# Patient Record
Sex: FEMALE | Race: WHITE | NOT HISPANIC OR LATINO | Employment: OTHER | URBAN - METROPOLITAN AREA
[De-identification: names, ages, dates, MRNs, and addresses within clinical notes are randomized per-mention and may not be internally consistent; named-entity substitution may affect disease eponyms.]

---

## 2017-01-23 ENCOUNTER — TRANSCRIBE ORDERS (OUTPATIENT)
Dept: SLEEP CENTER | Facility: CLINIC | Age: 67
End: 2017-01-23

## 2017-01-23 DIAGNOSIS — Z99.89 OSA ON CPAP: Primary | ICD-10-CM

## 2017-01-23 DIAGNOSIS — G47.33 OSA ON CPAP: Primary | ICD-10-CM

## 2017-02-21 ENCOUNTER — TRANSCRIBE ORDERS (OUTPATIENT)
Dept: SLEEP CENTER | Facility: CLINIC | Age: 67
End: 2017-02-21

## 2017-02-21 ENCOUNTER — HOSPITAL ENCOUNTER (OUTPATIENT)
Dept: SLEEP CENTER | Facility: CLINIC | Age: 67
Discharge: HOME | End: 2017-02-21
Payer: MEDICARE

## 2017-02-21 DIAGNOSIS — G47.33 OSA ON CPAP: ICD-10-CM

## 2017-02-21 DIAGNOSIS — Z99.89 OSA ON CPAP: ICD-10-CM

## 2017-02-21 DIAGNOSIS — G47.33 OSA (OBSTRUCTIVE SLEEP APNEA): Primary | ICD-10-CM

## 2017-05-02 ENCOUNTER — ALLSCRIPTS OFFICE VISIT (OUTPATIENT)
Dept: OTHER | Facility: OTHER | Age: 67
End: 2017-05-02

## 2017-09-13 ENCOUNTER — ALLSCRIPTS OFFICE VISIT (OUTPATIENT)
Dept: OTHER | Facility: OTHER | Age: 67
End: 2017-09-13

## 2017-09-20 ENCOUNTER — GENERIC CONVERSION - ENCOUNTER (OUTPATIENT)
Dept: OTHER | Facility: OTHER | Age: 67
End: 2017-09-20

## 2017-09-21 ENCOUNTER — GENERIC CONVERSION - ENCOUNTER (OUTPATIENT)
Dept: OTHER | Facility: OTHER | Age: 67
End: 2017-09-21

## 2017-09-21 DIAGNOSIS — M18.12 PRIMARY OSTEOARTHRITIS OF FIRST CARPOMETACARPAL JOINT OF LEFT HAND: ICD-10-CM

## 2017-10-18 ENCOUNTER — GENERIC CONVERSION - ENCOUNTER (OUTPATIENT)
Dept: OTHER | Facility: OTHER | Age: 67
End: 2017-10-18

## 2017-10-18 ENCOUNTER — TRANSCRIBE ORDERS (OUTPATIENT)
Dept: LAB | Facility: CLINIC | Age: 67
End: 2017-10-18

## 2017-10-18 ENCOUNTER — APPOINTMENT (OUTPATIENT)
Dept: LAB | Facility: CLINIC | Age: 67
End: 2017-10-18
Payer: MEDICARE

## 2017-10-18 ENCOUNTER — OFFICE VISIT (OUTPATIENT)
Dept: LAB | Facility: CLINIC | Age: 67
End: 2017-10-18
Payer: MEDICARE

## 2017-10-18 DIAGNOSIS — M18.12 PRIMARY OSTEOARTHRITIS OF FIRST CARPOMETACARPAL JOINT OF LEFT HAND: Primary | ICD-10-CM

## 2017-10-18 DIAGNOSIS — M18.12 PRIMARY OSTEOARTHRITIS OF FIRST CARPOMETACARPAL JOINT OF LEFT HAND: ICD-10-CM

## 2017-10-18 LAB
ATRIAL RATE: 62 BPM
BASOPHILS # BLD AUTO: 0.04 THOUSANDS/ΜL (ref 0–0.1)
BASOPHILS NFR BLD AUTO: 1 % (ref 0–1)
EOSINOPHIL # BLD AUTO: 0.13 THOUSAND/ΜL (ref 0–0.61)
EOSINOPHIL NFR BLD AUTO: 2 % (ref 0–6)
ERYTHROCYTE [DISTWIDTH] IN BLOOD BY AUTOMATED COUNT: 13.3 % (ref 11.6–15.1)
HCT VFR BLD AUTO: 51.8 % (ref 34.8–46.1)
HGB BLD-MCNC: 17.1 G/DL (ref 11.5–15.4)
LYMPHOCYTES # BLD AUTO: 1.69 THOUSANDS/ΜL (ref 0.6–4.47)
LYMPHOCYTES NFR BLD AUTO: 25 % (ref 14–44)
MCH RBC QN AUTO: 31.3 PG (ref 26.8–34.3)
MCHC RBC AUTO-ENTMCNC: 33 G/DL (ref 31.4–37.4)
MCV RBC AUTO: 95 FL (ref 82–98)
MONOCYTES # BLD AUTO: 0.66 THOUSAND/ΜL (ref 0.17–1.22)
MONOCYTES NFR BLD AUTO: 10 % (ref 4–12)
NEUTROPHILS # BLD AUTO: 4.28 THOUSANDS/ΜL (ref 1.85–7.62)
NEUTS SEG NFR BLD AUTO: 62 % (ref 43–75)
P AXIS: 61 DEGREES
PLATELET # BLD AUTO: 118 THOUSANDS/UL (ref 149–390)
PMV BLD AUTO: 11.8 FL (ref 8.9–12.7)
PR INTERVAL: 174 MS
QRS AXIS: -9 DEGREES
QRSD INTERVAL: 104 MS
QT INTERVAL: 406 MS
QTC INTERVAL: 412 MS
RBC # BLD AUTO: 5.47 MILLION/UL (ref 3.81–5.12)
T WAVE AXIS: 29 DEGREES
VENTRICULAR RATE: 62 BPM
WBC # BLD AUTO: 6.8 THOUSAND/UL (ref 4.31–10.16)

## 2017-10-18 PROCEDURE — 85025 COMPLETE CBC W/AUTO DIFF WBC: CPT

## 2017-10-18 PROCEDURE — 36415 COLL VENOUS BLD VENIPUNCTURE: CPT

## 2017-10-18 PROCEDURE — 93005 ELECTROCARDIOGRAM TRACING: CPT

## 2017-10-23 ENCOUNTER — ALLSCRIPTS OFFICE VISIT (OUTPATIENT)
Dept: OTHER | Facility: OTHER | Age: 67
End: 2017-10-23

## 2017-10-23 DIAGNOSIS — R05.9 COUGH: ICD-10-CM

## 2017-10-23 DIAGNOSIS — Z72.0 TOBACCO USE: ICD-10-CM

## 2017-10-23 DIAGNOSIS — E11.65 TYPE 2 DIABETES MELLITUS WITH HYPERGLYCEMIA (HCC): ICD-10-CM

## 2017-10-24 ENCOUNTER — APPOINTMENT (OUTPATIENT)
Dept: RADIOLOGY | Facility: CLINIC | Age: 67
End: 2017-10-24
Payer: MEDICARE

## 2017-10-24 ENCOUNTER — LAB CONVERSION - ENCOUNTER (OUTPATIENT)
Dept: OTHER | Facility: OTHER | Age: 67
End: 2017-10-24

## 2017-10-24 ENCOUNTER — TRANSCRIBE ORDERS (OUTPATIENT)
Dept: RADIOLOGY | Facility: CLINIC | Age: 67
End: 2017-10-24

## 2017-10-24 DIAGNOSIS — R05.9 COUGH: ICD-10-CM

## 2017-10-24 DIAGNOSIS — Z72.0 TOBACCO USE: ICD-10-CM

## 2017-10-24 LAB
A/G RATIO (HISTORICAL): 1.3 (CALC) (ref 1–2.5)
ALBUMIN SERPL BCP-MCNC: 3.9 G/DL (ref 3.6–5.1)
ALP SERPL-CCNC: 112 U/L (ref 33–130)
ALT SERPL W P-5'-P-CCNC: 23 U/L (ref 6–29)
AST SERPL W P-5'-P-CCNC: 17 U/L (ref 10–35)
BILIRUB SERPL-MCNC: 0.3 MG/DL (ref 0.2–1.2)
BUN SERPL-MCNC: 14 MG/DL (ref 7–25)
BUN/CREA RATIO (HISTORICAL): ABNORMAL (CALC) (ref 6–22)
CALCIUM SERPL-MCNC: 9.7 MG/DL (ref 8.6–10.4)
CHLORIDE SERPL-SCNC: 102 MMOL/L (ref 98–110)
CO2 SERPL-SCNC: 22 MMOL/L (ref 20–31)
CREAT SERPL-MCNC: 0.71 MG/DL (ref 0.5–0.99)
EGFR AFRICAN AMERICAN (HISTORICAL): 101 ML/MIN/1.73M2
EGFR-AMERICAN CALC (HISTORICAL): 87 ML/MIN/1.73M2
GAMMA GLOBULIN (HISTORICAL): 2.9 G/DL (CALC) (ref 1.9–3.7)
GLUCOSE (HISTORICAL): 325 MG/DL (ref 65–99)
LYME IGG/IGM AB (HISTORICAL): <0.9 INDEX
POTASSIUM SERPL-SCNC: 4.7 MMOL/L (ref 3.5–5.3)
SODIUM SERPL-SCNC: 136 MMOL/L (ref 135–146)
TOTAL PROTEIN (HISTORICAL): 6.8 G/DL (ref 6.1–8.1)
TSH SERPL DL<=0.05 MIU/L-ACNC: 1 MIU/L (ref 0.4–4.5)

## 2017-10-24 PROCEDURE — 71020 HB CHEST X-RAY 2VW FRONTAL&LATL: CPT

## 2017-10-24 NOTE — PROGRESS NOTES
Assessment  1  Arthritis of carpometacarpal (CMC) joint of left thumb (716 94) (M18 12)   2  Impaired fasting glucose (790 21) (R73 01)   3  Obesity (BMI 30-39 9) (278 00) (E66 9)   4  Secondary polycythemia (289 0) (D75 1)   5  Tobacco use (305 1) (Z72 0)   6  Cough (786 2) (R05)   7  DDD (degenerative disc disease), lumbar (722 52) (M51 36)    Plan  Cough, SocHx: Tobacco use    · * XR CHEST PA & LATERAL; Status:Active; Requested COM:[id number];   DDD (degenerative disc disease), lumbar    · Meloxicam 7 5 MG Oral Tablet; TAKE 1 TABLET Twice daily PRN PAIN    Discussion/Summary    Comments:  She has a history of impaired fasting glucose and has not followed through with her hemoglobin A1c  We obtain that today  He also got CBC CMP TSH and Lyme  She has a persistent cough asked her to go for chest x-ray  We will going to review these results prior to clearing her for the surgical procedure  She understands  In summary then this is a 41-year-old woman who presents today for medical clearance for a arthroplasty of her left first carpometacarpal joint with Dr Laura Forbes  We reviewed her ECG today which shows normal sinus rhythm with incomplete right bundle branch block  She's asymptomatic from a cardiovascular pulmonary standpoint  We did note that she was noted last year during her preop visit to have hyperglycemia  We asked her to go for hemoglobin A1c among other studies which did not occur  We deniz blood for hemoglobin A1c today and we'll get CMP as well a TSH  We'll also get a Lyme titer as she does have arthralgias and is concerned about potential Lyme exposure  Her preop CBC revealed mild polycythemia which was slightly improved from previously  She continues to smoke and we discussed that this is likely secondary polycythemia from smoking and she is again advised that she needs to discontinue this completely  She requests refill of meloxicam which she takes for low back pain and we provided this for her   She has a persistent cough which she believes is a smoker's cough but we have asked her to go for chest x-ray prior to her surgery as well  She agrees  History of Present Illness  Pre-Op Visit (Brief): The patient is being seen for a preoperative visit  The procedure is a(n) Arthroplasty of L CMC joint  scheduled for 11/14/17 with Reji Hightower  The indication for surgery is Persistent pain and limitation of function  Surgical Risk Assessment:   Prior Anesthesia: She had prior anesthesia,-- a prior adverse reaction to general anesthesia-- and-- Anorexia with inhalation agents  Pertinent Past Medical History: wears dentures,-- FRANK,-- obesity-- and-- Dentures, CPAP 18  Full mask, but-- no angina,-- no arrhythmia,-- no CAD,-- no CHF,-- no chronic liver disease,-- no acute hepatitis,-- no coagulation delay,-- no pulmonary embolism,-- no DVT,-- does not use anticoagulants,-- no diabetes,-- does not use insulin,-- no thyroid disease,-- no seizure disorder,-- no CVA,-- no asthma,-- no COPD,-- no renal disease-- and-- no low serum albumin  Exercise Capacity: able to walk four blocks without symptoms-- and-- able to walk two flights of stairs without symptoms  Lifestyle Factors: denies alcohol use, denies tobacco use, denies illegal drug use and Tobacco 3-4 / day  Symptoms: no easy bleeding,-- no easy bruising,-- no frequent nosebleeds,-- no chest pain,-- no dyspnea,-- no edema,-- no palpitations-- and-- no wheezing--    The patient presents with complaints of cough (Clear sputum  No hemoptysis or purulent sputum  )  Pertinent Family History: no pertinent family history  Living Situation: home is secure and supportive and no post-op concerns with her living situation  Review of Systems    Constitutional: no fever,-- no recent weight gain-- and-- no recent weight loss  Cardiovascular: no chest pain,-- no palpitations-- and-- no lower extremity edema     Respiratory: no shortness of breath-- and-- no wheezing--    The patient presents with complaints of cough (As noted in history of present illness  Clear TMs sputum  No hemoptysis and no purulent sputum  No pleuritic chest pain)  Gastrointestinal: No complaints of abdominal pain, no constipation, no nausea or vomiting, no diarrhea, no bloody stools,-- no abdominal pain,-- no constipation-- and-- no diarrhea  Genitourinary: No complaints of dysuria, no incontinence, no pelvic pain, no dysmenorrhea, no vaginal discharge or bleeding-- and-- no incontinence  Musculoskeletal: arthralgias,-- joint swelling-- and-- joint stiffness, but-- as noted in HPI  Integumentary: no rashes  Neurological: no headache-- and-- no dizziness  Active Problems  1  Analgesic use (V58 69) (Z79 899)   2  Arthritis of carpometacarpal (CMC) joint of left thumb (716 94) (M18 12)   3  Cataract of both eyes (366 9) (H26 9)   4  DDD (degenerative disc disease), lumbar (722 52) (M51 36)   5  Facet arthropathy, lumbar (721 3) (M12 88)   6  Impaired fasting glucose (790 21) (R73 01)   7  Lumbosacral radiculopathy (724 4) (M54 17)   8  Obesity (BMI 30-39 9) (278 00) (E66 9)   9  Obstructive sleep apnea of adult (327 23) (G47 33)   10  Sacroiliac pain (724 6) (M53 3)   11  Secondary polycythemia (289 0) (D75 1)   12   Tobacco use (305 1) (Z72 0)    Past Medical History   · History of Acute Bronchitis With Bronchospasm (466 0)   · History of Community acquired pneumonia (5) (J18 9)   · History of acute sinusitis (V12 69) (Z87 09)   · History of asthma (V12 69) (Z87 09)   · History of chronic obstructive lung disease (V12 69) (Z87 09)   · History of viral warts (V12 09) (Z86 19)    Surgical History   · History of Abdominoplasty   · History of Hysterectomy   · History of Liver Surgery    Family History  Mother    · Family history of myocardial infarction (V17 3) (Z80 55)  Father    · Family history of myocardial infarction (V17 3) (Z80 55)  Sister    · Family history of Ovarian cancer  Unknown    · Family history of cardiac disorder (V17 49) (Z82 49)   · Family history of diabetes mellitus (V18 0) (Z83 3)   · Family history of liver cancer (V16 0) (Z80 0)   · Family history of malignant neoplasm of breast (V16 3) (Z80 3)   · Family history of malignant neoplasm of stomach (V16 0) (Z80 0)    Social History   · Former smoker (X80 86) (G47 341)   · Quit smoking (V15 82) (V66 113)   · Social alcohol use (Z78 9)   · Tobacco use (305 1) (Z72 0)    Current Meds   1  Benadryl CAPS Recorded   2  Meloxicam 7 5 MG Oral Tablet; TAKE 1 TABLET Twice daily PRN PAIN;   Therapy: 40AEX2340 to (Evaluate:37Erl6628)  Requested for: 60IBX2061; Last   Rx:63Shq2401 Ordered   3  OxyCODONE HCl - 5 MG Oral Capsule; TAKE 1 CAPSULE EVERY 4  TO 6 HOURS AS   NEEDED FOR BREAKTHROUGH PAIN;   Therapy: 07WJN7636 to (Evaluate:10Qld5892); Last Rx:63Ljf1850 Ordered    Allergies  1  Penicillins    Vitals   Recorded: 49RRC6394 66:98LE   Systolic 758   Diastolic 78   Height 5 ft 0 5 in   Weight 184 lb    BMI Calculated 35 34   BSA Calculated 1 81     Physical Exam    Constitutional   General appearance: Abnormal  -- Overweight and in no apparent distress  Eyes   Conjunctiva and lids: No swelling, erythema or discharge  Ears, Nose, Mouth, and Throat   Otoscopic examination: Tympanic membranes translucent with normal light reflex  Canals patent without erythema  Lips, teeth, and gums: Abnormal  -- Edentulous oral cavity  Oropharynx: Normal with no erythema, edema, exudate or lesions  -- Mucosa is moist and I see no evidence of ulcer or exudate or lesion presently  Neck   Neck: Supple, symmetric, trachea midline, no masses  Thyroid: Normal, no thyromegaly  -- No thyroid enlargement or adenopathy  Pulmonary   Respiratory effort: No increased work of breathing or signs of respiratory distress  Auscultation of lungs: Clear to auscultation  -- Clear to auscultation     Cardiovascular   Auscultation of heart: Normal rate and rhythm, normal S1 and S2, no murmurs  -- Heart rate is regular with a rate in the 60s  There is no murmur or gallop noted  Carotid pulses: 2+ bilaterally  -- Carotid upstroke equal bilaterally and without  Examination of extremities for edema and/or varicosities: Normal  -- No edema  Abdomen   Abdomen: Abnormal  -- Obese abdomen with no tenderness or mass appreciated  Liver and spleen: No hepatomegaly or splenomegaly  -- No organomegaly appreciated  Lymphatic   Palpation of lymph nodes in neck: No lymphadenopathy  Musculoskeletal   Gait and station: Normal     Digits and nails: Normal without clubbing or cyanosis  -- Normal capillary refill without cyanosis  Psychiatric   Orientation to person, place, and time: Normal     Mood and affect: Normal        End of Encounter Meds  1  OxyCODONE HCl - 5 MG Oral Capsule; TAKE 1 CAPSULE EVERY 4  TO 6 HOURS AS   NEEDED FOR BREAKTHROUGH PAIN;   Therapy: 30MYB9798 to (Evaluate:21Kun3997); Last Rx:20Sep2017 Ordered  2  Meloxicam 7 5 MG Oral Tablet; TAKE 1 TABLET Twice daily PRN PAIN;   Therapy: 68YNB5424 to (Evaluate:21Apr2018)  Requested for: 23Oct2017; Last   Rx:23Oct2017 Ordered  3  Benadryl CAPS (DiphenhydrAMINE HCl) Recorded    Future Appointments    Date/Time Provider Specialty Site   11/24/2017 10:10 AM Mehdi Bunch, [facility] Cardenas   11/14/2017 12:15 PM BRIDGETTE Jeff   [facility]     Signatures   Electronically signed by : BRIDGETTE Caban ; Oct 23 2017  2:17PM EST                       (Author)

## 2017-10-25 ENCOUNTER — LAB CONVERSION - ENCOUNTER (OUTPATIENT)
Dept: OTHER | Facility: OTHER | Age: 67
End: 2017-10-25

## 2017-10-25 LAB
CONTACT: (HISTORICAL): NORMAL
HBA1C MFR BLD HPLC: 9.9 % OF TOTAL HGB
TEST INFORMATION (HISTORICAL): NORMAL
TEST NAME (HISTORICAL): NORMAL

## 2017-11-10 ENCOUNTER — ANESTHESIA EVENT (OUTPATIENT)
Dept: PERIOP | Facility: HOSPITAL | Age: 67
End: 2017-11-10

## 2017-11-10 ENCOUNTER — GENERIC CONVERSION - ENCOUNTER (OUTPATIENT)
Dept: OTHER | Facility: OTHER | Age: 67
End: 2017-11-10

## 2017-11-11 ENCOUNTER — APPOINTMENT (OUTPATIENT)
Dept: LAB | Facility: CLINIC | Age: 67
End: 2017-11-11
Payer: MEDICARE

## 2017-11-11 DIAGNOSIS — E11.65 TYPE 2 DIABETES MELLITUS WITH HYPERGLYCEMIA (HCC): ICD-10-CM

## 2017-11-11 LAB
ANION GAP SERPL CALCULATED.3IONS-SCNC: 8 MMOL/L (ref 4–13)
BUN SERPL-MCNC: 12 MG/DL (ref 5–25)
CALCIUM SERPL-MCNC: 9.2 MG/DL (ref 8.3–10.1)
CHLORIDE SERPL-SCNC: 101 MMOL/L (ref 100–108)
CO2 SERPL-SCNC: 29 MMOL/L (ref 21–32)
CREAT SERPL-MCNC: 0.59 MG/DL (ref 0.6–1.3)
GFR SERPL CREATININE-BSD FRML MDRD: 94 ML/MIN/1.73SQ M
GLUCOSE P FAST SERPL-MCNC: 256 MG/DL (ref 65–99)
POTASSIUM SERPL-SCNC: 4.2 MMOL/L (ref 3.5–5.3)
SODIUM SERPL-SCNC: 138 MMOL/L (ref 136–145)

## 2017-11-11 PROCEDURE — 36415 COLL VENOUS BLD VENIPUNCTURE: CPT

## 2017-11-11 PROCEDURE — 80048 BASIC METABOLIC PNL TOTAL CA: CPT

## 2017-11-13 ENCOUNTER — GENERIC CONVERSION - ENCOUNTER (OUTPATIENT)
Dept: OTHER | Facility: OTHER | Age: 67
End: 2017-11-13

## 2017-11-14 ENCOUNTER — ANESTHESIA (OUTPATIENT)
Dept: PERIOP | Facility: HOSPITAL | Age: 67
End: 2017-11-14

## 2017-11-30 ENCOUNTER — ALLSCRIPTS OFFICE VISIT (OUTPATIENT)
Dept: OTHER | Facility: OTHER | Age: 67
End: 2017-11-30

## 2017-12-05 NOTE — PROGRESS NOTES
Assessment    1  Keratosis (701 1) (L57 0)   2  Uncontrolled type 2 diabetes mellitus without complication, without long-term current   use of insulin (250 02) (E11 65)    Plan  Uncontrolled type 2 diabetes mellitus without complication, without long-term current  use of insulin    · GlipiZIDE ER 5 MG Oral Tablet Extended Release 24 Hour; take 1 tablet by mouth  daily 30 MINUTES BEFORE BREAKFAST    Discussion/Summary    In summary then this is a 22-year-old female who presents today for treatment of to keratotic lesions of her right posterior arm  They are both treated with cryotherapy of 45 seconds duration followed by 2nd cycle of 30 seconds duration to each lesion  I believe that they are seborrheic keratoses though cannot rule out actinic versus early squamous cell though I believe this is unlikely  We discussed the expected evolution of these lesions over time  She is asked to return in 2 weeks if they have not completely resolved  She agrees  We also spent significant time examining her recent blood sugar log and discussing her diabetes  She is going to continue on metformin 1000 b i d  She is also asked to begin glipizide ER 5 mg daily  We discussed possibly adding Januvia though she is concerned about the cost is going to investigate this with her insurance plan  She will send a blood sugar log in the next week and we will follow up with her at that time  She agrees  Chief Complaint  I am frustrated  I stay on my diet but my BS stay elevated  Metformin 1000 BID  No GI c/o  FBS recently 120s - 160s though 1 in the 250s  History of Present Illness  Patient is a 22-year-old female who states that she has irritated lesions of her right upper posterior arm which have been present for months which are bothering her more recently  She has had no bleeding  She has had no fevers chills, weight loss, anorexia or constitutional symptoms   She has also been monitoring her blood sugars since beginning metformin a presents her log today which we examined  Typically her blood sugars are significantly improved though they continue to remain in the 140-160 range on a regular basis and occasionally over 200  She states that she is attempting to watch her diet lose weight but is not having much success  She has no cardiovascular pulmonary complaint  Active Problems    1  Analgesic use (V58 69) (Z79 899)   2  Arthritis of carpometacarpal (CMC) joint of left thumb (716 94) (M18 12)   3  Cataract of both eyes (366 9) (H26 9)   4  Cough (786 2) (R05)   5  DDD (degenerative disc disease), lumbar (722 52) (M51 36)   6  Facet arthropathy, lumbar (721 3) (M12 88)   7  Fatigue, unspecified type (780 79) (R53 83)   8  Impaired fasting glucose (790 21) (R73 01)   9  Lumbosacral radiculopathy (724 4) (M54 17)   10  Obesity (BMI 30-39 9) (278 00) (E66 9)   11  Obstructive sleep apnea of adult (327 23) (G47 33)   12  Sacroiliac pain (724 6) (M53 3)   13  Secondary polycythemia (289 0) (D75 1)   14  Tobacco use (305 1) (Z72 0)   15  Uncontrolled type 2 diabetes mellitus without complication, without long-term current    use of insulin (250 02) (E11 65)    Past Medical History    1  History of Acute Bronchitis With Bronchospasm (466 0)   2  History of Community acquired pneumonia (5) (J18 9)   3  History of acute sinusitis (V12 69) (Z87 09)   4  History of asthma (V12 69) (Z87 09)   5  History of chronic obstructive lung disease (V12 69) (Z87 09)   6  History of viral warts (V12 09) (Z86 19)    Surgical History    1  History of Abdominoplasty   2  History of Hysterectomy   3  History of Liver Surgery    Family History  Mother    1  Family history of myocardial infarction (V17 3) (Z82 49)  Father    2  Family history of myocardial infarction (V17 3) (Z82 49)  Sister    3  Family history of Ovarian cancer  Unknown    4  Family history of cardiac disorder (V17 49) (Z82 49)   5   Family history of diabetes mellitus (V18 0) (Z83 3) 6  Family history of liver cancer (V16 0) (Z80 0)   7  Family history of malignant neoplasm of breast (V16 3) (Z80 3)   8  Family history of malignant neoplasm of stomach (V16 0) (Z80 0)    Social History    · Former smoker (N23 96) (L45 011)   · Quit smoking (V15 82) (Z67 177)   · Social alcohol use (Z78 9)   · Tobacco use (305 1) (Z72 0)    Current Meds   1  Accu-Chek Supriya Device; USE AS DIRECTED; Therapy: 01ZQC0961 to (Evaluate:11Nov2017)  Requested for: 35JNH3211; Last   Rx:10Nov2017 Ordered   2  Accu-Chek Supriya Plus In Vitro Strip; TEST TWICE DAILY; Therapy: 83MIU6619 to ([place] Reason)  Requested for: 15ZXR8088; Last   Rx:10Nov2017 Ordered   3  Accu-Chek Multiclix Lancets Miscellaneous; TEST BLOOD SUGAR TWICE DAILY; Therapy: 96AWB2018 to ([place] [facility])  Requested for: 09HMW0777; Last   Rx:10Nov2017 Ordered   4  Benadryl CAPS Recorded   5  Meloxicam 7 5 MG Oral Tablet; TAKE 1 TABLET Twice daily PRN PAIN;   Therapy: 09UJM0165 to (Evaluate:21Apr2018)  Requested for: 23Oct2017; Last   Rx:23Oct2017 Ordered   6  MetFORMIN HCl - 1000 MG Oral Tablet; TAKE 1 TABLET TWICE DAILY; Therapy: 66LZT3712 to (Evaluate:09Wts8979)  Requested for: 16VHO5456; Last   Rx:16Nov2017 Ordered   7  OxyCODONE HCl - 5 MG Oral Capsule; TAKE 1 CAPSULE EVERY 4  TO 6 HOURS AS   NEEDED FOR BREAKTHROUGH PAIN;   Therapy: 95OMH0121 to (Evaluate:10Ngw4487); Last Rx:04Uxb3257 Ordered    Allergies    1  Penicillins    Vitals  Vital Signs    Recorded: 96HLY0201 54:41SF   Systolic 148   Diastolic 70   Height 5 ft 0 5 in   Weight 186 lb    BMI Calculated 35 73   BSA Calculated 1 82     Physical Exam    Constitutional   General appearance: Abnormal   Obese and in no apparent distress  Skin   Examination of the skin for lesions: Abnormal   There are to raised keratotic lesions of the right posterior upper arm  One is approximately 6 mm in diameter in the other is approximately 3 mm in diameter   They are gray, somewhat scaly without erythema or irregularity  The are consistent with keratosis sees though cannot completely rule out squamous cell CA though I doubt this based on appearance  Each lesion was treated with 45 seconds of cryotherapy with 2nd cycle of 30 seconds to each lesion     Psychiatric   Orientation to person, place, and time: Normal     Mood and affect: Normal          Signatures   Electronically signed by : BRIDGETTE Norris ; Nov 30 2017 11:59AM EST                       (Author)

## 2018-01-12 VITALS
WEIGHT: 187 LBS | HEIGHT: 61 IN | HEART RATE: 60 BPM | SYSTOLIC BLOOD PRESSURE: 147 MMHG | BODY MASS INDEX: 35.3 KG/M2 | DIASTOLIC BLOOD PRESSURE: 77 MMHG

## 2018-01-12 VITALS
HEIGHT: 61 IN | SYSTOLIC BLOOD PRESSURE: 128 MMHG | DIASTOLIC BLOOD PRESSURE: 70 MMHG | BODY MASS INDEX: 35.12 KG/M2 | WEIGHT: 186 LBS

## 2018-01-12 NOTE — RESULT NOTES
Verified Results  (1) BASIC METABOLIC PROFILE 19NIG7071 07:22AM Micheal Kumari Order Number: [id number]     Test Name Result Flag Reference   SODIUM 138 mmol/L  136-145   POTASSIUM 4 2 mmol/L  3 5-5 3   Slightly Hemolyzed; Results May be Affected   CHLORIDE 101 mmol/L  100-108   CARBON DIOXIDE 29 mmol/L  21-32   ANION GAP (CALC) 8 mmol/L  4-13   BLOOD UREA NITROGEN 12 mg/dL  5-25   CREATININE 0 59 mg/dL L 0 60-1 30   Standardized to IDMS reference method   CALCIUM 9 2 mg/dL  8 3-10 1   eGFR 94 ml/min/1 73sq m     National Kidney Disease Education Program recommendations are as follows:  GFR calculation is accurate only with a steady state creatinine  Chronic Kidney disease less than 60 ml/min/1 73 sq  meters  Kidney failure less than 15 ml/min/1 73 sq  meters  GLUCOSE FASTING 256 mg/dL H 65-99   Specimen collection should occur prior to Sulfasalazine administration due to the potential for falsely depressed results  Specimen collection should occur prior to Sulfapyridine administration due to the potential for falsely elevated results

## 2018-01-12 NOTE — RESULT NOTES
Verified Results  * XR CHEST PA & LATERAL 32YEZ8113 12:29PM Micheal Kumari Order Number: [id number]     Test Name Result Flag Reference   XR CHEST PA & LATERAL (Report)     CHEST - DUAL ENERGY     INDICATION: Preoperative assessment  Scheduled for orthopedic surgery  History of tobacco use and cough     COMPARISON: 11/12/2008     VIEWS: PA (including soft tissue/bone algorithms) and lateral projections     IMAGES: 4     FINDINGS:        Cardiomediastinal silhouette appears unremarkable  The lungs are clear  No pneumothorax or pleural effusion  Chronic pleural thickening on the right is unchanged     Visualized osseous structures appear within normal limits for the patient's age  IMPRESSION:     No active pulmonary disease  Workstation performed: [id number]     Signed by:   Elif Roche MD   10/24/17     (1) COMPREHENSIVE METABOLIC PANEL 96BGF6228 42:67UF Nke[facility]itanner Faulkner     Test Name Result Flag Reference   GLUCOSE 325 mg/dL H 65-99   Fasting reference interval     For someone without known diabetes, a glucose  value >125 mg/dL indicates that they may have  diabetes and this should be confirmed with a  follow-up test    UREA NITROGEN (BUN) 14 mg/dL  7-25   CREATININE 0 71 mg/dL  0 50-0 99   For patients >52years of age, the reference limit  for Creatinine is approximately 13% higher for people  identified as -American  eGFR NON-AFR   AMERICAN 87 mL/min/1 73m2  > OR = 60   eGFR AFRICAN AMERICAN 101 mL/min/1 73m2  > OR = 60   BUN/CREATININE RATIO   0-79   NOT APPLICABLE (calc)   SODIUM 136 mmol/L  135-146   POTASSIUM 4 7 mmol/L  3 5-5 3   CHLORIDE 102 mmol/L     CARBON DIOXIDE 22 mmol/L  20-31   CALCIUM 9 7 mg/dL  8 6-10 4   PROTEIN, TOTAL 6 8 g/dL  6 1-8 1   ALBUMIN 3 9 g/dL  3 6-5 1   GLOBULIN 2 9 g/dL (calc)  1 9-3 7   ALBUMIN/GLOBULIN RATIO 1 3 (calc)  1 0-2 5   BILIRUBIN, TOTAL 0 3 mg/dL  0 2-1 2   ALKALINE PHOSPHATASE 112 U/L     AST 17 U/L  10-35   ALT 23 U/L  6-29 (Q) LYME DISEASE AB, TOTAL W/REFL WB (IGG, IGM) 23Oct2017 12:00AM Tc Bonilla     Test Name Result Flag Reference   LYME AB SCREEN <0 90 index     Index                Interpretation                     -----                --------------                     < 0 90               Negative                     0  90-1 09            Equivocal                     > 1 09               Positive      As recommended by the Food and Drug Administration   (FDA), all samples with positive or equivocal   results in a Borrelia burgdorferi antibody screen  will be tested using a blot method  Positive or   equivocal screening test results should not be   interpreted as truly positive until verified as such   using a supplemental assay (e g , B  burgdorferi blot)  The screening test and/or blot for B  burgdorferi   antibodies may be falsely negative in early stages  of Lyme disease, including the period when erythema   migrans is apparent  (Q) CBC (H/H, RBC, INDICES, WBC, PLT) 23Oct2017 12:00AM Tc Bonilla     Test Name Result Flag Reference   WHITE BLOOD CELL COUNT 7 7 Thousand/uL  3 8-10 8   RED BLOOD CELL COUNT 5 35 Million/uL H 3 80-5 10   HEMOGLOBIN 17 1 g/dL H 11 7-15 5   HEMATOCRIT 49 4 % H 35 0-45 0   MCV 92 3 fL  80 0-100 0   MCH 32 0 pg  27 0-33 0   MCHC 34 6 g/dL  32 0-36 0   RDW 12 0 %  11 0-15 0   PLATELET COUNT 175 Thousand/uL L 140-400   WE RECEIVED YOUR HANDWRITTEN TEST ORDER AND  PERFORMED A HEMOGRAM WITH A PLATELET WITHOUT  A DIFFERENTIAL  IF THIS IS NOT WHAT YOU INTENDED  TO ORDER, PLEASE CONTACT YOUR LOCAL CLIENT SERVICE  REPRESENTATIVE IMMEDIATELY SO THAT WE CAN   ADJUST OUR BILLING APPROPRIATELY  YOU MAY ALSO   INQUIRE ABOUT ALTERNATIVE OR ADDITIONAL TESTING     MPV 12 5 fL  7 5-12 5           (Q) TSH, 3RD GENERATION 23Oct2017 12:00AM Cesar Hilario     Test Name Result Flag Reference   TSH 1 00 mIU/L  0 40-4 50     (Q) TEST AUTHORIZATION 23Oct2017 12:00AM Cesar Hilario     Test Name Result Flag Reference   TEST(S) ORDERED ON$REQUISITION HEMOGLOBIN A1c     TEST CODE: 496QHO     CLIENT CONTACT: EVERT TREVINO     REPORT ALWAYS MESSAGE$SIGNATURE See Below     The laboratory testing on this patient was verbally requested  or confirmed by the ordering physician or his or her authorized  representative after contact with an employee of [employer]  Federal regulations require that we maintain on file written  authorization for all laboratory testing  Accordingly we are asking  that the ordering physician or his or her authorized representative  sign a copy of this report and promptly return it to the client    Signature:____________________________________________________     (Q) HEMOGLOBIN A1c 23Oct2017 12:00AM Trever Hinkle     Test Name Result Flag Reference   HEMOGLOBIN A1c 9 9 % of total Hgb H <5 7   For someone without known diabetes, a hemoglobin A1c  value of 6 5% or greater indicates that they may have   diabetes and this should be confirmed with a follow-up   test      For someone with known diabetes, a value <7% indicates   that their diabetes is well controlled and a value   greater than or equal to 7% indicates suboptimal   control  A1c targets should be individualized based on   duration of diabetes, age, comorbid conditions, and   other considerations  Currently, no consensus exists regarding use of  hemoglobin A1c for diagnosis of diabetes for children

## 2018-01-12 NOTE — MISCELLANEOUS
Message   Date: 24 Jun 2016 2:49 PM EST, Recorded By: Whitney Ragsdale, Care Coordinator   Phone: [phone]   Reason: Eladia Peguero from [address] [facility] [address] on [place] triage line today at 6808, wanting to verify if Dr Che Krause treated her for work related injury  Requesting CB      I spoke with Marilu Addison, advised I don't see in her chart that it was WC  She just wanted to verify they were billing correctly  ************        Active Problems    1  Acute Bronchitis With Bronchospasm (466 0)   2  Analgesic use (V58 69) (Z79 899)   3  Cataract of both eyes (366 9) (H26 9)   4  Community acquired pneumonia (5) (J18 9)   5  DDD (degenerative disc disease), lumbar (722 52) (M51 36)   6  Facet arthropathy, lumbar (721 3) (M46 96)   7  Lower back pain (724 2) (M54 5)   8  Lumbosacral radiculopathy (724 4) (M54 17)   9  Sacroiliac pain (724 6) (M53 3)   10  Tobacco use (305 1) (Z72 0)   11  Viral warts (078 10) (B07 9)    Current Meds   1  Gabapentin 100 MG Oral Capsule; TAKE TWO CAPSULES BY MOUTH IN THE   MORNING AND THREE  CAPSULES AT BEDTIME; Therapy: 39ABZ9079 to (Evaluate:78Med2780)  Requested for: 26Aph9162; Last   Rx:21Dec2015 Ordered   2  Meloxicam 7 5 MG Oral Tablet; TAKE 1 TABLET Twice daily PRN PAIN;   Therapy: 39XLF7581 to (Evaluate:39Ecb2514)  Requested for: 26STW4454; Last   Rx:23Nov2015 Ordered   3  Oxycodone-Acetaminophen 5-325 MG Oral Tablet; TAKE 1 TABLET EVERY day AS   NEEDED; Therapy: 88WPX1668 to (Evaluate:08Yoo1298); Last Rx:23Nov2015 Ordered   4  ProAir  (90 Base) MCG/ACT Inhalation Aerosol Solution; INHALE 2 PUFFS   EVERY 4-6 HOURS AS NEEDED; Therapy: 07QPP9069 to (Evaluate:01Jun2014)  Requested for: 02KVB9310; Last   Rx:03Mar2014 Ordered    Allergies    1  Penicillins    Signatures   Electronically signed by :  Jil Anne, ; Jun 24 2016  2:51PM EST                       (Author)

## 2018-01-13 VITALS
HEIGHT: 61 IN | DIASTOLIC BLOOD PRESSURE: 78 MMHG | SYSTOLIC BLOOD PRESSURE: 161 MMHG | BODY MASS INDEX: 35.3 KG/M2 | WEIGHT: 187 LBS | HEART RATE: 87 BPM

## 2018-01-13 VITALS
HEIGHT: 61 IN | SYSTOLIC BLOOD PRESSURE: 144 MMHG | BODY MASS INDEX: 34.74 KG/M2 | DIASTOLIC BLOOD PRESSURE: 78 MMHG | WEIGHT: 184 LBS

## 2018-01-13 NOTE — PROGRESS NOTES
Preliminary Nursing Report                Patient Information    Initial Encounter Entry Date:   2017 11:48 AM EST (Automated Transmission Automated Transmission)       Last Modified:   {Thalia Allen}              Legal Name: [id number] Number:        YOB: 1950        Age (years): 69        Gender: F        Body Mass Index (BMI): 37 kg/m2        Height: 60 in  Weight: 189 lbs (86 kgs)           Address:   [address] [place] [address]              Phone: [phone]   (consent to leave messages)        Email:        Ethnicity: Decline to State        Sabianism:        Marital Status:        Preferred Language: English        Race: Other Race                    Patient Insurance Information        Primary Insurance Information Carrier Name: {Primary  CarrierName}           Carrier Address:   {Primary  CarrierAddress}              Carrier Phone: {Primary  CarrierPhone}          Group Number: {Primary  GroupNumber}          Policy Number: {Primary  PolicyNumber}          Insured Name: {Primary  InsuredName}          Insured : {Primary  InsuredDOB}          Relationship to Insured: {Primary  RelationshiptoInsured}           Secondary Insurance Information Carrier Name: {Secondary  CarrierName}           Carrier Address:   {Secondary  CarrierAddress}              Carrier Phone: {Secondary  CarrierPhone}          Group Number: {Secondary  GroupNumber}          Policy Number: {Secondary  PolicyNumber}          Insured Name: {Secondary  InsuredName}          Insured : {Secondary  InsuredDOB}          Relationship to Insured: {Secondary  RelationshiptoInsured}                       Health Profile   Booking #:   Stan Standard #: [id number]               DOS: 2017    Surgery : REPAIR WRIST JOINT(S)    Add'l Procedures/Notes:     Surgery Risk: Intermediate          Precautions     BMI       Obstructive sleep apnea of adult                   Allergies Penicillins             Medications    Benadryl CAPS       Gabapentin 100 MG Oral Capsule       Meloxicam 7 5 MG Oral Tablet       OxyCODONE HCl - 5 MG Oral Capsule               Conditions    Analgesic use       Arthritis of carpometacarpal (CMC) joint of left thumb       Cataract of both eyes       DDD (degenerative disc disease), lumbar       Facet arthropathy, lumbar       Impaired fasting glucose       Limited Exercise Tolerance       Lumbosacral radiculopathy       Obesity (BMI 30-39  9)       Obstructive sleep apnea of adult       Pain of left hand       Sacroiliac pain       Secondary polycythemia       Tobacco use               Family History    None             Surgical History    None             Social History    Former smoker       Quit smoking       Social alcohol use       Tobacco use                               Patient Instructions       Medical Procedure Risk  NPO Instructions   The day before surgery it is recommended to have a light dinner at your usual time and you are allowed a light snack early in the evening  Do not eat anything heavy or eat a big meal after 7pm  Do not eat or drink anything after midnight prior to your surgery  If you are supposed to take any of your medications, do so with a sip of water  Failure to follow these instructions can lead to an increased risk of lung complications and may result in a delay or cancellation of your procedure  If you have any questions, contact your institution for further instructions  No candy, no gum, no mints, no chewing tobacco          Gabapentin 100 MG Oral Capsule  Medication Instruction (Neurological Medication) 8  Please continue to take this medication on your normal schedule  If this is an oral medication and you take in the morning, you may do so with a sip of water           Meloxicam 7 5 MG Oral Tablet  Medication Instruction (NSAID - Pain Medication) 61  Please stop ibuprofen, naproxen and other non-steroidal anti-inflammatory drugs (NSAIDS) for 24 hrs before surgery  OxyCODONE HCl - 5 MG Oral Capsule  Medication Instruction (Opioids - Pain Medication) 62  Please continue the following medications, if needed, up to and including the day of surgery (with a sip of water)  Obstructive sleep apnea of adult  Sleep Apnea   Please notify surgeon and anesthesiologist if you have sleep apnea, severe snoring, or daytime somnolence  For those sleep apnea patients with continuous positive airway pressure (CPAP or BiPAP) machines, please bring your machine to the hospital on the day of surgery  Tobacco use  Smoking Cessation   Smoking before and after surgery can lead to complications  Patients who quit smoking at least eight weeks before surgery have complication rates almost as low as non-smokers  Smokers who can stop smoking 24 or 48 hours before surgery may also benefit from decreased amounts of nicotine and carbon monoxide in the body  For help quitting smoking, speak with your physician or contact the [facility] Ritter or [employer]  Please visit the following web address for assistance with quitting  [employer] be  [email or web address]  [email or web address]  Testing Considerations       ? Complete Blood Count (CBC) t  If test was completed and normal within last six months, repeat test is not necessary  Triggered by: Secondary polycythemia         ? Electrocardiogram (ECG) t  Patient does not need new test if normal ECG is present within the last six months and no change in clinical condition  Triggered by: Obstructive sleep apnea of adult, Age or Facility Rec               Consultations       ? Limited Exercise Tolerance   If a cardiopulmonary issue is suspected then Beta Blocker Therapy, possible initiation of statins, and further cardiovascular testing are recommended if it will    If patient has limited exercise tolerance, please consider likelihood of this being secondary to other causes (e g  hip or knee pain)  Perioperative initiation of statins may be considered in patients with clinical indications who are undergoing elevated-risk procedures  Triggered by: Limited Exercise Tolerance         ? Primary Care Physician Evaluation   Primary care physician may need to evaluate patient prior to surgery  This is likely NOT necessary if the listed conditions are chronic and stable  Triggered by: Secondary polycythemia               Miscellaneous Questions         Question: Are you able to walk up a flight of stairs, walk up a hill or do heavy housework WITHOUT having chest pain or shortness of breath? Answer: NO                   Allergies/Conditions/Medications Not Found        The following were not recognized by our system when generating the recommendations  Please consider if this would impact any preoperative protocols  ? Social alcohol use       ? Benadryl CAPS                  Appointment Information         Date:    11/14/2017        Location:    [facility]        Address:           Directions:                      Footnotes revision 14      ?? Denotes a free-text entry  Legal Disclaimer: Any and all recommendations and services provided herein are designed to assist in the preoperative care of the patient  Nothing contained herein is designed to replace, eliminate or alleviate the responsibility of the attending physician to supervise and determine the patient?s preoperative care and course of treatment  Failure to provide complete, accurate information may negatively impact the system?s ability to recommend the proper preoperative protocol  THE ATTENDING PHYSICIAN IS RESPONSIBLE TO REVIEW THE SUGGESTED PREOPERATIVE PROTOCOLS/COURSE OF TREATMENT AND PRESCRIBE THE FINAL COURSE OF PREOPERATIVE TREATMENT IN CONSULTATION WITH THE PATIENT  THE [employer] SYSTEM AND ITS MATERIALS ARE PROVIDED ? AS IS? WITHOUT WARRANTY OF ANY KIND, EXPRESS OR IMPLIED, INCLUDING, BUT NOT LIMITED TO, WARRANTIES OF PERFORMANCE OR MERCHANTABILITY OR FITNESS FOR A PARTICULAR PURPOSE  PATIENT AND PHYSICIANS HEREBY AGREE THAT THEIR USE OF THE MATERIALS AND RESOURCES ACT AS A CONSENT TO RELEASE AND WAIVE ePREOP FROM ANY AND ALL CLAIMS OF WARRANTY, TORT OR CONTRACT LAW OF ANY KIND  Electronically signed Zane RICE    Sep 25 2017 12:55PM EST

## 2018-01-16 NOTE — CONSULTS
History of Present Illness  Pre-Op Visit (Brief): The patient is being seen for a preoperative visit  The procedure is a(n) Arthroplasty of L CMC joint  scheduled for 11/14/17 with [facility]  The indication for surgery is Persistent pain and limitation of function  Surgical Risk Assessment:   Prior Anesthesia: She had prior anesthesia, a prior adverse reaction to general anesthesia and Anorexia with inhalation agents  Pertinent Past Medical History: wears dentures, FRANK, obesity and Dentures, CPAP 18  Full mask, but no angina, no arrhythmia, no CAD, no CHF, no chronic liver disease, no acute hepatitis, no coagulation delay, no pulmonary embolism, no DVT, does not use anticoagulants, no diabetes, does not use insulin, no thyroid disease, no seizure disorder, no CVA, no asthma, no COPD, no renal disease and no low serum albumin  Exercise Capacity: able to walk four blocks without symptoms and able to walk two flights of stairs without symptoms  Lifestyle Factors: denies alcohol use, denies tobacco use, denies illegal drug use and Tobacco 3-4 / day  Symptoms: no easy bleeding, no easy bruising, no frequent nosebleeds, no chest pain, no dyspnea, no edema, no palpitations and no wheezing    The patient presents with complaints of cough (Clear sputum  No hemoptysis or purulent sputum  )  Pertinent Family History: no pertinent family history  Living Situation: home is secure and supportive and no post-op concerns with her living situation  Review of Systems    Constitutional: no fever, no recent weight gain and no recent weight loss  Cardiovascular: no chest pain, no palpitations and no lower extremity edema  Respiratory: no shortness of breath and no wheezing    The patient presents with complaints of cough (As noted in history of present illness  Clear TMs sputum  No hemoptysis and no purulent sputum  No pleuritic chest pain)     Gastrointestinal: No complaints of abdominal pain, no constipation, no nausea or vomiting, no diarrhea, no bloody stools, no abdominal pain, no constipation and no diarrhea  Genitourinary: No complaints of dysuria, no incontinence, no pelvic pain, no dysmenorrhea, no vaginal discharge or bleeding and no incontinence  Musculoskeletal: arthralgias, joint swelling and joint stiffness, but as noted in HPI  Integumentary: no rashes  Neurological: no headache and no dizziness  Active Problems    1  Analgesic use (V58 69) (Z79 899)   2  Arthritis of carpometacarpal (CMC) joint of left thumb (716 94) (M18 12)   3  Cataract of both eyes (366 9) (H26 9)   4  DDD (degenerative disc disease), lumbar (722 52) (M51 36)   5  Facet arthropathy, lumbar (721 3) (M12 88)   6  Impaired fasting glucose (790 21) (R73 01)   7  Lumbosacral radiculopathy (724 4) (M54 17)   8  Obesity (BMI 30-39 9) (278 00) (E66 9)   9  Obstructive sleep apnea of adult (327 23) (G47 33)   10  Sacroiliac pain (724 6) (M53 3)   11  Secondary polycythemia (289 0) (D75 1)   12   Tobacco use (305 1) (Z72 0)    Past Medical History    · History of Acute Bronchitis With Bronchospasm (466 0)   · History of Community acquired pneumonia (5) (J18 9)   · History of acute sinusitis (V12 69) (Z87 09)   · History of asthma (V12 69) (Z87 09)   · History of chronic obstructive lung disease (V12 69) (Z87 09)   · History of viral warts (V12 09) (Z86 19)    Surgical History    · History of Abdominoplasty   · History of Hysterectomy   · History of Liver Surgery    Family History    · Family history of myocardial infarction (V17 3) (Z82 49)    · Family history of myocardial infarction (V17 3) (Z82 49)    · Family history of Ovarian cancer    · Family history of cardiac disorder (V17 49) (Z82 49)   · Family history of diabetes mellitus (V18 0) (Z83 3)   · Family history of liver cancer (V16 0) (Z80 0)   · Family history of malignant neoplasm of breast (V16 3) (Z80 3)   · Family history of malignant neoplasm of stomach (V16 0) (Z80 0)    Social History    · Former smoker (J02 51) (L71 612)   · Quit smoking (V15 82) (X38 119)   · Social alcohol use (Z78 9)   · Tobacco use (305 1) (Z72 0)    Current Meds   1  Benadryl CAPS Recorded   2  Meloxicam 7 5 MG Oral Tablet; TAKE 1 TABLET Twice daily PRN PAIN;   Therapy: 04RMO0060 to (Evaluate:18Mpa5644)  Requested for: 98MVO6329; Last   Rx:[id number] Ordered   3  OxyCODONE HCl - 5 MG Oral Capsule; TAKE 1 CAPSULE EVERY 4  TO 6 HOURS AS   NEEDED FOR BREAKTHROUGH PAIN;   Therapy: 14OGQ8536 to (Evaluate:59Nks0912); Last Rx:[id number] Ordered    Allergies    1  Penicillins    Vitals  Signs    Systolic: 902  Diastolic: 78  Height: 5 ft 0 5 in  Weight: 184 lb   BMI Calculated: 35 34  BSA Calculated: 1 81    Physical Exam    Constitutional   General appearance: Abnormal   Overweight and in no apparent distress  Eyes   Conjunctiva and lids: No swelling, erythema or discharge  Ears, Nose, Mouth, and Throat   Otoscopic examination: Tympanic membranes translucent with normal light reflex  Canals patent without erythema  Lips, teeth, and gums: Abnormal   Edentulous oral cavity  Oropharynx: Normal with no erythema, edema, exudate or lesions  Mucosa is moist and I see no evidence of ulcer or exudate or lesion presently  Neck   Neck: Supple, symmetric, trachea midline, no masses  Thyroid: Normal, no thyromegaly  No thyroid enlargement or adenopathy  Pulmonary   Respiratory effort: No increased work of breathing or signs of respiratory distress  Auscultation of lungs: Clear to auscultation  Clear to auscultation  Cardiovascular   Auscultation of heart: Normal rate and rhythm, normal S1 and S2, no murmurs  Heart rate is regular with a rate in the 60s  There is no murmur or gallop noted  Carotid pulses: 2+ bilaterally  Carotid upstroke equal bilaterally and without  Examination of extremities for edema and/or varicosities: Normal   No edema     Abdomen   Abdomen: Abnormal   Obese abdomen with no tenderness or mass appreciated  Liver and spleen: No hepatomegaly or splenomegaly  No organomegaly appreciated  Lymphatic   Palpation of lymph nodes in neck: No lymphadenopathy  Musculoskeletal   Gait and station: Normal     Digits and nails: Normal without clubbing or cyanosis  Normal capillary refill without cyanosis  Psychiatric   Orientation to person, place, and time: Normal     Mood and affect: Normal        Assessment    1  Arthritis of carpometacarpal (CMC) joint of left thumb (716 94) (M18 12)   2  Impaired fasting glucose (790 21) (R73 01)   3  Obesity (BMI 30-39 9) (278 00) (E66 9)   4  Secondary polycythemia (289 0) (D75 1)   5  Tobacco use (305 1) (Z72 0)   6  Cough (786 2) (R05)   7  DDD (degenerative disc disease), lumbar (722 52) (M51 36)    Plan  Cough, SocHx: Tobacco use    · * XR CHEST PA & LATERAL; Status:Active; Requested RBY:[id number];   DDD (degenerative disc disease), lumbar    · Meloxicam 7 5 MG Oral Tablet; TAKE 1 TABLET Twice daily PRN PAIN    Discussion/Summary    Comments:  She has a history of impaired fasting glucose and has not followed through with her hemoglobin A1c  We obtain that today  He also got CBC CMP TSH and Lyme  She has a persistent cough asked her to go for chest x-ray  We will going to review these results prior to clearing her for the surgical procedure  She understands  In summary then this is a 25-year-old woman who presents today for medical clearance for a arthroplasty of her left first carpometacarpal joint with Dr Ivelisse Henao  We reviewed her ECG today which shows normal sinus rhythm with incomplete right bundle branch block  She's asymptomatic from a cardiovascular pulmonary standpoint  We did note that she was noted last year during her preop visit to have hyperglycemia  We asked her to go for hemoglobin A1c among other studies which did not occur  We deniz blood for hemoglobin A1c today and we'll get CMP as well a TSH   We'll also get a Lyme titer as she does have arthralgias and is concerned about potential Lyme exposure  Her preop CBC revealed mild polycythemia which was slightly improved from previously  She continues to smoke and we discussed that this is likely secondary polycythemia from smoking and she is again advised that she needs to discontinue this completely  She requests refill of meloxicam which she takes for low back pain and we provided this for her  She has a persistent cough which she believes is a smoker's cough but we have asked her to go for chest x-ray prior to her surgery as well  She agrees  End of Encounter Meds    1  OxyCODONE HCl - 5 MG Oral Capsule; TAKE 1 CAPSULE EVERY 4  TO 6 HOURS AS   NEEDED FOR BREAKTHROUGH PAIN;   Therapy: 80BHV9050 to (Evaluate:59Vbx8946); Last Rx:[id number] Ordered    2  Meloxicam 7 5 MG Oral Tablet; TAKE 1 TABLET Twice daily PRN PAIN;   Therapy: [id number] to (Evaluate:80Kkf6411)  Requested for: 23Oct2017; Last   Rx:[id number] Ordered    3   Benadryl CAPS (DiphenhydrAMINE HCl) Recorded    Signatures   Electronically signed by : BRIDGETTE Diallo ; Oct 23 2017  2:17PM EST                       (Author)

## 2018-01-22 ENCOUNTER — GENERIC CONVERSION - ENCOUNTER (OUTPATIENT)
Dept: OTHER | Facility: OTHER | Age: 68
End: 2018-01-22

## 2018-01-22 ENCOUNTER — ALLSCRIPTS OFFICE VISIT (OUTPATIENT)
Dept: OTHER | Facility: OTHER | Age: 68
End: 2018-01-22

## 2018-01-22 VITALS
DIASTOLIC BLOOD PRESSURE: 73 MMHG | WEIGHT: 189 LBS | HEIGHT: 61 IN | SYSTOLIC BLOOD PRESSURE: 149 MMHG | HEART RATE: 62 BPM | BODY MASS INDEX: 35.68 KG/M2

## 2018-01-22 DIAGNOSIS — Z12.11 ENCOUNTER FOR SCREENING FOR MALIGNANT NEOPLASM OF COLON: ICD-10-CM

## 2018-01-22 DIAGNOSIS — Z12.31 ENCOUNTER FOR SCREENING MAMMOGRAM FOR MALIGNANT NEOPLASM OF BREAST: ICD-10-CM

## 2018-01-22 DIAGNOSIS — E11.65 TYPE 2 DIABETES MELLITUS WITH HYPERGLYCEMIA (HCC): ICD-10-CM

## 2018-01-23 NOTE — PROGRESS NOTES
Assessment   1  Uncontrolled type 2 diabetes mellitus without complication, without long-term current use     of insulin (250 02) (E11 65)  2  Sacroiliac pain (724 6) (M53 3)    Plan   Breast cancer screening, high risk patient, Screening for colon cancer    · * MAMMO SCREENING BILATERAL W CAD; Status:Hold For - Scheduling; Requested    for:22Jan2018;   DDD (degenerative disc disease), lumbar    · Renew: Gabapentin 100 MG Oral Capsule; TAKE TWO CAPSULES BY MOUTH IN THE    MORNING AND THREE  CAPSULES AT BEDTIME  Lumbosacral radiculopathy    · Renew: OxyCODONE HCl - 5 MG Oral Capsule; TAKE 1 CAPSULE EVERY 4  TO 6    HOURS AS NEEDED FOR BREAKTHROUGH PAIN  Screening for colon cancer    · COLONOSCOPY; Status:Active; Requested for:22Jan2018; Uncontrolled type 2 diabetes mellitus without complication, without long-term current use    of insulin    · (1) CBC/ PLT (NO DIFF); Status:Active; Requested for:22Jan2018;    · (1) COMPREHENSIVE METABOLIC PANEL; Status:Active; Requested for:22Jan2018;    · (1) HEMOGLOBIN A1C; Status:Active; Requested for:22Jan2018;    · (1) LIPID PANEL, FASTING; Status:Active; Requested for:22Jan2018; Discussion/Summary      In summary this is a 69 year female who presents with acute radicular right lower back pain  She has a history of lumbosacral DDD with epidural steroid injections through pain management  Previously she received relief with gabapentin and oxycodone will provide her with these prescriptions today  She is going to use ice and rest  She is asked to call in 2 days with report of her condition  We also reviewed her recent blood sugars which shows much improved control  Going to have her go for hemoglobin A1c as well as CBC CMP and lipids next week  Will follow up with her when these results are available  Chief Complaint   I woke up at 120 with such sharp pain in my R lower side  No history of same on R but has on L  Dr Phill Almaraz started me on neurontin which seemed to help 2015  Yesterday no lifting, bending, etc  No incontinence  No weakness  No fever, chills, etc  No urinary sx  I get shaky if my BS is below 90  History of Present Illness   HPI: The patient is a 24-year-old obese female recently diagnosed diabetic presents with development of acute right lower back pain with radicular symptoms since the early morning hours  She has had no weakness and no incontinence  She does have a history of lumbosacral DDD with treatment by pain management 2015 with opioid analgesia as well as Neurontin and epidural steroid injections  She states that she recalls no precipitating injury yesterday but awoke with acutely this morning  She also presents her blood sugar log for review  She states that she has been feeling improved and feels that her sugars are under much improved control  Review of Systems        Constitutional: no fever,-- no recent weight gain-- and-- no recent weight loss  Gastrointestinal: No incontinence of bowel, but-- no constipation-- and-- no diarrhea  Genitourinary: No urinary frequency, but-- no dysuria-- and-- no incontinence  Musculoskeletal: Right-sided radicular lower back pain, but-- as noted in HPI  Integumentary: no rashes  Neurological: no headache  Active Problems   1  Analgesic use (V58 69) (Z79 899)  2  Arthritis of carpometacarpal (CMC) joint of left thumb (716 94) (M18 12)  3  Breast cancer screening, high risk patient (V76 11) (Z12 31)  4  Cataract of both eyes (366 9) (H26 9)  5  Cough (786 2) (R05)  6  DDD (degenerative disc disease), lumbar (722 52) (M51 36)  7  Facet arthropathy, lumbar (721 3) (M46 96)  8  Fatigue, unspecified type (780 79) (R53 83)  9  Impaired fasting glucose (790 21) (R73 01)  10  Keratosis (701 1) (L57 0)  11  Lumbosacral radiculopathy (724 4) (M54 17)  12  Obesity (BMI 30-39 9) (278 00) (E66 9)  13  Obstructive sleep apnea of adult (327 23) (G47 33)  14  Sacroiliac pain (724 6) (M53 3)  15  Screening for colon cancer (V76 51) (Z12 11)  16  Secondary polycythemia (289 0) (D75 1)  17  Tobacco use (305 1) (Z72 0)  18  Uncontrolled type 2 diabetes mellitus without complication, without long-term current use      of insulin (250 02) (E11 65)    Past Medical History   1  History of Acute Bronchitis With Bronchospasm (466 0)  2  History of Community acquired pneumonia (5) (J18 9)  3  History of acute sinusitis (V12 69) (Z87 09)  4  History of asthma (V12 69) (Z87 09)  5  History of chronic obstructive lung disease (V12 69) (Z87 09)  6  History of viral warts (V12 09) (Z86 19)    Family History   Mother   1  Family history of myocardial infarction (V17 3) (Z82 49)  Father   2  Family history of myocardial infarction (V17 3) (Z82 49)  Sister   3  Family history of Ovarian cancer  Unknown   4  Family history of cardiac disorder (V17 49) (Z82 49)  5  Family history of diabetes mellitus (V18 0) (Z83 3)  6  Family history of liver cancer (V16 0) (Z80 0)  7  Family history of malignant neoplasm of breast (V16 3) (Z80 3)  8  Family history of malignant neoplasm of stomach (V16 0) (Z80 0)    Social History    · Former smoker (B58 68) (Q36 853)   · Quit smoking (V15 82) (J63 631)   · Social alcohol use (Z78 9)   · Tobacco use (305 1) (Z72 0)    Surgical History   1  History of Abdominoplasty  2  History of Hysterectomy  3  History of Liver Surgery    Current Meds   1  Accu-Chek Supriya Device; USE AS DIRECTED; Therapy: 81QVD6936 to (Evaluate:11Nov2017)  Requested for: 16JXA5886; Last     Rx:10Nov2017 Ordered  2  Accu-Chek Supriya Plus In Vitro Strip; TEST WITH ONE STRIP  FOUR TIMES DAILY; Therapy: 50ZKS6073 to (Last Rx:81Tud2489)  Requested for: 27HJR8329 Ordered  3  Accu-Chek Softclix Lancets Miscellaneous; Check BS 4 times daily; Therapy: 05ZWV0463 to (Last Rx:18Ery5440)  Requested for: 58HEL8486 Ordered  4  Benadryl CAPS Recorded  5   GlipiZIDE ER 5 MG Oral Tablet Extended Release 24 Hour; take 1 tablet by mouth daily     30 MINUTES BEFORE BREAKFAST; Therapy: 44XTX9603 to (Evaluate:18Jun2018)  Requested for: 75Qpk0652; Last     Rx:94Htw5717 Ordered  6  Meloxicam 7 5 MG Oral Tablet; TAKE 1 TABLET Twice daily PRN PAIN;     Therapy: 57YLT4380 to (Evaluate:18Jun2018)  Requested for: 39Tmb8420; Last     Rx:59Hle8323 Ordered  7  MetFORMIN HCl - 1000 MG Oral Tablet; TAKE 1 TABLET TWICE DAILY; Therapy: 04ERX9569 to (Evaluate:18Jun2018)  Requested for: 20Hkq6969; Last     Rx:28Vkl4134 Ordered  8  OxyCODONE HCl - 5 MG Oral Capsule; TAKE 1 CAPSULE EVERY 4  TO 6 HOURS AS     NEEDED FOR BREAKTHROUGH PAIN;     Therapy: 78KQM1341 to (Evaluate:24Sep2017); Last Rx:75Lui9329 Ordered    Allergies   Denied   1  Penicillins    Vitals    Recorded: 61PLB9610 09:48AM   Temperature 01 6 F   Systolic 992   Diastolic 74   Weight 604 lb    BMI Calculated 36 5   BSA Calculated 1 84     Physical Exam        Constitutional      General appearance: Abnormal  -- Obese in moderate distress from lower back pain  Musculoskeletal      Gait and station: Abnormal  -- Antalgic gait favoring right  Digits and nails: Normal without clubbing or cyanosis  -- No clubbing or cyanosis  Neurologic      Reflexes: 2+ and symmetric  -- Normal DTRs at knees and ankles  Psychiatric      Orientation to person, place, and time: Normal        Mood and affect: Abnormal  -- Mildly anxious appearance and in pain  Results/Data   PHQ-2 Adult Depression Screening 22Jan2018 09:56AM User, s      Test Name Result Flag Reference   PHQ-2 Adult Depression Score 2     Over the last two weeks, how often have you been bothered by any of the following problems?      Little interest or pleasure in doing things: Several days - 1     Feeling down, depressed, or hopeless: Several days - 1   PHQ-2 Adult Depression Screening Negative          Signatures    Electronically signed by : BRIDGETTE Kelly ; Jan 22 2018 10:41AM EST                       (Author)

## 2018-01-31 DIAGNOSIS — M54.16 LUMBAR RADICULITIS: Primary | ICD-10-CM

## 2018-01-31 RX ORDER — OXYCODONE HYDROCHLORIDE 5 MG/1
1 CAPSULE ORAL
COMMUNITY
Start: 2017-09-20 | End: 2018-01-31 | Stop reason: SDUPTHER

## 2018-01-31 RX ORDER — MELOXICAM 7.5 MG/1
1 TABLET ORAL 2 TIMES DAILY PRN
COMMUNITY
Start: 2014-09-05 | End: 2018-01-31

## 2018-01-31 RX ORDER — OXYCODONE HYDROCHLORIDE 5 MG/1
5 CAPSULE ORAL EVERY 6 HOURS PRN
Qty: 30 CAPSULE | Refills: 0 | Status: SHIPPED | OUTPATIENT
Start: 2018-01-31 | End: 2018-02-16 | Stop reason: ALTCHOICE

## 2018-01-31 RX ORDER — GLIPIZIDE 5 MG/1
TABLET, FILM COATED, EXTENDED RELEASE ORAL
COMMUNITY
Start: 2017-11-30 | End: 2018-05-08 | Stop reason: SDUPTHER

## 2018-01-31 RX ORDER — LANCETS
EACH MISCELLANEOUS
COMMUNITY
Start: 2017-11-10 | End: 2018-04-02 | Stop reason: SDUPTHER

## 2018-01-31 RX ORDER — GABAPENTIN 100 MG/1
CAPSULE ORAL 3 TIMES DAILY
COMMUNITY
Start: 2015-02-05 | End: 2018-02-16 | Stop reason: ALTCHOICE

## 2018-01-31 NOTE — PROGRESS NOTES
I spoke with the patient today  She continues to have severe right-sided sciatica radiating down her leg to her foot  She requests a refill of oxycodone as this seems to be the only thing that is affective for her  The increased dose of gabapentin to 300 b i d  nor does her meloxicam   I told her that I would agree to refill her oxycodone for now but she must contact her pain management specialist, Dr Dixon Pepper for re-evaluation and she agrees to do this  She has no incontinence of bowel or bladder weakness weight loss night sweats [employer] presently  She does note that she is losing her appetite because the pain is severe and she is not sleeping well either

## 2018-02-13 ENCOUNTER — HOSPITAL ENCOUNTER (EMERGENCY)
Facility: HOSPITAL | Age: 68
Discharge: HOME | End: 2018-02-13
Attending: EMERGENCY MEDICINE | Admitting: EMERGENCY MEDICINE
Payer: MEDICARE

## 2018-02-13 ENCOUNTER — APPOINTMENT (EMERGENCY)
Dept: CT IMAGING | Facility: HOSPITAL | Age: 68
End: 2018-02-13
Payer: MEDICARE

## 2018-02-13 VITALS
WEIGHT: 183 LBS | RESPIRATION RATE: 18 BRPM | BODY MASS INDEX: 35.15 KG/M2 | SYSTOLIC BLOOD PRESSURE: 176 MMHG | HEART RATE: 58 BPM | DIASTOLIC BLOOD PRESSURE: 76 MMHG | OXYGEN SATURATION: 97 % | TEMPERATURE: 97.7 F

## 2018-02-13 DIAGNOSIS — M19.90 DJD (DEGENERATIVE JOINT DISEASE): Primary | ICD-10-CM

## 2018-02-13 DIAGNOSIS — M54.30 SCIATICA: ICD-10-CM

## 2018-02-13 PROCEDURE — 72131 CT LUMBAR SPINE W/O DYE: CPT

## 2018-02-13 PROCEDURE — 99284 EMERGENCY DEPT VISIT MOD MDM: CPT

## 2018-02-13 PROCEDURE — 96372 THER/PROPH/DIAG INJ SC/IM: CPT

## 2018-02-13 RX ORDER — KETOROLAC TROMETHAMINE 30 MG/ML
30 INJECTION, SOLUTION INTRAMUSCULAR; INTRAVENOUS ONCE
Status: COMPLETED | OUTPATIENT
Start: 2018-02-13 | End: 2018-02-13

## 2018-02-13 RX ORDER — PREDNISONE 20 MG/1
40 TABLET ORAL DAILY
Qty: 10 TABLET | Refills: 0 | Status: SHIPPED | OUTPATIENT
Start: 2018-02-13 | End: 2018-02-18

## 2018-02-13 RX ORDER — TRAMADOL HYDROCHLORIDE 50 MG/1
50 TABLET ORAL EVERY 6 HOURS PRN
Qty: 20 TABLET | Refills: 0 | Status: SHIPPED | OUTPATIENT
Start: 2018-02-13 | End: 2018-02-16

## 2018-02-13 RX ADMIN — KETOROLAC TROMETHAMINE 30 MG: 30 INJECTION, SOLUTION INTRAMUSCULAR at 11:12

## 2018-02-13 NOTE — ED NOTES
D/C instructions discussed  Educated on f/u, medication, and s/s of when to return to the ED  All questions answered  Ambulatory off unit with steady gait and no s/s of acute distress        Italo Patricia RN  02/13/18 9737

## 2018-02-13 NOTE — ED PROVIDER NOTES
History  Chief Complaint   Patient presents with    Back Pain     Pt complains of low back/sciatic pain that has been going on for the past 3 weeks  Pt states that she has seen her family doctor and the percocet pills he prescribed are not working for her  Pt states that the pain goes from her back down leg and sometiems goes from groin to leg  History provided by:  Patient  Back Pain   Location:  Lumbar spine  Quality:  Aching  Radiates to:  R posterior upper leg  Pain severity:  Moderate  Onset quality:  Gradual  Timing:  Constant  Progression:  Worsening  Chronicity:  New  Relieved by:  Being still  Worsened by:  Ambulation, bending, palpation and sitting  Ineffective treatments:  None tried  Associated symptoms: leg pain    Associated symptoms: no abdominal pain, no abdominal swelling, no bladder incontinence, no bowel incontinence, no chest pain, no dysuria, no fever, no headaches, no numbness, no paresthesias, no perianal numbness, no tingling and no weakness        Prior to Admission Medications   Prescriptions Last Dose Informant Patient Reported? Taking?    ACCU-CHEK SOFTCLIX LANCETS lancets   Yes No   Sig: by Does not apply route   Blood Glucose Monitoring Suppl (ACCU-CHEK JESS CONNECT) w/Device KIT   Yes No   Sig: by Does not apply route   Glucose Blood (ACCU-CHEK JESS PLUS VI)   Yes No   Sig: by In Vitro route   diphenhydrAMINE (BENADRYL) 25 mg capsule   Yes Yes   Sig: Take 25 mg by mouth every 6 (six) hours as needed for itching   gabapentin (NEURONTIN) 100 mg capsule   Yes Yes   Sig: Take by mouth 3 (three) times a day     glipiZIDE (GLUCOTROL XL) 5 mg 24 hr tablet   Yes Yes   Sig: Take by mouth   meloxicam (MOBIC) 7 5 mg tablet   Yes Yes   Sig: Take 7 5 mg by mouth 2 (two) times a day   metFORMIN (GLUCOPHAGE) 1000 MG tablet   Yes Yes   Sig: Take 1 tablet by mouth 2 (two) times a day   oxyCODONE (OXY-IR) 5 MG capsule   No Yes   Sig: Take 1 capsule (5 mg total) by mouth every 6 (six) hours as needed for moderate pain Max Daily Amount: 20 mg      Facility-Administered Medications: None       Past Medical History:   Diagnosis Date    Diabetes mellitus ([address] )     Liver disease     Sleep apnea        Past Surgical History:   Procedure Laterality Date    ABDOMINAL SURGERY      CHOLECYSTECTOMY      COSMETIC SURGERY      FINGER SURGERY      HAND SURGERY      HYSTERECTOMY      LIVER SURGERY      HI REPAIR INTERCARP/CARP-METACARP JT Right 9/9/2016    Procedure: THUMB TRAPEZIECTOMY; BASAL JOINT ARTHROPLASTY WITH APL AUTOGRAFT;  Surgeon: Kushal Vincent MD;  Location: [facility];  Service: Orthopedics       History reviewed  No pertinent family history  I have reviewed and agree with the history as documented  Social History   Substance Use Topics    Smoking status: Current Every Day Smoker     Packs/day: 0 20    Smokeless tobacco: Never Used    Alcohol use No        Review of Systems   Constitutional: Negative for chills and fever  HENT: Negative for rhinorrhea, sore throat and trouble swallowing  Eyes: Negative for pain  Respiratory: Negative for cough, shortness of breath, wheezing and stridor  Cardiovascular: Negative for chest pain and leg swelling  Gastrointestinal: Negative for abdominal pain, bowel incontinence, diarrhea and nausea  Endocrine: Negative for polyuria  Genitourinary: Negative for bladder incontinence, dysuria, flank pain and urgency  Musculoskeletal: Positive for back pain  Negative for joint swelling, myalgias and neck stiffness  Skin: Negative for rash  Allergic/Immunologic: Negative for immunocompromised state  Neurological: Negative for dizziness, tingling, syncope, weakness, numbness, headaches and paresthesias  Psychiatric/Behavioral: Negative for confusion and suicidal ideas  All other systems reviewed and are negative        Physical Exam  ED Triage Vitals [02/13/18 1047]   Temperature Pulse Respirations Blood Pressure SpO2   97 7 °F (36 5 °C) 75 18 (!) 182/82 92 %      Temp Source Heart Rate Source Patient Position - Orthostatic VS BP Location FiO2 (%)   Oral Monitor Lying Right arm --      Pain Score       Worst Possible Pain           Orthostatic Vital Signs  Vitals:    02/13/18 1047   BP: (!) 182/82   Pulse: 75   Patient Position - Orthostatic VS: Lying       Physical Exam   Constitutional: She is oriented to person, place, and time  She appears well-developed and well-nourished  HENT:   Head: Normocephalic and atraumatic  Eyes: EOM are normal  Pupils are equal, round, and reactive to light  Neck: Normal range of motion  Neck supple  Cardiovascular: Normal rate and regular rhythm  Exam reveals no friction rub  No murmur heard  Pulmonary/Chest: Breath sounds normal  No respiratory distress  She has no wheezes  She has no rales  Abdominal: Soft  Bowel sounds are normal  She exhibits no distension  There is no tenderness  Musculoskeletal: Normal range of motion  She exhibits tenderness  She exhibits no edema  Lumbar back: She exhibits tenderness, bony tenderness, pain and spasm  Back:    Neurological: She is alert and oriented to person, place, and time  Skin: Skin is warm  No rash noted  Psychiatric: She has a normal mood and affect  Nursing note and vitals reviewed  ED Medications  Medications   ketorolac (TORADOL) injection 30 mg (30 mg Intramuscular Given 2/13/18 1112)       Diagnostic Studies  Results Reviewed     None                 CT lumbar spine without contrast   Final Result by Marivel Giordano MD (02/13 1227)      Multilevel degenerative disc and facet disease with progression of disease since August 13, 2014  L3-4: Potential for exiting right L3 radiculopathy  New mild spinal canal stenosis  L4-5: Potential for exiting left L4 radiculopathy  L5-S1: Potential for bilateral exiting L5 radiculopathy left greater than right                    Workstation performed: [id number] Procedures  Procedures       Phone Contacts  ED Phone Contact    ED Course  ED Course                                MDM  Number of Diagnoses or Management Options  DJD (degenerative joint disease): new and requires workup  Sciatica: new and requires workup  Diagnosis management comments: No spinous process tenderness, hypertonicity paraspinal musculature consistent with acute muscle spasm , +2 patella and Achilles reflex bilaterally  Normal sensation normal muscle strength bilateral lower extremities    Denies history of severe or worsening lower extremity weakness/numbness  Denies history of saddle anesthesia/perineal anesthesia  Denies bowel or bladder incontinence/retention  History does not suggest diagnosis of cauda equina syndrome  Patient denies history of IVDA, denies history of fevers, no recent surgeries or any procedures to suggest a transient bacteremia leading to a diagnosis of subdural abscess  Denies history of blood thinner use with recent history of lumbar puncture or any violation of the epidural space to suggest history of epidural hematoma  Therefore these above diagnoses (cauda equina syndrome, epidural abscess, epidural hematoma) were not pursued with diagnostic imaging  Worsening degenerative disc disease joint disease on the CT scan  Will need outpatient management has appointment with pain management the next several days steroids and pain medicine to be given as an outpatient  Pt re-examined and evaluated after testing and treatment  Spoke with the patient and feeling improved and sxs have resolved  Will discharge home with close f/u with pcp and instructed to return to the ED if sxs worsen or continue  Pt agrees with the plan for discharge and feels comfortable to go home with proper f/u  Advised to return for worsening or additional problems  Diagnostic tests were reviewed and questions answered  Diagnosis, care plan and treatment options were discussed   The patient understand instructions and will follow up as directed  Amount and/or Complexity of Data Reviewed  Tests in the radiology section of CPT®: ordered and reviewed  Review and summarize past medical records: yes      CritCare Time    Disposition  Final diagnoses:   DJD (degenerative joint disease)   Sciatica     Time reflects when diagnosis was documented in both MDM as applicable and the Disposition within this note     Time User Action Codes Description Comment    2/13/2018 12:36 PM Saman Coleman Add [M19 90] DJD (degenerative joint disease)     2/13/2018 12:36 PM Saman Coleman Add [M54 30] Sciatica       ED Disposition     ED Disposition Condition Comment    Discharge  [address] [facility] [address] discharge to home/self care  Condition at discharge: Stable        Follow-up Information    None       Patient's Medications   Discharge Prescriptions    PREDNISONE 20 MG TABLET    Take 2 tablets (40 mg total) by mouth daily for 5 days       Start Date: 2/13/2018 End Date: 2/18/2018       Order Dose: 40 mg       Quantity: 10 tablet    Refills: 0    TRAMADOL (ULTRAM) 50 MG TABLET    Take 1 tablet (50 mg total) by mouth every 6 (six) hours as needed for moderate pain for up to 10 days       Start Date: 2/13/2018 End Date: 2/23/2018       Order Dose: 50 mg       Quantity: 20 tablet    Refills: 0     No discharge procedures on file      ED Provider  Electronically Signed by           Thuy Morel DO  02/13/18 1318

## 2018-02-13 NOTE — DISCHARGE INSTRUCTIONS
Osteoarthritis, Ambulatory Care   GENERAL INFORMATION:   Osteoarthritis  occurs when cartilage (tissue that cushions a joint) wears away slowly and causes the bones to rub together  Osteoarthritis (OA) is a long-term condition that often affects the hands, neck, lower back, knees, and hips  OA is also called arthrosis or degenerative joint disease  Common symptoms include the following:   · Joint pain that gets worse when you move the joint     · Joint stiffness that decreases after you move the joint     · Decreased range of movement     · Hard, bony enlargement on your fingers or toes    · A grinding or cracking sound when you move your joint  Seek immediate care for the following symptoms:   · Severe pain    · Not able to move your joint  Treatment for osteoarthritis  may include any of the following:  · Acetaminophen  is used to decrease pain  It is available without a doctor's order  Ask how much to take and how often to take it  Follow directions  Acetaminophen can cause liver damage if not taken correctly  · NSAIDs  help decrease swelling and pain or fever  This medicine is available with or without a doctor's order  NSAIDs can cause stomach bleeding or kidney problems in certain people  If you take blood thinner medicine, always ask your healthcare provider if NSAIDs are safe for you  Always read the medicine label and follow directions  · Capsaicin cream  may help decrease pain in your joint  · Prescription pain medicine  may be given to decrease severe pain if other medicines do not work  Take the medicine as directed  Do not wait until the pain is severe before you take your medicine  · A steroid injection  may be given if your symptoms get worse  · Physical therapy  may be ordered by your healthcare provider  A physical therapist teaches you exercises to help improve movement and strength, and to decrease pain in your joints       · Surgery  may be needed if other treatments do not work   Manage osteoarthritis   · Stay active  Physical activity may reduce your pain and improve your ability to do daily activities  Avoid activities that cause pain  Ask your healthcare provider what type of exercise would be best for you  · Maintain a healthy weight  This helps decrease the strain on the joints in your back, hips, knees, ankles, and feet  Ask your healthcare provider how much you should weigh  Ask him to help you create a weight loss plan if you are overweight  · Use heat or ice  on your joints as directed  Heat and ice help decrease pain, swelling, and muscle spasms  Use a heating pad on a low setting or take a warm bath  Use an ice pack, or put crushed ice in a plastic bag  Cover it with a towel  · Massage  the muscles around the joint to relieve pain and stiffness  · Use a cane, crutches, or a walker  to protect and relieve pressure on your ankle, knee, and hip joints  You may also be prescribed shoe inserts to decrease pressure in your joints  · Wear flat or low-heeled shoes  This will help decrease pain and reduce pressure on your ankle, knee, and hip joints  Follow up with your healthcare provider as directed:  Write down your questions so you remember to ask them during your visits  CARE AGREEMENT:   You have the right to help plan your care  Learn about your health condition and how it may be treated  Discuss treatment options with your caregivers to decide what care you want to receive  You always have the right to refuse treatment  The above information is an  only  It is not intended as medical advice for individual conditions or treatments  Talk to your doctor, nurse or pharmacist before following any medical regimen to see if it is safe and effective for you  © 2014 [employer] [employer] is for End User's use only and may not be sold, redistributed or otherwise used for commercial purposes   All illustrations and images included in [facility] are the copyrighted property of A D A M , Inc  or Cedric Soto  Osteoarthritis   WHAT YOU NEED TO KNOW:   Osteoarthritis (OA) occurs when cartilage (tissue that cushions a joint) wears away slowly and causes the bones to rub together  OA is a long-term condition that often affects the hands, neck, lower back, knees, and hips  OA is also called arthrosis or degenerative joint disease  DISCHARGE INSTRUCTIONS:   Return to the emergency department if:   · You have severe pain  · You cannot move your joint  Contact your healthcare provider if:   · You have a fever  · Your joint is red and tender  · You have questions or concerns about your condition or care  Medicines:   · Acetaminophen  is used to decrease pain  It is available without a doctor's order  Ask how much to take and how often to take it  Follow directions  Acetaminophen can cause liver damage if not taken correctly  · NSAIDs , such as ibuprofen, help decrease swelling, pain, and fever  This medicine is available with or without a doctor's order  NSAIDs can cause stomach bleeding or kidney problems in certain people  If you take blood thinner medicine, always ask your healthcare provider if NSAIDs are safe for you  Always read the medicine label and follow directions  · Prescription pain medicine  may be given to decrease severe pain if other medicines do not work  Take the medicine as directed  Do not wait until the pain is severe before you take your medicine  · Take your medicine as directed  Contact your healthcare provider if you think your medicine is not helping or if you have side effects  Tell him or her if you are allergic to any medicine  Keep a list of the medicines, vitamins, and herbs you take  Include the amounts, and when and why you take them  Bring the list or the pill bottles to follow-up visits  Carry your medicine list with you in case of an emergency    Follow up with your healthcare provider as directed:  Write down your questions so you remember to ask them during your visits  Go to physical therapy as directed:  A physical therapist teaches you exercises to help improve movement and strength, and to decrease pain in your joints  The exercises also help lower your risk for loss of function  Manage your OA:   · Stay active  Physical activity may reduce your pain and improve your ability to do daily activities  Avoid activities that cause pain  Ask your healthcare provider what type of exercise would be best for you  · Maintain a healthy weight  This helps decrease the strain on the joints in your back, hips, knees, ankles, and feet  Ask your healthcare provider how much you should weigh  Ask him to help you create a weight loss plan if you are overweight  · Use heat or ice  on your joints as directed  Heat and ice help decrease pain, swelling, and muscle spasms  Use a heating pad on a low setting or take a warm bath  Use an ice pack, or put crushed ice in a plastic bag  Cover it with a towel  · Massage  the muscles around the joint to relieve pain and stiffness  · Use a cane, crutches, or a walker  to protect and relieve pressure on your ankle, knee, and hip joints  You may also be prescribed shoe inserts to decrease pressure in your joints  · Wear flat or low-heeled shoes  This will help decrease pain and reduce pressure on your ankle, knee, and hip joints  © 2017 [address] Information is for End User's use only and may not be sold, redistributed or otherwise used for commercial purposes  All illustrations and images included in CareNotes® are the copyrighted property of A D A M , Inc  or Cedric Soto  The above information is an  only  It is not intended as medical advice for individual conditions or treatments   Talk to your doctor, nurse or pharmacist before following any medical regimen to see if it is safe and effective for you

## 2018-02-16 ENCOUNTER — CLINICAL SUPPORT (OUTPATIENT)
Dept: PAIN MEDICINE | Facility: CLINIC | Age: 68
End: 2018-02-16
Payer: MEDICARE

## 2018-02-16 VITALS
WEIGHT: 186 LBS | TEMPERATURE: 99.5 F | HEIGHT: 61 IN | HEART RATE: 74 BPM | BODY MASS INDEX: 35.12 KG/M2 | DIASTOLIC BLOOD PRESSURE: 75 MMHG | SYSTOLIC BLOOD PRESSURE: 158 MMHG

## 2018-02-16 DIAGNOSIS — M47.816 LUMBAR SPONDYLOSIS: ICD-10-CM

## 2018-02-16 DIAGNOSIS — M48.062 SPINAL STENOSIS OF LUMBAR REGION WITH NEUROGENIC CLAUDICATION: ICD-10-CM

## 2018-02-16 DIAGNOSIS — M54.16 LUMBAR RADICULOPATHY: Primary | ICD-10-CM

## 2018-02-16 DIAGNOSIS — M25.551 CHRONIC RIGHT HIP PAIN: ICD-10-CM

## 2018-02-16 DIAGNOSIS — G89.29 CHRONIC RIGHT HIP PAIN: ICD-10-CM

## 2018-02-16 PROCEDURE — 99215 OFFICE O/P EST HI 40 MIN: CPT | Performed by: ANESTHESIOLOGY

## 2018-02-16 RX ORDER — TRAMADOL HYDROCHLORIDE 50 MG/1
50 TABLET ORAL EVERY 8 HOURS PRN
Qty: 90 TABLET | Refills: 1 | Status: SHIPPED | OUTPATIENT
Start: 2018-02-16 | End: 2018-04-19 | Stop reason: SDUPTHER

## 2018-02-16 NOTE — PROGRESS NOTES
Assessment:  1  Lumbar radiculopathy    2  Spinal stenosis of lumbar region with neurogenic claudication    3  Lumbar spondylosis    4  Chronic right hip pain        Plan:  55-year-old female last seen in December of 2015 returning for follow-up of lumbosacral back pain and radiculopathy which was previously in the L4-5 distribution of the left lower extremity now into the L4-5 distribution of the right lower extremity  The patient does have degenerative disc disease and spondylosis as well as varying degrees of central and foraminal stenosis most pronounced at L5-S1 and L4-5 which has progressed since her previous MRI in 2014  The patient has had very favorable response to left L4 and L5 TFESI with essentially complete resolution of her left lower extremity symptoms  She has been taking tramadol 50 mg q 8 hours p r n  which she finds more effective than Norco   Her last prescription was from her primary care physician and she is almost out of this medication  She is finishing up a course of oral steroids which she was somewhat effective and was taking meloxicam 7 5 mg b i d  p r n  prior to starting her steroids without much relief  1   I will schedule the patient for a right L4 and L5 TFESI to reduce the inflammatory component of her pain  2  I will prescribe tramadol 50 mg q 8 hours p r n  and the patient was given a 1 month prescription with 1 refill  3  I will prescribe [facility] physical therapy for her radicular symptoms  4  The patient may benefit from a trial of gabapentin in the future, however she would like to hold off on medications that she would have to take on a consistent basis rather than p r n   5   I will follow up the patient in 2 months after injection  6  Patient may continue with meloxicam 7 5 mg b i d  p r n  when she is finished her steroid pack  The patient is not to take NSAIDs along with the steroids    The patient did verbalize understanding    There are risks associated with opioid medications, including dependence, addiction and tolerance  The patient understands and agrees to use these medications only as prescribed  Potential side effects of the medications include, but are not limited to, constipation, drowsiness, addiction, impaired judgment and risk of fatal overdose if not taken as prescribed  The patient was warned against driving while taking sedation medications  Sharing medications is a felony  At this point in time, the patient is showing no signs of addiction, abuse, diversion or suicidal ideation  A urine drug screen was collected at today's office visit as part of our medication management protocol  The point of care testing results were appropriate for what was being prescribed  The specimen will be sent for confirmatory testing  The drug screen is medically necessary because the patient is either dependent on opioid medication or is being considered for opioid medication therapy and the results could impact ongoing or future treatment  The drug screen is to evaluate for the presences or absence of prescribed, non-prescribed, and/or illicit drugs/substances  [place] Prescription Drug Monitoring Program report was reviewed and was appropriate     Complete risks and benefits including bleeding, infection, tissue reaction, nerve injury and allergic reaction were discussed  The approach was demonstrated using models and literature was provided  Verbal and written consent was obtained  My impressions and treatment recommendations were discussed in detail with the patient who verbalized understanding and had no further questions  Discharge instructions were provided  I personally saw and examined the patient and I agree with the above discussed plan of care  No orders of the defined types were placed in this encounter  No orders of the defined types were placed in this encounter        History of Present Illness:    Sergei Tong is a 69 y o  female returning for follow-up of lumbosacral back pain and radiculopathy secondary to degenerative disc disease, spondylosis, and stenosis  Initially, the patient's symptoms were in the L4-5 distribution of the left lower extremity  She had complete resolution of her symptoms with left L4 and L5 TFESI back in 2015  However, the patient is now experiencing identical symptoms in the right lower extremity in the L4-5 distribution  She denies any left lower extremity symptoms, bladder bowel incontinence, or saddle anesthesia  She is currently taking tramadol 50 mg q 8 hours p r n  which is provided by her primary care physician which the patient finds more fact of the [address] [facility] the  She has been on a steroid pack and is on her last day of it  She has found some relief with this  She was previously taking meloxicam 7 5 mg b i d  p r n  with minimal relief  The patient states that the pain was so severe she had to go to the emergency room where and updated image in the form of his CT of her lumbar spine was done revealing progression of her degenerative disc disease, spondylosis, and stenosis  The patient rates her pain a 10/10 on the pain is constant  The pain is worse in the afternoon, evening, and at night  The pain is described as shooting, sharp, and throbbing  The pain is decreased with lying down  The pain is increased with standing, bending at the waist, sitting, and exercise  I have personally reviewed and/or updated the patient's past medical history, past surgical history, family history, social history, current medications, allergies, and vital signs today  Other than as stated above, the patient denies any interval changes in medications, medical condition, mental condition, symptoms, or allergies since the last office visit  Review of Systems:    Review of Systems   Constitutional: Negative for fever and unexpected weight change  HENT: Negative for trouble swallowing      Eyes: Negative for visual disturbance  Respiratory: Negative for shortness of breath and wheezing  Cardiovascular: Negative for chest pain and palpitations  Gastrointestinal: Negative for constipation, diarrhea, nausea and vomiting  Endocrine: Negative for cold intolerance, heat intolerance and polydipsia  Genitourinary: Negative for difficulty urinating and frequency  Musculoskeletal: Negative for arthralgias, joint swelling and myalgias  Difficulty walking, Decreased ROM   Skin: Negative for rash  Neurological: Negative for dizziness, seizures, syncope, weakness and headaches  Hematological: Does not bruise/bleed easily  Psychiatric/Behavioral: Negative for dysphoric mood  All other systems reviewed and are negative  There is no problem list on file for this patient  Past Medical History:   Diagnosis Date    Diabetes mellitus ([facility] [address] )     Liver disease     Sleep apnea        Past Surgical History:   Procedure Laterality Date    ABDOMINAL SURGERY      CHOLECYSTECTOMY      COSMETIC SURGERY      FINGER SURGERY      HAND SURGERY      HYSTERECTOMY      LIVER SURGERY      MI REPAIR INTERCARP/CARP-METACARP JT Right 9/9/2016    Procedure: THUMB TRAPEZIECTOMY; BASAL JOINT ARTHROPLASTY WITH APL AUTOGRAFT;  Surgeon: Carin Jaffe MD;  Location: [facility];  Service: Orthopedics       Family History   Problem Relation Age of Onset    Heart attack Father     Heart disease Family     Diabetes Family     Thyroid disease Family     Ovarian cancer Family        Social History     Occupational History    Not on file       Social History Main Topics    Smoking status: Current Every Day Smoker     Packs/day: 0 20    Smokeless tobacco: Never Used    Alcohol use No    Drug use: No    Sexual activity: Not on file       Current Outpatient Prescriptions on File Prior to Visit   Medication Sig    ACCU-CHEK SOFTCLIX LANCETS lancets by Does not apply route    Blood Glucose Monitoring Suppl ([address]) w/Device KIT by Does not apply route    diphenhydrAMINE (BENADRYL) 25 mg capsule Take 25 mg by mouth every 6 (six) hours as needed for itching    glipiZIDE (GLUCOTROL XL) 5 mg 24 hr tablet Take by mouth    Glucose Blood (ACCU-CHEK JESS PLUS VI) by In Vitro route    meloxicam (MOBIC) 7 5 mg tablet Take 7 5 mg by mouth 2 (two) times a day    metFORMIN (GLUCOPHAGE) 1000 MG tablet Take 1 tablet by mouth 2 (two) times a day    predniSONE 20 mg tablet Take 2 tablets (40 mg total) by mouth daily for 5 days    traMADol (ULTRAM) 50 mg tablet Take 1 tablet (50 mg total) by mouth every 6 (six) hours as needed for moderate pain for up to 10 days    [DISCONTINUED] gabapentin (NEURONTIN) 100 mg capsule Take by mouth 3 (three) times a day      [DISCONTINUED] oxyCODONE (OXY-IR) 5 MG capsule Take 1 capsule (5 mg total) by mouth every 6 (six) hours as needed for moderate pain Max Daily Amount: 20 mg     No current facility-administered medications on file prior to visit  No Known Allergies    Physical Exam:    /75   Pulse 74   Temp 99 5 °F (37 5 °C)   Ht 5' 1" (1 549 m)   Wt 84 4 kg (186 lb)   BMI 35 14 kg/m²     Constitutional: obese  Eyes: anicteric  HEENT: grossly intact  Neck: supple, symmetric, trachea midline and no masses   Pulmonary:even and unlabored  Cardiovascular:No edema or pitting edema present  Skin:Normal without rashes or lesions and well hydrated  Psychiatric:Mood and affect appropriate  Neurologic:Cranial Nerves II-XII grossly intact  Musculoskeletal:antalgic gait  Right-sided lumbar paraspinals tender to palpation from L3-L5  Bilateral lower extremity strength 5/5 in all muscle groups  Positive seated straight leg raise on the right and negative on the left  Imaging:    CT Lumbar spine dated 2/13/2018    FINDINGS:     ALIGNMENT:  Normal alignment of the lumbar spine  No spondylolisthesis or spondylolysis      VERTEBRAL BODIES:  No fracture    No lytic or blastic lesion      DEGENERATIVE CHANGES:     Lower Thoracic spine:  Minimal T12-L1 disc bulge and facet degenerative disease  No spinal canal or neuroforaminal narrowing   ]     L1-2:  New minimal diffuse disc bulge slightly eccentric to the left posterolateral zone  Mild facet degenerative disease  No spinal canal or neuroforaminal narrowing      L2-3:  New minimal diffuse disc bulge  Mild facet degenerative disease  Minimal posterior epidural fat  No spinal canal or neuroforaminal narrowing      L3-4:  New mild to moderate diffuse  Mild to moderate facet degenerative disease  Minimal posterior epidural fat  New mild spinal canal stenosis  Mild bilateral neuroforaminal narrowing  There is encroachment if not contact of the right exiting L3   nerve root just lateral to the neural foramen      L4-5:  Severe disc height loss  Chronic mild diffuse disc bulge  Mild to moderate facet degenerative disease  Minimal posterior epidural fat  No spinal canal stenosis  Moderate bilateral neuroforaminal narrowing with contact of the exiting left L4   nerve root just lateral to the neural foramen      L5-S1:  Mild disc height loss  Mild chronic posterior disc bulge  Moderate to severe facet degenerative disease right greater than left  No spinal canal stenosis  Moderate to severe bilateral neuroforaminal narrowing left greater than right  There   is encroachment on bilateral exiting L5 nerve roots      PARASPINAL SOFT TISSUES:  Aortic calcification without abdominal aortic aneurysm  Surgical clips along the expected location of the left adrenal gland      IMPRESSION:     Multilevel degenerative disc and facet disease with progression of disease since August 13, 2014      L3-4: Potential for exiting right L3 radiculopathy  New mild spinal canal stenosis      L4-5: Potential for exiting left L4 radiculopathy      L5-S1: Potential for bilateral exiting L5 radiculopathy left greater than right

## 2018-02-20 ENCOUNTER — OFFICE VISIT (OUTPATIENT)
Dept: SLEEP CENTER | Facility: CLINIC | Age: 68
End: 2018-02-20
Payer: MEDICARE

## 2018-02-20 ENCOUNTER — VBI (OUTPATIENT)
Dept: ADMINISTRATIVE | Facility: OTHER | Age: 68
End: 2018-02-20

## 2018-02-20 VITALS
DIASTOLIC BLOOD PRESSURE: 70 MMHG | BODY MASS INDEX: 34.85 KG/M2 | HEIGHT: 61 IN | SYSTOLIC BLOOD PRESSURE: 140 MMHG | WEIGHT: 184.6 LBS | HEART RATE: 76 BPM

## 2018-02-20 DIAGNOSIS — G47.33 OSA (OBSTRUCTIVE SLEEP APNEA): ICD-10-CM

## 2018-02-20 PROCEDURE — 99213 OFFICE O/P EST LOW 20 MIN: CPT | Performed by: INTERNAL MEDICINE

## 2018-02-20 NOTE — TELEPHONE ENCOUNTER
Tonia Ritter    ED Visit Information     Ed visit date: 02/13/2018  Diagnosis Description: DJD (degenerative joint disease); Sciatica  In Network? Yes [facility]  Discharge status: Home  Number of ED visits to date: 1  ED Severity:3    Outreach Information    Outreach successful: Yes 1  Date letter mailed:N/a  Date Finalized:02/20/2018    Care Coordination    [facility] ED Outreach    Emergent necessity warranted by diagnosis:  No  Transportation:  Friend/Family Transport  Ambulance - Was Pt given choice of of ED:  NA  If able to choose an ED  Would you have chosen [facility]:  NA  Called PCP first?:  No  Told to go to ED by PCP?:  NA  Same-Day or Next Day Appointment Offered?:  NA  Would have used same-day or next-day if offered?:  NA  Feels able to call PCP for urgent problems ?:  Yes  Understands what emergencies can be handled by PCP ?:  Yes  Ever any problems getting appointment with PCP for minor emergency/urgency problems?:  No  Practice Contacted Patient ?:  No  Pt had ED FU Call w PCP/Staff and NO FU visit was scheduled ?:  No  Seen for follow-up out of network ?:  No    02/20/2018 11:12 AM Phone  [facility] [phone] (H)   Call Complete - Personal communication with patient:    Patient stated that her pain persists but she has a had a f/u appointment with her pain management physician on 02/16/2018  She is scheduled for epidural shots with him on March 7th  Patient went to the ed due to increased pain and chose [facility] due to  proximity  Patient does not feel that it is necessary at this time to follow up with her PCP  Patient stated that she feels comfortable reaching out to office and has not problem getting appointments when needed  She stated on occasion she has issues with  Messages being relayed to her physician

## 2018-02-20 NOTE — PROGRESS NOTES
Progress Note - Sleep Center   José Elena RWA:[birth year] MRN: [id number]      Reason for Visit:  69 y  o female here for annual follow-up    Assessment:  Doing well on current therapy, CPAP 18 cm for very severe obstructive sleep apnea (AHI = 117)  Plan:  Continue same    Follow up: One year    History of Present Illness:  History of FRANK on PAP therapy  Fully compliant and deriving benefit        Historical Information    Past Medical History:   Past Medical History:   Diagnosis Date    [facility] [address] [facility]     [facility]     Liver disease     Sleep apnea          Past Surgical History:   Past Surgical History:   Procedure Laterality Date    ABDOMINAL SURGERY      CHOLECYSTECTOMY      COSMETIC SURGERY      FINGER SURGERY      HAND SURGERY      HYSTERECTOMY      LIVER SURGERY      [place] REPAIR INTERCARP/CARP-METACARP JT Right 9/9/2016    Procedure: THUMB TRAPEZIECTOMY; BASAL JOINT ARTHROPLASTY WITH APL AUTOGRAFT;  Surgeon: Mana Perez MD;  [facility];  Service: Orthopedics         Social History - see chart  History   Alcohol use: Not on file     History   Drug Use Not on file     History   Smoking Status    Not on file   Smokeless Tobacco    Not on file     Family History:   Family History   Problem Relation Age of Onset    Heart attack Father     Heart disease Family     Diabetes Family     Thyroid disease Family     Ovarian cancer Family        Medications/Allergies:      Current Outpatient Prescriptions:     ACCU-CHEK SOFTCLIX LANCETS lancets, by Does not apply route, Disp: , Rfl:     Blood Glucose Monitoring Suppl (ACCU-CHEK JESS CONNECT) w/Device KIT, by Does not apply route, Disp: , Rfl:     diphenhydrAMINE (BENADRYL) 25 mg capsule, Take 25 mg by mouth every 6 (six) hours as needed for itching, Disp: , Rfl:     glipiZIDE (GLUCOTROL XL) 5 mg 24 hr tablet, Take by mouth, Disp: , Rfl:     Glucose Blood (ACCU-CHEK JESS PLUS VI), by In Vitro route, Disp: , Rfl:     meloxicam (MOBIC) 7 5 mg tablet, Take 7 5 mg by mouth 2 (two) times a day, Disp: , Rfl:     metFORMIN (GLUCOPHAGE) 1000 MG tablet, Take 1 tablet by mouth 2 (two) times a day, Disp: , Rfl:     traMADol (ULTRAM) 50 mg tablet, Take 1 tablet (50 mg total) by mouth every 8 (eight) hours as needed for moderate pain, Disp: 90 tablet, Rfl: 1      Review of Systems      Genitourinary hot flashes and post menopausal   Cardiology none   Gastrointestinal none   Neurology none   Constitutional weight change of 10 or more pounds in the past year loss of 10 pounds   Integumentary none   Psychiatry none   Musculoskeletal joint pain, back pain and sciatica   Pulmonary none   ENT post nasal drip and throat clearing   Endocrine intolerance of heat   Hematological none       Type 2 diabetic as of November 2017  Objective      Vital Signs:   Vitals:    02/20/18 1500   BP: 140/70   Pulse: 76       [place]: @ZDYO([id number])      Physical Exam:    General: Alert, appropriate, cooperative, overweight    Head: NC/AT    Skin: Warm, dry    Neuro: No motor abnormalities, cranial nerves appear intact    Extremity: No clubbing, cyanosis        Counseling / Coordination of Care  Total clinic time spent today 15 minutes  Greater than 50% of total time was spent with the patient and / or family counseling and / or coordination of care  A description of the counseling / coordination of care: Discussed equipment and response to treatment  BRIDGETTE Jenkins    Board Certified Sleep Specialist

## 2018-02-26 ENCOUNTER — APPOINTMENT (OUTPATIENT)
Dept: LAB | Facility: CLINIC | Age: 68
End: 2018-02-26
Payer: MEDICARE

## 2018-02-26 ENCOUNTER — TRANSCRIBE ORDERS (OUTPATIENT)
Dept: LAB | Facility: CLINIC | Age: 68
End: 2018-02-26

## 2018-02-26 ENCOUNTER — APPOINTMENT (OUTPATIENT)
Dept: RADIOLOGY | Facility: CLINIC | Age: 68
End: 2018-02-26
Payer: MEDICARE

## 2018-02-26 DIAGNOSIS — M25.551 CHRONIC RIGHT HIP PAIN: ICD-10-CM

## 2018-02-26 DIAGNOSIS — E11.65 TYPE 2 DIABETES MELLITUS WITH HYPERGLYCEMIA (HCC): ICD-10-CM

## 2018-02-26 DIAGNOSIS — G89.29 CHRONIC RIGHT HIP PAIN: ICD-10-CM

## 2018-02-26 LAB
ALBUMIN SERPL BCP-MCNC: 3.6 G/DL (ref 3.5–5)
ALP SERPL-CCNC: 79 U/L (ref 46–116)
ALT SERPL W P-5'-P-CCNC: 26 U/L (ref 12–78)
ANION GAP SERPL CALCULATED.3IONS-SCNC: 7 MMOL/L (ref 4–13)
AST SERPL W P-5'-P-CCNC: 15 U/L (ref 5–45)
BILIRUB SERPL-MCNC: 0.4 MG/DL (ref 0.2–1)
BUN SERPL-MCNC: 20 MG/DL (ref 5–25)
CALCIUM SERPL-MCNC: 9.3 MG/DL (ref 8.3–10.1)
CHLORIDE SERPL-SCNC: 103 MMOL/L (ref 100–108)
CHOLEST SERPL-MCNC: 212 MG/DL (ref 50–200)
CO2 SERPL-SCNC: 30 MMOL/L (ref 21–32)
CREAT SERPL-MCNC: 0.62 MG/DL (ref 0.6–1.3)
ERYTHROCYTE [DISTWIDTH] IN BLOOD BY AUTOMATED COUNT: 13.5 % (ref 11.6–15.1)
EST. AVERAGE GLUCOSE BLD GHB EST-MCNC: 126 MG/DL
GFR SERPL CREATININE-BSD FRML MDRD: 92 ML/MIN/1.73SQ M
GLUCOSE P FAST SERPL-MCNC: 135 MG/DL (ref 65–99)
HBA1C MFR BLD: 6 % (ref 4.2–6.3)
HCT VFR BLD AUTO: 50.6 % (ref 34.8–46.1)
HDLC SERPL-MCNC: 46 MG/DL (ref 40–60)
HGB BLD-MCNC: 16 G/DL (ref 11.5–15.4)
LDLC SERPL CALC-MCNC: 127 MG/DL (ref 0–100)
MCH RBC QN AUTO: 30 PG (ref 26.8–34.3)
MCHC RBC AUTO-ENTMCNC: 31.6 G/DL (ref 31.4–37.4)
MCV RBC AUTO: 95 FL (ref 82–98)
PLATELET # BLD AUTO: 150 THOUSANDS/UL (ref 149–390)
PMV BLD AUTO: 10.7 FL (ref 8.9–12.7)
POTASSIUM SERPL-SCNC: 4.3 MMOL/L (ref 3.5–5.3)
PROT SERPL-MCNC: 7.5 G/DL (ref 6.4–8.2)
RBC # BLD AUTO: 5.33 MILLION/UL (ref 3.81–5.12)
SODIUM SERPL-SCNC: 140 MMOL/L (ref 136–145)
TRIGL SERPL-MCNC: 197 MG/DL
WBC # BLD AUTO: 7.24 THOUSAND/UL (ref 4.31–10.16)

## 2018-02-26 PROCEDURE — 80061 LIPID PANEL: CPT

## 2018-02-26 PROCEDURE — 83036 HEMOGLOBIN GLYCOSYLATED A1C: CPT

## 2018-02-26 PROCEDURE — 36415 COLL VENOUS BLD VENIPUNCTURE: CPT

## 2018-02-26 PROCEDURE — 80053 COMPREHEN METABOLIC PANEL: CPT

## 2018-02-26 PROCEDURE — 85027 COMPLETE CBC AUTOMATED: CPT

## 2018-02-26 PROCEDURE — 73502 X-RAY EXAM HIP UNI 2-3 VIEWS: CPT

## 2018-03-01 ENCOUNTER — TELEPHONE (OUTPATIENT)
Dept: PAIN MEDICINE | Facility: MEDICAL CENTER | Age: 68
End: 2018-03-01

## 2018-03-01 NOTE — TELEPHONE ENCOUNTER
----- Message from Nelly Archer DO sent at 3/1/2018  7:05 AM EST -----  Please notify the patient the x-ray of her right hip did not reveal any significant arthritic processes, fracture, or dislocation

## 2018-03-09 ENCOUNTER — HOSPITAL ENCOUNTER (OUTPATIENT)
Dept: RADIOLOGY | Facility: CLINIC | Age: 68
Discharge: HOME | End: 2018-03-09
Attending: ANESTHESIOLOGY | Admitting: ANESTHESIOLOGY
Payer: MEDICARE

## 2018-03-09 VITALS
TEMPERATURE: 97.1 F | DIASTOLIC BLOOD PRESSURE: 62 MMHG | OXYGEN SATURATION: 97 % | SYSTOLIC BLOOD PRESSURE: 142 MMHG | RESPIRATION RATE: 18 BRPM | HEART RATE: 63 BPM

## 2018-03-09 DIAGNOSIS — M54.16 LUMBAR RADICULOPATHY: ICD-10-CM

## 2018-03-09 PROCEDURE — 64483 NJX AA&/STRD TFRM EPI L/S 1: CPT | Performed by: ANESTHESIOLOGY

## 2018-03-09 PROCEDURE — 64484 NJX AA&/STRD TFRM EPI L/S EA: CPT | Performed by: ANESTHESIOLOGY

## 2018-03-09 RX ORDER — PAPAVERINE HCL 150 MG
20 CAPSULE, EXTENDED RELEASE ORAL ONCE
Status: COMPLETED | OUTPATIENT
Start: 2018-03-09 | End: 2018-03-09

## 2018-03-09 RX ORDER — LIDOCAINE HYDROCHLORIDE 10 MG/ML
5 INJECTION, SOLUTION EPIDURAL; INFILTRATION; INTRACAUDAL; PERINEURAL ONCE
Status: COMPLETED | OUTPATIENT
Start: 2018-03-09 | End: 2018-03-09

## 2018-03-09 RX ADMIN — LIDOCAINE HYDROCHLORIDE 4 ML: 10 INJECTION, SOLUTION EPIDURAL; INFILTRATION; INTRACAUDAL; PERINEURAL at 07:23

## 2018-03-09 RX ADMIN — IOHEXOL 2 ML: 300 INJECTION, SOLUTION INTRAVENOUS at 07:25

## 2018-03-09 RX ADMIN — DEXAMETHASONE SODIUM PHOSPHATE 15 MG: 10 INJECTION, SOLUTION INTRAMUSCULAR; INTRAVENOUS at 07:22

## 2018-03-09 RX ADMIN — LIDOCAINE HYDROCHLORIDE 2 ML: 20 INJECTION, SOLUTION EPIDURAL; INFILTRATION; INTRACAUDAL; PERINEURAL at 07:23

## 2018-03-09 NOTE — H&P
History of Present Illness: The patient is a 77 y o  female who presents with complaints of low back and right leg pain      Patient Active Problem List   Diagnosis    Lumbar radiculopathy    Spinal stenosis of lumbar region with neurogenic claudication    Lumbar spondylosis    Chronic right hip pain       Past Medical History:   Diagnosis Date    [facility] [address] [facility]     Diabetes mellitus [facility] [address] [facility]     Liver disease     Sleep apnea        Past Surgical History:   Procedure Laterality Date    ABDOMINAL SURGERY      CHOLECYSTECTOMY      COSMETIC SURGERY      FINGER SURGERY      HAND SURGERY      HYSTERECTOMY      LIVER SURGERY      [place] REPAIR INTERCARP/CARP-METACARP JT Right 9/9/2016    Procedure: THUMB TRAPEZIECTOMY; BASAL JOINT ARTHROPLASTY WITH APL AUTOGRAFT;  Surgeon: Kymberly Tenorio MD;  [facility];  Service: Orthopedics         Current Outpatient Prescriptions:     ACCU-CHEK SOFTCLIX LANCETS lancets, by Does not apply route, Disp: , Rfl:     Blood Glucose Monitoring Suppl (ACCU-CHEK JESS CONNECT) w/Device KIT, by Does not apply route, Disp: , Rfl:     diphenhydrAMINE (BENADRYL) 25 mg capsule, Take 25 mg by mouth every 6 (six) hours as needed for itching, Disp: , Rfl:     glipiZIDE (GLUCOTROL XL) 5 mg 24 hr tablet, Take by mouth, Disp: , Rfl:     Glucose Blood (ACCU-CHEK JESS PLUS VI), by In Vitro route, Disp: , Rfl:     meloxicam (MOBIC) 7 5 mg tablet, Take 7 5 mg by mouth 2 (two) times a day, Disp: , Rfl:     metFORMIN (GLUCOPHAGE) 1000 MG tablet, Take 1 tablet by mouth 2 (two) times a day, Disp: , Rfl:     traMADol (ULTRAM) 50 mg tablet, Take 1 tablet (50 mg total) by mouth every 8 (eight) hours as needed for moderate pain, Disp: 90 tablet, Rfl: 1    Current Facility-Administered Medications:     dexamethasone (PF) (DECADRON) injection 20 mg, 20 mg, Intra-articular, Once, Juanita Henry, DO    iohexol (OMNIPAQUE) 300 mg/mL injection 50 mL, 50 mL, Injection, Once, Severiano Roberts, DO    lidocaine (PF) (XYLOCAINE-MPF) 1 % injection 5 mL, 5 mL, Intra-articular, Once, Nahun Henry, DO    lidocaine (PF) (XYLOCAINE-MPF) 2 % injection 5 mL, 5 mL, Intra-articular, Once, Severiano Armando, DO    No Known Allergies    Physical Exam:   Vitals:    03/09/18 0658   BP: 123/71   Pulse: 62   Resp: 18   Temp: (!) 97 1 °F (36 2 °C)   SpO2: 95%     General: Awake, Alert, Oriented x 3, Mood and affect appropriate  Respiratory: Respirations even and unlabored  Cardiovascular: Peripheral pulses intact; no edema  Musculoskeletal Exam: Bilateral lumbar paraspinals tender to palpation ropy in texture more so on the right than the left  ASA Score: 2    Assessment:   1  Lumbar radiculopathy        Plan: Lumbar radiculopathy -right L4 and L5 TFESI       Assessment:  1  Lumbar radiculopathy    2  Spinal stenosis of lumbar region with neurogenic claudication    3  Lumbar spondylosis    4  Chronic right hip pain          Plan:  20-year-old female last seen in December of 2015 returning for follow-up of lumbosacral back pain and radiculopathy which was previously in the L4-5 distribution of the left lower extremity now into the L4-5 distribution of the right lower extremity  The patient does have degenerative disc disease and spondylosis as well as varying degrees of central and foraminal stenosis most pronounced at L5-S1 and L4-5 which has progressed since her previous MRI in 2014  The patient has had very favorable response to left L4 and L5 TFESI with essentially complete resolution of her left lower extremity symptoms  She has been taking tramadol 50 mg q 8 hours p r n  which she finds more effective than Norco   Her last prescription was from her primary care physician and she is almost out of this medication    She is finishing up a course of oral steroids which she was somewhat effective and was taking meloxicam 7 5 mg b i d  p r n  prior to starting her steroids without much relief  1   I will schedule the patient for a right L4 and L5 TFESI to reduce the inflammatory component of her pain  2  I will prescribe tramadol 50 mg q 8 hours p r n  and the patient was given a 1 month prescription with 1 refill  3  I will prescribe [place] based physical therapy for her radicular symptoms  4  The patient may benefit from a trial of gabapentin in the future, however she would like to hold off on medications that she would have to take on a consistent basis rather than p r n   5   I will follow up the patient in 2 months after injection  6  Patient may continue with meloxicam 7 5 mg b i d  p r n  when she is finished her steroid pack  The patient is not to take NSAIDs along with the steroids  The patient did verbalize understanding     There are risks associated with opioid medications, including dependence, addiction and tolerance  The patient understands and agrees to use these medications only as prescribed  Potential side effects of the medications include, but are not limited to, constipation, drowsiness, addiction, impaired judgment and risk of fatal overdose if not taken as prescribed  The patient was warned against driving while taking sedation medications  Sharing medications is a felony  At this point in time, the patient is showing no signs of addiction, abuse, diversion or suicidal ideation      A urine drug screen was collected at today's office visit as part of our medication management protocol  The point of care testing results were appropriate for what was being prescribed  The specimen will be sent for confirmatory testing  The drug screen is medically necessary because the patient is either dependent on opioid medication or is being considered for opioid medication therapy and the results could impact ongoing or future treatment   The drug screen is to evaluate for the presences or absence of prescribed, non-prescribed, and/or illicit drugs/substances      [place] Prescription Drug Monitoring Program report was reviewed and was appropriate      Complete risks and benefits including bleeding, infection, tissue reaction, nerve injury and allergic reaction were discussed  The approach was demonstrated using models and literature was provided  Verbal and written consent was obtained      My impressions and treatment recommendations were discussed in detail with the patient who verbalized understanding and had no further questions  Discharge instructions were provided  I personally saw and examined the patient and I agree with the above discussed plan of care      No orders of the defined types were placed in this encounter      No orders of the defined types were placed in this encounter         History of Present Illness:    Kristen Tracey is a 69 y o  female returning for follow-up of lumbosacral back pain and radiculopathy secondary to degenerative disc disease, spondylosis, and stenosis  Initially, the patient's symptoms were in the L4-5 distribution of the left lower extremity  She had complete resolution of her symptoms with left L4 and L5 TFESI back in 2015  However, the patient is now experiencing identical symptoms in the right lower extremity in the L4-5 distribution  She denies any left lower extremity symptoms, bladder bowel incontinence, or saddle anesthesia  She is currently taking tramadol 50 mg q 8 hours p r n  which is provided by her primary care physician which the patient finds more fact of the [address] [facility] the  She has been on a steroid pack and is on her last day of it  She has found some relief with this  She was previously taking meloxicam 7 5 mg b i d  p r n  with minimal relief  The patient states that the pain was so severe she had to go to the emergency room where and updated image in the form of his CT of her lumbar spine was done revealing progression of her degenerative disc disease, spondylosis, and stenosis    The patient rates her pain a 10/10 on the pain is constant  The pain is worse in the afternoon, evening, and at night  The pain is described as shooting, sharp, and throbbing  The pain is decreased with lying down  The pain is increased with standing, bending at the waist, sitting, and exercise      I have personally reviewed and/or updated the patient's past medical history, past surgical history, family history, social history, current medications, allergies, and vital signs today       Other than as stated above, the patient denies any interval changes in medications, medical condition, mental condition, symptoms, or allergies since the last office visit  Review of Systems:     Review of Systems   Constitutional: Negative for fever and unexpected weight change  HENT: Negative for trouble swallowing  Eyes: Negative for visual disturbance  Respiratory: Negative for shortness of breath and wheezing  Cardiovascular: Negative for chest pain and palpitations  Gastrointestinal: Negative for constipation, diarrhea, nausea and vomiting  Endocrine: Negative for cold intolerance, heat intolerance and polydipsia  Genitourinary: Negative for difficulty urinating and frequency  Musculoskeletal: Negative for arthralgias, joint swelling and myalgias  Difficulty walking, Decreased ROM   Skin: Negative for rash  Neurological: Negative for dizziness, seizures, syncope, weakness and headaches  Hematological: Does not bruise/bleed easily  Psychiatric/Behavioral: Negative for dysphoric mood     All other systems reviewed and are negative         There is no problem list on file for this patient         Medical History        Past Medical History:   Diagnosis Date    [facility] [address] [facility]      Liver disease      Sleep apnea              Surgical History         Past Surgical History:   Procedure Laterality Date    ABDOMINAL SURGERY        CHOLECYSTECTOMY        COSMETIC SURGERY        FINGER SURGERY        HAND SURGERY        HYSTERECTOMY        LIVER SURGERY        [place] REPAIR INTERCARP/CARP-METACARP JT Right 9/9/2016     Procedure: THUMB TRAPEZIECTOMY; BASAL JOINT ARTHROPLASTY WITH APL AUTOGRAFT;  Surgeon: Bhavana Casas MD;  [facility];  Service: Orthopedics                  Family History   Problem Relation Age of Onset    Heart attack Father      Heart disease Family      Diabetes Family      Thyroid disease Family      Ovarian cancer Family           Social History          Occupational History    Not on file             Social History Main Topics    Smoking status: Current Every Day Smoker       Packs/day: 0 20    Smokeless tobacco: Never Used    Alcohol use No    Drug use: No    Sexual activity: Not on file              Current Outpatient Prescriptions on File Prior to Visit   Medication Sig    ACCU-CHEK SOFTCLIX LANCETS lancets by Does not apply route    Blood Glucose Monitoring Suppl (ACCU-CHEK JESS CONNECT) w/Device KIT by Does not apply route    diphenhydrAMINE (BENADRYL) 25 mg capsule Take 25 mg by mouth every 6 (six) hours as needed for itching    glipiZIDE (GLUCOTROL XL) 5 mg 24 hr tablet Take by mouth    Glucose Blood (ACCU-CHEK JESS PLUS VI) by In Vitro route    meloxicam (MOBIC) 7 5 mg tablet Take 7 5 mg by mouth 2 (two) times a day    metFORMIN (GLUCOPHAGE) 1000 MG tablet Take 1 tablet by mouth 2 (two) times a day    predniSONE 20 mg tablet Take 2 tablets (40 mg total) by mouth daily for 5 days    traMADol (ULTRAM) 50 mg tablet Take 1 tablet (50 mg total) by mouth every 6 (six) hours as needed for moderate pain for up to 10 days    [DISCONTINUED] gabapentin (NEURONTIN) 100 mg capsule Take by mouth 3 (three) times a day      [DISCONTINUED] oxyCODONE (OXY-IR) 5 MG capsule Take 1 capsule (5 mg total) by mouth every 6 (six) hours as needed for moderate pain Max Daily Amount: 20 mg      No current facility-administered medications on file prior to visit           No Known Allergies     Physical Exam:     /75   Pulse 74   Temp 99 5 °F (37 5 °C)   Ht 5' 1" (1 549 m)   Wt 84 4 kg (186 lb)   BMI 35 14 kg/m²      Constitutional: obese  Eyes: anicteric  HEENT: grossly intact  Neck: supple, symmetric, trachea midline and no masses   Pulmonary:even and unlabored  Cardiovascular:No edema or pitting edema present  Skin:Normal without rashes or lesions and well hydrated  Psychiatric:Mood and affect appropriate  Neurologic:Cranial Nerves II-XII grossly intact  Musculoskeletal:antalgic gait  Right-sided lumbar paraspinals tender to palpation from L3-L5  Bilateral lower extremity strength 5/5 in all muscle groups    Positive seated straight leg raise on the right and negative on the left

## 2018-03-09 NOTE — DISCHARGE INSTRUCTIONS
Epidural Steroid Injection   WHAT YOU NEED TO KNOW:   An epidural steroid injection (ELLEN) is a procedure to inject steroid medicine into the epidural space  The epidural space is between your spinal cord and vertebrae  Steroids reduce inflammation and fluid buildup in your spine that may be causing pain  You may be given pain medicine along with the steroids  ACTIVITY  · Do not drive or operate machinery today  · No strenuous activity today - bending, lifting, etc   · You may resume normal activites starting tomorrow - start slowly and as tolerated  · You may shower today, but no tub baths or hot tubs  · You may have numbness for several hours from the local anesthetic  Please use caution and common sense, especially with weight-bearing activities  CARE OF THE INJECTION SITE  · If you have soreness or pain, apply ice to the area today (20 minutes on/20 minutes off)  · Starting tomorrow, you may use warm, moist heat or ice if needed  · You may have an increase or change in your discomfort for 36-48 hours after your treatment  · Apply ice and continue with any pain medication you have been prescribed  · Notify [facility] if you have any of the following: redness, drainage, swelling, headache, stiff neck or fever above 100°F     SPECIAL INSTRUCTIONS  · Our office will contact you in approximately 7 days for a progress report  MEDICATIONS  · Continue to take all routine medications  · Our office may have instructed you to hold some medications  If you have a problem specifically related to your procedure, please call our office at [phone]  Problems not related to your procedure should be directed to your primary care physician

## 2018-03-16 ENCOUNTER — TELEPHONE (OUTPATIENT)
Dept: PAIN MEDICINE | Facility: CLINIC | Age: 68
End: 2018-03-16

## 2018-04-02 DIAGNOSIS — E11.9 TYPE 2 DIABETES MELLITUS WITHOUT COMPLICATION, WITHOUT LONG-TERM CURRENT USE OF INSULIN (HCC): Primary | ICD-10-CM

## 2018-04-02 PROBLEM — IMO0001 UNCONTROLLED TYPE 2 DIABETES MELLITUS WITHOUT COMPLICATION, WITHOUT LONG-TERM CURRENT USE OF INSULIN: Status: ACTIVE | Noted: 2017-11-09

## 2018-04-02 RX ORDER — LANCETS
EACH MISCELLANEOUS 4 TIMES DAILY
Qty: 100 EACH | Refills: 1 | Status: SHIPPED | OUTPATIENT
Start: 2018-04-02 | End: 2018-05-24

## 2018-04-13 ENCOUNTER — OFFICE VISIT (OUTPATIENT)
Dept: OBGYN CLINIC | Facility: HOSPITAL | Age: 68
End: 2018-04-13
Payer: MEDICARE

## 2018-04-13 VITALS
SYSTOLIC BLOOD PRESSURE: 172 MMHG | HEIGHT: 61 IN | WEIGHT: 185.4 LBS | DIASTOLIC BLOOD PRESSURE: 77 MMHG | HEART RATE: 82 BPM | BODY MASS INDEX: 35.01 KG/M2

## 2018-04-13 DIAGNOSIS — M18.12 PRIMARY OSTEOARTHRITIS OF FIRST CARPOMETACARPAL JOINT OF LEFT HAND: Primary | ICD-10-CM

## 2018-04-13 PROCEDURE — 99214 OFFICE O/P EST MOD 30 MIN: CPT | Performed by: ORTHOPAEDIC SURGERY

## 2018-04-13 NOTE — H&P
ASSESSMENT/PLAN:    Diagnoses and all orders for this visit:    Primary osteoarthritis of first carpometacarpal joint of left hand        Assessment:   Thumb CMC Arthritis  left    Plan:   Thumb Interpositional Arthroplasty Cata Ramirez) with FCR transfer  Left  Therapy will start 2 weeks after surgery  Follow Up: After Surgery    To Do Next Visit:    and Sutures out    General Discussions:       Operative Discussions:     [place]: The anatomy and physiology of carpometacarpal joint arthritis was discussed with the patient today in the office  Deterioration of the articular cartilage eventually leads to hypermobility at the thumb [facility] joint, resulting in joint subluxation, osteophyte formation, cystic changes within the trapezium and base of the first metacarpal, as well as subchondral sclerosis  Eventually, pain, limited mobility, and compensatory hyperextension at the metacarpophalangeal joint may develop  While normal activity and usage of the thumb joint may provide a painful experience to the patient, this typically does not result in damage to the thumb or hand  Treatment options include resting thumb spica splints to decreased joint edema, pain, and inflammation  Therapy exercises to strengthen the thenar musculature may relieve pain, but do not alter the overall continued development of osteoarthritis  Oral medications, topical medications, corticosteroid injections may decrease pain and increase overall function  Eventually, approximately 5% of patients may require surgical intervention  The risks and benefit of surgery were discussed with the patient  The patient is aware of the incision, the harvesting of the flexor carpi radialis tendon, as well as the weave portion of the procedure  After the procedure, the pain will be decreased, the function improved, and the strength improved  The potential for numbness around the incision site was also discussed    The patient is aware that in the postoperative period, a bulky dressing will be worn for roughly 10 days, followed by a removable splint for 4  This removable splint may be removed for showering, bathing, and daily therapy exercises  At 6 weeks postoperative, the splint will be discontinued in strengthening will continue for the next 6-8 weeks  The patient is aware that the average total recovery time is approximately 3-4 months  Success rate is approximately 90-95%  The risks and benefits of the procedure were explained to the patient, which include, but are not limited to: Bleeding, infection, recurrence, pain, scar, damage to tendons, damage to nerves, and damage to blood vessels, failure to give desired results and complications related to anesthesia  These risks, along with alternative conservative treatment options, and postoperative protocols were voiced back and understood by the patient  All questions were answered to the patient's satisfaction  The patient agrees to comply with a standard postoperative protocol, and is willing to proceed  Education was provided via written and auditory forms  There were no barriers to learning  Written handouts regarding wound care, incision and scar care, and general preoperative information was provided to the patient  Prior to surgery, the patient may be requested to stop all anti-inflammatory medications  Prophylactic aspirin, Plavix, and Coumadin may be allowed to be continued  Medications including vitamin E , ginkgo, and fish oil are requested to be stopped approximately one week prior to surgery  Hypertensive medications and beta blockers, if taken, should be continued  _____________________________________________________  CHIEF COMPLAINT:  Chief Complaint   Patient presents with    Left Thumb - Follow-up         SUBJECTIVE:  Alfonzo Tovar is a 77y o  RHD year old female who presents for follow up regarding Thumb CMC Arthritis  left    Since last visit, Alfonzo Tovar has tried bracing without relief  Today there is Pain  Moderate  Constant  Sharp and Aching to the left thumb  Pt states that is affecting her daily lift at this point in time  Pt states that she previously has had her R thumb done and states that she is pain free in her right hand  Pts surgery was previously cancelled due to finding out that she is a diabetic     Radiation: Yes to the  thumb  Associated symptoms: No Complaints    PAST MEDICAL HISTORY:  Past Medical History:   Diagnosis Date    [facility] [address]     Diabetes mellitus [facility] [address] [facility]     Liver disease     Sleep apnea        PAST SURGICAL HISTORY:  Past Surgical History:   Procedure Laterality Date    ABDOMINAL SURGERY      CHOLECYSTECTOMY      COSMETIC SURGERY      FINGER SURGERY      HAND SURGERY      HYSTERECTOMY      LIVER SURGERY      [place] REPAIR INTERCARP/CARP-METACARP JT Right 9/9/2016    Procedure: THUMB TRAPEZIECTOMY; BASAL JOINT ARTHROPLASTY WITH APL AUTOGRAFT;  Surgeon: Josephine Ramos MD;  [facility];  Service: Orthopedics       FAMILY HISTORY:  Family History   Problem Relation Age of Onset    Heart attack Father     Heart disease Family     Diabetes Family     Thyroid disease Family     Ovarian cancer Family        SOCIAL HISTORY:  Social History   Substance Use Topics    Smoking status: Current Some Day Smoker     Packs/day: 0 20    Smokeless tobacco: Never Used    Alcohol use No       MEDICATIONS:    Current Outpatient Prescriptions:     ACCU-CHEK SOFTCLIX LANCETS lancets, by Other route 4 (four) times a day, Disp: 100 each, Rfl: 1    Blood Glucose Monitoring Suppl (ACCU-CHEK JESS CONNECT) w/Device KIT, by Does not apply route, Disp: , Rfl:     diphenhydrAMINE (BENADRYL) 25 mg capsule, Take 25 mg by mouth every 6 (six) hours as needed for itching, Disp: , Rfl:     glipiZIDE (GLUCOTROL XL) 5 mg 24 hr tablet, Take by mouth, Disp: , Rfl:     Glucose Blood (ACCU-CHEK JESS PLUS VI), by In Vitro route, Disp: , Rfl:     meloxicam (MOBIC) 7 5 mg tablet, Take 7 5 mg by mouth 2 (two) times a day, Disp: , Rfl:     metFORMIN (GLUCOPHAGE) 1000 MG tablet, Take 1 tablet by mouth 2 (two) times a day, Disp: , Rfl:     traMADol (ULTRAM) 50 mg tablet, Take 1 tablet (50 mg total) by mouth every 8 (eight) hours as needed for moderate pain, Disp: 90 tablet, Rfl: 1    ALLERGIES:  No Known Allergies    REVIEW OF SYSTEMS:  Pertinent items are noted in HPI      LABS:  HgA1c:   Lab Results   Component Value Date    HGBA1C 6 0 02/26/2018     BMP:   Lab Results   Component Value Date    GLUCOSE 193 (H) 08/18/2016    CALCIUM 9 3 02/26/2018     02/26/2018    K 4 3 02/26/2018    CO2 30 02/26/2018     02/26/2018    BUN 20 02/26/2018    CREATININE 0 62 02/26/2018           _____________________________________________________  PHYSICAL EXAMINATION:  General: well developed and well nourished, alert, oriented times 3 and appears comfortable  Psychiatric: Normal  HEENT: Trachea Midline, No torticollis  Cardiovascular: No discernable arrhythmia  Pulmonary: No wheezing or stridor  Skin: No masses, erthema, lacerations, fluctation, ulcerations  Neurovascular: Sensation Intact to the Median, Ulnar, Radial Nerve, Motor Intact to the Median, Ulnar, Radial Nerve and Pulses Intact    MUSCULOSKELETAL EXAMINATION:  LEFT SIDE:  CMC: Positive shuck, Positive grind, Positive tendnerness CMC and Positive Shoulder Sign    _____________________________________________________  STUDIES REVIEWED:  No Studies to review      PROCEDURES PERFORMED:  Procedures  No Procedures performed today

## 2018-04-13 NOTE — PROGRESS NOTES
ASSESSMENT/PLAN:    Diagnoses and all orders for this visit:    Primary osteoarthritis of first carpometacarpal joint of left hand  -     Case request operating room: ARTHROPLASTY THUMB WEILBY, TRANSFER TENDON HAND/WRIST FCR from forearm to wrist; Standing  -     Case request operating room: ARTHROPLASTY THUMB WEILRICKY, TRANSFER TENDON HAND/WRIST FCR from forearm to wrist    Other orders  -     Diet NPO; Sips with meds; Standing  -     Height and weight upon arrival; Standing  -     Void on call to OR; Standing  -     Insert peripheral IV; Standing  -     ceFAZolin (ANCEF) 2,000 mg in sodium chloride 0 9 % 50 mL IVPB; Infuse 2,000 mg into a venous catheter once         Assessment:   Thumb CMC Arthritis  left    Plan:   Thumb Interpositional Arthroplasty Bree Holes) with FCR transfer  Left  It was discussed with the patient that we will start her Therapy 2 weeks after surgery  It was also discussed with the patient the acceptable A1C and daily sugar levels to proceed with surgery  Follow Up: After Surgery    To Do Next Visit:    and Sutures out    General Discussions:       Operative Discussions:     [place]: The anatomy and physiology of carpometacarpal joint arthritis was discussed with the patient today in the office  Deterioration of the articular cartilage eventually leads to hypermobility at the thumb [facility] joint, resulting in joint subluxation, osteophyte formation, cystic changes within the trapezium and base of the first metacarpal, as well as subchondral sclerosis  Eventually, pain, limited mobility, and compensatory hyperextension at the metacarpophalangeal joint may develop  While normal activity and usage of the thumb joint may provide a painful experience to the patient, this typically does not result in damage to the thumb or hand  Treatment options include resting thumb spica splints to decreased joint edema, pain, and inflammation    Therapy exercises to strengthen the thenar musculature may relieve pain, but do not alter the overall continued development of osteoarthritis  Oral medications, topical medications, corticosteroid injections may decrease pain and increase overall function  Eventually, approximately 5% of patients may require surgical intervention  The risks and benefit of surgery were discussed with the patient  The patient is aware of the incision, the harvesting of the flexor carpi radialis tendon, as well as the weave portion of the procedure  After the procedure, the pain will be decreased, the function improved, and the strength improved  The potential for numbness around the incision site was also discussed  The patient is aware that in the postoperative period, a bulky dressing will be worn for roughly 10 days, followed by a removable splint for 4  This removable splint may be removed for showering, bathing, and daily therapy exercises  At 6 weeks postoperative, the splint will be discontinued in strengthening will continue for the next 6-8 weeks  The patient is aware that the average total recovery time is approximately 3-4 months  Success rate is approximately 90-95%  The risks and benefits of the procedure were explained to the patient, which include, but are not limited to: Bleeding, infection, recurrence, pain, scar, damage to tendons, damage to nerves, and damage to blood vessels, failure to give desired results and complications related to anesthesia  These risks, along with alternative conservative treatment options, and postoperative protocols were voiced back and understood by the patient  All questions were answered to the patient's satisfaction  The patient agrees to comply with a standard postoperative protocol, and is willing to proceed  Education was provided via written and auditory forms  There were no barriers to learning  Written handouts regarding wound care, incision and scar care, and general preoperative information was provided to the patient  Prior to surgery, the patient may be requested to stop all anti-inflammatory medications  Prophylactic aspirin, Plavix, and Coumadin may be allowed to be continued  Medications including vitamin E , ginkgo, and fish oil are requested to be stopped approximately one week prior to surgery  Hypertensive medications and beta blockers, if taken, should be continued  _____________________________________________________  CHIEF COMPLAINT:  Chief Complaint   Patient presents with    Left Thumb - Follow-up         SUBJECTIVE:  Brooke Jacques is a 77y o  RHD year old female who presents for follow up regarding Thumb CMC Arthritis  left  Since last visit, Brooke Jacques has tried bracing without relief  Today there is Pain  Moderate  Constant  Sharp and Aching to the left thumb  Pt states that is affecting her daily lift at this point in time  Pt states that she previously has had her R thumb done and states that she is pain free in her right hand  Pts surgery was previously cancelled due to finding out that she is a diabetic     Radiation: Yes to the  thumb  Associated symptoms: No Complaints    PAST MEDICAL HISTORY:  Past Medical History:   Diagnosis Date    [facility]chronic obstructive [facility] disease) [address] [address]     Diabetes mellitus [facility] [address]     Liver disease     Sleep apnea        PAST SURGICAL HISTORY:  Past Surgical History:   Procedure Laterality Date    ABDOMINAL SURGERY      CHOLECYSTECTOMY      COSMETIC SURGERY      FINGER SURGERY      HAND SURGERY      HYSTERECTOMY      LIVER SURGERY      [place] REPAIR INTERCARP/CARP-METACARP JT Right 9/9/2016    Procedure: THUMB TRAPEZIECTOMY; BASAL JOINT ARTHROPLASTY WITH APL AUTOGRAFT;  Surgeon: Kunal Yusuf MD;  [facility];  Service: Orthopedics       FAMILY HISTORY:  Family History   Problem Relation Age of Onset    Heart attack Father     Heart disease Family     Diabetes Family     Thyroid disease Family     Ovarian cancer Family        SOCIAL HISTORY:  Social History   Substance Use Topics    Smoking status: Current Some Day Smoker     Packs/day: 0 20    Smokeless tobacco: Never Used    Alcohol use No       MEDICATIONS:    Current Outpatient Prescriptions:     ACCU-CHEK SOFTCLIX LANCETS lancets, by Other route 4 (four) times a day, Disp: 100 each, Rfl: 1    Blood Glucose Monitoring Suppl (ACCU-CHEK JESS CONNECT) w/Device KIT, by Does not apply route, Disp: , Rfl:     diphenhydrAMINE (BENADRYL) 25 mg capsule, Take 25 mg by mouth every 6 (six) hours as needed for itching, Disp: , Rfl:     glipiZIDE (GLUCOTROL XL) 5 mg 24 hr tablet, Take by mouth, Disp: , Rfl:     Glucose Blood (ACCU-CHEK JESS PLUS VI), by In Vitro route, Disp: , Rfl:     meloxicam (MOBIC) 7 5 mg tablet, Take 7 5 mg by mouth 2 (two) times a day, Disp: , Rfl:     metFORMIN (GLUCOPHAGE) 1000 MG tablet, Take 1 tablet by mouth 2 (two) times a day, Disp: , Rfl:     traMADol (ULTRAM) 50 mg tablet, Take 1 tablet (50 mg total) by mouth every 8 (eight) hours as needed for moderate pain, Disp: 90 tablet, Rfl: 1    ALLERGIES:  No Known Allergies    REVIEW OF SYSTEMS:  Pertinent items are noted in HPI      LABS:  HgA1c:   Lab Results   Component Value Date    HGBA1C 6 0 02/26/2018     BMP:   Lab Results   Component Value Date    GLUCOSE 193 (H) 08/18/2016    CALCIUM 9 3 02/26/2018     02/26/2018    K 4 3 02/26/2018    CO2 30 02/26/2018     02/26/2018    BUN 20 02/26/2018    CREATININE 0 62 02/26/2018           _____________________________________________________  PHYSICAL EXAMINATION:  General: well developed and well nourished, alert, oriented times 3 and appears comfortable  Psychiatric: Normal  HEENT: Trachea Midline, No torticollis  Cardiovascular: No discernable arrhythmia  Pulmonary: No wheezing or stridor  Skin: No masses, erthema, lacerations, fluctation, ulcerations  Neurovascular: Sensation Intact to the Median, Ulnar, Radial Nerve, Motor Intact to the Median, Ulnar, Radial Nerve and Pulses Intact    MUSCULOSKELETAL EXAMINATION:  LEFT SIDE:  CMC: Positive shuck, Positive grind, Positive tendnerness CMC and Positive Shoulder Sign    _____________________________________________________  STUDIES REVIEWED:  No Studies to review      PROCEDURES PERFORMED:  Procedures  No Procedures performed today

## 2018-04-13 NOTE — H&P
ASSESSMENT/PLAN:    Diagnoses and all orders for this visit:    Primary osteoarthritis of first carpometacarpal joint of left hand  -     Case request operating room: ARTHROPLASTY THUMB WEILBY, TRANSFER TENDON HAND/WRIST FCR from forearm to wrist; Standing  -     Case request operating room: ARTHROPLASTY THUMB WEILRICKY, TRANSFER TENDON HAND/WRIST FCR from forearm to wrist    Other orders  -     Diet NPO; Sips with meds; Standing  -     Height and weight upon arrival; Standing  -     Void on call to OR; Standing  -     Insert peripheral IV; Standing  -     ceFAZolin (ANCEF) 2,000 mg in sodium chloride 0 9 % 50 mL IVPB; Infuse 2,000 mg into a venous catheter once         Assessment:   Thumb CMC Arthritis  left    Plan:   Thumb Interpositional Arthroplasty Luis A Simper) with FCR transfer  Left  It was discussed with the patient that we will start her Therapy 2 weeks after surgery  It was also discussed with the patient the acceptable A1C and daily sugar levels to proceed with surgery  Follow Up: After Surgery    To Do Next Visit:    and Sutures out    General Discussions:       Operative Discussions:     [place]: The anatomy and physiology of carpometacarpal joint arthritis was discussed with the patient today in the office  Deterioration of the articular cartilage eventually leads to hypermobility at the thumb [facility] joint, resulting in joint subluxation, osteophyte formation, cystic changes within the trapezium and base of the first metacarpal, as well as subchondral sclerosis  Eventually, pain, limited mobility, and compensatory hyperextension at the metacarpophalangeal joint may develop  While normal activity and usage of the thumb joint may provide a painful experience to the patient, this typically does not result in damage to the thumb or hand  Treatment options include resting thumb spica splints to decreased joint edema, pain, and inflammation    Therapy exercises to strengthen the thenar musculature may relieve pain, but do not alter the overall continued development of osteoarthritis  Oral medications, topical medications, corticosteroid injections may decrease pain and increase overall function  Eventually, approximately 5% of patients may require surgical intervention  The risks and benefit of surgery were discussed with the patient  The patient is aware of the incision, the harvesting of the flexor carpi radialis tendon, as well as the weave portion of the procedure  After the procedure, the pain will be decreased, the function improved, and the strength improved  The potential for numbness around the incision site was also discussed  The patient is aware that in the postoperative period, a bulky dressing will be worn for roughly 10 days, followed by a removable splint for 4  This removable splint may be removed for showering, bathing, and daily therapy exercises  At 6 weeks postoperative, the splint will be discontinued in strengthening will continue for the next 6-8 weeks  The patient is aware that the average total recovery time is approximately 3-4 months  Success rate is approximately 90-95%  The risks and benefits of the procedure were explained to the patient, which include, but are not limited to: Bleeding, infection, recurrence, pain, scar, damage to tendons, damage to nerves, and damage to blood vessels, failure to give desired results and complications related to anesthesia  These risks, along with alternative conservative treatment options, and postoperative protocols were voiced back and understood by the patient  All questions were answered to the patient's satisfaction  The patient agrees to comply with a standard postoperative protocol, and is willing to proceed  Education was provided via written and auditory forms  There were no barriers to learning  Written handouts regarding wound care, incision and scar care, and general preoperative information was provided to the patient  Prior to surgery, the patient may be requested to stop all anti-inflammatory medications  Prophylactic aspirin, Plavix, and Coumadin may be allowed to be continued  Medications including vitamin E , ginkgo, and fish oil are requested to be stopped approximately one week prior to surgery  Hypertensive medications and beta blockers, if taken, should be continued  _____________________________________________________  CHIEF COMPLAINT:  Chief Complaint   Patient presents with    Left Thumb - Follow-up         SUBJECTIVE:  Lelo Carr is a 77y o  RHD year old female who presents for follow up regarding Thumb CMC Arthritis  left  Since last visit, Lelo Carr has tried bracing without relief  Today there is Pain  Moderate  Constant  Sharp and Aching to the left thumb  Pt states that is affecting her daily lift at this point in time  Pt states that she previously has had her R thumb done and states that she is pain free in her right hand  Pts surgery was previously cancelled due to finding out that she is a diabetic     Radiation: Yes to the  thumb  Associated symptoms: No Complaints    PAST MEDICAL HISTORY:  Past Medical History:   Diagnosis Date    [facility]ch[facility] obstructive [facility]) [facility] [address]     Diabetes mellitus [facility] [address]     Liver disease     Sleep apnea        PAST SURGICAL HISTORY:  Past Surgical History:   Procedure Laterality Date    ABDOMINAL SURGERY      CHOLECYSTECTOMY      COSMETIC SURGERY      FINGER SURGERY      HAND SURGERY      HYSTERECTOMY      LIVER SURGERY      [place] REPAIR INTERCARP/CARP-METACARP JT Right 9/9/2016    Procedure: THUMB TRAPEZIECTOMY; BASAL JOINT ARTHROPLASTY WITH APL AUTOGRAFT;  Surgeon: Huy Maxwell MD;  [facility];  Service: Orthopedics       FAMILY HISTORY:  Family History   Problem Relation Age of Onset    Heart attack Father     Heart disease Family     Diabetes Family     Thyroid disease Family     Ovarian cancer Family        SOCIAL HISTORY:  Social History   Substance Use Topics    Smoking status: Current Some Day Smoker     Packs/day: 0 20    Smokeless tobacco: Never Used    Alcohol use No       MEDICATIONS:    Current Outpatient Prescriptions:     ACCU-CHEK SOFTCLIX LANCETS lancets, by Other route 4 (four) times a day, Disp: 100 each, Rfl: 1    Blood Glucose Monitoring Suppl (ACCU-CHEK JESS CONNECT) w/Device KIT, by Does not apply route, Disp: , Rfl:     diphenhydrAMINE (BENADRYL) 25 mg capsule, Take 25 mg by mouth every 6 (six) hours as needed for itching, Disp: , Rfl:     glipiZIDE (GLUCOTROL XL) 5 mg 24 hr tablet, Take by mouth, Disp: , Rfl:     Glucose Blood (ACCU-CHEK JESS PLUS VI), by In Vitro route, Disp: , Rfl:     meloxicam (MOBIC) 7 5 mg tablet, Take 7 5 mg by mouth 2 (two) times a day, Disp: , Rfl:     metFORMIN (GLUCOPHAGE) 1000 MG tablet, Take 1 tablet by mouth 2 (two) times a day, Disp: , Rfl:     traMADol (ULTRAM) 50 mg tablet, Take 1 tablet (50 mg total) by mouth every 8 (eight) hours as needed for moderate pain, Disp: 90 tablet, Rfl: 1    ALLERGIES:  No Known Allergies    REVIEW OF SYSTEMS:  Pertinent items are noted in HPI      LABS:  HgA1c:   Lab Results   Component Value Date    HGBA1C 6 0 02/26/2018     BMP:   Lab Results   Component Value Date    GLUCOSE 193 (H) 08/18/2016    CALCIUM 9 3 02/26/2018     02/26/2018    K 4 3 02/26/2018    CO2 30 02/26/2018     02/26/2018    BUN 20 02/26/2018    CREATININE 0 62 02/26/2018           _____________________________________________________  PHYSICAL EXAMINATION:  General: well developed and well nourished, alert, oriented times 3 and appears comfortable  Psychiatric: Normal  HEENT: Trachea Midline, No torticollis  Cardiovascular: No discernable arrhythmia  Pulmonary: No wheezing or stridor  Skin: No masses, erthema, lacerations, fluctation, ulcerations  Neurovascular: Sensation Intact to the Median, Ulnar, Radial Nerve, Motor Intact to the Median, Ulnar, Radial Nerve and Pulses Intact    MUSCULOSKELETAL EXAMINATION:  LEFT SIDE:  CMC: Positive shuck, Positive grind, Positive tendnerness CMC and Positive Shoulder Sign    _____________________________________________________  STUDIES REVIEWED:  No Studies to review      PROCEDURES PERFORMED:  Procedures  No Procedures performed today

## 2018-04-19 ENCOUNTER — CLINICAL SUPPORT (OUTPATIENT)
Dept: PAIN MEDICINE | Facility: CLINIC | Age: 68
End: 2018-04-19
Payer: MEDICARE

## 2018-04-19 VITALS
HEIGHT: 61 IN | WEIGHT: 181.4 LBS | BODY MASS INDEX: 34.25 KG/M2 | DIASTOLIC BLOOD PRESSURE: 72 MMHG | TEMPERATURE: 98.2 F | SYSTOLIC BLOOD PRESSURE: 126 MMHG

## 2018-04-19 DIAGNOSIS — M48.062 SPINAL STENOSIS OF LUMBAR REGION WITH NEUROGENIC CLAUDICATION: ICD-10-CM

## 2018-04-19 DIAGNOSIS — G89.29 CHRONIC RIGHT HIP PAIN: Primary | ICD-10-CM

## 2018-04-19 DIAGNOSIS — M47.816 LUMBAR SPONDYLOSIS: ICD-10-CM

## 2018-04-19 DIAGNOSIS — M54.16 LUMBAR RADICULOPATHY: ICD-10-CM

## 2018-04-19 DIAGNOSIS — M25.551 CHRONIC RIGHT HIP PAIN: Primary | ICD-10-CM

## 2018-04-19 PROCEDURE — 99214 OFFICE O/P EST MOD 30 MIN: CPT | Performed by: ANESTHESIOLOGY

## 2018-04-19 RX ORDER — MELOXICAM 7.5 MG/1
7.5 TABLET ORAL 2 TIMES DAILY PRN
Qty: 60 TABLET | Refills: 2 | Status: SHIPPED | OUTPATIENT
Start: 2018-04-19 | End: 2018-05-08 | Stop reason: SDUPTHER

## 2018-04-19 RX ORDER — TRAMADOL HYDROCHLORIDE 50 MG/1
50 TABLET ORAL EVERY 8 HOURS PRN
Qty: 90 TABLET | Refills: 2 | Status: SHIPPED | OUTPATIENT
Start: 2018-05-02 | End: 2018-05-29 | Stop reason: HOSPADM

## 2018-04-19 NOTE — PROGRESS NOTES
Assessment:  1  Chronic right hip pain    2  Spinal stenosis of lumbar region with neurogenic claudication    3  Lumbar radiculopathy    4  Lumbar spondylosis        Plan:  42-year-old female returning for follow-up of lumbosacral back pain with radiculopathy in the L4-5 distribution of the right lower extremity  The patient is status post right L4 and L5 TFESI with essentially 95% relief of her right lower extremity radicular symptoms  The patient does have some chronic low back pain which is manageable at this time  She does have degenerative disc disease, spondylosis, and varying degrees of central and foraminal stenosis most pronounced at L5-S1 and L4-5  She also has occasional right hip pain that radiates into the groin  X-ray of her hips did not show any significant arthritic changes  However, the patient's right hip and groin pain does seem to be intra-articular in nature as she does have pain which is reproducible with internal and external rotation of the right hip as well as a positive Stinchfield test on the right  She has been taking tramadol 50 mg q 8 hours p r n  and meloxicam 7 5 mg b i d  p r n  with about 80-90% relief of her current symptoms  She denies any side effects to the medications  The patient is going to have surgery on her left thumb with orthopedics in the middle of next month  1   I will continue tramadol 50 mg q 8 hours p r n  and the patient was given a prescription dated for do not fill before May 2, 2018 with 2 refills  2   We will continue meloxicam 7 5 mg b i d  p r n   3   The patient will continue with her home exercise program  4  We will repeat right L4 and L5 TFESI p r n   5   We may consider a right intra-articular hip injection if the patient's right groin and hip pain worsens  6  I will follow up the patient in 3 months    There are risks associated with opioid medications, including dependence, addiction and tolerance   The patient understands and agrees to use these medications only as prescribed  Potential side effects of the medications include, but are not limited to, constipation, drowsiness, addiction, impaired judgment and risk of fatal overdose if not taken as prescribed  The patient was warned against driving while taking sedation medications  Sharing medications is a felony  At this point in time, the patient is showing no signs of addiction, abuse, diversion or suicidal ideation  [place] Prescription Drug Monitoring Program report was reviewed and was appropriate       My impressions and treatment recommendations were discussed in detail with the patient who verbalized understanding and had no further questions  Discharge instructions were provided  I personally saw and examined the patient and I agree with the above discussed plan of care  No orders of the defined types were placed in this encounter  No orders of the defined types were placed in this encounter  History of Present Illness:    Eric Pascual is a 77 y o  female  returning for follow-up of lumbosacral back pain with radiculopathy in the L4-5 distribution of the right lower extremity  The patient is status post right L4 and L5 TFESI with essentially 95% relief of her right lower extremity radicular symptoms  The patient does have some chronic low back pain which is manageable at this time  She does have degenerative disc disease, spondylosis, and varying degrees of central and foraminal stenosis most pronounced at L5-S1 and L4-5  She also has occasional right hip pain that radiates into the groin  X-ray of her hips did not show any significant arthritic changes  She denies any bladder or bowel incontinence, saddle anesthesia, or left lower extremity symptoms  She has been taking tramadol 50 mg q 8 hours p r n  and meloxicam 7 5 mg b i d  p r n  with about 80-90% relief of her current symptoms  She denies any side effects to the medications    Of note, the patient is going to have surgery on her left thumb with orthopedics in the middle of next month  The patient rates her pain a 1/10 on the pain is worse in the evening  The pain is occasional and described as dull and aching  The pain is worse with lying supine  The pain is alleviated with sitting and relaxation  I have personally reviewed and/or updated the patient's past medical history, past surgical history, family history, social history, current medications, allergies, and vital signs today  Other than as stated above, the patient denies any interval changes in medications, medical condition, mental condition, symptoms, or allergies since the last office visit  Review of Systems:    Review of Systems   Respiratory: Negative for shortness of breath  Cardiovascular: Negative for chest pain  Gastrointestinal: Negative for constipation, diarrhea, nausea and vomiting  Musculoskeletal: Positive for gait problem  Negative for arthralgias, joint swelling and myalgias  Difficulty walking   Skin: Negative for rash  Neurological: Negative for dizziness, seizures and weakness  All other systems reviewed and are negative        Patient Active Problem List   Diagnosis    Lumbar radiculopathy    Spinal stenosis of lumbar region with neurogenic claudication    Lumbar spondylosis    Chronic right hip pain    Uncontrolled type 2 diabetes mellitus without complication, without long-term current use of insulin (HCC)    Primary osteoarthritis of first carpometacarpal joint of left hand       Past Medical History:   Diagnosis Date    [facility] disease) [facility]     Diabetes mellitus ([facility]     Liver disease     Sleep apnea        Past Surgical History:   Procedure Laterality Date    ABDOMINAL SURGERY      CHOLECYSTECTOMY      COSMETIC SURGERY      FINGER SURGERY      HAND SURGERY      HYSTERECTOMY      LIVER SURGERY      [place] REPAIR INTERCARP/CARP-METACARP JT Right 9/9/2016 Procedure: THUMB TRAPEZIECTOMY; BASAL JOINT ARTHROPLASTY WITH APL AUTOGRAFT;  Surgeon: Diana Dawkins MD;  [facility];  Service: Orthopedics       Family History   Problem Relation Age of Onset    Heart attack Father     Heart disease Family     Diabetes Family     Thyroid disease Family     Ovarian cancer Family        Social History     Occupational History    Not on file  Social History Main Topics    Smoking status: Current Some Day Smoker     Packs/day: 0 20    Smokeless tobacco: Never Used    Alcohol use No    Drug use: No    Sexual activity: Not on file       Current Outpatient Prescriptions on File Prior to Visit   Medication Sig    ACCU-CHEK SOFTCLIX LANCETS lancets by Other route 4 (four) times a day    Blood Glucose Monitoring Suppl (ACCU-CHEK JESS CONNECT) w/Device KIT by Does not apply route    diphenhydrAMINE (BENADRYL) 25 mg capsule Take 25 mg by mouth every 6 (six) hours as needed for itching    glipiZIDE (GLUCOTROL XL) 5 mg 24 hr tablet Take by mouth    Glucose Blood (ACCU-CHEK JESS PLUS VI) by In Vitro route    meloxicam (MOBIC) 7 5 mg tablet Take 7 5 mg by mouth 2 (two) times a day    metFORMIN (GLUCOPHAGE) 1000 MG tablet Take 1 tablet by mouth 2 (two) times a day    traMADol (ULTRAM) 50 mg tablet Take 1 tablet (50 mg total) by mouth every 8 (eight) hours as needed for moderate pain     No current facility-administered medications on file prior to visit  No Known Allergies    Physical Exam:    /72   Temp 98 2 °F (36 8 °C)   Ht 5' 0 6" (1 539 m)   Wt 82 3 kg (181 lb 6 4 oz)   BMI 34 73 kg/m²     Constitutional: normal, well developed, well nourished, alert, in no distress and non-toxic and no overt pain behavior    Eyes: anicteric  HEENT: grossly intact  Neck: supple, symmetric, trachea midline and no masses   Pulmonary:even and unlabored  Cardiovascular:No edema or pitting edema present  Skin:Normal without rashes or lesions and well hydrated  Psychiatric:Mood and affect appropriate  Neurologic:Cranial Nerves II-XII grossly intact  Musculoskeletal:normal gait  Bilateral lumbar paraspinals mildly tender to palpation and ropy in texture  Negative seated straight leg raise bilaterally  Bilateral lower extremity strength 5/5 in all muscle groups  Right hip and groin pain reproducible with internal and external rotation of the right hip  Positive Stinchfield test on the right      Imaging  Imaging reviewed

## 2018-04-24 ENCOUNTER — APPOINTMENT (OUTPATIENT)
Dept: LAB | Facility: CLINIC | Age: 68
End: 2018-04-24
Payer: MEDICARE

## 2018-04-24 ENCOUNTER — OFFICE VISIT (OUTPATIENT)
Dept: LAB | Facility: CLINIC | Age: 68
End: 2018-04-24
Payer: MEDICARE

## 2018-04-24 DIAGNOSIS — M18.12 PRIMARY OSTEOARTHRITIS OF FIRST CARPOMETACARPAL JOINT OF LEFT HAND: ICD-10-CM

## 2018-04-24 LAB
ANION GAP SERPL CALCULATED.3IONS-SCNC: 9 MMOL/L (ref 4–13)
ATRIAL RATE: 73 BPM
BASOPHILS # BLD AUTO: 0.03 THOUSANDS/ΜL (ref 0–0.1)
BASOPHILS NFR BLD AUTO: 0 % (ref 0–1)
BUN SERPL-MCNC: 20 MG/DL (ref 5–25)
CALCIUM SERPL-MCNC: 9.8 MG/DL (ref 8.3–10.1)
CHLORIDE SERPL-SCNC: 102 MMOL/L (ref 100–108)
CO2 SERPL-SCNC: 28 MMOL/L (ref 21–32)
CREAT SERPL-MCNC: 0.64 MG/DL (ref 0.6–1.3)
EOSINOPHIL # BLD AUTO: 0.16 THOUSAND/ΜL (ref 0–0.61)
EOSINOPHIL NFR BLD AUTO: 2 % (ref 0–6)
ERYTHROCYTE [DISTWIDTH] IN BLOOD BY AUTOMATED COUNT: 14.3 % (ref 11.6–15.1)
EST. AVERAGE GLUCOSE BLD GHB EST-MCNC: 131 MG/DL
GFR SERPL CREATININE-BSD FRML MDRD: 91 ML/MIN/1.73SQ M
GLUCOSE SERPL-MCNC: 121 MG/DL (ref 65–140)
HBA1C MFR BLD: 6.2 % (ref 4.2–6.3)
HCT VFR BLD AUTO: 49.3 % (ref 34.8–46.1)
HGB BLD-MCNC: 16 G/DL (ref 11.5–15.4)
LYMPHOCYTES # BLD AUTO: 1.63 THOUSANDS/ΜL (ref 0.6–4.47)
LYMPHOCYTES NFR BLD AUTO: 24 % (ref 14–44)
MCH RBC QN AUTO: 30.5 PG (ref 26.8–34.3)
MCHC RBC AUTO-ENTMCNC: 32.5 G/DL (ref 31.4–37.4)
MCV RBC AUTO: 94 FL (ref 82–98)
MONOCYTES # BLD AUTO: 0.51 THOUSAND/ΜL (ref 0.17–1.22)
MONOCYTES NFR BLD AUTO: 8 % (ref 4–12)
NEUTROPHILS # BLD AUTO: 4.41 THOUSANDS/ΜL (ref 1.85–7.62)
NEUTS SEG NFR BLD AUTO: 66 % (ref 43–75)
P AXIS: 55 DEGREES
PLATELET # BLD AUTO: 146 THOUSANDS/UL (ref 149–390)
PMV BLD AUTO: 11.6 FL (ref 8.9–12.7)
POTASSIUM SERPL-SCNC: 4 MMOL/L (ref 3.5–5.3)
PR INTERVAL: 186 MS
QRS AXIS: -17 DEGREES
QRSD INTERVAL: 120 MS
QT INTERVAL: 414 MS
QTC INTERVAL: 456 MS
RBC # BLD AUTO: 5.24 MILLION/UL (ref 3.81–5.12)
SODIUM SERPL-SCNC: 139 MMOL/L (ref 136–145)
T WAVE AXIS: 26 DEGREES
VENTRICULAR RATE: 73 BPM
WBC # BLD AUTO: 6.74 THOUSAND/UL (ref 4.31–10.16)

## 2018-04-24 PROCEDURE — 36415 COLL VENOUS BLD VENIPUNCTURE: CPT

## 2018-04-24 PROCEDURE — 93005 ELECTROCARDIOGRAM TRACING: CPT

## 2018-04-24 PROCEDURE — 80048 BASIC METABOLIC PNL TOTAL CA: CPT

## 2018-04-24 PROCEDURE — 83036 HEMOGLOBIN GLYCOSYLATED A1C: CPT

## 2018-04-24 PROCEDURE — 93010 ELECTROCARDIOGRAM REPORT: CPT | Performed by: INTERNAL MEDICINE

## 2018-04-24 PROCEDURE — 85025 COMPLETE CBC W/AUTO DIFF WBC: CPT

## 2018-05-02 ENCOUNTER — OFFICE VISIT (OUTPATIENT)
Dept: FAMILY MEDICINE CLINIC | Facility: CLINIC | Age: 68
End: 2018-05-02
Payer: MEDICARE

## 2018-05-02 VITALS
DIASTOLIC BLOOD PRESSURE: 70 MMHG | SYSTOLIC BLOOD PRESSURE: 140 MMHG | WEIGHT: 184 LBS | BODY MASS INDEX: 33.86 KG/M2 | HEIGHT: 62 IN

## 2018-05-02 DIAGNOSIS — Z01.818 PREOPERATIVE CLEARANCE: Primary | ICD-10-CM

## 2018-05-02 DIAGNOSIS — E11.9 TYPE 2 DIABETES MELLITUS WITHOUT COMPLICATION, WITHOUT LONG-TERM CURRENT USE OF INSULIN (HCC): Primary | ICD-10-CM

## 2018-05-02 PROBLEM — M18.12 ARTHRITIS OF CARPOMETACARPAL (CMC) JOINT OF LEFT THUMB: Status: ACTIVE | Noted: 2017-09-20

## 2018-05-02 PROBLEM — L57.0 KERATOSIS: Status: ACTIVE | Noted: 2017-11-30

## 2018-05-02 PROCEDURE — 99214 OFFICE O/P EST MOD 30 MIN: CPT | Performed by: FAMILY MEDICINE

## 2018-05-02 NOTE — ASSESSMENT & PLAN NOTE
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

## 2018-05-02 NOTE — PROGRESS NOTES
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  Plan is for arthroplasty and tendon transfer performed by Dr Jackson  Cardiac functional status is acceptable  We reviewed her preoperative studies which include blood work which was acceptable with a hemoglobin A1c of 6 2, normal BMP and essentially normal CBC  She has mild polycythemia from cigarette smoking most likely  ECG revealed a right bundle deena block which is similar to her previous ECG  No evidence of myocardial ischemia  Based on her examination today as well as review of her preoperative studies she is cleared for the proposed procedure  Diagnoses and all orders for this visit:    Preoperative clearance          Subjective:   Chief Complaint   Patient presents with    Surgical Clearance        Patient ID: Lelo Carr is a 77 y o  female  HPI     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  She has 1st CMC arthritis on the left  Dr Jackson plans arthroplasty as well as tendon transfer from formed to wrist   She had right [facility] arthroplasty performed by Dr Suzette Clancy in 2016 and tolerated the procedure well  She has been awaiting surgery on her left thumb though she was discovered to be diabetic last fall and has been working on getting her diabetes under control  She has been watching her diet and exercising  Her hemoglobin A1c in the fall was over 9 but her most recent A1c last week was 6 2 which she was congratulated for  She states that she can walk 4 blocks without exertional chest pain or significant shortness of breath  She can climb 2 flights of stairs without chest pain or exertional shortness of breath  She continues to smoke though she is down to 0-3 cigarettes per day  She has no bleeding bruising and no exertional chest pain no cough no edema palpitations or wheezing    She does have some mild shortness of breath with exertion  She notes this is increased during times of humidity  She has had anesthesia on multiple occasions without any significant adverse reaction  She does note that she is nauseated for 2 days after typically but no vomiting or other cardio vascular symptom  Her past medical history is significant for obstructive sleep apnea and she is compliant with CPAP at 18 mm of water pressure  She wears a nasal mask  She also suffers from obesity and COPD  Again she has no angina no arrhythmia no CAD no mi no CHF no chronic liver disease no appetite is no history of PE or DVT and she uses no anticoagulants  She has no thyroid disease no seizure disorder no CVA no asthma no renal disease or low serum albumin  She does have a history of diabetes as documented above but is currently well controlled  Managing DM well  Recent A1c was 6 2  Was 9+ in the fall  Can walk 2 flights of stairs without CP as well as 4 blocks on a flat surface  No exertional CP  Anesthesia without issue  No history DVT or PE  No hemorrhagic diathesis  CPAP 18 cm H2O  Saw Dr Kacy Coffey and told no retinopathy and retinal tear healed  0-3 cigs a day  No ETOH since [place]  The following portions of the patient's history were reviewed and updated as appropriate: allergies, current medications, past family history, past medical history, past social history, past surgical history and problem list     Review of Systems   Constitution: Negative for decreased appetite, fever, weakness, weight gain and weight loss  HENT: Positive for congestion  Cardiovascular: Negative for chest pain, leg swelling, orthopnea and paroxysmal nocturnal dyspnea  Respiratory: Positive for cough  Negative for shortness of breath and wheezing  Endocrine: Negative for polydipsia, polyphagia and polyuria  Hematologic/Lymphatic: Negative for adenopathy and bleeding problem  Does not bruise/bleed easily     Musculoskeletal: Positive for arthritis, back pain, joint pain and joint swelling  Gastrointestinal: Positive for diarrhea  Negative for abdominal pain  Dumping syndrome from cholecystectomy  Genitourinary: Negative  Psychiatric/Behavioral: Negative for depression and suicidal ideas  The patient has insomnia  The patient is not nervous/anxious  FRANK and c/w CPAP         Objective:    Physical Exam   Constitutional: She is oriented to person, place, and time  She appears well-developed and well-nourished  Obese and in no apparent distress   HENT:   Mouth/Throat: Oropharynx is clear and moist  No oropharyngeal exudate  Oral cavity and oropharynx is without ulcer exudate or lesion  She is edentulous and has dentures in place  Eyes: Conjunctivae are normal  No scleral icterus  Neck: No JVD present  No thyromegaly present  Cardiovascular: Normal rate, regular rhythm and normal heart sounds  Exam reveals no gallop  No murmur heard  Pulmonary/Chest: Effort normal and breath sounds normal  No respiratory distress  She has no wheezes  She has no rales  Lungs are clear with good air exchange and no crackle rhonchi or wheeze   Musculoskeletal: She exhibits no edema  Lymphadenopathy:     She has no cervical adenopathy  Neurological: She is alert and oriented to person, place, and time  No cranial nerve deficit  Skin: No rash noted  No erythema  Psychiatric: She has a normal mood and affect

## 2018-05-02 NOTE — PATIENT INSTRUCTIONS
Your cleared for proposed procedure  Please try to discontinue smoking completely  Continue to walk for exercise and watch your diet for diabetes

## 2018-05-08 DIAGNOSIS — E11.9 TYPE 2 DIABETES MELLITUS WITHOUT COMPLICATION, WITHOUT LONG-TERM CURRENT USE OF INSULIN (HCC): Primary | ICD-10-CM

## 2018-05-08 DIAGNOSIS — M25.551 CHRONIC RIGHT HIP PAIN: ICD-10-CM

## 2018-05-08 DIAGNOSIS — G89.29 CHRONIC RIGHT HIP PAIN: ICD-10-CM

## 2018-05-08 RX ORDER — GLIPIZIDE 5 MG/1
TABLET, FILM COATED, EXTENDED RELEASE ORAL
Qty: 90 TABLET | Refills: 1 | Status: SHIPPED | OUTPATIENT
Start: 2018-05-08 | End: 2018-08-05 | Stop reason: SDUPTHER

## 2018-05-08 RX ORDER — MELOXICAM 7.5 MG/1
TABLET ORAL
Qty: 180 TABLET | Refills: 1 | Status: SHIPPED | OUTPATIENT
Start: 2018-05-08 | End: 2018-08-05 | Stop reason: SDUPTHER

## 2018-05-24 RX ORDER — ALBUTEROL SULFATE 90 UG/1
2 AEROSOL, METERED RESPIRATORY (INHALATION) EVERY 6 HOURS PRN
COMMUNITY
End: 2019-08-02 | Stop reason: ALTCHOICE

## 2018-05-24 NOTE — PRE-PROCEDURE INSTRUCTIONS
Pre-Surgery Instructions:   Medication Instructions    diphenhydrAMINE (BENADRYL) 25 mg capsule Instructed patient per Anesthesia Guidelines   glipiZIDE (GLUCOTROL XL) 5 mg 24 hr tablet Instructed patient per Anesthesia Guidelines   meloxicam (MOBIC) 7 5 mg tablet Instructed patient per Anesthesia Guidelines   metFORMIN (GLUCOPHAGE) 1000 MG tablet Instructed patient per Anesthesia Guidelines   traMADol (ULTRAM) 50 mg tablet Instructed patient per Anesthesia Guidelines  Spoke to pt  Medication list reviewed & instructed  As of 5 24 18 pt to stop meloxicam  Instructed on tramadol or tylenol  Am DOS no meds  Tramadol ok if needed  Pt given instructions on post-op restart of metformin/glipizide by PCP  Pt reports COPD stable, no inhaler use >1 year  Ventolin PRN rare use  Smoking cessation education given  Instructed no smoking 24 hours prior to sx including am DOS  Pt understands  All instructions verbally understood by patient  No further questions  Med Class     Pre-Surgery/Procedure Instructions for Adult Patients who Take Medicine for Diabetes or to Control their Blood Sugar     Day Before Surgery/Procedure  Use the directions based on the type of medicine you take for your diabetes  1  If you are having a procedure that does not require a bowel prep:  ? Pre-Mixed Insulin (Intermediate Acting: Humalog 75/25, Humulin 70/30  Novolog 70/30, Regular Insulin)  § Take ½ your regular dose the evening before your procedure  ? Rapid/Fast Acting Insulin/Long Acting Insulin (Humalog U200, NovoLog, Apidra, Lantus, Levemir, Ekaterina Claude, [place])  § Take your FULL regular dose the day before procedure  ? Oral Diabetic Medicines including Glipizide/Glimepiride/Glucotrol (sulfonylurea)  § Take your regular dose with dinner the evening before your procedure      Day of Surgery/Procedure  · Long Acting Insulin (Lantus, Levemir, Ekaterina Claude)  ?  If you usually take your Long-Acting Insulin in the morning, take the full dose as scheduled  · With the exception of the morning Long-Acting Insulin noted above, DO NOT take ANY diabetic medicine on the day of your procedure unless you were instructed by the doctor who manages your diabetic medicines  · Continue to check your blood sugars  · If you have an insulin pump then consult with your Endocrinologist for instructions  · If you cannot see your Endocrinologist, on the day of the procedure set your insulin pump to your basal rate only  Please bring your insulin pump supplies to the hospital      This Educational material has been approved by the Patient Education Advisory Committee  Date prepared: 1/17/2018          Expiration date: 1/17/2019        Approval Number:                     NSAID Med Class     Stop taking this medication at least 3 days prior to surgery/procedure  Opioid Med Class     Continue to take this medication on your normal schedule  If this is an oral medication and you take it in the morning, then you may take this medicine with a sip of water

## 2018-05-29 ENCOUNTER — HOSPITAL ENCOUNTER (OUTPATIENT)
Facility: HOSPITAL | Age: 68
Setting detail: OUTPATIENT SURGERY
Discharge: HOME | End: 2018-05-29
Attending: ORTHOPAEDIC SURGERY | Admitting: ORTHOPAEDIC SURGERY
Payer: MEDICARE

## 2018-05-29 ENCOUNTER — ANESTHESIA EVENT (OUTPATIENT)
Dept: PERIOP | Facility: HOSPITAL | Age: 68
End: 2018-05-29
Payer: MEDICARE

## 2018-05-29 ENCOUNTER — ANESTHESIA (OUTPATIENT)
Dept: PERIOP | Facility: HOSPITAL | Age: 68
End: 2018-05-29
Payer: MEDICARE

## 2018-05-29 ENCOUNTER — APPOINTMENT (OUTPATIENT)
Dept: RADIOLOGY | Facility: HOSPITAL | Age: 68
End: 2018-05-29
Payer: MEDICARE

## 2018-05-29 VITALS
RESPIRATION RATE: 20 BRPM | OXYGEN SATURATION: 93 % | TEMPERATURE: 99 F | DIASTOLIC BLOOD PRESSURE: 67 MMHG | HEART RATE: 91 BPM | SYSTOLIC BLOOD PRESSURE: 167 MMHG | HEIGHT: 62 IN | BODY MASS INDEX: 33.31 KG/M2 | WEIGHT: 181 LBS

## 2018-05-29 DIAGNOSIS — M18.12 PRIMARY OSTEOARTHRITIS OF FIRST CARPOMETACARPAL JOINT OF LEFT HAND: Primary | ICD-10-CM

## 2018-05-29 LAB
GLUCOSE SERPL-MCNC: 149 MG/DL (ref 65–140)
GLUCOSE SERPL-MCNC: 150 MG/DL (ref 65–140)

## 2018-05-29 PROCEDURE — 25310 TRANSPLANT FOREARM TENDON: CPT | Performed by: ORTHOPAEDIC SURGERY

## 2018-05-29 PROCEDURE — 82948 REAGENT STRIP/BLOOD GLUCOSE: CPT

## 2018-05-29 PROCEDURE — 25447 ARTHRP NTRCRP/CRP/MTCR NTRPS: CPT | Performed by: ORTHOPAEDIC SURGERY

## 2018-05-29 RX ORDER — METOCLOPRAMIDE HYDROCHLORIDE 5 MG/ML
5 INJECTION INTRAMUSCULAR; INTRAVENOUS ONCE AS NEEDED
Status: DISCONTINUED | OUTPATIENT
Start: 2018-05-29 | End: 2018-05-29 | Stop reason: HOSPADM

## 2018-05-29 RX ORDER — ONDANSETRON 2 MG/ML
4 INJECTION INTRAMUSCULAR; INTRAVENOUS ONCE AS NEEDED
Status: DISCONTINUED | OUTPATIENT
Start: 2018-05-29 | End: 2018-05-29 | Stop reason: HOSPADM

## 2018-05-29 RX ORDER — DEXAMETHASONE SODIUM PHOSPHATE 4 MG/ML
INJECTION, SOLUTION INTRA-ARTICULAR; INTRALESIONAL; INTRAMUSCULAR; INTRAVENOUS; SOFT TISSUE AS NEEDED
Status: DISCONTINUED | OUTPATIENT
Start: 2018-05-29 | End: 2018-05-29 | Stop reason: SURG

## 2018-05-29 RX ORDER — HYDROCODONE BITARTRATE AND ACETAMINOPHEN 5; 325 MG/1; MG/1
2 TABLET ORAL EVERY 6 HOURS PRN
Status: DISCONTINUED | OUTPATIENT
Start: 2018-05-29 | End: 2018-05-29 | Stop reason: HOSPADM

## 2018-05-29 RX ORDER — SODIUM CHLORIDE, SODIUM LACTATE, POTASSIUM CHLORIDE, CALCIUM CHLORIDE 600; 310; 30; 20 MG/100ML; MG/100ML; MG/100ML; MG/100ML
50 INJECTION, SOLUTION INTRAVENOUS CONTINUOUS
Status: DISCONTINUED | OUTPATIENT
Start: 2018-05-29 | End: 2018-05-29 | Stop reason: HOSPADM

## 2018-05-29 RX ORDER — SODIUM CHLORIDE, SODIUM LACTATE, POTASSIUM CHLORIDE, CALCIUM CHLORIDE 600; 310; 30; 20 MG/100ML; MG/100ML; MG/100ML; MG/100ML
20 INJECTION, SOLUTION INTRAVENOUS CONTINUOUS
Status: DISCONTINUED | OUTPATIENT
Start: 2018-05-29 | End: 2018-05-29 | Stop reason: HOSPADM

## 2018-05-29 RX ORDER — MAGNESIUM HYDROXIDE 1200 MG/15ML
LIQUID ORAL AS NEEDED
Status: DISCONTINUED | OUTPATIENT
Start: 2018-05-29 | End: 2018-05-29 | Stop reason: HOSPADM

## 2018-05-29 RX ORDER — IPRATROPIUM BROMIDE AND ALBUTEROL SULFATE 2.5; .5 MG/3ML; MG/3ML
3 SOLUTION RESPIRATORY (INHALATION)
Status: DISCONTINUED | OUTPATIENT
Start: 2018-05-29 | End: 2018-05-29 | Stop reason: HOSPADM

## 2018-05-29 RX ORDER — ONDANSETRON 2 MG/ML
INJECTION INTRAMUSCULAR; INTRAVENOUS AS NEEDED
Status: DISCONTINUED | OUTPATIENT
Start: 2018-05-29 | End: 2018-05-29 | Stop reason: SURG

## 2018-05-29 RX ORDER — HYDROCODONE BITARTRATE AND ACETAMINOPHEN 5; 325 MG/1; MG/1
1 TABLET ORAL EVERY 4 HOURS PRN
Qty: 20 TABLET | Refills: 0 | Status: SHIPPED | OUTPATIENT
Start: 2018-05-29 | End: 2018-06-06 | Stop reason: SDUPTHER

## 2018-05-29 RX ORDER — FENTANYL CITRATE/PF 50 MCG/ML
25 SYRINGE (ML) INJECTION
Status: DISCONTINUED | OUTPATIENT
Start: 2018-05-29 | End: 2018-05-29 | Stop reason: HOSPADM

## 2018-05-29 RX ORDER — ALBUTEROL SULFATE 90 UG/1
AEROSOL, METERED RESPIRATORY (INHALATION) AS NEEDED
Status: DISCONTINUED | OUTPATIENT
Start: 2018-05-29 | End: 2018-05-29 | Stop reason: SURG

## 2018-05-29 RX ADMIN — ONDANSETRON 4 MG: 2 INJECTION INTRAMUSCULAR; INTRAVENOUS at 09:35

## 2018-05-29 RX ADMIN — SODIUM CHLORIDE, SODIUM LACTATE, POTASSIUM CHLORIDE, AND CALCIUM CHLORIDE: .6; .31; .03; .02 INJECTION, SOLUTION INTRAVENOUS at 09:10

## 2018-05-29 RX ADMIN — ALBUTEROL SULFATE 4 PUFF: 90 AEROSOL, METERED RESPIRATORY (INHALATION) at 10:04

## 2018-05-29 RX ADMIN — ALBUTEROL SULFATE 4 PUFF: 90 AEROSOL, METERED RESPIRATORY (INHALATION) at 09:25

## 2018-05-29 RX ADMIN — DEXAMETHASONE SODIUM PHOSPHATE 4 MG: 4 INJECTION, SOLUTION INTRAMUSCULAR; INTRAVENOUS at 09:35

## 2018-05-29 RX ADMIN — IPRATROPIUM BROMIDE AND ALBUTEROL SULFATE 3 ML: .5; 3 SOLUTION RESPIRATORY (INHALATION) at 10:26

## 2018-05-29 RX ADMIN — ALBUTEROL SULFATE 3 PUFF: 90 AEROSOL, METERED RESPIRATORY (INHALATION) at 09:17

## 2018-05-29 RX ADMIN — SODIUM CHLORIDE, SODIUM LACTATE, POTASSIUM CHLORIDE, AND CALCIUM CHLORIDE 20 ML/HR: .6; .31; .03; .02 INJECTION, SOLUTION INTRAVENOUS at 08:19

## 2018-05-29 NOTE — ADDENDUM NOTE
Addendum  created 05/29/18 1248 by Jai Elder MD    Anesthesia Intra Blocks edited, Sign clinical note

## 2018-05-29 NOTE — ANESTHESIA POSTPROCEDURE EVALUATION
Post-Op Assessment Note      CV Status:  Stable    Mental Status:  Alert and awake    Hydration Status:  Euvolemic    PONV Controlled:  Controlled    Airway Patency:  Patent    Post Op Vitals Reviewed: Yes          Staff: Anesthesiologist, CRNA           BP   101/70   Temp   97 0   Pulse  75   Resp   16   SpO2   95

## 2018-05-29 NOTE — ANESTHESIA POSTPROCEDURE EVALUATION
Post-Op Assessment Note      CV Status:  Stable    Mental Status:  Alert and awake    Hydration Status:  Euvolemic    PONV Controlled:  Controlled    Airway Patency:  Patent    Post Op Vitals Reviewed: Yes          Staff: Anesthesiologist, CRNA           /76 (05/29/18 1022)    Temp 97 7 °F (36 5 °C) (05/29/18 1022)    Pulse 70 (05/29/18 1022)   Resp 18 (05/29/18 1022)    SpO2 93 % (05/29/18 1022)

## 2018-05-29 NOTE — H&P
H&P Exam - Orthopedics   Gill Salcedo 77 y o  female MRN: [id number]  Unit/Bed#: [facility] 03    Assessment/Plan   Assessment:  Left thumb CMC joint osteoarthritis that failed conservative management  Plan:  Left thumb [place] with FCR tendon transfer    History of Present Illness   HPI:  Gill Salcedo is a 77 y o  y o  female who presents with left hand thumb CMC joint arthritis  Historical Information  Review Of Systems:   · Skin: Normal  · Neuro: See HPI  · Musculoskeletal: See HPI  · 14 point review of systems negative except as stated above     Past Medical History:   Past Medical History:   Diagnosis Date    Asthma     [facility] disease [facility]     Community acquired pneumonia     LAST ASSESSED: [id number]    COPD [facility]chronic obstructive pulmonary disease) ([facility]     Diabetes mellitus ([facility] )     History of MRSA infection     2008 liver sx    Liver disease     Sleep apnea     Viral warts     LAST ASSESSED: [id number]       Past Surgical History:   Past Surgical History:   Procedure Laterality Date    ABDOMINAL SURGERY      ABDOMINOPLASTY      CHOLECYSTECTOMY      COSMETIC SURGERY      EYE SURGERY      Bilateral cataracts 2015 Dr Nas Lama       FINGER SURGERY      HAND SURGERY      HYSTERECTOMY      LIVER SURGERY      [place] REPAIR INTERCARP/CARP-METACARP JT Right 9/9/2016    Procedure: THUMB TRAPEZIECTOMY; BASAL JOINT ARTHROPLASTY WITH APL AUTOGRAFT;  Surgeon: Delbert Cano MD;  [facility];  Service: Orthopedics       Family History:  Family history reviewed and non-contributory  Family History   Problem Relation Age of Onset    Heart attack Father     Heart disease Family     Diabetes Family     Thyroid disease Family     Ovarian cancer Family     Heart attack Mother     Ovarian cancer Sister     Heart disease Other      CARDIAC DISORDER    Diabetes Other     Liver cancer Other     Breast cancer Other     Stomach cancer Other        Social History:  Social History Social History    Marital status: /Civil Union     Spouse name: N/A    Number of children: N/A    Years of education: N/A     Social History Main Topics    Smoking status: Current Every Day Smoker     Packs/day: 0 20    Smokeless tobacco: Never Used    Alcohol use No    Drug use: No    Sexual activity: Not Asked     Other Topics Concern    None     Social History Narrative    None       Allergies:   No Known Allergies        Labs:    0  Lab Value Date/Time   HCT 49 3 (H) 04/24/2018 0906   HCT 50 6 (H) 02/26/2018 0819   HCT 51 8 (H) 10/18/2017 0957   HGB 16 0 (H) 04/24/2018 0906   HGB 16 0 (H) 02/26/2018 0819   HGB 17 1 (H) 10/18/2017 0957   WBC 6 74 04/24/2018 0906   WBC 7 24 02/26/2018 0819   WBC 6 80 10/18/2017 0957       Meds:    Current Facility-Administered Medications:     ceFAZolin (ANCEF) 2 G in sodium chloride 0 9% 50 ml IVPB (CMPD ORDER), 2,000 mg, Intravenous, Once, Audie Lofton MD    lactated ringers infusion, 20 mL/hr, Intravenous, Continuous, Audie Lofton MD, Last Rate: 20 mL/hr at 05/29/18 0819, 20 mL/hr at 05/29/18 0819    Blood Culture:   No results found for: BLOODCX    Wound Culture:   No results found for: WOUNDCULT    Ins and Outs:  No intake/output data recorded  Physical Exam  /61   Pulse 58   Temp 98 3 °F (36 8 °C) (Tympanic Core)   Resp 18   Ht 5' 1 5" (1 562 m)   Wt 82 1 kg (181 lb)   SpO2 94%   BMI 33 65 kg/m²   Gen: Alert and oriented to person, place, time  HEENT: EOMI, eyes clear, moist mucus membranes, hearing intact  Respiratory: Bilateral chest rise  No audible wheezing found  Cardiovascular: Regular Rate and Rhythm  Abdomen: soft nontender/nondistended  Ortho Exam:  Tenderness to palpation left thumb CMC joint with positive shock and grind test  Neuro Exam:  The patient is neurovascularly intact in the median, ulnar, and radial nerve distribution  There is normal sensation and good capillary refill within the digits    2+ pulses      Lab Results: Reviewed  Imaging: Reviewed

## 2018-05-29 NOTE — ANESTHESIA PROCEDURE NOTES
Peripheral Block    Patient location during procedure: pre-op  Start time: 5/29/2018 9:00 AM  Reason for block: procedure for pain, at surgeon's request and post-op pain management  Staffing  Anesthesiologist: Jose Smith  Performed: anesthesiologist   Preanesthetic Checklist  Completed: patient identified, surgical consent and IV checked  Peripheral Block  Patient position: supine  Prep: ChloraPrep  Patient monitoring: heart rate, cardiac monitor, continuous pulse ox and frequent blood pressure checks  Block type: supraclavicular  Laterality: left  Injection technique: single-shot  Procedures: ultrasound guided  Local infiltration: ropivacaine  Infiltration strength: 0 5 %  Dose: 20 mL  Needle  Needle gauge: 22 G  Needle length: 10 cm  Needle localization: anatomical landmarks and ultrasound guidance  Assessment  Injection assessment: incremental injection, local visualized surrounding nerve on ultrasound and negative aspiration for heme  Heart rate change: no  Slow fractionated injection: yes  Post-procedure:  site cleaned  patient tolerated the procedure well with no immediate complications

## 2018-05-29 NOTE — OP NOTE
OPERATIVE REPORT  PATIENT NAME: Beck Romo  :  1950  MRN: [id number]  Pt Location: [facility]    SURGERY DATE: 18    Surgeon(s) and Role:     * Herbert Johnson MD - Primary    Pre-Op Diagnosis:  Primary osteoarthritis of first carpometacarpal joint of left hand [M18 12]    Post-Op Diagnosis Codes:     * Primary osteoarthritis of first carpometacarpal joint of left hand [M18 12]    Procedure(s):  ARTHROPLASTY THUMB WEILBY (Left)  TRANSFER TENDON HAND/WRIST FCR from forearm to wrist (Left)    Specimen(s):  * No orders in the log *    Estimated Blood Loss:   Minimal      Anesthesia Type:   [place] with Sedation    Operative Indications: The patient has a history of Thumb CMC Arthritis  left that was recalcitrant to conservative management  The decision was made to bring the patient to the operating room for Thumb Interpositional Arthroplasty [place] with FCR transfer  left  Risks of the procedure were explained which include, but are not limited to bleeding; infection; damage to nerves, arteries,veins, tendons; scar; pain; need for reoperation; failure to give desired result; and risks of anaesthesia  All questions were answered to satisfaction and they were willing to proceed  Operative Findings:  Left thumb arthritis    Complications:   None    Procedure and Technique:  After the patient, site, and procedure were identified, the patient was brought into the operating room in a supine position  Regional and general anaesthesia were provided  A well padded tourniquet was applied to the extremity, set at 250 mmHg  The  left upper extremity was then prepped and drapped in a normal, sterile, orthopedic fashion  After the patient, site, and procedure were once again identified, attention was turned to the left thumb  A curvilinear incision was made at the junction of the glaborous and nonglaborous skin extending toward the flexor carpi radialis tendon  Care was taken to protect the superficial sensory branch of the radial nerve, the radial artery, the median nerve, the palmar cutaneous branch of the median nerve, the flexor carpi radialis tendon, the abductor pollicis longus tendon and the extensor pollicis brevis tendon  The thenar muscles were elevated off of the thumb metacarpal and an arthrotomy was done at the trapeziometacarpal and scaphotrapezial joint  The trapezium was removed in its entirety  Inspection revealed stage 4 (full thickiness loss of the articular cartilage with exposed subchondral bone) arthritis at the level of the metacarpal base, stage 4 (full thickiness loss of the articular cartilage with exposed subchondral bone) arthritis at the distal pole of the scaphoid, and inspection of the scaphotrapezoid joint revealed stage 1 (minimal fraying of the articular surface) arthritis  Through a second incision, using a Birdpost tendon retriever, the ulnar half of the FCR tendon was harvested toward its insertion  The thumb was held in a reduced position and the tendon was woven through the APL at its insertion into the thumb metacarpal and secured with Ethibond suture  A figure of 8 wrap was then performed around the remainder of the FCR and APL tendons and secured with Ethibond suture  Gelfoam was placed in the remainder of the hole  At the completion of the procedure, hemostasis was obtained with cautery and direct pressure  The wounds were copiously irrigated with sterile solution  The wounds were closed with Vicryl and Prolene  Sterile dressings were applied, including Xeroform, gauze, tweeners, webril, ACE  Please note, all sponge, needle, and instrument counts were correct prior to closure  Loupe magnification was utilized  The patient tolerated the procedure well       I was present for the entire procedure    Patient Disposition:  PACU  and hemodynamically stable    SIGNATURE: Nereyda Tinsley MD  DATE: 05/29/18  TIME: 10:06 AM

## 2018-05-29 NOTE — ANESTHESIA PREPROCEDURE EVALUATION
Review of Systems/Medical History  Patient summary reviewed  Chart reviewed  No history of anesthetic complications     Cardiovascular  EKG reviewed, Dysrhythmias ,   Comment: RBBB,  Pulmonary  Pneumonia, COPD mild- PRN medicaiton , Sleep apnea CPAP and Sleep Study completed,        GI/Hepatic    Liver disease ,   Comment: Hx of hepatic abscess  S/p partial hepatectomy ~10 years ago          Endo/Other  Diabetes type 2 Oral agent,   Comment: Adrenalectomy    GYN  Negative gynecology ROS          Hematology   Musculoskeletal    Arthritis     Neurology   Psychology   Negative psychology ROS              Physical Exam    Airway    Mallampati score: II  TM Distance: >3 FB       Dental   upper dentures and lower dentures,     Cardiovascular  Rhythm: regular, Rate: normal,     Pulmonary  Pulmonary exam normal Breath sounds clear to auscultation,     Other Findings        Anesthesia Plan  ASA Score- 3     Anesthesia Type- IV sedation with anesthesia with ASA Monitors  Additional Monitors:   Airway Plan:         Plan Factors-    Induction- intravenous  Postoperative Plan-     Informed Consent- Anesthetic plan and risks discussed with patient and spouse  I personally reviewed this patient with the CRNA  Discussed and agreed on the Anesthesia Plan with the CRNA  Elaine Chun

## 2018-05-29 NOTE — DISCHARGE INSTRUCTIONS
Post Operative Instructions    You have had surgery on your arm today, please read and follow the information below:  · Elevate your hand above your elbow during the next 24-48 hours to help with swelling  · Place your hand and arm over your head with motion at your shoulder three times a day  · Do not apply any cream/ointment/oil to your incisions including antibiotics  · Do not soak your hands in standing water (dishwater, tubs, Jacuzzi's, pools, etc ) until given permission (typically 2-3 weeks after injury)    Call the office if you notice any:  · Increased numbness or tingling of your hand or fingers that is not relieved with elevation  · Increasing pain that is not controlled with medication  · Difficulty chewing, breathing, swallowing  · Chest pains or shortness of breath  · Fever over 101 4 degrees  Bandage: Do NOT remove bandage until follow-up appointment  Motion: Move fingers into a fist 5 times a day, DO NOT move any splinted fingers  Weight bearing status: The operated extremity should be non-weight bearing until further notice  Ice: Ice for 10 minutes every hour as needed for swelling x 24 hours  Sling: Sling for comfort for 2-3 days  Pain medication: Norco/Hydrocodone 1 tab every 6 hours as needed for pain  Follow-up Appointment: 7-10 days  Please call the office if you have any questions or concerns regarding your post-operative care

## 2018-06-04 ENCOUNTER — TELEPHONE (OUTPATIENT)
Dept: OBGYN CLINIC | Facility: HOSPITAL | Age: 68
End: 2018-06-04

## 2018-06-04 NOTE — TELEPHONE ENCOUNTER
Spoke to pt  Symptoms are getting better with loosening of bandages  And will see the Dr  On Wednesday for her Follow up appt

## 2018-06-04 NOTE — TELEPHONE ENCOUNTER
If pt's symptoms don't improve with loosening of the bandage, we can try to get her in  Let me know  I'm hoping loosening the bandage is all she needs  She may also put tiny tears in the web roll in addition to just loosening the ace  She may experience bruising in her fingers post-operatively regardless  But if she has any warning signs of significant pain, n/t, or worsening swelling, we can get her in today  Let me know how she's doing   Thanks

## 2018-06-04 NOTE — TELEPHONE ENCOUNTER
Pt  Called stating that  her fingers on left hand is swollen and fingers are changing color "it looks bruised "since last night   pt  Also stated that her dressing feels tight  Pt  Fingers are blanchable, pt  Deny any coldness to fingers, but stats that fingers are tender to touch  I advised pt  To have her  loosen dressing, elevate arm and start icing for swelling  Pt  Is willing to come in to have it checked  Pt  Is aware Dr  Is in the [address] [place] [address] location today  Please advise

## 2018-06-06 ENCOUNTER — OFFICE VISIT (OUTPATIENT)
Dept: OBGYN CLINIC | Facility: HOSPITAL | Age: 68
End: 2018-06-06

## 2018-06-06 VITALS
HEART RATE: 56 BPM | WEIGHT: 184 LBS | DIASTOLIC BLOOD PRESSURE: 80 MMHG | HEIGHT: 60 IN | BODY MASS INDEX: 36.12 KG/M2 | SYSTOLIC BLOOD PRESSURE: 165 MMHG

## 2018-06-06 DIAGNOSIS — Z47.89 AFTERCARE FOLLOWING SURGERY OF THE MUSCULOSKELETAL SYSTEM: Primary | ICD-10-CM

## 2018-06-06 DIAGNOSIS — M18.12 PRIMARY OSTEOARTHRITIS OF FIRST CARPOMETACARPAL JOINT OF LEFT HAND: ICD-10-CM

## 2018-06-06 PROCEDURE — 99024 POSTOP FOLLOW-UP VISIT: CPT | Performed by: ORTHOPAEDIC SURGERY

## 2018-06-06 RX ORDER — HYDROCODONE BITARTRATE AND ACETAMINOPHEN 5; 325 MG/1; MG/1
1 TABLET ORAL EVERY 4 HOURS PRN
Qty: 20 TABLET | Refills: 0 | Status: SHIPPED | OUTPATIENT
Start: 2018-06-06 | End: 2018-06-16

## 2018-06-06 NOTE — PROGRESS NOTES
77 y o female presents today at her request due to increased pain and swelling 8 days s/p left Weilby from 5/29  She loosened her compression wrap with improvement but still had and has pain  She has been taking her Vicodin and tramadol and also resumed her mobic  Sherre Soulier Denies any fevers or chills  Review of Systems  Review of systems negative unless otherwise specified in HPI    Past Medical History  Past Medical History:   Diagnosis Date    Asthma     Chronic obstructive lung disease ([facility] )     Community acquired pneumonia     LAST ASSESSED: [id number]    COPD (chronic obstructive pulmonary disease) ([facility] )     Diabetes mellitus ([facility] )     History of MRSA infection     2008 liver sx    Liver disease     Sleep apnea     Viral warts     LAST ASSESSED: [id number]       Past Surgical History  Past Surgical History:   Procedure Laterality Date    ABDOMINAL SURGERY      ABDOMINOPLASTY      CHOLECYSTECTOMY      COSMETIC SURGERY      EYE SURGERY      Bilateral cataracts 2015 Dr Wen Ceballos       FINGER SURGERY      HAND SURGERY      HYSTERECTOMY      LIVER SURGERY      SC REPAIR INTERCARP/CARP-METACARP JT Right 9/9/2016    Procedure: THUMB TRAPEZIECTOMY; BASAL JOINT ARTHROPLASTY WITH APL AUTOGRAFT;  Surgeon: Elyse Hoskins MD;  Location[facility];  Service: Orthopedics    SC REPAIR INTERCARP/CARP-METACARP JT Left 5/29/2018    Procedure: [place];  Surgeon: Audie Lofton MD;  [facility];  Service: Orthopedics    SC TRANSPLANT HAND TENDON Left 5/29/2018    Procedure: TRANSFER TENDON HAND/WRIST FCR from forearm to wrist;  Surgeon: Audie Lofton MD;  [facility];  Service: Orthopedics       Current Medications  Current Outpatient Prescriptions on File Prior to Visit   Medication Sig Dispense Refill    diphenhydrAMINE (BENADRYL) 25 mg capsule Take 25 mg by mouth every 6 (six) hours as needed for itching      glipiZIDE (GLUCOTROL XL) 5 mg 24 hr tablet TAKE 1 TABLET EVERY DAY  30  MINUTES BEFORE BREAKFAST 90 tablet 1    HYDROcodone-acetaminophen (NORCO) 5-325 mg per tablet Take 1 tablet by mouth every 4 (four) hours as needed for pain for up to 10 days Max Daily Amount: 6 tablets 20 tablet 0    meloxicam (MOBIC) 7 5 mg tablet TAKE 1 TABLET TWICE DAILY AS NEEDED FOR PAIN 180 tablet 1    metFORMIN (GLUCOPHAGE) 1000 MG tablet TAKE 1 TABLET TWICE DAILY 180 tablet 1    albuterol (PROVENTIL HFA,VENTOLIN HFA) 90 mcg/act inhaler Inhale 2 puffs every 6 (six) hours as needed for wheezing       No current facility-administered medications on file prior to visit  Recent Labs [facility])    0  Lab Value Date/Time   HCT 49 3 (H) 04/24/2018 0906   HGB 16 0 (H) 04/24/2018 0906   WBC 6 74 04/24/2018 0906   GLUCOSE 121 04/24/2018 0906   HGBA1C 6 2 04/24/2018 0906   HGBA1C 9 9 (H) 10/23/2017 0000         Physical exam  · General: Awake, Alert, Oriented  · Eyes: Pupils equal, round and reactive to light  · Heart: regular rate and rhythm  · Lungs: No audible wheezing  · Abdomen: soft    Left hand: thumb    Healed incisions with mild diffuse ecchymosis  Painless passive circumduction  Mild diffuse swelling throughout  Good STLT  NVID      Imaging  None indicated    Procedure  None indicated    Assessment/Plan:   77 y  o female 8 days s/p left Weilby  She is doing well, can get her hand wet and wash with soap and water, pat dry, don't rub  Lifting restrictions as discussed (fork/spoon)  Refer to OT will see Mariaa today  Comfort cool brace given today  Refill of her vicodin     Re-check in 5 weeks   Scribe Attestation    I,:   Niurka Sandoval am acting as a scribe while in the presence of the attending physician :        I,:   Delfino Phan MD personally performed the services described in this documentation    as scribed in my presence :

## 2018-06-13 ENCOUNTER — EVALUATION (OUTPATIENT)
Dept: OCCUPATIONAL THERAPY | Facility: CLINIC | Age: 68
End: 2018-06-13
Payer: MEDICARE

## 2018-06-13 DIAGNOSIS — Z47.89 AFTERCARE FOLLOWING SURGERY OF THE MUSCULOSKELETAL SYSTEM: ICD-10-CM

## 2018-06-13 DIAGNOSIS — M18.12 ARTHRITIS OF CARPOMETACARPAL (CMC) JOINT OF LEFT THUMB: ICD-10-CM

## 2018-06-13 DIAGNOSIS — M18.12 PRIMARY OSTEOARTHRITIS OF FIRST CARPOMETACARPAL JOINT OF LEFT HAND: Primary | ICD-10-CM

## 2018-06-13 PROCEDURE — G8991 OTHER PT/OT GOAL STATUS: HCPCS | Performed by: OCCUPATIONAL THERAPIST

## 2018-06-13 PROCEDURE — 97140 MANUAL THERAPY 1/> REGIONS: CPT | Performed by: OCCUPATIONAL THERAPIST

## 2018-06-13 PROCEDURE — 97165 OT EVAL LOW COMPLEX 30 MIN: CPT | Performed by: OCCUPATIONAL THERAPIST

## 2018-06-13 PROCEDURE — G8990 OTHER PT/OT CURRENT STATUS: HCPCS | Performed by: OCCUPATIONAL THERAPIST

## 2018-06-13 NOTE — PROGRESS NOTES
OT Evaluation     Today's date: 2018  Patient name: Mark Tompkins  : 1950  MRN: [id number]  Referring provider: Billy Pacheco MD  Dx:   Encounter Diagnosis     ICD-10-CM    1  Primary osteoarthritis of first carpometacarpal joint of left hand M18 12    2  Aftercare following surgery of the musculoskeletal system [id number] [id number] Ambulatory referral to PT/OT hand therapy    Left [place] 18                   Assessment  Impairments: abnormal or restricted ROM, impaired physical strength and pain with function  Patient presents with symptom irritability no  Assessment details: Glenny Delacruz is s/p L Weilby procedure on 18  She presents with minimal pain and edema  Her motion is excellent at this time and minimally restricted  Understanding of Dx/Px/POC: excellent  Goals  STG: Patient will be compliant with post operative precautions (if applicable) and home exercise program in 2 weeks  STG:  Pain will be reduced by two subjective levels in 2 weeks  STG: Inflammation/edema/joint effusion will be reduced by 25% and patient will be independent with self management techniques in 4 weeks  STG: Range of motion of thumb will be improved by 25% in 4 weeks  STG: Strength will be improved by 25% in 4 weeks  LTG: Pain will be reduced by 4 subjective levels in 6-8 weeks  LTG: Performance in ADLs and IADLS will be improved to prior level of function with the affected extremity within 8 weeks  LTG: FOTO score increase by 20 points within 8 weeks         Plan  Patient would benefit from: skilled OT and OT eval  Planned modality interventions: thermotherapy: hydrocollator packs and thermotherapy: paraffin bath  Planned therapy interventions: functional ROM exercises, home exercise program, joint mobilization, manual therapy, therapeutic exercise and patient education  Frequency: 2x week  Duration in weeks: 6  Treatment plan discussed with: patient  Plan details: Treatment to include modalities, manual therapy, PRE's, HEP, and orthotics as appropriate and following protocol  Subjective Evaluation    History of Present Illness  Date of surgery: 2018  Mechanism of injury: surgery  Mechanism of injury: Lee Machado is s/p L Weilby procedure on 18  She was previously seen in 2016 for the procedure on the R hand and had a very good outcome after surgery  She has been wearing a comfort-cool brace on the L and has been started with AROM exercises last week  Pain  Current pain ratin  At worst pain ratin  Quality: dull ache  Progression: improved    Social Support  Lives with: spouse    Hand dominance: right    Patient Goals  Patient goals for therapy: decreased pain, increased strength, independence with ADLs/IADLs, increased motion and decreased edema          Objective     Observations     Left Wrist/Hand   Positive for incision  Neurological Testing     Additional Neurological Details  No numbness or tingling into the digits  Slight numbness over incision  Active Range of Motion     Left Wrist   Wrist flexion: 65 degrees   Wrist extension: 65 degrees     Left Thumb   Flexion     MP: 48 degrees    DIP: 60 degrees  Extension     MP: 20 degrees  Palmar Abduction     CMC: 75 degrees  Radial abduction    CMC: 80 degrees    Right Thumb   Flexion     MP: 50    DIP: 61  Palmar Abduction    CMC: 70  Radial Abduction    CMC: 85  Opposition: Opposition to all digits       Swelling     Left Wrist/Hand   Thumb     Proximal: 6 cm    Distal: 5 4 cm  Circumference wrist: 16 4 cm    Right Wrist/Hand   Thumb     Proximal: 5 7 cm    Distal: 5 5 cm  Circumference wrist: 15 8 cm

## 2018-06-13 NOTE — PROGRESS NOTES
Daily Note     Today's date: 2018  Patient name: Jillian Molina  : 1950  MRN: [id number]  Referring provider: Dez Redd MD  Dx:   Encounter Diagnosis     ICD-10-CM    1  Primary osteoarthritis of first carpometacarpal joint of left hand M18 12    2  Aftercare following surgery of the musculoskeletal system Z47 89 Ambulatory referral to PT/OT hand therapy    [facility]                   Subjective: See eval       Objective: See treatment diary below      Assessment: Tolerated treatment well  Patient would benefit from continued OT      Plan: Progress treament per protocol       Precautions: Weilby 18; no strength     Specialty Daily Treatment Diary     Manual         IASTM 10       IP PROM        AAROM thumb         Wrist ROM                    Exercise Diary         HEP: AROM, TG Completed                                                                                                                                                                   Modalities        P 5'

## 2018-06-15 ENCOUNTER — OFFICE VISIT (OUTPATIENT)
Dept: OCCUPATIONAL THERAPY | Facility: CLINIC | Age: 68
End: 2018-06-15
Payer: MEDICARE

## 2018-06-15 DIAGNOSIS — M18.12 PRIMARY OSTEOARTHRITIS OF FIRST CARPOMETACARPAL JOINT OF LEFT HAND: Primary | ICD-10-CM

## 2018-06-15 PROCEDURE — 97140 MANUAL THERAPY 1/> REGIONS: CPT | Performed by: OCCUPATIONAL THERAPIST

## 2018-06-15 PROCEDURE — 97110 THERAPEUTIC EXERCISES: CPT | Performed by: OCCUPATIONAL THERAPIST

## 2018-06-15 NOTE — PROGRESS NOTES
Daily Note     Today's date: 6/15/2018  Patient name: Laly Harvey  : 1950  MRN: [id number]  Referring provider: Ernesto Rubio MD  Dx:   Encounter Diagnosis     ICD-10-CM    1  Primary osteoarthritis of first carpometacarpal joint of left hand M18 12               Subjective: "I am so pleased with this surgery"      Objective: See treatment diary below  Assessment: Tolerated treatment well  Patient would benefit from continued OT  Excellent motion  Minimal scar tissue adherence  Minimal swelling  Plan: Progress treament per protocol       Precautions: Weilby 18; no strength     Specialty Daily Treatment Diary     Manual  6/13 6/15      IASTM 10 10'      IP PROM  1'      AAROM thumb         Wrist ROM  2'                  Exercise Diary  6/13 6/15      HEP: AROM, TG Completed       [place] roll   20x      Keypegs  1x      Wrist maze  10x      Coordination balls  2 min                                                                                                                                  Modalities 6/13 6/15      MHP 5' 10'

## 2018-06-19 ENCOUNTER — OFFICE VISIT (OUTPATIENT)
Dept: OCCUPATIONAL THERAPY | Facility: CLINIC | Age: 68
End: 2018-06-19
Payer: MEDICARE

## 2018-06-19 DIAGNOSIS — M18.12 PRIMARY OSTEOARTHRITIS OF FIRST CARPOMETACARPAL JOINT OF LEFT HAND: Primary | ICD-10-CM

## 2018-06-19 PROCEDURE — 97010 HOT OR COLD PACKS THERAPY: CPT | Performed by: OCCUPATIONAL THERAPIST

## 2018-06-19 PROCEDURE — 97110 THERAPEUTIC EXERCISES: CPT | Performed by: OCCUPATIONAL THERAPIST

## 2018-06-19 PROCEDURE — 97140 MANUAL THERAPY 1/> REGIONS: CPT | Performed by: OCCUPATIONAL THERAPIST

## 2018-06-19 NOTE — PROGRESS NOTES
Daily Note     Today's date: 2018  Patient name: Tc Estrella  : 1950  MRN: [id number]  Referring provider: Zachary Guzmán MD  Dx:   Encounter Diagnosis     ICD-10-CM    1  Primary osteoarthritis of first carpometacarpal joint of left hand M18 12               Subjective: "It still feels good!"      Objective: See treatment diary below  Assessment: Tolerated treatment well  Patient would benefit from continued OT  Minimal discomfort, completed PREs without incidence  Plan: Progress treament per protocol       Precautions: Weilby 18; no strength     Specialty Daily Treatment Diary     Manual  6/13 6/15 6/19     IASTM 10 10' 10     IP PROM  1'      AAROM thumb    5     Wrist ROM  2'                  Exercise Diary  6/13 6/15 6/19     HEP: AROM, TG Completed       [place] roll   20x 25     Keypegs  1x 2     Wrist maze  10x 10     Coordination balls  2 min 3     Grasp + translation   Gems/beads                                                                                                                         Modalities 6/13 6/15 6/19     MHP 5' 10' 10

## 2018-06-22 ENCOUNTER — OFFICE VISIT (OUTPATIENT)
Dept: OCCUPATIONAL THERAPY | Facility: CLINIC | Age: 68
End: 2018-06-22
Payer: MEDICARE

## 2018-06-22 DIAGNOSIS — M18.12 PRIMARY OSTEOARTHRITIS OF FIRST CARPOMETACARPAL JOINT OF LEFT HAND: Primary | ICD-10-CM

## 2018-06-22 PROCEDURE — 97110 THERAPEUTIC EXERCISES: CPT | Performed by: OCCUPATIONAL THERAPIST

## 2018-06-22 NOTE — PROGRESS NOTES
Daily Note     Today's date: 2018  Patient name: Biju Haas  : 1950  MRN: [id number]  Referring provider: Gerrianne Crigler, MD  Dx:   Encounter Diagnosis     ICD-10-CM    1  Primary osteoarthritis of first carpometacarpal joint of left hand M18 12                   Subjective:   " I don't have any pain  My next appointment is on 18 "  " I can fold clothes, wash silverware with no problem "    ,   Objective: See treatment diary below    Manual  6/13 6/15 6/19 6/22    IASTM 10 10' 10 10    IP PROM  1'      AAROM thumb    5 5    Wrist ROM  2'                  Exercise Diary  6/13 6/15 6/19 6/22    HEP: AROM, TG Completed       [place] roll   20x 25 25    Keypegs  1x 2 2    Wrist maze  10x 10 10    Coordination balls  2 min 3 3    Grasp + translation   Gems/beads Gems/bead                                                                                                                        Modalities 6/13 6/15 6/19 6/22    MHP 5' 10' 10 10                              Assessment: Tolerated treatment well  Patient exhibited good technique with therapeutic exercises and would benefit from continued OT  Thumb ROM is excellent  Progressing well  Plan: Continue per plan of care

## 2018-06-26 ENCOUNTER — OFFICE VISIT (OUTPATIENT)
Dept: OCCUPATIONAL THERAPY | Facility: CLINIC | Age: 68
End: 2018-06-26
Payer: MEDICARE

## 2018-06-26 DIAGNOSIS — M18.12 PRIMARY OSTEOARTHRITIS OF FIRST CARPOMETACARPAL JOINT OF LEFT HAND: Primary | ICD-10-CM

## 2018-06-26 PROCEDURE — 97110 THERAPEUTIC EXERCISES: CPT | Performed by: OCCUPATIONAL THERAPIST

## 2018-06-26 PROCEDURE — 97140 MANUAL THERAPY 1/> REGIONS: CPT | Performed by: OCCUPATIONAL THERAPIST

## 2018-06-26 PROCEDURE — 97010 HOT OR COLD PACKS THERAPY: CPT | Performed by: OCCUPATIONAL THERAPIST

## 2018-06-26 NOTE — PROGRESS NOTES
Daily Note     Today's date: 2018  Patient name: Farrah Rosario  : 1950  MRN: [id number]  Referring provider: Amanuel Ramos MD  Dx:   Encounter Diagnosis     ICD-10-CM    1  Primary osteoarthritis of first carpometacarpal joint of left hand M18 12                   Subjective:   " It feels great!"    ,   Objective: See treatment diary below    Manual  6/13 6/15 6/19 6/22    IASTM 10 10' 10 10    IP PROM  1'      AAROM thumb    5 5    Wrist ROM  2'                  Exercise Diary  6/13 6/15 6/19 6/22 6/26   HEP: AROM, TG Completed       [place] roll   20x 25 25 30   Keypegs  1x 2 2 2   Wrist maze  10x 10 10 10   Coordination balls  2 min 3 3 3 min   Grasp + translation   Gems/beads Gems/bead Gems beads x 50                                                                                                                       Modalities 6/13 6/15 6/19 6/22 6/26   MHP 5' 10' 10 10 10                             Assessment: Tolerated treatment well  Patient exhibited good technique with therapeutic exercises and would benefit from continued OT  Doing very well, no pain  Progressing well  Plan: Continue per plan of care

## 2018-06-29 ENCOUNTER — OFFICE VISIT (OUTPATIENT)
Dept: OCCUPATIONAL THERAPY | Facility: CLINIC | Age: 68
End: 2018-06-29
Payer: MEDICARE

## 2018-06-29 DIAGNOSIS — M18.12 PRIMARY OSTEOARTHRITIS OF FIRST CARPOMETACARPAL JOINT OF LEFT HAND: Primary | ICD-10-CM

## 2018-06-29 PROCEDURE — 97140 MANUAL THERAPY 1/> REGIONS: CPT | Performed by: OCCUPATIONAL THERAPIST

## 2018-06-29 PROCEDURE — 97110 THERAPEUTIC EXERCISES: CPT | Performed by: OCCUPATIONAL THERAPIST

## 2018-06-29 NOTE — PROGRESS NOTES
Daily Note     Today's date: 2018  Patient name: Gill Salcedo  : 1950  MRN: [id number]  Referring provider: Jackson Hollis MD  Dx:   Encounter Diagnosis     ICD-10-CM    1  Primary osteoarthritis of first carpometacarpal joint of left hand M18 12                   Subjective:   "im massaging this bump"      Objective: See treatment diary below    Manual  6/13 6/15 6/19 6/22 6/29   IASTM  10' 10 10 5'   IP PROM  1'      AAROM thumb  5'  5 5 5'   Wrist ROM  2'                  Exercise Diary  6/29 6/15 6/19 6/22 6/26   HEP: AROM, TG        [place] roll  20x 20x 25 25 30   Keypegs 1x 1x 2 2 2   Wrist maze  10x 10 10 10   Coordination balls 2 min 2 min 3 3 3 min   Grasp + translation   Gems/beads Gems/bead Gems beads x 50   Tennis ball on wall  10x                                                                                                                   Modalities 6/29 6/15 6/19 6/22 6/26   MHP 15' 10' 10 10 10                       Assessment: Completed program as outlined  No pain  Tolerated treatment well  Patient exhibited good technique with therapeutic exercises and would benefit from continued OT  Plan: Continue per plan of care

## 2018-07-03 ENCOUNTER — OFFICE VISIT (OUTPATIENT)
Dept: OCCUPATIONAL THERAPY | Facility: CLINIC | Age: 68
End: 2018-07-03
Payer: MEDICARE

## 2018-07-03 DIAGNOSIS — M18.12 PRIMARY OSTEOARTHRITIS OF FIRST CARPOMETACARPAL JOINT OF LEFT HAND: Primary | ICD-10-CM

## 2018-07-03 PROCEDURE — 97010 HOT OR COLD PACKS THERAPY: CPT | Performed by: OCCUPATIONAL THERAPIST

## 2018-07-03 PROCEDURE — 97140 MANUAL THERAPY 1/> REGIONS: CPT | Performed by: OCCUPATIONAL THERAPIST

## 2018-07-03 PROCEDURE — 97110 THERAPEUTIC EXERCISES: CPT | Performed by: OCCUPATIONAL THERAPIST

## 2018-07-03 NOTE — PROGRESS NOTES
Daily Note     Today's date: 7/3/2018  Patient name: Moises Giordano  : 1950  MRN: [id number]  Referring provider: Nai Foster MD  Dx:   Encounter Diagnosis     ICD-10-CM    1  Primary osteoarthritis of first carpometacarpal joint of left hand M18 12                   Subjective:   "It feels great"      Objective: See treatment diary below    Manual  7/3 6/15 6/19 6/22 6/29   IASTM 5 10' 10 10 5'   IP PROM  1'      AAROM thumb  5'  5 5 5'   Wrist ROM  2'                  Exercise Diary  7/3 6/15 6/19 6/22 6/26   HEP: AROM, TG        [place] roll  30 20x 25 25 30   Keypegs 1x 1x 2 2 2   Wrist maze  10x 10 10 10   Coordination balls 3 min 2 min 3 3 3 min   Grasp + translation Beads x 45  Gems/beads Gems/bead Gems beads x 50   Ball rotate 4 planes 10x       Pinch Stebbins Y/R x1       EDC Sm rubber band x 20                                                                                                   Modalities  67/3    MHP 15' 10' 10 10 10                       Assessment: Completed program as outlined  No pain  Tolerated treatment well  Patient exhibited good technique with therapeutic exercises and would benefit from continued OT  Tolerated upgrades/light strengthening well with mild discomfort only  Plan: Continue per plan of care

## 2018-07-05 ENCOUNTER — OFFICE VISIT (OUTPATIENT)
Dept: OCCUPATIONAL THERAPY | Facility: CLINIC | Age: 68
End: 2018-07-05
Payer: MEDICARE

## 2018-07-05 DIAGNOSIS — M18.12 PRIMARY OSTEOARTHRITIS OF FIRST CARPOMETACARPAL JOINT OF LEFT HAND: Primary | ICD-10-CM

## 2018-07-05 PROCEDURE — 97140 MANUAL THERAPY 1/> REGIONS: CPT | Performed by: OCCUPATIONAL THERAPIST

## 2018-07-05 PROCEDURE — 97110 THERAPEUTIC EXERCISES: CPT | Performed by: OCCUPATIONAL THERAPIST

## 2018-07-10 ENCOUNTER — EVALUATION (OUTPATIENT)
Dept: OCCUPATIONAL THERAPY | Facility: CLINIC | Age: 68
End: 2018-07-10
Payer: MEDICARE

## 2018-07-10 DIAGNOSIS — Z47.89 AFTERCARE FOLLOWING SURGERY OF THE MUSCULOSKELETAL SYSTEM: ICD-10-CM

## 2018-07-10 DIAGNOSIS — M18.12 PRIMARY OSTEOARTHRITIS OF FIRST CARPOMETACARPAL JOINT OF LEFT HAND: Primary | ICD-10-CM

## 2018-07-10 DIAGNOSIS — G47.33 OSA (OBSTRUCTIVE SLEEP APNEA): Primary | ICD-10-CM

## 2018-07-10 PROCEDURE — 97110 THERAPEUTIC EXERCISES: CPT | Performed by: OCCUPATIONAL THERAPIST

## 2018-07-10 PROCEDURE — G8985 CARRY GOAL STATUS: HCPCS | Performed by: OCCUPATIONAL THERAPIST

## 2018-07-10 PROCEDURE — G8984 CARRY CURRENT STATUS: HCPCS | Performed by: OCCUPATIONAL THERAPIST

## 2018-07-10 PROCEDURE — 97168 OT RE-EVAL EST PLAN CARE: CPT | Performed by: OCCUPATIONAL THERAPIST

## 2018-07-10 NOTE — PROGRESS NOTES
OT Re-Evaluation     Today's date: 7/10/2018  Patient name: Aida Anguiano  : 1950  MRN: [id number]  Referring provider: Andreia Gutierrez MD  Dx:   Encounter Diagnosis     ICD-10-CM    1  Primary osteoarthritis of first carpometacarpal joint of left hand M18 12    2  Aftercare following surgery of the musculoskeletal system Z47 89        Start Time: 5437  Stop Time: 0940  Total time in clinic (min): 50 minutes    Assessment  Impairments: abnormal or restricted ROM, impaired physical strength and pain with function  Patient presents with symptom irritability no  Assessment details: Cole Pardo is s/p L Weilby procedure on 18  She is 6 weeks post surgery and light strengthening was initiated with patient tolerating graded activities  She continues to present with slight edema  ROM is excellent   strength and pinch are slightly decreased in comparison with the right hand but this is to be expected  Functionally, she is Independent with light IADL's  She is now able to perform ADL's at an adequate rate of time  ADL's are I  Patient would continue to benefit from skilled OT to  Initiate graded strength tasks in order to resume independent lifestyle  At present, patient is doing excellent in therapy with no anticpated problems  Understanding of Dx/Px/POC: excellent  Goals  STG: Patient will be compliant with post operative precautions (if applicable) and home exercise program in 2 weeks  MET  STG:  Pain will be reduced by two subjective levels in 2 weeks  MET  STG: Inflammation/edema/joint effusion will be reduced by 25% and patient will be independent with self management techniques in 4 weeks  MET  STG: Range of motion of thumb will be improved by 25% in 4 weeks  MET  STG: Strength will be improved by 25% in 4 weeks  PARTIALLY MET    LTG: Pain will be reduced by 4 subjective levels in 6-8 weeks     MET  LTG: Performance in ADLs and IADLS will be improved to prior level of function with the affected extremity within 8 weeks  PARTIALLY MET  LTG: FOTO score increase by 20 points within 8 weeks  PARTIALLY MET      Plan  Patient would benefit from: skilled OT and OT eval  Planned modality interventions: thermotherapy: hydrocollator packs and thermotherapy: paraffin bath  Planned therapy interventions: functional ROM exercises, home exercise program, joint mobilization, manual therapy, therapeutic exercise and patient education  Frequency: 2x week  Duration in weeks: 6  Treatment plan discussed with: patient  Plan details: Treatment to include modalities, manual therapy, PRE's, HEP, and orthotics as appropriate and following protocol  Subjective Evaluation    History of Present Illness  Date of surgery: 2018  Mechanism of injury: surgery  Mechanism of injury: Ila Garnica is s/p L Weineeta procedure on 18  She was previously seen in 2016 for the procedure on the R hand and had a very good outcome after surgery  She has been wearing a comfort-cool brace on the L and has been started with AROM exercises last week  Re-eval this date  She is 6 weeks post surgery and feels that she is progressing accordingly  She continues  Wear her comfort cool splint  MD f/u on 18  Not a recurrent problem   Quality of life: good    Pain  Current pain ratin  At worst pain ratin  Location: base of CMC and wrist  Quality: dull ache  Relieving factors: rest and heat  Progression: improved    Social Support  Lives with: spouse    Hand dominance: right    Patient Goals  Patient goals for therapy: decreased pain, increased strength, independence with ADLs/IADLs, increased motion and decreased edema          Objective     Observations     Left Wrist/Hand   Positive for incision  Neurological Testing     Additional Neurological Details   Slight numbness over incision at time of IE and it is now very slight numbness over surgical scar        Active Range of Motion     Left Wrist   Wrist flexion: 65 degrees   Wrist extension: 65 degrees     Left Thumb   Flexion     MP: 49 degrees    DIP: 70 degrees  Extension     MP: 4016 degrees  Palmar Abduction     CMC: 75 degrees  Radial abduction    CMC: 80 degrees    Right Thumb   Flexion     MP: 50    DIP: 61  Palmar Abduction    CMC: 70  Radial Abduction    CMC: 85  Opposition: Opposition to all digits       Strength/Myotome Testing     Left Wrist/Hand      (2nd hand position)     Trial 1: 40    Trial 2: 38    Trial 3: 35    Thumb Strength  Key/Lateral Pinch     [place] 1: 9    [place] 2: 8    Trial 3: 7    Average: 8  Tip/Two-Point Pinch     Trial 1: 6    Trial 2: 5    Trial 3: 5    Average: 5 33  Palmar/Three-Point Pinch     Trial 1: 1 5    Trial 2: 3    Trial 3: 3    Average: 2 5     Right Wrist/Hand      (2nd hand position)     Trial 1: 20    Trial 2: 22    Trial 3: 25    Thumb Strength   Key/Lateral Pinch     Trial 1: 3    Trial 2: 3    Trial 3: 3    Average: 3  Tip/Two-Point Pinch     Trial 1: 2    Trial 2: 1 5    Trial 3: 2    Average: 1 83  Palmar/Three-Point Pinch     Trial 1: 2    Trial 2: 2    Trial 3: 1    Average: 1 67    Swelling     Left Wrist/Hand   Thumb     Proximal: 6 cm    Distal: 5 4 cm  Circumference wrist: 16 4 cm    Right Wrist/Hand   Thumb     Proximal: 5 7 cm    Distal: 5 5 cm  Circumference wrist: 15 8 cm      Flowsheet Rows      Most Recent Value   PT/OT G-Codes   Current Score  44   Projected Score  59   Assessment Type  Re-evaluation   G code set  Carrying, Moving & Handling Objects   Carrying, Moving and Handling Objects Current Status ()  CK   Carrying, Moving and Handling Objects Goal Status ()  CJ         Manual  7/3 7/5 7/10 6/22 6/29   IASTM 5 5' 5' 10 5'   IP PROM        AAROM thumb  5' 3'  5 5'   Wrist ROM  2'                  Exercise Diary  7/3 7/5 7/10 6/22 6/26   HEP: AROM, TG        [place] roll  30   25 30   [place] 1x x1  2 2   Wrist maze    10 10   Coordination balls 3 min 2 min  3 3 min   Grasp + translation Beads x 45   Gems/bead Gems beads x 50   Ball rotate 4 planes 10x       Pinch [place] Y/R x1       EDC Sm rubber band x 20 x20      Pegs  Yellow in, 1st out      Wrist strengthening  1# 3x10      Velcro pinch board        RPW  20 times      Yellow tp   Finger dig  Roll & pinch tip to tip and three jaw perfecto        Peg board  Alt y & r   3rd                                                  Modalities 6/29 7/3 7/5 6/22 6/26   MHP 15' 8' 10 10 10

## 2018-07-11 ENCOUNTER — OFFICE VISIT (OUTPATIENT)
Dept: OBGYN CLINIC | Facility: HOSPITAL | Age: 68
End: 2018-07-11

## 2018-07-11 VITALS
BODY MASS INDEX: 35.69 KG/M2 | HEIGHT: 60 IN | HEART RATE: 59 BPM | SYSTOLIC BLOOD PRESSURE: 166 MMHG | DIASTOLIC BLOOD PRESSURE: 76 MMHG | WEIGHT: 181.8 LBS

## 2018-07-11 DIAGNOSIS — Z47.89 AFTERCARE FOLLOWING SURGERY OF THE MUSCULOSKELETAL SYSTEM: Primary | ICD-10-CM

## 2018-07-11 DIAGNOSIS — G47.33 OSA (OBSTRUCTIVE SLEEP APNEA): Primary | ICD-10-CM

## 2018-07-11 PROCEDURE — 99024 POSTOP FOLLOW-UP VISIT: CPT | Performed by: ORTHOPAEDIC SURGERY

## 2018-07-11 NOTE — PROGRESS NOTES
Assessment:   S/P L Weilby 5/29/18    Plan:   Continued therapy, may now progress to strengthening  Comfort cool only as needed  Scar tissue massage    Follow Up:  6  week(s)          CHIEF COMPLAINT:  Chief Complaint   Patient presents with    Left Wrist - Post-op         SUBJECTIVE:  Beck Romo is a 77y o  year old female who presents for follow up S/P L Weilby 5/29/18  Patient states she is doing great  No pain  Therapy is going very well   Overall she is very pleased with the results      PHYSICAL EXAMINATION:  General: well developed and well nourished, alert, oriented times 3 and appears comfortable  Psychiatric: Normal    MUSCULOSKELETAL EXAMINATION:  Incision: Clean, dry, intact  Range of Motion: As expected  Neurovascular status: Neuro intact, good cap refill  Activity Restrictions: No restrictions         STUDIES REVIEWED:  No Studies to review      PROCEDURES PERFORMED:  Procedures  No Procedures performed today   Scribe Attestation    I,:   Javed Soriano PA-C am acting as a scribe while in the presence of the attending physician :        I,:   Netta Walsh MD personally performed the services described in this documentation    as scribed in my presence :

## 2018-07-12 ENCOUNTER — CLINICAL SUPPORT (OUTPATIENT)
Dept: PAIN MEDICINE | Facility: CLINIC | Age: 68
End: 2018-07-12
Payer: MEDICARE

## 2018-07-12 VITALS
HEART RATE: 57 BPM | TEMPERATURE: 99 F | WEIGHT: 180 LBS | DIASTOLIC BLOOD PRESSURE: 71 MMHG | BODY MASS INDEX: 35.15 KG/M2 | SYSTOLIC BLOOD PRESSURE: 151 MMHG

## 2018-07-12 DIAGNOSIS — M47.816 LUMBAR SPONDYLOSIS: ICD-10-CM

## 2018-07-12 DIAGNOSIS — M51.36 DDD (DEGENERATIVE DISC DISEASE), LUMBAR: ICD-10-CM

## 2018-07-12 DIAGNOSIS — E11.9 TYPE 2 DIABETES MELLITUS WITHOUT COMPLICATION, WITHOUT LONG-TERM CURRENT USE OF INSULIN (HCC): ICD-10-CM

## 2018-07-12 DIAGNOSIS — M54.16 LUMBAR RADICULOPATHY: Primary | ICD-10-CM

## 2018-07-12 DIAGNOSIS — M48.062 SPINAL STENOSIS OF LUMBAR REGION WITH NEUROGENIC CLAUDICATION: ICD-10-CM

## 2018-07-12 PROCEDURE — 99214 OFFICE O/P EST MOD 30 MIN: CPT | Performed by: ANESTHESIOLOGY

## 2018-07-12 NOTE — PROGRESS NOTES
Pt is c/o lower back pain   Assessment:  1  Lumbar radiculopathy    2  Lumbar spondylosis    3  DDD (degenerative disc disease), lumbar    4  Spinal stenosis of lumbar region with neurogenic claudication        Plan:  25-year-old female returning for follow-up of lumbosacral back pain with radiculopathy in the L4-5 distribution of the right lower extremity  The patient is status post right L4 and L5 TFESI and she is still experiencing about 95% relief of her lower extremity pain  The patient is also status post surgery on the left thumb and she is quite satisfied with the results of the surgery as well from orthopedics  Her rehabilitation is going exceptionally well and she has regained quite a bit of function of her left hand which she is very happy about  The patient is currently taking tramadol 50 mg q 8 hours p r n  and meloxicam 7 5 mg b i d  p r n  with about 95% relief of her pain  She denies any side effects to the medication  1   The patient may continue with tramadol 50 mg q 8 hours p r n     The patient states that she does not need refills at this time  2   The patient may continue with meloxicam 7 5 mg b i d  p r n   3   The patient will continue with her home exercise program  4  I will repeat right L4 and L5 TFESI p r n   5   I will follow up the patient in 3 months    [facility] report was reviewed and was appropriate       History of Present Illness: The patient is a 77 y o  female  returning for follow-up of lumbosacral back pain with radiculopathy in the L4-5 distribution of the right lower extremity  The patient is status post right L4 and L5 TFESI and she is still experiencing about 95% relief of her lower extremity pain  She denies any bladder or bowel incontinence or saddle anesthesia  She denies any left lower extremity radicular symptoms      The patient is also status post surgery on the left thumb and she is quite satisfied with the results of the surgery as well from orthopedics  Her rehabilitation is going exceptionally well and she has regained quite a bit of function of her left hand which she is very happy about  The patient is currently taking tramadol 50 mg q 8 hours p r n  and meloxicam 7 5 mg b i d  p r n  with about 95% relief of her pain  She denies any side effects to the medication  The patient rates her pain as 0/10 on the pain is worse in the evening  The pain is intermittent and described as dull and aching  The pain is worse with standing, walking, and exercise  The pain is alleviated with sitting, relaxation, and lying down  I have personally reviewed and/or updated the patient's past medical history, past surgical history, family history, social history, current medications, allergies, and vital signs today  Other than as stated above, the patient denies any interval changes in medications, medical condition, mental condition, symptoms, or allergies since the last office visit  Review of Systems  Review of Systems   Respiratory: Negative for shortness of breath  Cardiovascular: Negative for chest pain  Gastrointestinal: Negative for constipation, diarrhea, nausea and vomiting  Musculoskeletal: Negative for arthralgias, gait problem, joint swelling and myalgias  Difficulty walking    Skin: Negative for rash  Neurological: Negative for dizziness, seizures and weakness  All other systems reviewed and are negative          Past Medical History:   Diagnosis Date    Asthma     Chronic obstructive lung disease ([place] [address] )     Community acquired pneumonia     LAST ASSESSED: [id number]    COPD (chronic obstructive pulmonary disease) ([place] [address] )     Diabetes mellitus ([place] [address] )     History of MRSA infection     2008 liver sx    Liver disease     Sleep apnea     Viral warts     LAST ASSESSED: [id number]       Past Surgical History:   Procedure Laterality Date    ABDOMINAL SURGERY      ABDOMINOPLASTY      CHOLECYSTECTOMY      COSMETIC SURGERY      EYE SURGERY      Bilateral cataracts 2015 Dr Chuck Shelotn   FINGER SURGERY      HAND SURGERY      HYSTERECTOMY      LIVER SURGERY      CA REPAIR INTERCARP/CARP-METACARP JT Right 9/9/2016    Procedure: THUMB TRAPEZIECTOMY; BASAL JOINT ARTHROPLASTY WITH APL AUTOGRAFT;  Surgeon: Aundrea Lucas MD;  [facility];  Service: Orthopedics    CA REPAIR INTERCARP/CARP-METACARP JT Left 5/29/2018    Procedure: Patricia Randall;  Surgeon: Marilu Perez MD;  [facility];  Service: Orthopedics    CA TRANSPLANT HAND TENDON Left 5/29/2018    Procedure: TRANSFER TENDON HAND/WRIST FCR from forearm to wrist;  Surgeon: Marilu Perez MD;  [facility];  Service: Orthopedics       Family History   Problem Relation Age of Onset    Heart attack Father     Heart disease Family     Diabetes Family     Thyroid disease Family     Ovarian cancer Family     Heart attack Mother     Ovarian cancer Sister     Heart disease Other         CARDIAC DISORDER    Diabetes Other     Liver cancer Other     Breast cancer Other     Stomach cancer Other        Social History     Occupational History    Not on file       Social History Main Topics    Smoking status: Current Every Day Smoker     Packs/day: 0 20    Smokeless tobacco: Never Used    Alcohol use No    Drug use: No    Sexual activity: Not on file         Current Outpatient Prescriptions:     albuterol (PROVENTIL HFA,VENTOLIN HFA) 90 mcg/act inhaler, Inhale 2 puffs every 6 (six) hours as needed for wheezing, Disp: , Rfl:     diphenhydrAMINE (BENADRYL) 25 mg capsule, Take 25 mg by mouth every 6 (six) hours as needed for itching, Disp: , Rfl:     glipiZIDE (GLUCOTROL XL) 5 mg 24 hr tablet, TAKE 1 TABLET EVERY DAY  30  MINUTES BEFORE BREAKFAST, Disp: 90 tablet, Rfl: 1    meloxicam (MOBIC) 7 5 mg tablet, TAKE 1 TABLET TWICE DAILY AS NEEDED FOR PAIN, Disp: 180 tablet, Rfl: 1    metFORMIN (GLUCOPHAGE) 1000 MG tablet, TAKE 1 TABLET TWICE DAILY, Disp: 180 tablet, Rfl: 1    No Known Allergies    Physical Exam:    There were no vitals taken for this visit  Constitutional:overweight   Eyes:anicteric  HEENT:grossly intact  Neck:supple, symmetric, trachea midline and no masses   Pulmonary:even and unlabored  Cardiovascular:No edema or pitting edema present  Skin:Normal without rashes or lesions and well hydrated  Psychiatric:Mood and affect appropriate  Neurologic:Cranial Nerves II-XII grossly intact  Musculoskeletal:normal gait  Bilateral lumbar paraspinals minimally tender to palpation from L3-L5  Bilateral SI joints nontender to palpation  Bilateral lower extremity strength 5/5 in all muscle groups  Negative seated straight leg raise bilaterally  Patient wearing wrist and thumb splint on the left  Imaging  No orders to display     MRI lumbar spine wo contrast   Status: Final result   PACS Images     Show images for MRI lumbar spine wo contrast   Order Report      Order Details   Study Result     MRI LUMBAR SPINE WITHOUT CONTRAST   INDICATION-  Chronic low back pain, bilateral leg pain   COMPARISON-  X-ray 7/30/2014   TECHNIQUE-  Sagittal T1, sagittal T2, sagittal inversion recovery,   axial T1 and axial T2, coronal T2      IMAGE QUALITY-  Diagnostic   FINDINGS-   ALIGNMENT-  Minimal retrolisthesis of L4  MARROW SIGNAL-  Normal marrow signal is identified within the   visualized bony structures  No discrete marrow lesion  Chronic   reactive marrow changes   DISTAL CORD AND CONUS-  Normal size and signal within the distal cord   and conus  The conus ends at the L1 level  PARASPINAL SOFT TISSUES-  Paraspinal soft tissues are unremarkable  SACRUM-  Normal signal within the sacrum  No evidence of insufficiency   or stress fracture  LOWER THORACIC DISC SPACES-  Minor bulge at the T10-11 and T11-T12   levels     LUMBAR DISC SPACES-        L1-L2-  Small chronic Schmorl's nodes   L2-L3- Minimal facet arthrosis   L3-L4-  Minor facet arthrosis   L4-L5-  Reduction disc height, chronic endplate changes,   circumferential bulging of the disc extending beyond the foramen   bilaterally, no definite L4 root compression  L5-S1-  Moderate bilateral facet arthrosis, right greater than left  Small marginal osteophytes and minor circumferential bulge  IMPRESSION-   1   Mild to moderate multilevel degenerative changes, most severe at   the L4-L5 level  No compressive cord or root disease  Transcribed on- BRITTNY Aviles MD        Reading Radiologist- BRITTNY Shah MD        Electronically BRITTNY Hardin MD        Released Date Time- 08/13/14 1322           No orders of the defined types were placed in this encounter

## 2018-07-13 ENCOUNTER — OFFICE VISIT (OUTPATIENT)
Dept: OCCUPATIONAL THERAPY | Facility: CLINIC | Age: 68
End: 2018-07-13
Payer: MEDICARE

## 2018-07-13 DIAGNOSIS — M18.12 ARTHRITIS OF CARPOMETACARPAL (CMC) JOINT OF LEFT THUMB: Primary | ICD-10-CM

## 2018-07-13 PROCEDURE — 97150 GROUP THERAPEUTIC PROCEDURES: CPT

## 2018-07-13 PROCEDURE — 97140 MANUAL THERAPY 1/> REGIONS: CPT

## 2018-07-13 PROCEDURE — 97110 THERAPEUTIC EXERCISES: CPT

## 2018-07-13 RX ORDER — BLOOD SUGAR DIAGNOSTIC
STRIP MISCELLANEOUS
Qty: 100 EACH | Refills: 1 | Status: SHIPPED | OUTPATIENT
Start: 2018-07-13 | End: 2018-11-30 | Stop reason: SDUPTHER

## 2018-07-13 NOTE — PROGRESS NOTES
Daily Note     Today's date: 2018  Patient name: Eric Pascual  : 1950  MRN: [id number]  Referring provider: Demetrice Fraire MD  Dx:   Encounter Diagnosis   Name Primary?  Arthritis of carpometacarpal [place]) joint of left thumb Yes                  Subjective: "I'm doing really well!"      Objective: See treatment diary below  Manual  7/3 7/5 7/13 6/22 6/29   IASTM 5 5' 10 10 5'   IP PROM             AAROM thumb  5' 3' 5' 5 5'   Wrist ROM   2'                             Exercise Diary  7/3 7/5 7/13 6/22 6/26   HEP: AROM, TG             [place] roll  30   25 25 30   [place] 1x x1 1x 2 2   Wrist maze      10 10   Coordination balls 3 min 2 min 3 min 3 3 min   Grasp + translation Beads x 45    Gems/bead Gems beads x 50   Ball rotate 4 planes 10x           Pinch [place] Y/R x1    Y/R 1x       EDC Sm rubber band x 20 x20  Rubber bands x1       Pegs   Yellow in, 1st out         Wrist strengthening   1# 3x10         Velcro pinch board   x1                                                                                                                               Modalities 6/29 7/3 7/5 7/13 6/26   MHP 15' 10' 10 10 10                                      Assessment: Tolerated treatment well        Plan: Continued skilled OT per POC     INTERVENTION COMMENTS:  Diagnosis: Arthritis of carpometacarpal (CMC) joint of left thumb [M18 12]  Precautions: Universal  FOTO:

## 2018-07-16 ENCOUNTER — OFFICE VISIT (OUTPATIENT)
Dept: OCCUPATIONAL THERAPY | Facility: CLINIC | Age: 68
End: 2018-07-16
Payer: MEDICARE

## 2018-07-16 DIAGNOSIS — M18.12 ARTHRITIS OF CARPOMETACARPAL (CMC) JOINT OF LEFT THUMB: Primary | ICD-10-CM

## 2018-07-16 PROCEDURE — 97110 THERAPEUTIC EXERCISES: CPT | Performed by: OCCUPATIONAL THERAPIST

## 2018-07-16 NOTE — PROGRESS NOTES
Daily Note     Today's date: 2018  Patient name: Marlen Pretty  : 1950  MRN: [id number]  Referring provider: Bonifacio Forrester MD  Dx:   Encounter Diagnosis   Name Primary?  Arthritis of carpometacarpal (CMC) joint of left thumb Yes                  Subjective: "I'm doing really well!"      Objective: See treatment diary below  Manual  7/3 7/5 7/13 7/16 6/29   IASTM 5 5' 10  5'   IP PROM             AAROM thumb  5' 3' 5'  5'   Wrist ROM   2'                             Exercise Diary  7/3 7/5 7/13 7/16 6/26   HEP: AROM, TG             [place] roll  30   25  30   [place] 1x x1 1x  2   Wrist maze       10   Coordination balls 3 min 2 min 3 min  3 min   Grasp + translation Beads x 45     Gems beads x 50   Ball rotate 4 planes 10x           Pinch [place] Y/R x1    Y/R 1x       EDC Sm rubber band x 20 x20  Rubber bands x1  Yellow ext web 2x10     Pegs   Yellow in, 1st out   Red, 3rd     Wrist strengthening   1# 3x10    2# 3X10     Velcro pinch board   x1         Putty jar/key turn       [place] 10/10                                                                                                             Modalities 6/29 7/3 7/5 7/13 7/16   MHP 15' 10' 10 10 10'                                    Assessment: Tolerated treatment well  MNG added to HEP due to c/o of thumb--> LF numbness  Progressing with strengthening  Plan: Continued skilled OT per POC

## 2018-07-19 ENCOUNTER — OFFICE VISIT (OUTPATIENT)
Dept: OCCUPATIONAL THERAPY | Facility: CLINIC | Age: 68
End: 2018-07-19
Payer: MEDICARE

## 2018-07-19 DIAGNOSIS — M18.12 ARTHRITIS OF CARPOMETACARPAL (CMC) JOINT OF LEFT THUMB: Primary | ICD-10-CM

## 2018-07-19 PROCEDURE — 97140 MANUAL THERAPY 1/> REGIONS: CPT | Performed by: OCCUPATIONAL THERAPIST

## 2018-07-19 PROCEDURE — 97110 THERAPEUTIC EXERCISES: CPT | Performed by: OCCUPATIONAL THERAPIST

## 2018-07-19 NOTE — PROGRESS NOTES
Daily Note     Today's date: 2018  Patient name: Gill Salcedo  : 1950  MRN: [id number]  Referring provider: Jackson Hollis MD  Dx:   Encounter Diagnosis   Name Primary?  Arthritis of carpometacarpal (CMC) joint of left thumb Yes                  Subjective: "They have been tingling a lot"      Objective: See treatment diary below  Manual  7/3 7/5 7/13 7/16 7/19   IASTM 5 5' 10  5'   IP PROM             AAROM thumb  5' 3' 5'     Wrist ROM   2'      5'    Thumb PROM          5'         Exercise Diary  7/3 7/5 7/13 7/16 7/19   HEP: AROM, TG             [place] roll  30   25     [place] 1x x1 1x  1x   Wrist maze          Coordination balls 3 min 2 min 3 min     Grasp + translation Beads x 45        Ball rotate 4 planes 10x           Pinch [place] Y/R x1    Y/R 1x       EDC Sm rubber band x 20 x20  Rubber bands x1  Yellow ext web 2x10 Yellow- unable to complete   Pegs   Yellow in, 1st out   Red, 3rd  Red, 3rd   Wrist strengthening   1# 3x10    2# 3X10  3# 3x10   Velcro pinch board   x1      1x   Putty jar/key turn       [place] 10/10  [place] 10/10                                                                                                           Modalities 7/19 7/3 7/5 7/13 7/16   MHP 15' 10' 10 10 10'                                    Assessment: Tolerated treatment well  Reports consistent numbness along MN innervated digits  Will continue to monitor  Had some thumb extensor pain during resistive PRE and was unable to complete today  Plan: Continued skilled OT per POC

## 2018-07-23 ENCOUNTER — OFFICE VISIT (OUTPATIENT)
Dept: OCCUPATIONAL THERAPY | Facility: CLINIC | Age: 68
End: 2018-07-23
Payer: MEDICARE

## 2018-07-23 DIAGNOSIS — M18.12 ARTHRITIS OF CARPOMETACARPAL (CMC) JOINT OF LEFT THUMB: Primary | ICD-10-CM

## 2018-07-23 PROCEDURE — 97010 HOT OR COLD PACKS THERAPY: CPT | Performed by: OCCUPATIONAL THERAPIST

## 2018-07-23 PROCEDURE — 97140 MANUAL THERAPY 1/> REGIONS: CPT | Performed by: OCCUPATIONAL THERAPIST

## 2018-07-23 PROCEDURE — 97110 THERAPEUTIC EXERCISES: CPT | Performed by: OCCUPATIONAL THERAPIST

## 2018-07-23 NOTE — PROGRESS NOTES
Daily Note     Today's date: 2018  Patient name: Nishi Nielsen  : 1950  MRN: [id number]  Referring provider: Myrlene Bumpers, MD  Dx:   Encounter Diagnosis   Name Primary?  Arthritis of carpometacarpal (CMC) joint of left thumb Yes                  Subjective: "It still keeps getting numb"      Objective: See treatment diary below  Manual  7/3 7/5 7/13 7/16 7/19   IASTM 5 5' 10  5'   IP PROM             AAROM thumb  5' 3' 5'     Wrist ROM   2'      5'    Thumb PROM          5'         Exercise Diary     HEP: AROM, TG             [place] roll     25     [place] 1x x1 1x  1x   Wrist maze          Coordination balls 3 min 2 min 3 min     Grasp + translation Beads x 45        Ball rotate 4 planes 10x           Pinch [place] Y/R x1    Y/R 1x       EDC Med rB  x 20 x20  Rubber bands x1  Yellow ext web 2x10 Yellow- unable to complete   Pegs Red, 3rd Yellow in, 1st out   Red, 3rd  Red, 3rd   Wrist strengthening  3# 3x10 1# 3x10    2# 3X10  3# 3x10   Velcro pinch board  x1 x1      1x   Putty jar/key turn  [place] 10/10     [place] 10/10  [place] 10/10                                                                                                           Modalities    MHP 15' 15 10 10 10'                                    Assessment: Tolerated treatment well  Says her N/T worse with sleep, provided OTC brace recommendation  Tolerated PREs without pain however + N/T 1x  Plan: Continued skilled OT per POC

## 2018-07-25 ENCOUNTER — OFFICE VISIT (OUTPATIENT)
Dept: OCCUPATIONAL THERAPY | Facility: CLINIC | Age: 68
End: 2018-07-25
Payer: MEDICARE

## 2018-07-25 DIAGNOSIS — M18.12 ARTHRITIS OF CARPOMETACARPAL (CMC) JOINT OF LEFT THUMB: Primary | ICD-10-CM

## 2018-07-25 PROCEDURE — 97110 THERAPEUTIC EXERCISES: CPT | Performed by: OCCUPATIONAL THERAPIST

## 2018-07-25 NOTE — PROGRESS NOTES
Daily Note     Today's date: 2018  Patient name: Beck Romo  : 1950  MRN: [id number]  Referring provider: Sharmaine Berman MD  Dx:   Encounter Diagnosis   Name Primary?  Arthritis of carpometacarpal (CMC) joint of left thumb Yes                  Subjective: reports getting new nighttime brace and it has significantly helped her nighttime numbness  Objective: See treatment diary below  Manual     IASTM 5' 5' 10  5'   IP PROM             AAROM thumb   3' 5'     Wrist ROM   2'      5'    Thumb PROM          5'         Exercise Diary     HEP: AROM, TG             [place] roll     25     [place] 1x  1x  1x   Wrist maze          Coordination balls 3 min  3 min     Grasp + translation Beads x 45        Ball rotate 4 planes 10x           Pinch [place] Y/R x1    Y/R 1x       EDC Med rB  x 20   Rubber bands x1  Yellow ext web 2x10 Yellow- unable to complete   Pegs Red, 3rd [place] 3rd    Red, 3rd  Red, 3rd   Wrist strengthening  3# 3x10 5# 3x10    2# 3X10  3# 3x10   Velcro pinch board  x1        1x   Putty jar/key turn  orange 10/10 [place] 10/10   [place] 10/10  [place] 10/10   Object find in orange putty   1x                                                                                                   Modalities    MHP 15' 15 10' 10 10'                                    Assessment: Tolerated treatment well  Assessed pinch and  strength today and measures were not significantly different from the last re-evaluation  Pinch strength is more limited than   She was issued orange theraputty for home and instructed to perform lateral and 3 point pinch exercises  Plan: Continued skilled OT per POC

## 2018-07-31 ENCOUNTER — OFFICE VISIT (OUTPATIENT)
Dept: OCCUPATIONAL THERAPY | Facility: CLINIC | Age: 68
End: 2018-07-31
Payer: MEDICARE

## 2018-07-31 DIAGNOSIS — M18.12 ARTHRITIS OF CARPOMETACARPAL (CMC) JOINT OF LEFT THUMB: Primary | ICD-10-CM

## 2018-07-31 PROCEDURE — 97112 NEUROMUSCULAR REEDUCATION: CPT | Performed by: OCCUPATIONAL THERAPIST

## 2018-07-31 PROCEDURE — 97110 THERAPEUTIC EXERCISES: CPT | Performed by: OCCUPATIONAL THERAPIST

## 2018-07-31 PROCEDURE — 97140 MANUAL THERAPY 1/> REGIONS: CPT | Performed by: OCCUPATIONAL THERAPIST

## 2018-07-31 NOTE — PROGRESS NOTES
Daily Note     Today's date: 2018  Patient name: Tiara Marvin  : 1950  MRN: [id number]  Referring provider: Francine Lynch MD  Dx:   Encounter Diagnosis   Name Primary?  Arthritis of carpometacarpal (CMC) joint of left thumb Yes                  Subjective: reports getting new nighttime brace and it has significantly helped her nighttime numbness  Objective: See treatment diary below  Manual     IASTM 5' 13 10  5'   IP PROM             AAROM thumb    5'     Wrist ROM         5'    Thumb PROM          5'         Exercise Diary     HEP: AROM, TG             [place] roll          [place] 1x  x1  1x   Wrist maze          Coordination balls 3 min       Grasp + translation Beads x 45        Ball rotate 4 planes 10x           Pinch [place] Y/R x1          EDC Med rB  x 20   Med RB x 5  Yellow ext web 2x10 Yellow- unable to complete   Pegs Red, 3rd Green 3rd   Green 3rd Red, 3rd  Red, 3rd   Wrist strengthening  3# 3x10 5# 3x10    2# 3X10  3# 3x10   Velcro pinch board  x1    x2    1x   Putty jar/key turn  orange 10/10 Orange 10/10   Orange 10/10  [place] 10/10   Object find in orange putty   1x          FPCizer   x25  x25        Pinch [place]   R/R x 2  R/R x2                                                                     Modalities    MHP 15' 15 10' 10 10'                                    Assessment: Tolerated treatment well  Doing well with upgrades, no pain reported today  Plan: Continued skilled OT per POC

## 2018-08-02 ENCOUNTER — OFFICE VISIT (OUTPATIENT)
Dept: OCCUPATIONAL THERAPY | Facility: CLINIC | Age: 68
End: 2018-08-02
Payer: MEDICARE

## 2018-08-02 DIAGNOSIS — M18.12 ARTHRITIS OF CARPOMETACARPAL (CMC) JOINT OF LEFT THUMB: Primary | ICD-10-CM

## 2018-08-02 PROCEDURE — 97110 THERAPEUTIC EXERCISES: CPT | Performed by: OCCUPATIONAL THERAPIST

## 2018-08-02 PROCEDURE — 97140 MANUAL THERAPY 1/> REGIONS: CPT | Performed by: OCCUPATIONAL THERAPIST

## 2018-08-02 NOTE — PROGRESS NOTES
Daily Note     Today's date: 2018  Patient name: Katherin Joyce  : 1950  MRN: [id number]  Referring provider: Baltazar Peralta MD  Dx:   Encounter Diagnosis   Name Primary?  Arthritis of carpometacarpal (CMC) joint of left thumb Yes                  Subjective:  "The tape really helps"      Objective: See treatment diary below  Manual     IASTM 5' 13 10  5'   IP PROM             AAROM thumb         Wrist ROM         5'    Thumb PROM          5'         Exercise Diary     HEP: AROM, TG             [place] roll          [place] 1x  x1  1x   Wrist maze          Coordination balls 3 min       Grasp + translation Beads x 45        Ball rotate 4 planes 10x           Pinch [place] Y/R x1          EDC Med rB  x 20   Med RB x 5  Yellow ext web- unable to complete- sub with rubber bands Yellow- unable to complete   Pegs Red, 3rd Green 3rd   Green 3rd Green , 3rd  Red, 3rd   Wrist strengthening  3# 3x10 5# 3x10    5# 3X10  3# 3x10   Velcro pinch board  x1    x2  x1  1x   Putty jar/key turn  orange 10/10 Orange 10/10   [place] 10/10  [place] 10/10   Object find in orange putty   1x          FPCizer   x25  x25        Pinch [place]   R/R x 2  R/R x2        Power web thumb flexion       Blue 3x10                                                     Modalities    MHP 15' 15 10' 10 10'                                    Assessment: Tolerated treatment well  Minimal scar tissue adherence  Good motion  Pain with extension web  Plan: Continued skilled OT per POC

## 2018-08-05 DIAGNOSIS — M25.551 CHRONIC RIGHT HIP PAIN: ICD-10-CM

## 2018-08-05 DIAGNOSIS — G89.29 CHRONIC RIGHT HIP PAIN: ICD-10-CM

## 2018-08-05 DIAGNOSIS — E11.9 TYPE 2 DIABETES MELLITUS WITHOUT COMPLICATION, WITHOUT LONG-TERM CURRENT USE OF INSULIN (HCC): ICD-10-CM

## 2018-08-06 ENCOUNTER — OFFICE VISIT (OUTPATIENT)
Dept: OCCUPATIONAL THERAPY | Facility: CLINIC | Age: 68
End: 2018-08-06
Payer: MEDICARE

## 2018-08-06 DIAGNOSIS — M18.12 ARTHRITIS OF CARPOMETACARPAL (CMC) JOINT OF LEFT THUMB: Primary | ICD-10-CM

## 2018-08-06 PROCEDURE — 97112 NEUROMUSCULAR REEDUCATION: CPT | Performed by: OCCUPATIONAL THERAPIST

## 2018-08-06 PROCEDURE — 97110 THERAPEUTIC EXERCISES: CPT | Performed by: OCCUPATIONAL THERAPIST

## 2018-08-06 PROCEDURE — 97140 MANUAL THERAPY 1/> REGIONS: CPT | Performed by: OCCUPATIONAL THERAPIST

## 2018-08-06 RX ORDER — MELOXICAM 7.5 MG/1
TABLET ORAL
Qty: 180 TABLET | Refills: 1 | Status: SHIPPED | OUTPATIENT
Start: 2018-08-06 | End: 2019-04-09 | Stop reason: SDUPTHER

## 2018-08-06 RX ORDER — GLIPIZIDE 5 MG/1
TABLET, FILM COATED, EXTENDED RELEASE ORAL
Qty: 90 TABLET | Refills: 1 | Status: SHIPPED | OUTPATIENT
Start: 2018-08-06 | End: 2019-01-29 | Stop reason: SDUPTHER

## 2018-08-06 NOTE — PROGRESS NOTES
Daily Note     Today's date: 2018  Patient name: Jillian Molina  : 1950  MRN: [id number]  Referring provider: Dez Redd MD  Dx:   No diagnosis found  Subjective:  "It feels stronger"      Objective: See treatment diary below  Manual     IASTM 5' 13 10 10 5'   IP PROM             AAROM thumb         Wrist ROM         5'    Thumb PROM          5'         Exercise Diary     HEP: AROM, TG             [place] roll          [place] 1x  x1     Wrist maze          Coordination balls 3 min       Grasp + translation Beads x 45        Ball rotate 4 planes 10x           Pinch [place] Y/R x1          EDC Med rB  x 20   Med RB x 5  Yellow ext web- unable to complete- sub with rubber bands LRG RB   Pegs Red, 3rd Green 3rd   Green 3rd Green , 3rd  Green, 4th   Wrist strengthening  3# 3x10 5# 3x10    5# 3X10  5# 3x10   Velcro pinch board  x1    x2  x1  1x   Putty jar/key turn  orange 10/10 Orange 10/10   [place] 10/10  [place] 10/10   Object find in orange putty   1x          FPCizer   x25  x25        Pinch [place]   R/R x 2  R/R x2        Power web thumb flexion       Blue 3x10  Blue 3x10    Pinch [place]         R/G x 2                                     Modalities    MHP 15' 15 10' 10 15                                    Assessment: Tolerated treatment well  Slowly building extension endurance; no pain today and upgrades going well  Scar bed greatly decreased  Plan: Continued skilled OT per POC

## 2018-08-09 ENCOUNTER — OFFICE VISIT (OUTPATIENT)
Dept: OCCUPATIONAL THERAPY | Facility: CLINIC | Age: 68
End: 2018-08-09
Payer: MEDICARE

## 2018-08-09 DIAGNOSIS — M18.12 ARTHRITIS OF CARPOMETACARPAL (CMC) JOINT OF LEFT THUMB: Primary | ICD-10-CM

## 2018-08-09 PROCEDURE — 97010 HOT OR COLD PACKS THERAPY: CPT | Performed by: OCCUPATIONAL THERAPIST

## 2018-08-09 PROCEDURE — 97110 THERAPEUTIC EXERCISES: CPT | Performed by: OCCUPATIONAL THERAPIST

## 2018-08-09 PROCEDURE — 97112 NEUROMUSCULAR REEDUCATION: CPT | Performed by: OCCUPATIONAL THERAPIST

## 2018-08-09 NOTE — PROGRESS NOTES
Daily Note     Today's date: 2018  Patient name: Jo Herrera  : 1950  MRN: [id number]  Referring provider: Ellyn Teresa MD  Dx:   Encounter Diagnosis   Name Primary?  Arthritis of carpometacarpal (CMC) joint of left thumb Yes                  Subjective:  "It feels stronger"      Objective: See treatment diary below  Manual     IASTM 5' 13 10 10 5'   IP PROM             AAROM thumb         Wrist ROM         5'    Thumb PROM          5'         Exercise Diary     HEP: AROM, TG             [place] roll          [place]   x1     Wrist maze          Coordination balls        Grasp + translation         Ball rotate 4 planes            Pinch [place]           EDC LRG RB   Med RB x 5  Yellow ext web- unable to complete- sub with rubber bands LRG RB   Pegs R,  4th Green 3rd   Green 3rd Green , 3rd  Green, 4th   Wrist strengthening 5# 3x10 5# 3x10    5# 3X10  5# 3x10   Velcro pinch board 1x    x2  x1  1x   Putty jar/key turn [place] 15/15 Orange 10/10   [place] 10/10  [place] 10/10   Object find in orange putty   1x          FPCizer   x25  x25        Pinch [place]   R/R x 2  R/R x2        Power web thumb flexion       Blue 3x10  Blue 3x10    Pinch [place]  R/G x2       R/G x 2                                     Modalities    MHP 15' 15 10' 10 15                                    Assessment: Tolerated treatment well  Good tolerance, no pain with activitis when pinch downgraded  Plan: Continued skilled OT per POC

## 2018-08-13 ENCOUNTER — EVALUATION (OUTPATIENT)
Dept: OCCUPATIONAL THERAPY | Facility: CLINIC | Age: 68
End: 2018-08-13
Payer: MEDICARE

## 2018-08-13 DIAGNOSIS — M18.12 ARTHRITIS OF CARPOMETACARPAL (CMC) JOINT OF LEFT THUMB: Primary | ICD-10-CM

## 2018-08-13 PROCEDURE — G8991 OTHER PT/OT GOAL STATUS: HCPCS | Performed by: OCCUPATIONAL THERAPIST

## 2018-08-13 PROCEDURE — G8990 OTHER PT/OT CURRENT STATUS: HCPCS | Performed by: OCCUPATIONAL THERAPIST

## 2018-08-13 PROCEDURE — 97110 THERAPEUTIC EXERCISES: CPT | Performed by: OCCUPATIONAL THERAPIST

## 2018-08-13 PROCEDURE — 97140 MANUAL THERAPY 1/> REGIONS: CPT | Performed by: OCCUPATIONAL THERAPIST

## 2018-08-13 NOTE — PROGRESS NOTES
Daily Note     Today's date: 2018  Patient name: Danica Yeung  : 1950  MRN: [id number]  Referring provider: Jeronimo Arana MD  Dx:   Encounter Diagnosis   Name Primary?  Arthritis of carpometacarpal (CMC) joint of left thumb Yes                  Subjective:  "It's great!"      Objective: See treatment diary below  Manual     IASTM 5' 13 10 10 5'   IP PROM             AAROM thumb         Wrist ROM         5'    Thumb PROM          5'         Exercise Diary     HEP: AROM, TG             [place] roll          [place]        Wrist maze          Coordination balls        Grasp + translation         Ball rotate 4 planes            Pinch [place]           EDC LRG RB LRG RB x 20   Yellow ext web- unable to complete- sub with rubber bands LRG RB   Pegs R,  4th   Green , 3rd  Green, 4th   Wrist strengthening 5# 3x10 5#3x10    5# 3X10  5# 3x10   Velcro pinch board 1x     x1  1x   Putty jar/key turn [place] 15/15    [place] 10/10  [place] 10/10   Object find in orange putty             FPCizer            Pinch [place]   R/G x2         Power web thumb flexion    B x 35   Blue 3x10  Blue 3x10    Pinch [place]  R/G x2      R/G x 2    +Pinch    R/G/4th                            Modalities    MHP 15' 15 10' 10 15                                    Assessment: See RE  Plan: Continued skilled OT per POC

## 2018-08-13 NOTE — PROGRESS NOTES
OT Re-Evaluation     Today's date: 2018  Patient name: Marlen Pretty  : 1950  MRN: [id number]  Referring provider: Bonifacio Forrester MD  Dx:   Encounter Diagnosis     ICD-10-CM    1  Arthritis of carpometacarpal [place]) joint of left thumb M18 12                   Assessment  Impairments: abnormal or restricted ROM, impaired physical strength and pain with function  Patient presents with symptom irritability no  Assessment details: Nick Rueda is s/p L Weilby procedure on 18: Nick Rueda continues to show great improvement with her strength, pain and reported ADL/IADL function  At this point recommend HEP only  Understanding of Dx/Px/POC: excellent  Goals  STG: Patient will be compliant with post operative precautions (if applicable) and home exercise program in 2 weeks  MET  STG:  Pain will be reduced by two subjective levels in 2 weeks  MET  STG: Inflammation/edema/joint effusion will be reduced by 25% and patient will be independent with self management techniques in 4 weeks  MET  STG: Range of motion of thumb will be improved by 25% in 4 weeks  MET  STG: Strength will be improved by 25% in 4 weeks  PARTIALLY MET    LTG: Pain will be reduced by 4 subjective levels in 6-8 weeks  MET  LTG: Performance in ADLs and IADLS will be improved to prior level of function with the affected extremity within 8 weeks  MET  LTG: FOTO score increase by 20 points within 8 weeks     MET      Plan  Patient would benefit from: skilled OT and OT eval  Planned modality interventions: thermotherapy: hydrocollator packs and thermotherapy: paraffin bath  Planned therapy interventions: functional ROM exercises, home exercise program, joint mobilization, manual therapy, therapeutic exercise and patient education  Frequency: 2x week  Duration in weeks: 6  Treatment plan discussed with: patient  Plan details: Treatment to include modalities, manual therapy, PRE's, HEP, and orthotics as appropriate and following protocol  Subjective Evaluation    History of Present Illness  Date of surgery: 2018  Mechanism of injury: surgery  Mechanism of injury: Glenny Delacruz is s/p L Weilby procedure on 18  She was previously seen in 2016 for the procedure on the R hand and had a very good outcome after surgery  She has been wearing a comfort-cool brace on the L and has been started with AROM exercises last week  Re-eval this date  She is 6 weeks post surgery and feels that she is progressing accordingly  She continues  Wear her comfort cool splint  MD f/u on 18      Not a recurrent problem   Quality of life: good    Pain  Current pain ratin  At worst pain ratin  Location: base of CMC and wrist  Quality: dull ache  Relieving factors: rest and heat  Progression: improved    Social Support  Lives with: spouse    Hand dominance: right    Patient Goals  Patient goals for therapy: decreased pain, increased strength, independence with ADLs/IADLs, increased motion and decreased edema          Objective     Neurological Testing     Additional Neurological Details       Active Range of Motion     Left Wrist   Wrist flexion: 70 degrees   Wrist extension: 70 degrees     Left Thumb   Flexion     MP: 50 degrees    DIP: 60 degrees  Extension     MP: 15 degrees    DIP: 0 degrees  Palmar Abduction     CMC: 60 degrees  Radial abduction    CMC: 63 degrees    Strength/Myotome Testing     Left Wrist/Hand      (2nd hand position)     Trial 1: 30    Thumb Strength  Key/Lateral Pinch     Trail 1: 6  Tip/Two-Point Pinch     Trial 1: 6    Right Wrist/Hand   Normal wrist strength     (2nd hand position)     Trial 1: 31    Thumb Strength   Key/Lateral Pinch     Trial 1: 3  Tip/Two-Point Pinch     Trial 1: 1         Manual  7/3 7/5 7/10 6/22 6/29   IASTM 5 5' 5' 10 5'   IP PROM        AAROM thumb  5' 3'  5 5'   Wrist ROM  2'                  Exercise Diary  7/3 7/5 7/10 6/22 6/26   HEP: AROM, TG        [place] roll  30    30 [place] 1x x1  2 2   Wrist maze    10 10   Coordination balls 3 min 2 min  3 3 min   Grasp + translation Beads x 45   Gems/bead [place] beads x 50   Ball rotate 4 planes 10x       Pinch [place] Y/R x1       EDC Sm rubber band x 20 x20      Pegs  Yellow in, 1st out      Wrist strengthening  1# 3x10      Velcro pinch board        RPW  20 times      Yellow tp   Finger dig  Roll & pinch tip to tip and three jaw perfecto        Peg board  [place] y & r   3rd                                                  Modalities 6/29 7/3 7/5 6/22 6/26   [facility] 15' 8' 10 10 10

## 2018-08-15 ENCOUNTER — OFFICE VISIT (OUTPATIENT)
Dept: OBGYN CLINIC | Facility: HOSPITAL | Age: 68
End: 2018-08-15

## 2018-08-15 ENCOUNTER — HOSPITAL ENCOUNTER (OUTPATIENT)
Dept: RADIOLOGY | Facility: HOSPITAL | Age: 68
Discharge: HOME | End: 2018-08-15
Attending: ORTHOPAEDIC SURGERY
Payer: MEDICARE

## 2018-08-15 VITALS
SYSTOLIC BLOOD PRESSURE: 166 MMHG | HEART RATE: 91 BPM | HEIGHT: 60 IN | DIASTOLIC BLOOD PRESSURE: 73 MMHG | WEIGHT: 182 LBS | BODY MASS INDEX: 35.73 KG/M2

## 2018-08-15 DIAGNOSIS — Z48.89 AFTERCARE FOLLOWING SURGERY: ICD-10-CM

## 2018-08-15 DIAGNOSIS — M18.12 PRIMARY OSTEOARTHRITIS OF FIRST CARPOMETACARPAL JOINT OF LEFT HAND: Primary | ICD-10-CM

## 2018-08-15 PROCEDURE — 99024 POSTOP FOLLOW-UP VISIT: CPT | Performed by: ORTHOPAEDIC SURGERY

## 2018-08-15 PROCEDURE — 73130 X-RAY EXAM OF HAND: CPT

## 2018-08-15 NOTE — PROGRESS NOTES
Assessment:   S/p Left Weilby on 5/29/18    Plan:   Resume activities as tolerated  WBAT  No formal restrictions at this point in time  If the patients numbness and tingling does not improve she was advised to call the office to see us  Follow Up:  PRN    To Do Next Visit:         CHIEF COMPLAINT:  Chief Complaint   Patient presents with    Left Hand - Post-op         SUBJECTIVE:  Tiara Marvin is a 77y o  year old female who presents for follow up after S/p Left Weilby on 5/29/18   Today patient has No Complaints  Pt states that she is back to fully activities at this point in time and is so happy with her results  Pt does experience occasional numbness and tingling into her left hand thumb, index and long finger however she has since started wearing a night splint which she states help  PHYSICAL EXAMINATION:  General: well developed and well nourished, alert, oriented times 3 and appears comfortable  Psychiatric: Normal    MUSCULOSKELETAL EXAMINATION:  Incision: healed  Range of Motion: full thumb motion, full strength no hyperextension, no tenderness cmc  Neurovascular status: Neuro intact, good cap refill  Activity Restrictions: No restrictions         STUDIES REVIEWED:  Images were reviewd in PACS: s/p L thumb Weibly procedure to left thumb with proper alignment        PROCEDURES PERFORMED:  Procedures  No Procedures performed today   Scribe Attestation    I,:   Reggie Velasco am acting as a scribe while in the presence of the attending physician :        I,:   Amanuel Echavarria MD personally performed the services described in this documentation    as scribed in my presence :

## 2018-09-11 DIAGNOSIS — G47.33 OSA (OBSTRUCTIVE SLEEP APNEA): Primary | ICD-10-CM

## 2018-10-04 ENCOUNTER — OFFICE VISIT (OUTPATIENT)
Dept: PAIN MEDICINE | Facility: CLINIC | Age: 68
End: 2018-10-04
Payer: MEDICARE

## 2018-10-04 VITALS
DIASTOLIC BLOOD PRESSURE: 74 MMHG | TEMPERATURE: 99.3 F | SYSTOLIC BLOOD PRESSURE: 124 MMHG | HEIGHT: 60 IN | BODY MASS INDEX: 36.32 KG/M2 | WEIGHT: 185 LBS

## 2018-10-04 DIAGNOSIS — M48.062 SPINAL STENOSIS OF LUMBAR REGION WITH NEUROGENIC CLAUDICATION: ICD-10-CM

## 2018-10-04 DIAGNOSIS — G89.4 CHRONIC PAIN SYNDROME: ICD-10-CM

## 2018-10-04 DIAGNOSIS — M47.816 LUMBAR SPONDYLOSIS: Primary | ICD-10-CM

## 2018-10-04 DIAGNOSIS — M54.16 LUMBAR RADICULOPATHY: ICD-10-CM

## 2018-10-04 DIAGNOSIS — F11.20 UNCOMPLICATED OPIOID DEPENDENCE (HCC): ICD-10-CM

## 2018-10-04 DIAGNOSIS — M51.36 DDD (DEGENERATIVE DISC DISEASE), LUMBAR: ICD-10-CM

## 2018-10-04 DIAGNOSIS — M47.816 FACET ARTHROPATHY, LUMBAR: ICD-10-CM

## 2018-10-04 PROCEDURE — 80305 DRUG TEST PRSMV DIR OPT OBS: CPT | Performed by: NURSE PRACTITIONER

## 2018-10-04 PROCEDURE — 99214 OFFICE O/P EST MOD 30 MIN: CPT | Performed by: NURSE PRACTITIONER

## 2018-10-04 RX ORDER — TRAMADOL HYDROCHLORIDE 50 MG/1
50 TABLET ORAL EVERY 8 HOURS PRN
Qty: 90 TABLET | Refills: 0 | Status: SHIPPED | OUTPATIENT
Start: 2018-10-04 | End: 2019-01-10 | Stop reason: SDUPTHER

## 2018-10-04 RX ORDER — TRAMADOL HYDROCHLORIDE 50 MG/1
50 TABLET ORAL EVERY 6 HOURS PRN
COMMUNITY
End: 2018-10-04 | Stop reason: SDUPTHER

## 2018-10-04 NOTE — PROGRESS NOTES
Assessment:  1  Lumbar spondylosis    2  Lumbar radiculopathy    3  DDD (degenerative disc disease), lumbar    4  Facet arthropathy, lumbar    5  Spinal stenosis of lumbar region with neurogenic claudication        Plan:  1  The patient's bilateral axial lumbosacral back pain is most likely secondary to her spondylosis and facet arthropathy as evidenced on exam and MRI of the lumbar spine  I did discuss bilateral L3-5 medial branch blocks with the intention of moving forward toward a radiofrequency ablation if she has an appropriate diagnostic response, however she would like to read literature on this procedure first and will call the office if she wishes to schedule the procedure  Complete risks and benefits including bleeding, infection, tissue reaction, nerve injury and allergic reaction were discussed  The approach was demonstrated using models and literature was provided  2   The patient is currently not experiencing any radicular symptoms in her right lower extremity, however if this pain were to return, we can repeat right L4 and L5 TFESI PRN  3   The patient can continue on tramadol 50 mg 1 tablet every 8 hours as needed for pain #90 tablets  Please note that the patient takes this medication sparingly, therefore 90 tablets lasts the patients 3 months, therefore I will only provide with with 90 tablets without any refills at this time  The patient did not have their opioid medications available for confirmation or counting while in the office today  I reviewed with the patient that as per the opioid contract, they are to bring in the last filled prescription for all of their opioid medications, with what they have left to every office visit  I advised the patient that if they would continue to not bring in their prescriptions as discussed, we may not be able to continue prescribing opioid medications in the future  The patient was agreeable and verbalized an understanding      [place] Prescription Drug Monitoring Program report was reviewed and was appropriate     There are risks associated with opioid medications, including dependence, addiction and tolerance  The patient understands and agrees to use these medications only as prescribed  Potential side effects of the medications include, but are not limited to, constipation, drowsiness, addiction, impaired judgment and risk of fatal overdose if not taken as prescribed  The patient was warned against driving while taking sedation medications  Sharing medications is a felony  At this point in time, the patient is showing no signs of addiction, abuse, diversion or suicidal ideation  An oral drug screen swab was collected at today's office visit  The swab will be sent for confirmatory testing  The drug screen is medically necessary because the patient is either dependent on opioid medication or is being considered for opioid medication therapy and the results could impact ongoing or future treatment  The drug screen is to evaluate for the presences or absence of prescribed, non-prescribed, and/or illicit drugs/substances  While the patient was in the office today an opioid contract was thoroughly reviewed and signed by the patient  The patient was given adequate time to ask questions in regards to the contract and a signed copy was sent home for their records  4   The patient can continue on meloxicam 7 5 mg BID PRN  5  The patient will continue to follow up with Orthopedics as needed   6  Patient will continue with her home exercise program  7  The patient will follow-up in 12 weeks for medication prescription refill and reevaluation  The patient was advised to contact the office should their symptoms worsen in the interim  The patient was agreeable and verbalized an understanding        I attest that I have spent at least 25 minutes face to face with the patient and that at least 50% of the time was spent educating and/or discussing the patient's symptoms and treatment plan options  History of Present Illness: The patient is a 77 y o  female last seen on 7/12/18 who presents for a follow up office visit in regards to chronic pain secondary to lumbar stenosis and spondylosis with a history of radicular symptoms in the right lower extremity in an L4-5 dermatomal distribution  The patient had a previous right L4 and L5 TFESI with Dr Dennis Collier on March 3, 2018 and continues to experience ongoing resolution of her right lower extremity symptoms at this time  The patient reports that she is also status post surgery on the left thumb with Dr Bandar Bangura and continues to heal and rehab well from this procedure  She is now following with Dr Bandar Bangura PRN  At today's office visit, the patient complains of axial lumbosacral back pain that does not radiate into the bilateral lower extremities  She denies any bowel or bladder incontinence or saddle anesthesia  She currently rates her pain a 5/10 on the numeric pain rating scale  She states her pain is intermittent in nature most bothersome in the evening  She characterizes the pain as dull aching  The pain is made worse when she is walking or standing for long periods of time, especially when she walks up the stairs  The pain is alleviated with relaxation and lying down  Current pain medications includes:   Tramadol 50 mg 1 tablet every 8 hours as needed for pain and meloxicam 7 5 mg b i d  p r n     The patient reports that this regimen is providing 60% pain relief  The patient is reporting no side effects from this pain medication regimen  Pain Contract Signed: 10/4/18  Last Urine Drug Screen: 10/4/18    I have personally reviewed and/or updated the patient's past medical history, past surgical history, family history, social history, current medications, allergies, and vital signs today  Review of Systems:    Review of Systems   Respiratory: Positive for shortness of breath  Cardiovascular: Negative for chest pain  Gastrointestinal: Negative for constipation, diarrhea, nausea and vomiting  Musculoskeletal: Positive for gait problem (Difficulty walking, Decreased range of motion)  Negative for arthralgias, joint swelling and myalgias  Skin: Negative for rash  Neurological: Negative for dizziness, seizures and weakness  All other systems reviewed and are negative  Past Medical History:   Diagnosis Date    Asthma     Chronic obstructive lung disease ([place] [address] )     Community acquired pneumonia     LAST ASSESSED: [id number]    COPD (chronic obstructive pulmonary disease) ([place] [address] )     Diabetes mellitus ([place] [address] )     History of MRSA infection     2008 liver sx    Liver disease     Sleep apnea     Viral warts     LAST ASSESSED: [id number]       Past Surgical History:   Procedure Laterality Date    ABDOMINAL SURGERY      ABDOMINOPLASTY      CHOLECYSTECTOMY      COSMETIC SURGERY      EYE SURGERY      Bilateral cataracts 2015 Dr Tyesha Mercado       FINGER SURGERY      HAND SURGERY      HYSTERECTOMY      LIVER SURGERY      PA REPAIR INTERCARP/CARP-METACARP JT Right 9/9/2016    Procedure: THUMB TRAPEZIECTOMY; BASAL JOINT ARTHROPLASTY WITH APL AUTOGRAFT;  Surgeon: Inez Jasso MD;  [facility];  Service: Orthopedics    PA REPAIR INTERCARP/CARP-METACARP JT Left 5/29/2018    Procedure: Julita Riley;  Surgeon: Daysi Harden MD;  [facility];  Service: Orthopedics    PA TRANSPLANT HAND TENDON Left 5/29/2018    Procedure: TRANSFER TENDON HAND/WRIST FCR from forearm to wrist;  Surgeon: Daysi Harden MD;  [facility];  Service: Orthopedics       Family History   Problem Relation Age of Onset    Heart attack Father     Heart disease Family     Diabetes Family     Thyroid disease Family     Ovarian cancer Family     Heart attack Mother     Ovarian cancer Sister     Heart disease Other         CARDIAC DISORDER    Diabetes Other     Liver cancer Other     Breast cancer Other     Stomach cancer Other        Social History     Occupational History    Not on file  Social History Main Topics    Smoking status: Current Every Day Smoker     Packs/day: 0 20    Smokeless tobacco: Never Used    Alcohol use No    Drug use: No    Sexual activity: Not on file         Current Outpatient Prescriptions:     ACCU-CHEK JESS PLUS test strip, USE 1 STRIP TO CHECK GLUCOSE 4 TIMES DAILY, Disp: 100 each, Rfl: 1    albuterol (PROVENTIL HFA,VENTOLIN HFA) 90 mcg/act inhaler, Inhale 2 puffs every 6 (six) hours as needed for wheezing, Disp: , Rfl:     diphenhydrAMINE (BENADRYL) 25 mg capsule, Take 25 mg by mouth every 6 (six) hours as needed for itching, Disp: , Rfl:     glipiZIDE (GLUCOTROL XL) 5 mg 24 hr tablet, TAKE 1 TABLET EVERY DAY  30  MINUTES BEFORE BREAKFAST, Disp: 90 tablet, Rfl: 1    meloxicam (MOBIC) 7 5 mg tablet, TAKE 1 TABLET TWICE DAILY AS NEEDED FOR PAIN, Disp: 180 tablet, Rfl: 1    metFORMIN (GLUCOPHAGE) 1000 MG tablet, TAKE 1 TABLET TWICE DAILY, Disp: 180 tablet, Rfl: 1    Multiple Vitamins-Minerals (OCUVITE PO), Take by mouth, Disp: , Rfl:     traMADol (ULTRAM) 50 mg tablet, Take 50 mg by mouth every 6 (six) hours as needed for moderate pain, Disp: , Rfl:     No Known Allergies    Physical Exam:    /74   Temp 99 3 °F (37 4 °C)   Ht 5' (1 524 m)   Wt 83 9 kg (185 lb)   BMI 36 13 kg/m²     Constitutional:Endomorphic body habitus  Eyes:anicteric  HEENT:grossly intact  Neck:supple, symmetric, trachea midline and no masses   Pulmonary:even and unlabored  Cardiovascular:No edema or pitting edema present  Skin:Normal without rashes or lesions and well hydrated  Psychiatric:Mood and affect appropriate  Neurologic:Cranial Nerves II-XII grossly intact  Musculoskeletal:Slightly antalgic but steady without the use of assistive devices  Bilateral lumbar paraspinal musculature mildly tender to palpation    Lumbar facet loading with bilateral rotation does reproduce the patient's low back pain complaints  Bilateral lower extremity strength 5/5 in all muscle groups  Negative straight leg raise bilaterally      Imaging  CT LUMBAR SPINE     INDICATION: back pain  History taken directly from the electronic ordering system      COMPARISON: Lumbar spine MRI 8/13/2014     TECHNIQUE:  Contiguous axial images through the lumbar spine were obtained  Sagittal and coronal reconstructions were performed        Radiation dose length product (DLP) for this visit:  3025 mGy-cm   This examination, like all CT scans performed in the [facility], was performed utilizing techniques to minimize radiation dose exposure, including the use of iterative   reconstruction and automated exposure control        IMAGE QUALITY:  Diagnostic      FINDINGS:     ALIGNMENT:  Normal alignment of the lumbar spine  No spondylolisthesis or spondylolysis      VERTEBRAL BODIES:  No fracture  No lytic or blastic lesion      DEGENERATIVE CHANGES:     Lower Thoracic spine:  Minimal T12-L1 disc bulge and facet degenerative disease  No spinal canal or neuroforaminal narrowing   ]     L1-2:  New minimal diffuse disc bulge slightly eccentric to the left posterolateral zone  Mild facet degenerative disease  No spinal canal or neuroforaminal narrowing      L2-3:  New minimal diffuse disc bulge  Mild facet degenerative disease  Minimal posterior epidural fat  No spinal canal or neuroforaminal narrowing      L3-4:  New mild to moderate diffuse  Mild to moderate facet degenerative disease  Minimal posterior epidural fat  New mild spinal canal stenosis  Mild bilateral neuroforaminal narrowing  There is encroachment if not contact of the right exiting L3   nerve root just lateral to the neural foramen      L4-5:  Severe disc height loss  Chronic mild diffuse disc bulge  Mild to moderate facet degenerative disease  Minimal posterior epidural fat    No spinal canal stenosis  Moderate bilateral neuroforaminal narrowing with contact of the exiting left L4   nerve root just lateral to the neural foramen      L5-S1:  Mild disc height loss  Mild chronic posterior disc bulge  Moderate to severe facet degenerative disease right greater than left  No spinal canal stenosis  Moderate to severe bilateral neuroforaminal narrowing left greater than right  There   is encroachment on bilateral exiting L5 nerve roots      PARASPINAL SOFT TISSUES:  Aortic calcification without abdominal aortic aneurysm  Surgical clips along the expected location of the left adrenal gland      IMPRESSION:     Multilevel degenerative disc and facet disease with progression of disease since August 13, 2014      L3-4: Potential for exiting right L3 radiculopathy  New mild spinal canal stenosis      L4-5: Potential for exiting left L4 radiculopathy      L5-S1: Potential for bilateral exiting L5 radiculopathy left greater than right        No orders of the defined types were placed in this encounter

## 2018-10-10 ENCOUNTER — TELEPHONE (OUTPATIENT)
Dept: PAIN MEDICINE | Facility: CLINIC | Age: 68
End: 2018-10-10

## 2018-10-10 NOTE — TELEPHONE ENCOUNTER
Patient's UDS came back negative for Tramadol  Can the patient explain this? Had she not taken the tramadol recently?

## 2018-10-10 NOTE — TELEPHONE ENCOUNTER
S/w the patient and she stated she only takes the tramadol when she needs it  The last time she took it was 2 nights before her office visit  Clarified that she takes it PRN  Please advise   Thanks

## 2018-11-30 DIAGNOSIS — E11.9 TYPE 2 DIABETES MELLITUS WITHOUT COMPLICATION, WITHOUT LONG-TERM CURRENT USE OF INSULIN (HCC): ICD-10-CM

## 2019-01-10 ENCOUNTER — CLINICAL SUPPORT (OUTPATIENT)
Dept: PAIN MEDICINE | Facility: CLINIC | Age: 69
End: 2019-01-10
Payer: MEDICARE

## 2019-01-10 VITALS
DIASTOLIC BLOOD PRESSURE: 78 MMHG | WEIGHT: 185 LBS | HEIGHT: 60 IN | BODY MASS INDEX: 36.32 KG/M2 | SYSTOLIC BLOOD PRESSURE: 158 MMHG | HEART RATE: 88 BPM

## 2019-01-10 DIAGNOSIS — M47.816 FACET ARTHROPATHY, LUMBAR: ICD-10-CM

## 2019-01-10 DIAGNOSIS — M51.36 DDD (DEGENERATIVE DISC DISEASE), LUMBAR: ICD-10-CM

## 2019-01-10 DIAGNOSIS — M48.062 SPINAL STENOSIS OF LUMBAR REGION WITH NEUROGENIC CLAUDICATION: ICD-10-CM

## 2019-01-10 DIAGNOSIS — G89.4 CHRONIC PAIN SYNDROME: ICD-10-CM

## 2019-01-10 DIAGNOSIS — M54.16 LUMBAR RADICULOPATHY: ICD-10-CM

## 2019-01-10 DIAGNOSIS — M47.816 LUMBAR SPONDYLOSIS: Primary | ICD-10-CM

## 2019-01-10 PROCEDURE — 99214 OFFICE O/P EST MOD 30 MIN: CPT | Performed by: ANESTHESIOLOGY

## 2019-01-10 RX ORDER — TRAMADOL HYDROCHLORIDE 50 MG/1
50 TABLET ORAL EVERY 8 HOURS PRN
Qty: 90 TABLET | Refills: 0 | Status: SHIPPED | OUTPATIENT
Start: 2019-01-10 | End: 2019-04-04 | Stop reason: SDUPTHER

## 2019-01-10 NOTE — PROGRESS NOTES
Assessment:  1  Lumbar spondylosis    2  Spinal stenosis of lumbar region with neurogenic claudication    3  Lumbar radiculopathy    4  DDD (degenerative disc disease), lumbar    5  Facet arthropathy, lumbar    6  Chronic pain syndrome        Plan:  23-year-old female returning for follow-up of lumbosacral back pain  The patient does have a history of lumbar degenerative disc disease, spondylosis, radiculopathy, and stenosis most pronounced at L4-5 and L5-S1  The patient denies any radicular symptoms today and her major complaint is her low back pain  The patient did have a left L4 and L5 TFESI with about 90% relief of her left lower extremity symptoms which she is still experiencing today  She is currently taking tramadol 50 mg p r n     This does give her moderate relief  She also takes meloxicam 7 5 mg b i d  P r n  Which gives her mild relief  She denies any side effects of the medications  The patient's low back pain seems to be mostly myofascial and facet mediated  She does not have any evidence of radiculopathy or myelopathy on physical exam today  1  I discussed bilateral L2, L3, L4, and L5 medial branch blocks with the patient to address the facet mediated component of her pain  I discussed the diagnostic nature of these blocks and if she has a favorable result x2 we could proceed with RFA for longer lasting relief  The patient wished to proceed with the procedure  2  The patient may continue with tramadol 50 mg q 8 hours p r n     A refill was sent to the pharmacy today  Of note the patient last filled her medication October 4, 2018 and she tries to take this is sparingly as possible  3  The patient may continue with meloxicam 7 5 mg b i d  P r n     The patient did not need a refill of this today    4  The patient may continue with her home exercise program   5  I will follow up the patient in 3 months      There are risks associated with opioid medications, including dependence, addiction and tolerance  The patient understands and agrees to use these medications only as prescribed  Potential side effects of the medications include, but are not limited to, constipation, drowsiness, addiction, impaired judgment and risk of fatal overdose if not taken as prescribed  The patient was warned against driving while taking sedation medications  Sharing medications is a felony  At this point in time, the patient is showing no signs of addiction, abuse, diversion or suicidal ideation  [place] Prescription Drug Monitoring Program report was reviewed and was appropriate     Complete risks and benefits including bleeding, infection, tissue reaction, nerve injury and allergic reaction were discussed  The approach was demonstrated using models and literature was provided  Verbal and written consent was obtained  My impressions and treatment recommendations were discussed in detail with the patient who verbalized understanding and had no further questions  Discharge instructions were provided  I personally saw and examined the patient and I agree with the above discussed plan of care  No orders of the defined types were placed in this encounter  No orders of the defined types were placed in this encounter  History of Present Illness:    Parveen Jones is a 77 y o  female with a history of lumbar degenerative disc disease, spondylosis, stenosis, and lumbar radiculopathy returning for follow-up with complaints of low back pain  She denies any radiation of the pain into her legs  She denies any numbness, paresthesias, or subjective weakness in her legs  She denies any bladder or bowel incontinence or saddle anesthesia  The patient did have a left L4 and L5 TFESI with about 90% relief of her left lower extremity symptoms which she is still experiencing today  She is currently taking tramadol 50 mg p r n     This does give her moderate relief  She also takes meloxicam 7 5 mg b i d  P r n   Which gives her mild relief  She denies any side effects of the medications  The patient rates her pain 8/10 on the pain does not follow any particular pattern throughout the day  The pain is constant and described as throbbing and pressure-like  The pain is worse with standing, walking, bending, and lifting  The pain is alleviated with sitting and lying down  I have personally reviewed and/or updated the patient's past medical history, past surgical history, family history, social history, current medications, allergies, and vital signs today  Other than as stated above, the patient denies any interval changes in medications, medical condition, mental condition, symptoms, or allergies since the last office visit  Review of Systems:    Review of Systems   Constitutional: Negative for fever and unexpected weight change  HENT: Negative for trouble swallowing  Eyes: Negative for visual disturbance  Respiratory: Negative for shortness of breath and wheezing  Cardiovascular: Negative for chest pain and palpitations  Gastrointestinal: Negative for constipation, diarrhea, nausea and vomiting  Endocrine: Negative for cold intolerance, heat intolerance and polydipsia  Genitourinary: Negative for difficulty urinating and frequency  Musculoskeletal: Positive for gait problem  Negative for arthralgias, joint swelling and myalgias  Pain in extremity   Skin: Negative for rash  Neurological: Negative for dizziness, seizures, syncope, weakness and headaches  Hematological: Does not bruise/bleed easily  Psychiatric/Behavioral: Negative for dysphoric mood  All other systems reviewed and are negative        Patient Active Problem List   Diagnosis    Lumbar radiculopathy    Spinal stenosis of lumbar region with neurogenic claudication    Lumbar spondylosis    Chronic right hip pain    Type 2 diabetes mellitus without complication, without long-term current use of insulin ([facility] [address] )    Primary osteoarthritis of first carpometacarpal joint of left hand    Arthritis of carpometacarpal (CMC) joint of left thumb    Cataract of both eyes    DDD (degenerative disc disease), lumbar    Facet arthropathy, lumbar    Keratosis    Obesity (BMI 30-39  9)    Obstructive sleep apnea of adult    Secondary polycythemia    Preoperative clearance    Aftercare following surgery of the musculoskeletal system       Past Medical History:   Diagnosis Date    Asthma     Chronic obstructive [facility] disease [facility] [address] [facility]     Community acquired pneumonia     LAST ASSESSED: [id number]    COPD (chronic obstructive pulmonary disease) ([facility] [address] [facility]     [facility]     History of MRSA infection     2008 liver sx    Liver disease     Sleep apnea     Viral warts     LAST ASSESSED: [id number]       Past Surgical History:   Procedure Laterality Date    ABDOMINAL SURGERY      ABDOMINOPLASTY      CHOLECYSTECTOMY      COSMETIC SURGERY      EYE SURGERY      Bilateral cataracts 2015 Dr Alee Al       FINGER SURGERY      HAND SURGERY      HYSTERECTOMY      LIVER SURGERY      [place] REPAIR INTERCARP/CARP-METACARP JT Right 9/9/2016    Procedure: THUMB TRAPEZIECTOMY; BASAL JOINT ARTHROPLASTY WITH APL AUTOGRAFT;  Surgeon: Vladimir Patel MD;  [facility];  Service: Orthopedics    [place] REPAIR INTERCARP/CARP-METACARP JT Left 5/29/2018    Procedure: Nakia Holt;  Surgeon: Devin Cohn MD;  [facility];  Service: Orthopedics    OH TRANSPLANT HAND TENDON Left 5/29/2018    Procedure: TRANSFER TENDON HAND/WRIST FCR from forearm to wrist;  Surgeon: Devin Cohn MD;  [facility];  Service: Orthopedics       Family History   Problem Relation Age of Onset    Heart attack Father     Heart disease Family     Diabetes Family     Thyroid disease Family     Ovarian cancer Family     Heart attack Mother     Ovarian cancer Sister     Heart disease Other         CARDIAC DISORDER    Diabetes Other     Liver cancer Other     Breast cancer Other     Stomach cancer Other        Social History     Occupational History    Not on file  Social History Main Topics    Smoking status: Current Every Day Smoker     Packs/day: 0 20    Smokeless tobacco: Never Used    Alcohol use No    Drug use: No    Sexual activity: Not on file       Current Outpatient Prescriptions on File Prior to Visit   Medication Sig    diphenhydrAMINE (BENADRYL) 25 mg capsule Take 25 mg by mouth every 6 (six) hours as needed for itching    glipiZIDE (GLUCOTROL XL) 5 mg 24 hr tablet TAKE 1 TABLET EVERY DAY  30  MINUTES BEFORE BREAKFAST    glucose blood (ACCU-CHEK JESS PLUS) test strip Test BS 3 times daily  DX [id number] [id number]    meloxicam (MOBIC) 7 5 mg tablet TAKE 1 TABLET TWICE DAILY AS NEEDED FOR PAIN    metFORMIN (GLUCOPHAGE) 1000 MG tablet TAKE 1 TABLET TWICE DAILY    traMADol (ULTRAM) 50 mg tablet Take 1 tablet (50 mg total) by mouth every 8 (eight) hours as needed for moderate pain    [DISCONTINUED] Multiple Vitamins-Minerals (OCUVITE PO) Take by mouth    albuterol (PROVENTIL HFA,VENTOLIN HFA) 90 mcg/act inhaler Inhale 2 puffs every 6 (six) hours as needed for wheezing     No current facility-administered medications on file prior to visit  No Known Allergies    Physical Exam:    Ht 5' (1 524 m)   Wt 83 9 kg (185 lb)   BMI 36 13 kg/m²     Constitutional: normal, well developed, well nourished, alert, in no distress and non-toxic and no overt pain behavior  Eyes: anicteric  HEENT: grossly intact  Neck: supple, symmetric, trachea midline and no masses   Pulmonary:even and unlabored  Cardiovascular:No edema or pitting edema present  Skin:Normal without rashes or lesions and well hydrated  Psychiatric:Mood and affect appropriate  Neurologic:Cranial Nerves II-XII grossly intact  Musculoskeletal:normal gait  Bilateral lumbar paraspinals tender to palpation from L2-L5    Bilateral SI joints minimally tender to palpation  Bilateral greater trochanters nontender to palpation  Bilateral lower extremity strength 5/5 in all muscle groups  Sensation intact to light touch in the L3 thru S1 dermatomes bilaterally  Negative seated straight leg raise bilaterally      Imaging  Imaging reviewed

## 2019-01-23 ENCOUNTER — HOSPITAL ENCOUNTER (OUTPATIENT)
Dept: RADIOLOGY | Facility: CLINIC | Age: 69
Discharge: HOME | End: 2019-01-23
Attending: ANESTHESIOLOGY | Admitting: ANESTHESIOLOGY
Payer: MEDICARE

## 2019-01-23 VITALS
TEMPERATURE: 98.2 F | HEART RATE: 88 BPM | SYSTOLIC BLOOD PRESSURE: 142 MMHG | OXYGEN SATURATION: 97 % | RESPIRATION RATE: 18 BRPM | DIASTOLIC BLOOD PRESSURE: 72 MMHG

## 2019-01-23 DIAGNOSIS — M47.816 LUMBAR SPONDYLOSIS: ICD-10-CM

## 2019-01-23 PROCEDURE — 64493 INJ PARAVERT F JNT L/S 1 LEV: CPT | Performed by: ANESTHESIOLOGY

## 2019-01-23 PROCEDURE — 64494 INJ PARAVERT F JNT L/S 2 LEV: CPT | Performed by: ANESTHESIOLOGY

## 2019-01-23 PROCEDURE — 64495 INJ PARAVERT F JNT L/S 3 LEV: CPT | Performed by: ANESTHESIOLOGY

## 2019-01-23 RX ORDER — LIDOCAINE HYDROCHLORIDE 10 MG/ML
10 INJECTION, SOLUTION EPIDURAL; INFILTRATION; INTRACAUDAL; PERINEURAL ONCE
Status: COMPLETED | OUTPATIENT
Start: 2019-01-23 | End: 2019-01-23

## 2019-01-23 RX ADMIN — LIDOCAINE HYDROCHLORIDE 10 ML: 10 INJECTION, SOLUTION EPIDURAL; INFILTRATION; INTRACAUDAL; PERINEURAL at 08:39

## 2019-01-23 RX ADMIN — LIDOCAINE HYDROCHLORIDE 4 ML: 20 INJECTION, SOLUTION EPIDURAL; INFILTRATION; INTRACAUDAL; PERINEURAL at 08:41

## 2019-01-23 NOTE — H&P
History of Present Illness: The patient is a 77 y o  female who presents with complaints of low back pain  Patient Active Problem List   Diagnosis    Lumbar radiculopathy    Spinal stenosis of lumbar region with neurogenic claudication    Lumbar spondylosis    Chronic right hip pain    Type 2 diabetes mellitus without complication, without long-term current use of insulin (HCC)    Primary osteoarthritis of first carpometacarpal joint of left hand    Arthritis of carpometacarpal (CMC) joint of left thumb    Cataract of both eyes    DDD (degenerative disc disease), lumbar    Facet arthropathy, lumbar    Keratosis    Obesity (BMI 30-39  9)    Obstructive sleep apnea of adult    Secondary polycythemia    Preoperative clearance    Aftercare following surgery of the musculoskeletal system    Chronic pain syndrome       Past Medical History:   Diagnosis Date    Asthma     Chronic obstructive lung disease ([facility] )     Community acquired pneumonia     LAST ASSESSED: [id number]    [facility]     Diabetes mellitus ([facility] )     History of MRSA infection     2008 liver sx    Liver disease     Sleep apnea     Viral warts     LAST ASSESSED: [id number]       Past Surgical History:   Procedure Laterality Date    ABDOMINAL SURGERY      ABDOMINOPLASTY      CHOLECYSTECTOMY      COSMETIC SURGERY      EYE SURGERY      Bilateral cataracts 2015 Dr Jess Scruggs       FINGER SURGERY      HAND SURGERY      HYSTERECTOMY      LIVER SURGERY      ID REPAIR INTERCARP/CARP-METACARP JT Right 9/9/2016    Procedure: THUMB TRAPEZIECTOMY; BASAL JOINT ARTHROPLASTY WITH APL AUTOGRAFT;  Surgeon: Dario Bernal MD;  [facility];  Service: Orthopedics    ID REPAIR INTERCARP/CARP-METACARP JT Left 5/29/2018    Procedure: Jeananne Barthel;  Surgeon: Suki Gannon MD;  [facility];  Service: Orthopedics    ID TRANSPLANT HAND TENDON Left 5/29/2018    Procedure: TRANSFER TENDON HAND/WRIST FCR from forearm to wrist;  Surgeon: China Kebede MD;  [facility];  Service: Orthopedics         Current Outpatient Prescriptions:     albuterol (PROVENTIL HFA,VENTOLIN HFA) 90 mcg/act inhaler, Inhale 2 puffs every 6 (six) hours as needed for wheezing, Disp: , Rfl:     diphenhydrAMINE (BENADRYL) 25 mg capsule, Take 25 mg by mouth every 6 (six) hours as needed for itching, Disp: , Rfl:     glipiZIDE (GLUCOTROL XL) 5 mg 24 hr tablet, TAKE 1 TABLET EVERY DAY  30  MINUTES BEFORE BREAKFAST, Disp: 90 tablet, Rfl: 1    glucose blood (ACCU-CHEK JESS PLUS) test strip, Test BS 3 times daily  DX E11 9, Disp: 100 each, Rfl: 2    meloxicam (MOBIC) 7 5 mg tablet, TAKE 1 TABLET TWICE DAILY AS NEEDED FOR PAIN, Disp: 180 tablet, Rfl: 1    metFORMIN (GLUCOPHAGE) 1000 MG tablet, TAKE 1 TABLET TWICE DAILY, Disp: 180 tablet, Rfl: 1    traMADol (ULTRAM) 50 mg tablet, Take 1 tablet (50 mg total) by mouth every 8 (eight) hours as needed for moderate pain, Disp: 90 tablet, Rfl: 0    No Known Allergies    Physical Exam:   Vitals:    01/23/19 0808   BP: (!) 176/66   Pulse: 88   Resp: 20   Temp: 98 2 °F (36 8 °C)   SpO2: 97%     General: Awake, Alert, Oriented x 3, Mood and affect appropriate  Respiratory: Respirations even and unlabored  Cardiovascular: Peripheral pulses intact; no edema  Musculoskeletal Exam:  Bilateral lumbar paraspinals tender to palpation  ASA Score: 2    Patient/Chart Verification  Patient ID Verified: Verbal  ID Band Applied: No  Consents Confirmed: Procedural  H&P( within 30 days) Verified: To be obtained in the Pre-Procedure area  Interval H&P(within 24 hr) Complete (required for Outpatients and Surgery Admit only): To be obtained in the Pre-Procedure area  Allergies Reviewed: Yes  Anticoag/NSAID held?: NA  Currently on antibiotics?: No  Pre-op Lab/Test Results Available: N/A  Pregnancy Lab Collected: N/A comment    Assessment:   1   Lumbar spondylosis        Plan: lumbar spondylosis - Bilateral L2, L3, L4, L5 MBB#1

## 2019-01-23 NOTE — DISCHARGE INSTRUCTIONS

## 2019-01-28 ENCOUNTER — TELEPHONE (OUTPATIENT)
Dept: PAIN MEDICINE | Facility: CLINIC | Age: 69
End: 2019-01-28

## 2019-01-29 ENCOUNTER — OFFICE VISIT (OUTPATIENT)
Dept: FAMILY MEDICINE CLINIC | Facility: CLINIC | Age: 69
End: 2019-01-29
Payer: MEDICARE

## 2019-01-29 VITALS
HEIGHT: 60 IN | WEIGHT: 184 LBS | DIASTOLIC BLOOD PRESSURE: 90 MMHG | SYSTOLIC BLOOD PRESSURE: 170 MMHG | BODY MASS INDEX: 36.12 KG/M2 | TEMPERATURE: 97.4 F | HEART RATE: 72 BPM | OXYGEN SATURATION: 97 %

## 2019-01-29 DIAGNOSIS — Z11.59 NEED FOR HEPATITIS C SCREENING TEST: ICD-10-CM

## 2019-01-29 DIAGNOSIS — E11.9 TYPE 2 DIABETES MELLITUS WITHOUT COMPLICATION, WITHOUT LONG-TERM CURRENT USE OF INSULIN (HCC): ICD-10-CM

## 2019-01-29 DIAGNOSIS — M48.062 SPINAL STENOSIS OF LUMBAR REGION WITH NEUROGENIC CLAUDICATION: ICD-10-CM

## 2019-01-29 DIAGNOSIS — I10 ESSENTIAL HYPERTENSION: ICD-10-CM

## 2019-01-29 DIAGNOSIS — G89.29 CHRONIC RIGHT HIP PAIN: ICD-10-CM

## 2019-01-29 DIAGNOSIS — M25.551 CHRONIC RIGHT HIP PAIN: ICD-10-CM

## 2019-01-29 DIAGNOSIS — M54.16 LUMBAR RADICULOPATHY: Primary | ICD-10-CM

## 2019-01-29 DIAGNOSIS — F17.200 CURRENT SMOKER ON SOME DAYS: ICD-10-CM

## 2019-01-29 DIAGNOSIS — Z13.0 SCREENING FOR IRON DEFICIENCY ANEMIA: ICD-10-CM

## 2019-01-29 DIAGNOSIS — G47.33 OBSTRUCTIVE SLEEP APNEA OF ADULT: ICD-10-CM

## 2019-01-29 PROBLEM — Z01.818 PREOPERATIVE CLEARANCE: Status: RESOLVED | Noted: 2018-05-02 | Resolved: 2019-01-29

## 2019-01-29 PROBLEM — Z47.89 AFTERCARE FOLLOWING SURGERY OF THE MUSCULOSKELETAL SYSTEM: Status: RESOLVED | Noted: 2018-07-11 | Resolved: 2019-01-29

## 2019-01-29 LAB — SL AMB POCT HEMOGLOBIN AIC: 5.9 (ref ?–6.5)

## 2019-01-29 PROCEDURE — 99214 OFFICE O/P EST MOD 30 MIN: CPT | Performed by: FAMILY MEDICINE

## 2019-01-29 PROCEDURE — 83036 HEMOGLOBIN GLYCOSYLATED A1C: CPT | Performed by: FAMILY MEDICINE

## 2019-01-29 RX ORDER — LISINOPRIL 10 MG/1
10 TABLET ORAL DAILY
Qty: 90 TABLET | Refills: 0 | Status: SHIPPED | OUTPATIENT
Start: 2019-01-29 | End: 2019-03-25 | Stop reason: SDUPTHER

## 2019-01-29 RX ORDER — GLIPIZIDE 5 MG/1
5 TABLET, FILM COATED, EXTENDED RELEASE ORAL
Qty: 90 TABLET | Refills: 1 | Status: SHIPPED | OUTPATIENT
Start: 2019-01-29 | End: 2019-06-24 | Stop reason: SDUPTHER

## 2019-01-29 RX ORDER — MELOXICAM 7.5 MG/1
7.5 TABLET ORAL 2 TIMES DAILY PRN
Qty: 180 TABLET | Refills: 1 | Status: CANCELLED | OUTPATIENT
Start: 2019-01-29

## 2019-01-29 NOTE — ASSESSMENT & PLAN NOTE
Lab Results   Component Value Date    HGBA1C 5 9 01/29/2019     Hemoglobin A1c is 5 9 revealing excellent control  She is going to continue with Glucotrol as well as metformin  She is due for fasting blood work and we are going to get CBC CMP lipids as well as hepatitis-C  Also get urine microalbumin  She is asked to continue compliance with her medication and diet try to improve her exercise regimen  No results for input(s): POCGLU in the last 72 hours      Blood Sugar Average: Last 72 hrs:

## 2019-01-29 NOTE — PROGRESS NOTES
Assessment/Plan:  Type 2 diabetes mellitus without complication, without long-term current use of insulin (HCC)  Lab Results   Component Value Date    HGBA1C 5 9 01/29/2019     Hemoglobin A1c is 5 9 revealing excellent control  She is going to continue with Glucotrol as well as metformin  She is due for fasting blood work and we are going to get CBC CMP lipids as well as hepatitis-C  Also get urine microalbumin  She is asked to continue compliance with her medication and diet try to improve her exercise regimen  No results for input(s): POCGLU in the last 72 hours  Blood Sugar Average: Last 72 hrs:      Obstructive sleep apnea of adult  Continue with CPAP  Essential hypertension  Blood pressure is uncontrolled today  She is in pain from her back but due to her blood pressure today as well as her diabetes I believe the beginning her on lisinopril as in her best interest   She agrees to this  She does decline get locally and says that she will wait until she can get from her mail order pharmacy  We sent that prescription now  She is asked to return for blood pressure check after she had been on lisinopril for a few days    Lumbar radiculopathy  She continues to follow up with Dr Corey Lambert  We did give her prescription for a wheeled walker that she can use  Obesity (BMI 30-39  9)  Continue watching diet and try to improve activity level for weight loss  Current smoker on some days  We are going to have her go for CT lung screen to which she agrees  Diagnoses and all orders for this visit:    Lumbar radiculopathy  -     Walker    Type 2 diabetes mellitus without complication, without long-term current use of insulin (HCC)  -     metFORMIN (GLUCOPHAGE) 1000 MG tablet; Take 1 tablet (1,000 mg total) by mouth 2 (two) times a day  -     glipiZIDE (GLUCOTROL XL) 5 mg 24 hr tablet;  Take 1 tablet (5 mg total) by mouth daily before breakfast Take 30 minutes before  -     POCT hemoglobin A1c  - Comprehensive metabolic panel  -     Lipid panel  -     Microalbumin, Random Urine (W/Creatinine)    Chronic right hip pain    Spinal stenosis of lumbar region with neurogenic claudication  -     Walker    Current smoker on some days  -     CT lung screening program; Future    Essential hypertension  -     lisinopril (ZESTRIL) 10 mg tablet; Take 1 tablet (10 mg total) by mouth daily  -     Lipid panel    Screening for iron deficiency anemia  -     CBC    Need for hepatitis C screening test  -     Hepatitis C Ab W/Refl To HCV RNA, Qn, PCR    Obstructive sleep apnea of adult    Other orders  -     Cancel: meloxicam (MOBIC) 7 5 mg tablet; 1 tablet (7 5 mg total) 2 (two) times a day as needed          Subjective:   Chief Complaint   Patient presents with    Diabetes     here for a checkup and fbw today  pt needs a diabetic foot exam  hba1c was 5 9  last colon was 10 years ago  Patient ID: Mildred Marx is a 77 y o  female  HPI  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 for follow-up of multiple medical problems including type 2 diabetes, essential hypertension, obstructive sleep apnea, obesity as while as chronic back pain  She states that she has been basically bed ridden from her lower back pain she had medial branch block performed last week bilaterally from L2-L5 with some improvement  She is going to be scheduled for 2nd set the near future  She has been wearing CPAP and states it is wonderful  She has been compliant with her metformin and glipizide for diabetes  She does note that her blood sugar gets under 100 she feels hypoglycemic  Typically her blood sugars in the 140 range fasting  She uses Mobic which is somewhat effective for her back pain at times  She also has tramadol for pain management the special[facility], Dr Cholo Vinson  She has been trying to cut back on her tobacco use  She has had no cigarettes since Saturday 3 days ago    She had her last colonoscopy about 10 years ago with Dr Raquel Roe  She is due for follow-up  She has had no mammogram recently and does not feel that she needs to follow through on this less she has a problem  I did ask her to consider continuing with mammographic screening for breast cancer  The following portions of the patient's history were reviewed and updated as appropriate: allergies, current medications, past family history, past medical history, past social history, past surgical history and problem list     Review of Systems   Constitution: Negative for chills, decreased appetite, fever, malaise/fatigue, weight gain and weight loss  HENT: Positive for congestion  Negative for sore throat  Cardiovascular: Negative for chest pain, irregular heartbeat, leg swelling, orthopnea and paroxysmal nocturnal dyspnea  Respiratory: Negative for cough, hemoptysis, shortness of breath, sputum production and wheezing  Endocrine: Negative for polydipsia, polyphagia and polyuria  Hematologic/Lymphatic: Negative for adenopathy and bleeding problem  Musculoskeletal: Positive for arthritis, back pain and stiffness  Negative for falls, muscle weakness and myalgias  Gastrointestinal: Negative for bowel incontinence, constipation and diarrhea  Genitourinary: Negative for bladder incontinence  Neurological: Negative for dizziness and headaches  Psychiatric/Behavioral: Negative for depression  The patient does not have insomnia and is not nervous/anxious  Objective:    Physical Exam   Constitutional: She is oriented to person, place, and time  She appears well-developed and well-nourished  Obese in no distress   Eyes: Conjunctivae are normal    Neck: Neck supple  No JVD present  No thyromegaly present  Cardiovascular: Normal rate, regular rhythm and normal heart sounds  Exam reveals no gallop  Pulses are no weak pulses  No murmur heard  Pulses:       Dorsalis pedis pulses are 2+ on the right side, and 2+ on the left side     No carotid bruit    Pulmonary/Chest: Effort normal and breath sounds normal  No respiratory distress  She has no wheezes  She has no rales  Abdominal: Soft  Bowel sounds are normal    Musculoskeletal: She exhibits tenderness  She exhibits no edema  Diffuse lower back tenderness  No step-off or deformity  Feet:   Right Foot:   Skin Integrity: Negative for ulcer, skin breakdown, erythema, warmth, callus or dry skin  Left Foot:   Skin Integrity: Negative for ulcer, skin breakdown, erythema, warmth, callus or dry skin  Lymphadenopathy:     She has no cervical adenopathy  Neurological: She is alert and oriented to person, place, and time  Skin: No rash noted  No erythema  Psychiatric: She has a normal mood and affect  Nursing note and vitals reviewed  Patient's shoes and socks removed  Right Foot/Ankle   Right Foot Inspection  Skin Exam: skin normal and skin intact no dry skin, no warmth, no callus, no erythema, no maceration, no abnormal color, no pre-ulcer, no ulcer and no callus                          Toe Exam: ROM and strength within normal limits  Sensory       Monofilament testing: intact  Vascular  Capillary refills: < 3 seconds  The right DP pulse is 2+  Left Foot/Ankle  Left Foot Inspection  Skin Exam: skin normal and skin intactno dry skin, no warmth, no erythema, no maceration, normal color, no pre-ulcer, no ulcer and no callus                         Toe Exam: ROM and strength within normal limits                   Sensory       Monofilament: intact  Vascular  Capillary refills: < 3 seconds  The left DP pulse is 2+  Assign Risk Category:  No deformity present; No loss of protective sensation;  No weak pulses       Risk: 0

## 2019-01-29 NOTE — TELEPHONE ENCOUNTER
Please transfer call to Larry procedure  line if you have pt on phone  Called pt LMOM for pt to cb to schedule- gave direct line and hours

## 2019-01-29 NOTE — ASSESSMENT & PLAN NOTE
Blood pressure is uncontrolled today  She is in pain from her back but due to her blood pressure today as well as her diabetes I believe the beginning her on lisinopril as in her best interest   She agrees to this  She does decline get locally and says that she will wait until she can get from her mail order pharmacy  We sent that prescription now    She is asked to return for blood pressure check after she had been on lisinopril for a few days

## 2019-01-29 NOTE — TELEPHONE ENCOUNTER
Spoke to patient scheduled BRIAN L2, L3, L4, L5 MBB#2 02/26/19 arrival 10:30am     Went over pre procedure instructions, NPO 1 hr prior, if sick or on abx needs to call to rs, wear loose, comf clothing- no buttons/zippers, needs   Pt verbalized understanding

## 2019-01-29 NOTE — ASSESSMENT & PLAN NOTE
She continues to follow up with Dr Garcia Music  We did give her prescription for a wheeled walker that she can use

## 2019-01-30 LAB
ALBUMIN SERPL-MCNC: 4 G/DL (ref 3.6–5.1)
ALBUMIN/CREAT UR: 651 MCG/MG CREAT
ALBUMIN/GLOB SERPL: 1.4 (CALC) (ref 1–2.5)
ALP SERPL-CCNC: 79 U/L (ref 33–130)
ALT SERPL-CCNC: 17 U/L (ref 6–29)
AST SERPL-CCNC: 16 U/L (ref 10–35)
BILIRUB SERPL-MCNC: 0.5 MG/DL (ref 0.2–1.2)
BUN SERPL-MCNC: 15 MG/DL (ref 7–25)
BUN/CREAT SERPL: 31 (CALC) (ref 6–22)
CALCIUM SERPL-MCNC: 9.5 MG/DL (ref 8.6–10.4)
CHLORIDE SERPL-SCNC: 104 MMOL/L (ref 98–110)
CHOLEST SERPL-MCNC: 198 MG/DL
CHOLEST/HDLC SERPL: 3.9 (CALC)
CO2 SERPL-SCNC: 26 MMOL/L (ref 20–32)
CREAT SERPL-MCNC: 0.48 MG/DL (ref 0.6–0.93)
CREAT UR-MCNC: 45 MG/DL (ref 20–275)
ERYTHROCYTE [DISTWIDTH] IN BLOOD BY AUTOMATED COUNT: 12.7 % (ref 11–15)
GLOBULIN SER CALC-MCNC: 2.8 G/DL (CALC) (ref 1.9–3.7)
GLUCOSE SERPL-MCNC: 119 MG/DL (ref 65–99)
HCT VFR BLD AUTO: 44.6 % (ref 35–45)
HCV AB S/CO SERPL IA: 0.02
HCV AB SERPL QL IA: NORMAL
HDLC SERPL-MCNC: 51 MG/DL
HGB BLD-MCNC: 15.3 G/DL (ref 11.7–15.5)
LDLC SERPL CALC-MCNC: 126 MG/DL (CALC)
MCH RBC QN AUTO: 31.5 PG (ref 27–33)
MCHC RBC AUTO-ENTMCNC: 34.3 G/DL (ref 32–36)
MCV RBC AUTO: 91.8 FL (ref 80–100)
MICROALBUMIN UR-MCNC: 29.3 MG/DL
NONHDLC SERPL-MCNC: 147 MG/DL (CALC)
PLATELET # BLD AUTO: 149 THOUSAND/UL (ref 140–400)
PMV BLD REES-ECKER: 11.7 FL (ref 7.5–12.5)
POTASSIUM SERPL-SCNC: 4.4 MMOL/L (ref 3.5–5.3)
PROT SERPL-MCNC: 6.8 G/DL (ref 6.1–8.1)
RBC # BLD AUTO: 4.86 MILLION/UL (ref 3.8–5.1)
SL AMB EGFR AFRICAN AMERICAN: 115 ML/MIN/1.73M2
SL AMB EGFR NON AFRICAN AMERICAN: 99 ML/MIN/1.73M2
SODIUM SERPL-SCNC: 139 MMOL/L (ref 135–146)
TRIGL SERPL-MCNC: 103 MG/DL
WBC # BLD AUTO: 6.1 THOUSAND/UL (ref 3.8–10.8)

## 2019-02-20 ENCOUNTER — OFFICE VISIT (OUTPATIENT)
Dept: SLEEP CENTER | Facility: CLINIC | Age: 69
End: 2019-02-20
Payer: MEDICARE

## 2019-02-20 VITALS
DIASTOLIC BLOOD PRESSURE: 78 MMHG | BODY MASS INDEX: 36.71 KG/M2 | SYSTOLIC BLOOD PRESSURE: 160 MMHG | HEART RATE: 64 BPM | HEIGHT: 60 IN | WEIGHT: 187 LBS

## 2019-02-20 DIAGNOSIS — G47.33 OSA (OBSTRUCTIVE SLEEP APNEA): Primary | ICD-10-CM

## 2019-02-20 PROCEDURE — 99213 OFFICE O/P EST LOW 20 MIN: CPT | Performed by: INTERNAL MEDICINE

## 2019-02-20 NOTE — PROGRESS NOTES
Progress Note - Sleep Center   Pelon Combs OEY:[birth year] MRN: [id number]      Reason for Visit:  77 y  o female here for annual follow-up    Assessment:  Doing well on current therapy of CPAP 18 cm for very severe obstructive sleep apnea (AHI = 117)  Plan:  Continue same    Follow up: One year    History of Present Illness:  History of FRANK on PAP therapy  Fully compliant and deriving benefit  Review of Systems      Genitourinary none   Cardiology none   Gastrointestinal none   Neurology none   Constitutional none   Integumentary none   Psychiatry none   Musculoskeletal back pain   Pulmonary none   ENT none   Endocrine none   Hematological none           I have reviewed and updated the review of systems as necessary      Historical Information    Past Medical History:   Past Medical History:   Diagnosis Date    Asthma     [facility] disease ([facility] )     Community acquired pneumonia     LAST ASSESSED: [id number]    COPD (chronic obstructive pulmonary disease) ([facility] )     Diabetes mellitus ([facility] )     History of MRSA infection     2008 liver sx    Liver disease     Sleep apnea     Viral warts     LAST ASSESSED: [id number]         Past Surgical History:   Past Surgical History:   Procedure Laterality Date    ABDOMINAL SURGERY      ABDOMINOPLASTY      CHOLECYSTECTOMY      COSMETIC SURGERY      EYE SURGERY      Bilateral cataracts 2015 Dr Delfina Osler       FINGER SURGERY      HAND SURGERY      HYSTERECTOMY      LIVER SURGERY      OR REPAIR INTERCARP/CARP-METACARP JT Right 9/9/2016    Procedure: THUMB TRAPEZIECTOMY; BASAL JOINT ARTHROPLASTY WITH APL AUTOGRAFT;  Surgeon: Evans Silverman MD;  [facility];  Service: Orthopedics    OR REPAIR INTERCARP/CARP-METACARP JT Left 5/29/2018    Procedure: Brando Olson;  Surgeon: Yarelis Langley MD;  [facility];  Service: Orthopedics    OR TRANSPLANT HAND TENDON Left 5/29/2018    Procedure: TRANSFER TENDON HAND/WRIST FCR from forearm to wrist;  Surgeon: Suki Gannon MD;  [facility];  Service: Orthopedics       Social History:   Social History     Socioeconomic History    Marital status: /Civil Union     Spouse name: None    Number of children: None    Years of education: None    Highest education level: None   Occupational History    None   Social Needs    Financial resource strain: None    Food insecurity:     Worry: None     Inability: None    Transportation needs:     Medical: None     Non-medical: None   Tobacco Use    Smoking status: Current Every Day Smoker     Packs/day: 0 20    Smokeless tobacco: Never Used   Substance and Sexual Activity    Alcohol use: Not Currently    Drug use: No    Sexual activity: None   Lifestyle    Physical activity:     Days per week: None     Minutes per session: None    Stress: None   Relationships    Social connections:     Talks on phone: None     Gets together: None     Attends Voodoo service: None     Active member of club or organization: None     Attends meetings of clubs or organizations: None     Relationship status: None    Intimate partner violence:     Fear of current or ex partner: None     Emotionally abused: None     Physically abused: None     Forced sexual activity: None   Other Topics Concern    None   Social History Narrative    None       Family History:   Family History   Problem Relation Age of Onset    Heart attack Father     Heart disease Family     Diabetes Family     Thyroid disease Family     Ovarian cancer Family     Heart attack Mother     Ovarian cancer Sister     Heart disease Other         CARDIAC DISORDER    Diabetes Other     Liver cancer Other     Breast cancer Other     Stomach cancer Other        Medications/Allergies:      Current Outpatient Medications:     albuterol (PROVENTIL HFA,VENTOLIN HFA) 90 mcg/act inhaler, Inhale 2 puffs every 6 (six) hours as needed for wheezing, Disp: , Rfl:    diphenhydrAMINE (BENADRYL) 25 mg capsule, Take 25 mg by mouth every 6 (six) hours as needed for itching, Disp: , Rfl:     glipiZIDE (GLUCOTROL XL) 5 mg 24 hr tablet, Take 1 tablet (5 mg total) by mouth daily before breakfast Take 30 minutes before, Disp: 90 tablet, Rfl: 1    glucose blood (ACCU-CHEK JESS PLUS) test strip, Test BS 3 times daily  DX E11 9, Disp: 100 each, Rfl: 2    lisinopril (ZESTRIL) 10 mg tablet, Take 1 tablet (10 mg total) by mouth daily, Disp: 90 tablet, Rfl: 0    meloxicam (MOBIC) 7 5 mg tablet, TAKE 1 TABLET TWICE DAILY AS NEEDED FOR PAIN, Disp: 180 tablet, Rfl: 1    metFORMIN (GLUCOPHAGE) 1000 MG tablet, Take 1 tablet (1,000 mg total) by mouth 2 (two) times a day, Disp: 180 tablet, Rfl: 1    traMADol (ULTRAM) 50 mg tablet, Take 1 tablet (50 mg total) by mouth every 8 (eight) hours as needed for moderate pain, Disp: 90 tablet, Rfl: 0          Objective      Vital Signs:   Vitals:    02/20/19 0900   BP: 160/78   Pulse: 64     [place] Sleepiness Scale: Total score: 2        Physical Exam:    General: Alert, appropriate, cooperative, overweight    Head: NC/AT    Skin: Warm, dry    Neuro: No motor abnormalities, cranial nerves appear intact    Extremity: No clubbing, cyanosis      DME Provider: [employer]        Counseling / Coordination of Care   I have spent 15 minutes with Patient  today in which greater than 50% of this time was spent in counseling/coordination of care regarding Risks and benefits of tx options                Board Certified Sleep Specialist

## 2019-02-26 ENCOUNTER — HOSPITAL ENCOUNTER (OUTPATIENT)
Dept: RADIOLOGY | Facility: CLINIC | Age: 69
Discharge: HOME | End: 2019-02-26
Payer: MEDICARE

## 2019-02-26 VITALS
TEMPERATURE: 98.7 F | OXYGEN SATURATION: 93 % | DIASTOLIC BLOOD PRESSURE: 75 MMHG | HEART RATE: 75 BPM | RESPIRATION RATE: 20 BRPM | SYSTOLIC BLOOD PRESSURE: 168 MMHG

## 2019-02-26 DIAGNOSIS — M47.816 LUMBAR SPONDYLOSIS: ICD-10-CM

## 2019-02-26 PROCEDURE — 64495 INJ PARAVERT F JNT L/S 3 LEV: CPT | Performed by: ANESTHESIOLOGY

## 2019-02-26 PROCEDURE — 64493 INJ PARAVERT F JNT L/S 1 LEV: CPT | Performed by: ANESTHESIOLOGY

## 2019-02-26 PROCEDURE — 64494 INJ PARAVERT F JNT L/S 2 LEV: CPT | Performed by: ANESTHESIOLOGY

## 2019-02-26 RX ORDER — BUPIVACAINE HYDROCHLORIDE 5 MG/ML
30 INJECTION, SOLUTION EPIDURAL; INTRACAUDAL ONCE
Status: COMPLETED | OUTPATIENT
Start: 2019-02-26 | End: 2019-02-26

## 2019-02-26 RX ORDER — LIDOCAINE HYDROCHLORIDE 10 MG/ML
10 INJECTION, SOLUTION EPIDURAL; INFILTRATION; INTRACAUDAL; PERINEURAL ONCE
Status: COMPLETED | OUTPATIENT
Start: 2019-02-26 | End: 2019-02-26

## 2019-02-26 RX ADMIN — LIDOCAINE HYDROCHLORIDE 9 ML: 10 INJECTION, SOLUTION EPIDURAL; INFILTRATION; INTRACAUDAL; PERINEURAL at 10:32

## 2019-02-26 RX ADMIN — BUPIVACAINE HYDROCHLORIDE 4 ML: 5 INJECTION, SOLUTION EPIDURAL; INTRACAUDAL at 10:37

## 2019-02-26 NOTE — H&P
History of Present Illness: The patient is a 77 y o  female who presents with complaints of low back pain  Patient Active Problem List   Diagnosis    Lumbar radiculopathy    Spinal stenosis of lumbar region with neurogenic claudication    Lumbar spondylosis    Chronic right hip pain    Type 2 diabetes mellitus without complication, without long-term current use of insulin (HCC)    Primary osteoarthritis of first carpometacarpal joint of left hand    Arthritis of carpometacarpal (CMC) joint of left thumb    Cataract of both eyes    DDD (degenerative disc disease), lumbar    Facet arthropathy, lumbar    Keratosis    Obesity (BMI 30-39  9)    Obstructive sleep apnea of adult    Secondary polycythemia    Chronic pain syndrome    Current smoker on some days    Essential hypertension       Past Medical History:   Diagnosis Date    Asthma     [facility]     Community acquired pneumonia     LAST ASSESSED: [id number]    COPD [facility]ronic obstructive [facility] disease) [facility]     [facility]     History of MRSA infection     2008 liver sx    Liver disease     Sleep apnea     Viral warts     LAST ASSESSED: [id number]       Past Surgical History:   Procedure Laterality Date    ABDOMINAL SURGERY      ABDOMINOPLASTY      CHOLECYSTECTOMY      COSMETIC SURGERY      EYE SURGERY      Bilateral cataracts 2015 Dr Arsenio Montes       FINGER SURGERY      HAND SURGERY      HYSTERECTOMY      LIVER SURGERY      MD REPAIR INTERCARP/CARP-METACARP JT Right 9/9/2016    Procedure: THUMB TRAPEZIECTOMY; BASAL JOINT ARTHROPLASTY WITH APL AUTOGRAFT;  Surgeon: Jessica Gautam MD;  [facility];  Service: Orthopedics    MD REPAIR INTERCARP/CARP-METACARP JT Left 5/29/2018    Procedure: Edna Patrick;  Surgeon: Kenneth Chacon MD;  [facility];  Service: Orthopedics    MD TRANSPLANT HAND TENDON Left 5/29/2018    Procedure: TRANSFER TENDON HAND/WRIST FCR from forearm to wrist;  Surgeon: Carmella Horton MD;  [facility];  Service: Orthopedics         Current Outpatient Medications:     albuterol (PROVENTIL HFA,VENTOLIN HFA) 90 mcg/act inhaler, Inhale 2 puffs every 6 (six) hours as needed for wheezing, Disp: , Rfl:     diphenhydrAMINE (BENADRYL) 25 mg capsule, Take 25 mg by mouth every 6 (six) hours as needed for itching, Disp: , Rfl:     glipiZIDE (GLUCOTROL XL) 5 mg 24 hr tablet, Take 1 tablet (5 mg total) by mouth daily before breakfast Take 30 minutes before, Disp: 90 tablet, Rfl: 1    glucose blood (ACCU-CHEK JESS PLUS) test strip, Test BS 3 times daily  DX E11 9, Disp: 100 each, Rfl: 2    lisinopril (ZESTRIL) 10 mg tablet, Take 1 tablet (10 mg total) by mouth daily, Disp: 90 tablet, Rfl: 0    meloxicam (MOBIC) 7 5 mg tablet, TAKE 1 TABLET TWICE DAILY AS NEEDED FOR PAIN, Disp: 180 tablet, Rfl: 1    metFORMIN (GLUCOPHAGE) 1000 MG tablet, Take 1 tablet (1,000 mg total) by mouth 2 (two) times a day, Disp: 180 tablet, Rfl: 1    traMADol (ULTRAM) 50 mg tablet, Take 1 tablet (50 mg total) by mouth every 8 (eight) hours as needed for moderate pain, Disp: 90 tablet, Rfl: 0    No Known Allergies    Physical Exam:   Vitals:    02/26/19 1001   BP: 145/75   Pulse: 61   Resp: 20   Temp: 98 7 °F (37 1 °C)   SpO2: 94%     General: Awake, Alert, Oriented x 3, Mood and affect appropriate  Respiratory: Respirations even and unlabored  Cardiovascular: Peripheral pulses intact; no edema  Musculoskeletal Exam:  Bilateral lumbar paraspinals tender palpation  ASA Score: 3    Patient/Chart Verification  Patient ID Verified: Verbal  ID Band Applied: No  Consents Confirmed: Procedural  H&P( within 30 days) Verified: To be obtained in the Pre-Procedure area  Interval H&P(within 24 hr) Complete (required for Outpatients and Surgery Admit only): To be obtained in the Pre-Procedure area  Allergies Reviewed:  Yes  Anticoag/NSAID held?: NA  Currently on antibiotics?: No  Pre-op Lab/Test Results Available: N/A  Pregnancy Lab Collected: N/A comment    Assessment:   1   Lumbar spondylosis        Plan: BRIAN L2, L3, L4, L5 MBB#2

## 2019-02-26 NOTE — DISCHARGE INSTRUCTIONS

## 2019-03-25 DIAGNOSIS — I10 ESSENTIAL HYPERTENSION: ICD-10-CM

## 2019-03-25 RX ORDER — LISINOPRIL 20 MG/1
20 TABLET ORAL DAILY
Qty: 90 TABLET | Refills: 1 | Status: SHIPPED | OUTPATIENT
Start: 2019-03-25 | End: 2019-08-12 | Stop reason: SDUPTHER

## 2019-04-01 ENCOUNTER — TELEPHONE (OUTPATIENT)
Dept: PAIN MEDICINE | Facility: CLINIC | Age: 69
End: 2019-04-01

## 2019-04-04 ENCOUNTER — TELEPHONE (OUTPATIENT)
Dept: PAIN MEDICINE | Facility: CLINIC | Age: 69
End: 2019-04-04

## 2019-04-04 ENCOUNTER — OFFICE VISIT (OUTPATIENT)
Dept: PAIN MEDICINE | Facility: CLINIC | Age: 69
End: 2019-04-04
Payer: MEDICARE

## 2019-04-04 VITALS
DIASTOLIC BLOOD PRESSURE: 73 MMHG | HEART RATE: 56 BPM | SYSTOLIC BLOOD PRESSURE: 157 MMHG | HEIGHT: 60 IN | BODY MASS INDEX: 35.93 KG/M2 | WEIGHT: 183 LBS

## 2019-04-04 DIAGNOSIS — G89.4 CHRONIC PAIN SYNDROME: ICD-10-CM

## 2019-04-04 DIAGNOSIS — M51.36 DDD (DEGENERATIVE DISC DISEASE), LUMBAR: ICD-10-CM

## 2019-04-04 DIAGNOSIS — M47.816 LUMBAR SPONDYLOSIS: Primary | ICD-10-CM

## 2019-04-04 DIAGNOSIS — M54.16 LUMBAR RADICULOPATHY: ICD-10-CM

## 2019-04-04 DIAGNOSIS — M47.816 FACET ARTHROPATHY, LUMBAR: ICD-10-CM

## 2019-04-04 PROCEDURE — 99214 OFFICE O/P EST MOD 30 MIN: CPT | Performed by: NURSE PRACTITIONER

## 2019-04-04 RX ORDER — TRAMADOL HYDROCHLORIDE 50 MG/1
50 TABLET ORAL EVERY 8 HOURS PRN
Qty: 90 TABLET | Refills: 0 | Status: SHIPPED | OUTPATIENT
Start: 2019-04-04 | End: 2019-06-27 | Stop reason: SDUPTHER

## 2019-04-09 ENCOUNTER — OFFICE VISIT (OUTPATIENT)
Dept: FAMILY MEDICINE CLINIC | Facility: CLINIC | Age: 69
End: 2019-04-09
Payer: MEDICARE

## 2019-04-09 VITALS
OXYGEN SATURATION: 92 % | HEIGHT: 60 IN | WEIGHT: 190 LBS | TEMPERATURE: 97.4 F | DIASTOLIC BLOOD PRESSURE: 70 MMHG | SYSTOLIC BLOOD PRESSURE: 170 MMHG | HEART RATE: 83 BPM | BODY MASS INDEX: 37.3 KG/M2

## 2019-04-09 DIAGNOSIS — M25.551 CHRONIC RIGHT HIP PAIN: ICD-10-CM

## 2019-04-09 DIAGNOSIS — Z12.31 ENCOUNTER FOR SCREENING MAMMOGRAM FOR BREAST CANCER: Primary | ICD-10-CM

## 2019-04-09 DIAGNOSIS — F11.20 UNCOMPLICATED OPIOID DEPENDENCE (HCC): ICD-10-CM

## 2019-04-09 DIAGNOSIS — G89.29 CHRONIC RIGHT HIP PAIN: ICD-10-CM

## 2019-04-09 DIAGNOSIS — E11.65 TYPE 2 DIABETES MELLITUS WITH HYPERGLYCEMIA, WITHOUT LONG-TERM CURRENT USE OF INSULIN (HCC): ICD-10-CM

## 2019-04-09 DIAGNOSIS — I10 ESSENTIAL HYPERTENSION: ICD-10-CM

## 2019-04-09 PROCEDURE — 99214 OFFICE O/P EST MOD 30 MIN: CPT | Performed by: FAMILY MEDICINE

## 2019-04-09 RX ORDER — HYDROCHLOROTHIAZIDE 25 MG/1
25 TABLET ORAL DAILY
Qty: 90 TABLET | Refills: 0 | Status: SHIPPED | OUTPATIENT
Start: 2019-04-09 | End: 2019-06-10 | Stop reason: SDUPTHER

## 2019-04-09 RX ORDER — MELOXICAM 7.5 MG/1
7.5 TABLET ORAL 2 TIMES DAILY PRN
Qty: 180 TABLET | Refills: 1 | Status: SHIPPED | OUTPATIENT
Start: 2019-04-09 | End: 2019-08-26 | Stop reason: SDUPTHER

## 2019-04-16 ENCOUNTER — TELEPHONE (OUTPATIENT)
Dept: PAIN MEDICINE | Facility: CLINIC | Age: 69
End: 2019-04-16

## 2019-04-30 ENCOUNTER — HOSPITAL ENCOUNTER (OUTPATIENT)
Dept: RADIOLOGY | Facility: CLINIC | Age: 69
Discharge: HOME | End: 2019-04-30
Attending: ANESTHESIOLOGY
Payer: MEDICARE

## 2019-04-30 VITALS
DIASTOLIC BLOOD PRESSURE: 63 MMHG | HEART RATE: 57 BPM | OXYGEN SATURATION: 93 % | TEMPERATURE: 97.8 F | RESPIRATION RATE: 20 BRPM | SYSTOLIC BLOOD PRESSURE: 159 MMHG

## 2019-04-30 DIAGNOSIS — M47.816 LUMBAR SPONDYLOSIS: ICD-10-CM

## 2019-04-30 PROCEDURE — 64636 DESTROY L/S FACET JNT ADDL: CPT | Performed by: ANESTHESIOLOGY

## 2019-04-30 PROCEDURE — 64635 DESTROY LUMB/SAC FACET JNT: CPT | Performed by: ANESTHESIOLOGY

## 2019-04-30 RX ORDER — BUPIVACAINE HYDROCHLORIDE 5 MG/ML
30 INJECTION, SOLUTION EPIDURAL; INTRACAUDAL ONCE
Status: COMPLETED | OUTPATIENT
Start: 2019-04-30 | End: 2019-04-30

## 2019-04-30 RX ORDER — LIDOCAINE HYDROCHLORIDE 10 MG/ML
10 INJECTION, SOLUTION EPIDURAL; INFILTRATION; INTRACAUDAL; PERINEURAL ONCE
Status: COMPLETED | OUTPATIENT
Start: 2019-04-30 | End: 2019-04-30

## 2019-04-30 RX ADMIN — LIDOCAINE HYDROCHLORIDE 4 ML: 20 INJECTION, SOLUTION EPIDURAL; INFILTRATION; INTRACAUDAL; PERINEURAL at 09:06

## 2019-04-30 RX ADMIN — LIDOCAINE HYDROCHLORIDE 10 ML: 10 INJECTION, SOLUTION EPIDURAL; INFILTRATION; INTRACAUDAL; PERINEURAL at 08:58

## 2019-04-30 RX ADMIN — BUPIVACAINE HYDROCHLORIDE 4 ML: 5 INJECTION, SOLUTION EPIDURAL; INTRACAUDAL at 09:03

## 2019-05-01 ENCOUNTER — TELEPHONE (OUTPATIENT)
Dept: RADIOLOGY | Facility: CLINIC | Age: 69
End: 2019-05-01

## 2019-05-08 ENCOUNTER — OFFICE VISIT (OUTPATIENT)
Dept: FAMILY MEDICINE CLINIC | Facility: CLINIC | Age: 69
End: 2019-05-08
Payer: MEDICARE

## 2019-05-08 VITALS — WEIGHT: 187 LBS | BODY MASS INDEX: 36.52 KG/M2 | DIASTOLIC BLOOD PRESSURE: 66 MMHG | SYSTOLIC BLOOD PRESSURE: 138 MMHG

## 2019-05-08 DIAGNOSIS — I10 ESSENTIAL HYPERTENSION: ICD-10-CM

## 2019-05-08 DIAGNOSIS — E11.65 TYPE 2 DIABETES MELLITUS WITH HYPERGLYCEMIA, WITHOUT LONG-TERM CURRENT USE OF INSULIN (HCC): ICD-10-CM

## 2019-05-08 DIAGNOSIS — Z00.00 MEDICARE ANNUAL WELLNESS VISIT, INITIAL: Primary | ICD-10-CM

## 2019-05-08 PROCEDURE — G0438 PPPS, INITIAL VISIT: HCPCS | Performed by: FAMILY MEDICINE

## 2019-05-08 PROCEDURE — 99213 OFFICE O/P EST LOW 20 MIN: CPT | Performed by: FAMILY MEDICINE

## 2019-05-08 PROCEDURE — 36415 COLL VENOUS BLD VENIPUNCTURE: CPT | Performed by: FAMILY MEDICINE

## 2019-05-09 LAB
BUN SERPL-MCNC: 24 MG/DL (ref 7–25)
BUN/CREAT SERPL: ABNORMAL (CALC) (ref 6–22)
CALCIUM SERPL-MCNC: 9.7 MG/DL (ref 8.6–10.4)
CHLORIDE SERPL-SCNC: 101 MMOL/L (ref 98–110)
CO2 SERPL-SCNC: 32 MMOL/L (ref 20–32)
CREAT SERPL-MCNC: 0.71 MG/DL (ref 0.6–0.93)
EST. AVERAGE GLUCOSE BLD GHB EST-MCNC: 126 (CALC)
EST. AVERAGE GLUCOSE BLD GHB EST-SCNC: 7 (CALC)
GLUCOSE SERPL-MCNC: 112 MG/DL (ref 65–99)
HBA1C MFR BLD: 6 % OF TOTAL HGB
POTASSIUM SERPL-SCNC: 4.3 MMOL/L (ref 3.5–5.3)
SL AMB EGFR AFRICAN AMERICAN: 99 ML/MIN/1.73M2
SL AMB EGFR NON AFRICAN AMERICAN: 86 ML/MIN/1.73M2
SODIUM SERPL-SCNC: 138 MMOL/L (ref 135–146)

## 2019-05-14 ENCOUNTER — HOSPITAL ENCOUNTER (OUTPATIENT)
Dept: RADIOLOGY | Facility: CLINIC | Age: 69
Discharge: HOME | End: 2019-05-14
Attending: ANESTHESIOLOGY | Admitting: ANESTHESIOLOGY
Payer: MEDICARE

## 2019-05-14 ENCOUNTER — TELEPHONE (OUTPATIENT)
Dept: RADIOLOGY | Facility: CLINIC | Age: 69
End: 2019-05-14

## 2019-05-14 VITALS
OXYGEN SATURATION: 95 % | DIASTOLIC BLOOD PRESSURE: 70 MMHG | HEART RATE: 69 BPM | TEMPERATURE: 98.1 F | RESPIRATION RATE: 18 BRPM | SYSTOLIC BLOOD PRESSURE: 146 MMHG

## 2019-05-14 DIAGNOSIS — M47.816 LUMBAR SPONDYLOSIS: ICD-10-CM

## 2019-05-14 PROCEDURE — 64636 DESTROY L/S FACET JNT ADDL: CPT | Performed by: ANESTHESIOLOGY

## 2019-05-14 PROCEDURE — 64635 DESTROY LUMB/SAC FACET JNT: CPT | Performed by: ANESTHESIOLOGY

## 2019-05-14 RX ORDER — BUPIVACAINE HYDROCHLORIDE 5 MG/ML
30 INJECTION, SOLUTION EPIDURAL; INTRACAUDAL ONCE
Status: COMPLETED | OUTPATIENT
Start: 2019-05-14 | End: 2019-05-14

## 2019-05-14 RX ORDER — LIDOCAINE HYDROCHLORIDE 10 MG/ML
10 INJECTION, SOLUTION EPIDURAL; INFILTRATION; INTRACAUDAL; PERINEURAL ONCE
Status: COMPLETED | OUTPATIENT
Start: 2019-05-14 | End: 2019-05-14

## 2019-05-14 RX ADMIN — LIDOCAINE HYDROCHLORIDE 4 ML: 20 INJECTION, SOLUTION EPIDURAL; INFILTRATION; INTRACAUDAL; PERINEURAL at 08:26

## 2019-05-14 RX ADMIN — BUPIVACAINE HYDROCHLORIDE 4 ML: 5 INJECTION, SOLUTION EPIDURAL; INTRACAUDAL at 08:24

## 2019-05-14 RX ADMIN — LIDOCAINE HYDROCHLORIDE 8 ML: 10 INJECTION, SOLUTION EPIDURAL; INFILTRATION; INTRACAUDAL; PERINEURAL at 08:20

## 2019-06-07 ENCOUNTER — PREP FOR PROCEDURE (OUTPATIENT)
Dept: OBGYN CLINIC | Facility: HOSPITAL | Age: 69
End: 2019-06-07

## 2019-06-07 ENCOUNTER — OFFICE VISIT (OUTPATIENT)
Dept: OBGYN CLINIC | Facility: HOSPITAL | Age: 69
End: 2019-06-07
Payer: MEDICARE

## 2019-06-07 VITALS
HEART RATE: 90 BPM | WEIGHT: 188 LBS | BODY MASS INDEX: 36.91 KG/M2 | SYSTOLIC BLOOD PRESSURE: 133 MMHG | HEIGHT: 60 IN | DIASTOLIC BLOOD PRESSURE: 88 MMHG

## 2019-06-07 DIAGNOSIS — M65.322 TRIGGER FINGER, LEFT INDEX FINGER: ICD-10-CM

## 2019-06-07 DIAGNOSIS — M65.331 TRIGGER FINGER, RIGHT MIDDLE FINGER: Primary | ICD-10-CM

## 2019-06-07 PROCEDURE — 99214 OFFICE O/P EST MOD 30 MIN: CPT | Performed by: ORTHOPAEDIC SURGERY

## 2019-06-07 RX ORDER — LIDOCAINE HYDROCHLORIDE AND EPINEPHRINE 10; 10 MG/ML; UG/ML
20 INJECTION, SOLUTION INFILTRATION; PERINEURAL ONCE
Status: CANCELLED | OUTPATIENT
Start: 2019-06-07 | End: 2019-06-07

## 2019-06-07 RX ORDER — LIDOCAINE HYDROCHLORIDE AND EPINEPHRINE 10; 10 MG/ML; UG/ML
10 INJECTION, SOLUTION INFILTRATION; PERINEURAL ONCE
Status: CANCELLED | OUTPATIENT
Start: 2019-06-07 | End: 2019-06-07

## 2019-06-10 DIAGNOSIS — I10 ESSENTIAL HYPERTENSION: ICD-10-CM

## 2019-06-10 RX ORDER — HYDROCHLOROTHIAZIDE 25 MG/1
25 TABLET ORAL DAILY
Qty: 90 TABLET | Refills: 0 | Status: SHIPPED | OUTPATIENT
Start: 2019-06-10 | End: 2019-08-12 | Stop reason: SDUPTHER

## 2019-06-20 LAB
LEFT EYE DIABETIC RETINOPATHY: NORMAL
RIGHT EYE DIABETIC RETINOPATHY: NORMAL

## 2019-06-24 DIAGNOSIS — E11.9 TYPE 2 DIABETES MELLITUS WITHOUT COMPLICATION, WITHOUT LONG-TERM CURRENT USE OF INSULIN (HCC): ICD-10-CM

## 2019-06-24 RX ORDER — GLIPIZIDE 5 MG/1
TABLET, FILM COATED, EXTENDED RELEASE ORAL
Qty: 90 TABLET | Refills: 1 | Status: SHIPPED | OUTPATIENT
Start: 2019-06-24 | End: 2019-11-06 | Stop reason: SDUPTHER

## 2019-06-25 ENCOUNTER — TELEPHONE (OUTPATIENT)
Dept: PAIN MEDICINE | Facility: CLINIC | Age: 69
End: 2019-06-25

## 2019-06-25 DIAGNOSIS — M54.16 LUMBAR RADICULOPATHY: Primary | ICD-10-CM

## 2019-06-25 DIAGNOSIS — M51.36 DDD (DEGENERATIVE DISC DISEASE), LUMBAR: ICD-10-CM

## 2019-06-25 DIAGNOSIS — M48.062 SPINAL STENOSIS OF LUMBAR REGION WITH NEUROGENIC CLAUDICATION: ICD-10-CM

## 2019-06-25 DIAGNOSIS — M47.816 FACET ARTHROPATHY, LUMBAR: ICD-10-CM

## 2019-06-25 DIAGNOSIS — G89.4 CHRONIC PAIN SYNDROME: ICD-10-CM

## 2019-06-25 DIAGNOSIS — M47.816 LUMBAR SPONDYLOSIS: ICD-10-CM

## 2019-06-27 RX ORDER — TRAMADOL HYDROCHLORIDE 50 MG/1
50 TABLET ORAL EVERY 8 HOURS PRN
Qty: 90 TABLET | Refills: 0 | Status: SHIPPED | OUTPATIENT
Start: 2019-06-27 | End: 2019-07-18 | Stop reason: SDUPTHER

## 2019-07-11 ENCOUNTER — HOSPITAL ENCOUNTER (OUTPATIENT)
Dept: MRI IMAGING | Facility: HOSPITAL | Age: 69
Discharge: HOME | End: 2019-07-11
Attending: ANESTHESIOLOGY
Payer: MEDICARE

## 2019-07-11 DIAGNOSIS — M48.062 SPINAL STENOSIS OF LUMBAR REGION WITH NEUROGENIC CLAUDICATION: ICD-10-CM

## 2019-07-11 DIAGNOSIS — M54.16 LUMBAR RADICULOPATHY: ICD-10-CM

## 2019-07-11 PROCEDURE — 72148 MRI LUMBAR SPINE W/O DYE: CPT

## 2019-07-18 ENCOUNTER — CLINICAL SUPPORT (OUTPATIENT)
Dept: PAIN MEDICINE | Facility: CLINIC | Age: 69
End: 2019-07-18
Payer: MEDICARE

## 2019-07-18 VITALS
BODY MASS INDEX: 36.91 KG/M2 | HEIGHT: 60 IN | DIASTOLIC BLOOD PRESSURE: 82 MMHG | WEIGHT: 188 LBS | HEART RATE: 55 BPM | SYSTOLIC BLOOD PRESSURE: 138 MMHG

## 2019-07-18 DIAGNOSIS — M47.816 FACET ARTHROPATHY, LUMBAR: ICD-10-CM

## 2019-07-18 DIAGNOSIS — M48.062 SPINAL STENOSIS OF LUMBAR REGION WITH NEUROGENIC CLAUDICATION: ICD-10-CM

## 2019-07-18 DIAGNOSIS — Z79.891 ENCOUNTER FOR LONG-TERM OPIATE ANALGESIC USE: ICD-10-CM

## 2019-07-18 DIAGNOSIS — M47.816 LUMBAR SPONDYLOSIS: ICD-10-CM

## 2019-07-18 DIAGNOSIS — M54.16 LUMBAR RADICULOPATHY: Primary | ICD-10-CM

## 2019-07-18 DIAGNOSIS — M51.36 DDD (DEGENERATIVE DISC DISEASE), LUMBAR: ICD-10-CM

## 2019-07-18 DIAGNOSIS — M46.1 SACROILIITIS (HCC): ICD-10-CM

## 2019-07-18 DIAGNOSIS — F11.20 UNCOMPLICATED OPIOID DEPENDENCE (HCC): ICD-10-CM

## 2019-07-18 DIAGNOSIS — G89.4 CHRONIC PAIN SYNDROME: ICD-10-CM

## 2019-07-18 PROCEDURE — 80305 DRUG TEST PRSMV DIR OPT OBS: CPT | Performed by: ANESTHESIOLOGY

## 2019-07-18 PROCEDURE — 99214 OFFICE O/P EST MOD 30 MIN: CPT | Performed by: ANESTHESIOLOGY

## 2019-07-18 RX ORDER — TRAMADOL HYDROCHLORIDE 50 MG/1
50 TABLET ORAL EVERY 8 HOURS PRN
Qty: 90 TABLET | Refills: 2 | Status: SHIPPED | OUTPATIENT
Start: 2019-07-25 | End: 2019-10-10 | Stop reason: SDUPTHER

## 2019-07-18 NOTE — PROGRESS NOTES
Assessment  1  Lumbar radiculopathy    2  Lumbar spondylosis    3  Uncomplicated opioid dependence ([address] )    4  Spinal stenosis of lumbar region with neurogenic claudication    5  Chronic pain syndrome    6  Sacroiliitis ([address] )    7  DDD (degenerative disc disease), lumbar    8  Facet arthropathy, lumbar    9  Encounter for long-term opiate analgesic use        Plan  77-year-old female returning for follow-up of lumbosacral back pain  The patient does have a history of lumbar degenerative disc disease, spondylosis, stenosis, and radiculopathy  The patient's radicular symptoms were resolved with left L4 and L5 TFESI  The patient's low back pain has significantly improved since bilateral L3-4, L4-5, and L5-S1 RFA and she is still noticing relief from this, however the the pain in her low back is more in the sacral area which seems to be secondary to sacroiliitis  The patient does have surgery scheduled with orthopedics for trigger finger release in the next few weeks  She is currently taking meloxicam 7 5 mg b i d  P r n  And tramadol 50 mg p r n  With about 60% relief  She denies any side effects from the medication  The patient is currently looking into medical marijuana, however she is not sure if she is going to pursue this option  1  The patient may benefit from bilateral SI joint injections, however she would like to hold off since she has upcoming surgery with orthopedics  Will obtain clearance from orthopedics if needed  2  The patient will continue with tramadol 50 mg q 8 hours p r n     A prescription was sent to the pharmacy with a do not fill date before July 25, 2019 with 2 refills  3  The patient will continue with meloxicam 7 5 mg b i d  P r n   4  I will follow up the patient in 3 months or sooner if needed       There are risks associated with opioid medications, including dependence, addiction and tolerance  The patient understands and agrees to use these medications only as prescribed  Potential side effects of the medications include, but are not limited to, constipation, drowsiness, addiction, impaired judgment and risk of fatal overdose if not taken as prescribed  The patient was warned against driving while taking sedation medications  Sharing medications is a felony  At this point in time, the patient is showing no signs of addiction, abuse, diversion or suicidal ideation  A urine drug screen was collected at today's office visit as part of our medication management protocol  The point of care testing results were appropriate for what was being prescribed  The specimen will be sent for confirmatory testing  The drug screen is medically necessary because the patient is either dependent on opioid medication or is being considered for opioid medication therapy and the results could impact ongoing or future treatment  The drug screen is to evaluate for the presences or absence of prescribed, non-prescribed, and/or illicit drugs/substances  [place] Prescription Drug Monitoring Program report was reviewed and was appropriate       My impressions and treatment recommendations were discussed in detail with the patient who verbalized understanding and had no further questions  Discharge instructions were provided  I personally saw and examined the patient and I agree with the above discussed plan of care  No orders of the defined types were placed in this encounter  No orders of the defined types were placed in this encounter  History of Present Illness    Beck Romo is a 68 y o  female returning for follow-up of lumbosacral back pain without any radiation into her lower extremities  The patient did have radiating left lower extremity pain, however her were resolved with left L4 and L5 TFESI  She denies any bladder or bowel incontinence or saddle anesthesia   The patient's low back pain has significantly improved since bilateral L3-4, L4-5, and L5-S1 RFA and she is still noticing relief from this, however the the pain in her low back is more in the sacral area  The patient does have surgery scheduled with orthopedics for trigger finger release in the next few weeks  She is currently taking meloxicam 7 5 mg b i d  P r n  And tramadol 50 mg p r n  With about 60% relief  She denies any side effects from the medication  The patient rates her pain an 8/10 on the pain does not follow any particular pattern throughout the day  The pain is intermittent and described as dull, aching, and sharp  The pain is worse with standing, walking, and sitting  The pain is alleviated with lying down  I have personally reviewed and/or updated the patient's past medical history, past surgical history, family history, social history, current medications, allergies, and vital signs today  Other than as stated above, the patient denies any interval changes in medications, medical condition, mental condition, symptoms, or allergies since the last office visit  Review of Systems   Constitutional: Negative for fever and unexpected weight change  HENT: Negative for trouble swallowing  Eyes: Negative for visual disturbance  Respiratory: Negative for shortness of breath and wheezing  Cardiovascular: Negative for chest pain and palpitations  Gastrointestinal: Negative for constipation, diarrhea, nausea and vomiting  Endocrine: Negative for cold intolerance, heat intolerance and polydipsia  Genitourinary: Negative for difficulty urinating and frequency  Musculoskeletal: Positive for gait problem  Negative for arthralgias, joint swelling and myalgias  Pain in extemity   Skin: Negative for rash  Neurological: Negative for dizziness, seizures, syncope, weakness and headaches  Hematological: Does not bruise/bleed easily  Psychiatric/Behavioral: Negative for dysphoric mood  All other systems reviewed and are negative        Patient Active Problem List   Diagnosis  Lumbar radiculopathy    Spinal stenosis of lumbar region with neurogenic claudication    Lumbar spondylosis    Chronic right hip pain    Type 2 diabetes mellitus with hyperglycemia, without long-term current use of insulin (HCC)    Primary osteoarthritis of first carpometacarpal joint of left hand    Arthritis of carpometacarpal ([facility]) joint of left thumb    Cataract of both eyes    DDD (degenerative disc disease), lumbar    Facet arthropathy, lumbar    Keratosis    Obesity (BMI 30-39  9)    Obstructive sleep apnea of adult    Secondary polycythemia    Chronic pain syndrome    Current smoker on some days    Essential hypertension    [facility]    Medicare annual wellness visit, initial    Trigger finger, left index finger    Trigger finger, right middle finger       Past Medical History:   Diagnosis Date    Asthma     Chronic obstructive lung disease ([facility] )     Community acquired pneumonia     LAST ASSESSED: [id number]    COPD (chronic obstructive pulmonary disease) ([facility] )     Diabetes mellitus ([facility] )     History of MRSA infection     2008 liver sx    Liver disease     Sleep apnea     Viral warts     LAST ASSESSED: [id number]       Past Surgical History:   Procedure Laterality Date    ABDOMINAL SURGERY      ABDOMINOPLASTY      CHOLECYSTECTOMY      COSMETIC SURGERY      EYE SURGERY      Bilateral cataracts 2015 Dr Brannon Lucio       FINGER SURGERY      HAND SURGERY      HYSTERECTOMY      LIVER SURGERY      VA REPAIR INTERCARP/CARP-METACARP JT Right 9/9/2016    Procedure: THUMB TRAPEZIECTOMY; BASAL JOINT ARTHROPLASTY WITH APL AUTOGRAFT;  Surgeon: Addie Mai MD;  [facility];  Service: Orthopedics    VA REPAIR INTERCARP/CARP-METACARP JT Left 5/29/2018    Procedure: Camelia Cr;  Surgeon: Hellen Gray MD;  [facility];  Service: Orthopedics    VA TRANSPLANT HAND TENDON Left 5/29/2018    Procedure: TRANSFER TENDON HAND/WRIST FCR from forearm to wrist;  Surgeon: Marilu Perez MD;  [facility];  Service: Orthopedics       Family History   Problem Relation Age of Onset    Heart attack Father     Heart disease Family     Diabetes Family     Thyroid disease Family     Ovarian cancer Family     Heart attack Mother     Ovarian cancer Sister     Heart disease Other         CARDIAC DISORDER    Diabetes Other     Liver cancer Other     Breast cancer Other     Stomach cancer Other        Social History     Occupational History    Not on file   Tobacco Use    Smoking status: Current Every Day Smoker     Packs/day: 0 20    Smokeless tobacco: Never Used   Substance and Sexual Activity    Alcohol use: Not Currently    Drug use: No    Sexual activity: Not on file       Current Outpatient Medications on File Prior to Visit   Medication Sig    albuterol (PROVENTIL HFA,VENTOLIN HFA) 90 mcg/act inhaler Inhale 2 puffs every 6 (six) hours as needed for wheezing    diphenhydrAMINE (BENADRYL) 25 mg capsule Take 25 mg by mouth every 6 (six) hours as needed for itching    glipiZIDE (GLUCOTROL XL) 5 mg 24 hr tablet TAKE 1 TABLET EVERY DAY  30  MINUTES BEFORE BREAKFAST    glucose blood (ACCU-CHEK JESS PLUS) test strip Test BS 3 times daily  DX E11 9    hydrochlorothiazide (HYDRODIURIL) 25 mg tablet Take 1 tablet (25 mg total) by mouth daily for 90 days    lisinopril (ZESTRIL) 20 mg tablet Take 1 tablet (20 mg total) by mouth daily    meloxicam (MOBIC) 7 5 mg tablet Take 1 tablet (7 5 mg total) by mouth 2 (two) times a day as needed for moderate pain    metFORMIN (GLUCOPHAGE) 1000 MG tablet TAKE 1 TABLET TWICE DAILY    traMADol (ULTRAM) 50 mg tablet Take 1 tablet (50 mg total) by mouth every 8 (eight) hours as needed for moderate pain     No current facility-administered medications on file prior to visit  No Known Allergies    Physical Exam    There were no vitals taken for this visit      Constitutional: normal, well developed, well nourished, alert, in no distress and non-toxic and no overt pain behavior  Eyes: anicteric  HEENT: grossly intact  Neck: supple, symmetric, trachea midline and no masses   Pulmonary:even and unlabored  Cardiovascular:No edema or pitting edema present  Skin:Normal without rashes or lesions and well hydrated  Psychiatric:Mood and affect appropriate  Neurologic:Cranial Nerves II-XII grossly intact  Musculoskeletal:normal gait  Bilateral lumbar paraspinals minimally tender to palpation from L4-S1  Bilateral SI joints tender to palpation  Bilateral lower extremity strength 5/5 in all muscle groups  Sensation intact to light touch in L3 thru S1 dermatomes bilaterally  Negative straight leg raise bilaterally  Positive Nehemiah's test bilaterally  Imaging      PACS Images      Show images for MRI lumbar spine wo contrast   Study Result     MRI LUMBAR SPINE WITHOUT CONTRAST     INDICATION: M54 [id number]: Radiculopathy, lumbar region  M48 062: Spinal stenosis, lumbar region with neurogenic claudication      COMPARISON:  MRI from 8/13/2014     TECHNIQUE:  Sagittal T1, sagittal T2, sagittal inversion recovery, axial T1 and axial T2, coronal T2     IMAGE QUALITY:  Diagnostic     FINDINGS:     VERTEBRAL BODIES:  There is a heterogeneous appearance of the visualized osseous structures without focal suspicious lesion  Vertebral body heights are maintained  There is Modic type I endplate degenerative change at L4-L5 and L5-S1      SACRUM:  Normal signal within the sacrum   No evidence of insufficiency or stress fracture      DISTAL CORD AND CONUS:  Normal size and signal within the distal cord and conus        PARASPINAL SOFT TISSUES:  Paraspinal soft tissues are unremarkable      LOWER THORACIC DISC SPACES:  There is disc space narrowing within the lower thoracic spine      LUMBAR DISC SPACES:  There is multilevel disc space degeneration      L1-L2:  No significant canal stenosis or foraminal narrowing      L2-L3:  There is a minimal bulge  There is facet arthrosis  There is no significant canal stenosis  There is no significant foraminal narrowing      L3-L4: There is disc space narrowing  There is a bulging annulus  There is facet arthrosis with ligamentum flavum thickening  There is overall mild mass effect on thecal sac  Findings are overall progressed  There is mild foraminal narrowing      L4-L5:  There is disc space narrowing  There is vacuum disc phenomenon  There is a bulging annulus  There is dorsal osteophytosis  There is facet arthrosis  There is mild mass effect on the thecal sac  There is endplate spurring  There is mild to   moderate left and mild right foraminal narrowing      L5-S1:  There is vacuum disc phenomenon  There is a bulging annulus  There is facet arthrosis  There is endplate spurring  There is moderate to severe bilateral foraminal narrowing with contact of the exiting nerve roots    Findings are grossly   stable      IMPRESSION:     Multilevel degenerative changes of the lumbar spine, as described above      Multifactorial disease results in overall mild mass affect on the thecal sac at L3-L4, slightly progressed      Multilevel degenerative changes in the remainder of the spine are otherwise not significantly changed         Workstation performed: [id number]

## 2019-07-19 ENCOUNTER — APPOINTMENT (OUTPATIENT)
Dept: MRI IMAGING | Facility: HOSPITAL | Age: 69
End: 2019-07-19
Payer: MEDICARE

## 2019-07-19 ENCOUNTER — HOSPITAL ENCOUNTER (OUTPATIENT)
Facility: HOSPITAL | Age: 69
Setting detail: OBSERVATION
Discharge: HOME | End: 2019-07-20
Attending: EMERGENCY MEDICINE | Admitting: INTERNAL MEDICINE
Payer: MEDICARE

## 2019-07-19 DIAGNOSIS — R42 DIZZINESS: ICD-10-CM

## 2019-07-19 DIAGNOSIS — R27.0 ATAXIA: Primary | ICD-10-CM

## 2019-07-19 PROBLEM — J44.9 COPD (CHRONIC OBSTRUCTIVE PULMONARY DISEASE) (HCC): Status: ACTIVE | Noted: 2019-07-19

## 2019-07-19 LAB
ALBUMIN SERPL BCP-MCNC: 3.9 G/DL (ref 3.5–5)
ALP SERPL-CCNC: 72 U/L (ref 46–116)
ALT SERPL W P-5'-P-CCNC: 27 U/L (ref 12–78)
ANION GAP SERPL CALCULATED.3IONS-SCNC: 8 MMOL/L (ref 4–13)
AST SERPL W P-5'-P-CCNC: 13 U/L (ref 5–45)
BASOPHILS # BLD AUTO: 0.05 THOUSANDS/ΜL (ref 0–0.1)
BASOPHILS NFR BLD AUTO: 1 % (ref 0–1)
BILIRUB SERPL-MCNC: 0.3 MG/DL (ref 0.2–1)
BUN SERPL-MCNC: 20 MG/DL (ref 5–25)
CALCIUM SERPL-MCNC: 10.3 MG/DL (ref 8.3–10.1)
CHLORIDE SERPL-SCNC: 106 MMOL/L (ref 100–108)
CO2 SERPL-SCNC: 30 MMOL/L (ref 21–32)
CREAT SERPL-MCNC: 0.66 MG/DL (ref 0.6–1.3)
EOSINOPHIL # BLD AUTO: 0.13 THOUSAND/ΜL (ref 0–0.61)
EOSINOPHIL NFR BLD AUTO: 1 % (ref 0–6)
ERYTHROCYTE [DISTWIDTH] IN BLOOD BY AUTOMATED COUNT: 13.5 % (ref 11.6–15.1)
GFR SERPL CREATININE-BSD FRML MDRD: 89 ML/MIN/1.73SQ M
GLUCOSE SERPL-MCNC: 142 MG/DL (ref 65–140)
HCT VFR BLD AUTO: 44.8 % (ref 34.8–46.1)
HGB BLD-MCNC: 14.8 G/DL (ref 11.5–15.4)
IMM GRANULOCYTES # BLD AUTO: 0.04 THOUSAND/UL (ref 0–0.2)
IMM GRANULOCYTES NFR BLD AUTO: 0 % (ref 0–2)
LYMPHOCYTES # BLD AUTO: 1.5 THOUSANDS/ΜL (ref 0.6–4.47)
LYMPHOCYTES NFR BLD AUTO: 15 % (ref 14–44)
MCH RBC QN AUTO: 32.1 PG (ref 26.8–34.3)
MCHC RBC AUTO-ENTMCNC: 33 G/DL (ref 31.4–37.4)
MCV RBC AUTO: 97 FL (ref 82–98)
MONOCYTES # BLD AUTO: 0.73 THOUSAND/ΜL (ref 0.17–1.22)
MONOCYTES NFR BLD AUTO: 7 % (ref 4–12)
NEUTROPHILS # BLD AUTO: 7.38 THOUSANDS/ΜL (ref 1.85–7.62)
NEUTS SEG NFR BLD AUTO: 76 % (ref 43–75)
NRBC BLD AUTO-RTO: 0 /100 WBCS
PLATELET # BLD AUTO: 151 THOUSANDS/UL (ref 149–390)
PMV BLD AUTO: 10.8 FL (ref 8.9–12.7)
POTASSIUM SERPL-SCNC: 4.7 MMOL/L (ref 3.5–5.3)
PROT SERPL-MCNC: 7.6 G/DL (ref 6.4–8.2)
RBC # BLD AUTO: 4.61 MILLION/UL (ref 3.81–5.12)
SODIUM SERPL-SCNC: 144 MMOL/L (ref 136–145)
WBC # BLD AUTO: 9.83 THOUSAND/UL (ref 4.31–10.16)

## 2019-07-19 PROCEDURE — 99220 PR INITIAL OBSERVATION CARE/DAY 70 MINUTES: CPT | Performed by: FAMILY MEDICINE

## 2019-07-19 PROCEDURE — 96374 THER/PROPH/DIAG INJ IV PUSH: CPT

## 2019-07-19 PROCEDURE — 36415 COLL VENOUS BLD VENIPUNCTURE: CPT | Performed by: EMERGENCY MEDICINE

## 2019-07-19 PROCEDURE — 70551 MRI BRAIN STEM W/O DYE: CPT

## 2019-07-19 PROCEDURE — 99284 EMERGENCY DEPT VISIT MOD MDM: CPT | Performed by: EMERGENCY MEDICINE

## 2019-07-19 PROCEDURE — 85025 COMPLETE CBC W/AUTO DIFF WBC: CPT | Performed by: EMERGENCY MEDICINE

## 2019-07-19 PROCEDURE — 1123F ACP DISCUSS/DSCN MKR DOCD: CPT | Performed by: EMERGENCY MEDICINE

## 2019-07-19 PROCEDURE — 96361 HYDRATE IV INFUSION ADD-ON: CPT

## 2019-07-19 PROCEDURE — 80053 COMPREHEN METABOLIC PANEL: CPT | Performed by: EMERGENCY MEDICINE

## 2019-07-19 PROCEDURE — 99285 EMERGENCY DEPT VISIT HI MDM: CPT

## 2019-07-19 RX ORDER — MELOXICAM 7.5 MG/1
7.5 TABLET ORAL 2 TIMES DAILY PRN
Status: DISCONTINUED | OUTPATIENT
Start: 2019-07-19 | End: 2019-07-20 | Stop reason: HOSPADM

## 2019-07-19 RX ORDER — DIAZEPAM 5 MG/ML
2.5 INJECTION, SOLUTION INTRAMUSCULAR; INTRAVENOUS ONCE
Status: COMPLETED | OUTPATIENT
Start: 2019-07-19 | End: 2019-07-19

## 2019-07-19 RX ORDER — GLIPIZIDE 2.5 MG/1
5 TABLET, EXTENDED RELEASE ORAL
Status: DISCONTINUED | OUTPATIENT
Start: 2019-07-20 | End: 2019-07-20 | Stop reason: HOSPADM

## 2019-07-19 RX ORDER — ALBUTEROL SULFATE 90 UG/1
2 AEROSOL, METERED RESPIRATORY (INHALATION) EVERY 6 HOURS PRN
Status: DISCONTINUED | OUTPATIENT
Start: 2019-07-19 | End: 2019-07-20 | Stop reason: HOSPADM

## 2019-07-19 RX ORDER — ONDANSETRON 4 MG/1
8 TABLET, ORALLY DISINTEGRATING ORAL ONCE
Status: COMPLETED | OUTPATIENT
Start: 2019-07-19 | End: 2019-07-19

## 2019-07-19 RX ORDER — HYDROCHLOROTHIAZIDE 25 MG/1
25 TABLET ORAL DAILY
Status: DISCONTINUED | OUTPATIENT
Start: 2019-07-20 | End: 2019-07-20 | Stop reason: HOSPADM

## 2019-07-19 RX ORDER — ACETAMINOPHEN 325 MG/1
650 TABLET ORAL EVERY 6 HOURS PRN
Status: DISCONTINUED | OUTPATIENT
Start: 2019-07-19 | End: 2019-07-20 | Stop reason: HOSPADM

## 2019-07-19 RX ORDER — TRAMADOL HYDROCHLORIDE 50 MG/1
50 TABLET ORAL EVERY 8 HOURS PRN
Status: DISCONTINUED | OUTPATIENT
Start: 2019-07-19 | End: 2019-07-20 | Stop reason: HOSPADM

## 2019-07-19 RX ORDER — DIPHENHYDRAMINE HCL 25 MG
25 TABLET ORAL EVERY 6 HOURS PRN
Status: DISCONTINUED | OUTPATIENT
Start: 2019-07-19 | End: 2019-07-20 | Stop reason: HOSPADM

## 2019-07-19 RX ORDER — ONDANSETRON 2 MG/ML
4 INJECTION INTRAMUSCULAR; INTRAVENOUS EVERY 4 HOURS PRN
Status: DISCONTINUED | OUTPATIENT
Start: 2019-07-19 | End: 2019-07-20 | Stop reason: HOSPADM

## 2019-07-19 RX ORDER — SODIUM CHLORIDE, SODIUM LACTATE, POTASSIUM CHLORIDE, CALCIUM CHLORIDE 600; 310; 30; 20 MG/100ML; MG/100ML; MG/100ML; MG/100ML
500 INJECTION, SOLUTION INTRAVENOUS CONTINUOUS
Status: DISCONTINUED | OUTPATIENT
Start: 2019-07-19 | End: 2019-07-19

## 2019-07-19 RX ORDER — LISINOPRIL 20 MG/1
20 TABLET ORAL DAILY
Status: DISCONTINUED | OUTPATIENT
Start: 2019-07-20 | End: 2019-07-20 | Stop reason: HOSPADM

## 2019-07-19 RX ORDER — MECLIZINE HYDROCHLORIDE 25 MG/1
25 TABLET ORAL EVERY 8 HOURS SCHEDULED
Status: DISCONTINUED | OUTPATIENT
Start: 2019-07-19 | End: 2019-07-20 | Stop reason: HOSPADM

## 2019-07-19 RX ADMIN — DIAZEPAM 2.5 MG: 5 INJECTION, SOLUTION INTRAMUSCULAR; INTRAVENOUS at 19:41

## 2019-07-19 RX ADMIN — TRAMADOL HYDROCHLORIDE 50 MG: 50 TABLET, COATED ORAL at 22:56

## 2019-07-19 RX ADMIN — METFORMIN HYDROCHLORIDE 1000 MG: 500 TABLET, FILM COATED ORAL at 22:56

## 2019-07-19 RX ADMIN — MECLIZINE HYDROCHLORIDE 25 MG: 25 TABLET ORAL at 22:56

## 2019-07-19 RX ADMIN — SODIUM CHLORIDE, SODIUM LACTATE, POTASSIUM CHLORIDE, AND CALCIUM CHLORIDE 500 ML: .6; .31; .03; .02 INJECTION, SOLUTION INTRAVENOUS at 19:52

## 2019-07-19 RX ADMIN — ONDANSETRON 8 MG: 4 TABLET, ORALLY DISINTEGRATING ORAL at 19:30

## 2019-07-19 NOTE — ED PROCEDURE NOTE
PROCEDURE  ECG 12 Lead Documentation Only  Date/Time: 7/19/2019 7:56 PM  Performed by: Gage Patterson MD  Authorized by: Gage Patterson MD     Indications / Diagnosis:  Dizziness  ECG reviewed by me, the ED Provider: yes    Patient location:  ED and bedside  Previous ECG:     Previous ECG:  Compared to current    Comparison ECG info:  10/18/17- no sign changes    Similarity:  No change    Comparison to cardiac monitor: Yes    Interpretation:     Interpretation: non-specific    Rate:     ECG rate:  83    ECG rate assessment: normal    Rhythm:     Rhythm: sinus rhythm    Ectopy:     Ectopy: none    QRS:     QRS axis:  Normal    QRS intervals:   Wide  Conduction:     Conduction: abnormal      Abnormal conduction: complete RBBB    ST segments:     ST segments:  Normal  T waves:     T waves: flattening      Flattening:  III, V1, V2, V3 and V4  Q waves:     Q waves:  V1  Other findings:     Other findings: U wave    Comments:      No ecg signs of ischemia/ injury         Gage Patterson MD  07/19/19 2001

## 2019-07-20 VITALS
DIASTOLIC BLOOD PRESSURE: 95 MMHG | TEMPERATURE: 98.4 F | RESPIRATION RATE: 18 BRPM | BODY MASS INDEX: 35.5 KG/M2 | WEIGHT: 188 LBS | HEIGHT: 61 IN | OXYGEN SATURATION: 90 % | SYSTOLIC BLOOD PRESSURE: 139 MMHG | HEART RATE: 90 BPM

## 2019-07-20 LAB
ANION GAP SERPL CALCULATED.3IONS-SCNC: 9 MMOL/L (ref 4–13)
BUN SERPL-MCNC: 19 MG/DL (ref 5–25)
CALCIUM SERPL-MCNC: 9.5 MG/DL (ref 8.3–10.1)
CHLORIDE SERPL-SCNC: 106 MMOL/L (ref 100–108)
CO2 SERPL-SCNC: 28 MMOL/L (ref 21–32)
CREAT SERPL-MCNC: 0.75 MG/DL (ref 0.6–1.3)
ERYTHROCYTE [DISTWIDTH] IN BLOOD BY AUTOMATED COUNT: 13.7 % (ref 11.6–15.1)
GFR SERPL CREATININE-BSD FRML MDRD: 80 ML/MIN/1.73SQ M
GLUCOSE SERPL-MCNC: 126 MG/DL (ref 65–140)
HCT VFR BLD AUTO: 42.7 % (ref 34.8–46.1)
HGB BLD-MCNC: 13.5 G/DL (ref 11.5–15.4)
MCH RBC QN AUTO: 31.3 PG (ref 26.8–34.3)
MCHC RBC AUTO-ENTMCNC: 31.6 G/DL (ref 31.4–37.4)
MCV RBC AUTO: 99 FL (ref 82–98)
PLATELET # BLD AUTO: 143 THOUSANDS/UL (ref 149–390)
PMV BLD AUTO: 11.2 FL (ref 8.9–12.7)
POTASSIUM SERPL-SCNC: 4.2 MMOL/L (ref 3.5–5.3)
RBC # BLD AUTO: 4.32 MILLION/UL (ref 3.81–5.12)
SODIUM SERPL-SCNC: 143 MMOL/L (ref 136–145)
WBC # BLD AUTO: 9.25 THOUSAND/UL (ref 4.31–10.16)

## 2019-07-20 PROCEDURE — 80048 BASIC METABOLIC PNL TOTAL CA: CPT | Performed by: PHYSICIAN ASSISTANT

## 2019-07-20 PROCEDURE — 85027 COMPLETE CBC AUTOMATED: CPT | Performed by: PHYSICIAN ASSISTANT

## 2019-07-20 PROCEDURE — 99217 PR OBSERVATION CARE DISCHARGE MANAGEMENT: CPT | Performed by: FAMILY MEDICINE

## 2019-07-20 RX ORDER — MECLIZINE HYDROCHLORIDE 25 MG/1
25 TABLET ORAL EVERY 8 HOURS SCHEDULED
Qty: 90 TABLET | Refills: 1 | Status: SHIPPED | OUTPATIENT
Start: 2019-07-20 | End: 2019-10-10 | Stop reason: SDUPTHER

## 2019-07-20 RX ADMIN — TRAMADOL HYDROCHLORIDE 50 MG: 50 TABLET, COATED ORAL at 13:53

## 2019-07-20 RX ADMIN — ENOXAPARIN SODIUM 40 MG: 40 INJECTION SUBCUTANEOUS at 09:11

## 2019-07-20 RX ADMIN — HYDROCHLOROTHIAZIDE 25 MG: 25 TABLET ORAL at 09:11

## 2019-07-20 RX ADMIN — METFORMIN HYDROCHLORIDE 1000 MG: 500 TABLET, FILM COATED ORAL at 09:12

## 2019-07-20 RX ADMIN — GLIPIZIDE 5 MG: 2.5 TABLET, EXTENDED RELEASE ORAL at 06:29

## 2019-07-20 RX ADMIN — MECLIZINE HYDROCHLORIDE 25 MG: 25 TABLET ORAL at 06:28

## 2019-07-20 RX ADMIN — LISINOPRIL 20 MG: 20 TABLET ORAL at 09:11

## 2019-07-20 RX ADMIN — MECLIZINE HYDROCHLORIDE 25 MG: 25 TABLET ORAL at 13:53

## 2019-07-20 NOTE — ED NOTES
Patient transported to [facility] EDIL Ritter  07/19/19 8939

## 2019-07-20 NOTE — UTILIZATION REVIEW
Initial Clinical Review    Admission: Date/Time/Statement: 07/19/2019 @ 2045  Orders Placed This Encounter   Procedures    Place in Observation     Standing Status:   Standing     Number of Occurrences:   1     Order Specific Question:   Admitting Physician     Answer:   Conner Padron [[id number]]     Order Specific Question:   Level of Care     Answer:   Med Surg [[id number]]     ED Arrival Information     Expected Arrival Acuity Means of Arrival Escorted By Service Admission Type    - 7/19/2019 18:32 Urgent Wheelchair Spouse Hospitalist Urgent    Arrival Complaint    -Dizziness        Chief Complaint   Patient presents with    Dizziness     pt states when she woke up today felt dizzy and has been vomiting     Assessment/Plan: 68year old female, presented to the ED from home via car  Admitted as OBSERVATION due to dizziness  Woke this AM, 0630, persistent dizziness described as fluttering sensation and ataxic gait  DIZZINESS:   Suspect vertigo as most likely cause  Patient's neurologic exam is benign-- no unilateral muscle weakness, sensation is full and equal bilaterally, no nystagmus  ED ordered STAT MRI to rule out posterior circulation stroke-- F/U results  Q4 neuro checks,   GCS 15  Meclizine, Zofran  If no improvement or evidence of CVA on MRI- neurology consultation and physical therapy and occupational therapy      ED Triage Vitals   Temperature Pulse Respirations Blood Pressure SpO2   07/19/19 1845 07/19/19 1845 07/19/19 1845 07/19/19 1845 07/19/19 1845   98 8 °F (37 1 °C) 81 16 165/73 96 %      Temp Source Heart Rate Source Patient Position - Orthostatic VS BP Location FiO2 (%)   07/19/19 2211 07/19/19 2000 07/19/19 2000 07/19/19 2000 --   Oral Monitor Lying Right arm       Pain Score       07/19/19 1845       No Pain        Wt Readings from Last 1 Encounters:   07/19/19 85 3 kg (188 lb)     Additional Vital Signs:   Date/Time  Temp  Pulse  Resp  BP  SpO2  O2 Device  Patient Position - Orthostatic VS 19 2300            Nasal cannula     19 2211  98 °F (36 7 °C)  79  18  139/77  93 %  None (Room air)  Lying   19        148/67      Lying   19    92  16  147/67  92 %  None (Room air)  Lying       Pertinent Labs/Diagnostic Test Results:   Results from last 7 days   Lab Units 19  0426 19  1940   WBC Thousand/uL 9 25 9 83   HEMOGLOBIN g/dL 13 5 14 8   HEMATOCRIT % 42 7 44 8   PLATELETS Thousands/uL 143* 151   NEUTROS ABS Thousands/µL  --  7 38     Results from last 7 days   Lab Units 19  0426 19  1940   SODIUM mmol/L 143 144   POTASSIUM mmol/L 4 2 4 7   CHLORIDE mmol/L 106 106   CO2 mmol/L 28 30   ANION GAP mmol/L 9 8   BUN mg/dL 19 20   CREATININE mg/dL 0 75 0 66   EGFR ml/min/1 73sq m 80 89   CALCIUM mg/dL 9 5 10 3*     Results from last 7 days   Lab Units 19  1940   AST U/L 13   ALT U/L 27   ALK PHOS U/L 72   TOTAL PROTEIN g/dL 7 6   ALBUMIN g/dL 3 9   TOTAL BILIRUBIN mg/dL 0 30     Results from last 7 days   Lab Units 19  0426 19  1940   GLUCOSE RANDOM mg/dL 126 142*     2019 @ 2147  MRI brain:  White matter changes suggestive of chronic microangiopathy   No acute intracranial pathology      2019 @ 1956  EC, NSR, complete RBBB, No ecg signs of ischemia/ injury    ED Treatment:   Medication Administration from 2019 1832 to 2019       Date/Time Order Dose Route Action     2019 1930 ondansetron (ZOFRAN-ODT) dispersible tablet 8 mg 8 mg Oral Given     2019 lactated ringers infusion 500 mL 500 mL Intravenous New Bag     2019 diazepam (VALIUM) injection 2 5 mg 2 5 mg Intravenous Given        Past Medical History:   Diagnosis Date    Asthma     Chronic obstructive lung disease ([facility] )     Community acquired pneumonia     LAST ASSESSED: [id number]    COPD (chronic obstructive pulmonary disease) ([facility] )     Diabetes mellitus ([facility] )     History of MRSA infection      liver sx    Liver disease     Sleep apnea     Viral warts     LAST ASSESSED: [id number]     Present on Admission:   Type 2 diabetes mellitus with hyperglycemia, without long-term current use of insulin (HCC)   Essential hypertension   Chronic pain syndrome      Admitting Diagnosis: Dizziness [R42]  Ataxia [R27 0]  Age/Sex: 68 y o  female  Admission Orders:  Current Facility-Administered Medications:  acetaminophen 650 mg Oral Q6H PRN   albuterol 2 puff Inhalation Q6H PRN   diphenhydrAMINE 25 mg Oral Q6H PRN   enoxaparin 40 mg Subcutaneous Daily   glipiZIDE 5 mg Oral Daily Before Breakfast   hydrochlorothiazide 25 mg Oral Daily   lisinopril 20 mg Oral Daily   meclizine 25 mg Oral Q8H [address]   meloxicam 7 5 mg Oral BID PRN   metFORMIN 1,000 mg Oral BID   nicotine 1 patch Transdermal Daily   ondansetron 4 mg Intravenous Q4H PRN   traMADol 50 mg Oral Q8H PRN     Neuro checks q4h    GCS 15       Network Utilization Review Department  Phone: [phone]; Fax [phone]  [email or web address]  ATTENTION: Please call with any questions or concerns to [phone]  and carefully listen to the prompts so that you are directed to the right person  Send all requests for admission clinical reviews, approved or denied determinations and any other requests to fax [phone]   All voicemails are confidential

## 2019-07-20 NOTE — PLAN OF CARE
Problem: Potential for Falls  Goal: Patient will remain free of falls  Description  INTERVENTIONS:  - Assess patient frequently for physical needs  -  Identify cognitive and physical deficits and behaviors that affect risk of falls    -  [place] fall precautions as indicated by assessment   - Educate patient/family on patient safety including physical limitations  - Instruct patient to call for assistance with activity based on assessment  - Modify environment to reduce risk of injury  - Consider OT/PT consult to assist with strengthening/mobility  Outcome: Progressing     Problem: PAIN - ADULT  Goal: Verbalizes/displays adequate comfort level or baseline comfort level  Description  Interventions:  - Encourage patient to monitor pain and request assistance  - Assess pain using appropriate pain scale  - Administer analgesics based on type and severity of pain and evaluate response  - Implement non-pharmacological measures as appropriate and evaluate response  - Consider cultural and social influences on pain and pain management  - Notify physician/advanced practitioner if interventions unsuccessful or patient reports new pain  Outcome: Progressing

## 2019-07-20 NOTE — PROGRESS NOTES
Progress Note - Katherin Joyce 1950, 68 y o  female MRN: [id number]    Unit/Bed#: -01 Encounter: [id number]    Primary Care Provider: Dante Johnson MD   Date and time admitted to hospital: 7/19/2019  7:04 PM        COPD (chronic obstructive pulmonary disease) ([facility] )  Assessment & Plan  · No evidence of acute exacerbation  · Continue home inhaled regimen     Essential hypertension  Assessment & Plan  · BP adequate  · Continue home regimen     Chronic pain syndrome  Assessment & Plan  · Continue home pain regimen     Type 2 diabetes mellitus with hyperglycemia, without long-term current use of insulin (HCC)  Assessment & Plan  Lab Results   Component Value Date    HGBA1C 6 0 (H) 05/08/2019       No results for input(s): POCGLU in the last 72 hours  Blood Sugar Average: Last 72 hrs:  · continue home regimen     * Dizziness  Assessment & Plan  · MRI showed no CVA  Patient's dizziness has improved  She says meclizine has helped  Will discharge home with meclizine  Disposition:     Home    Reason for Admission:  Dizziness    Discharge Diagnoses:     Principal Problem:    Dizziness  Active Problems:    Type 2 diabetes mellitus with hyperglycemia, without long-term current use of insulin (HCC)    Chronic pain syndrome    Essential hypertension    COPD (chronic obstructive pulmonary disease) ([facility])  Resolved Problems:    * No resolved hospital problems  *      Consultations During Hospital Stay:  · None    Procedures Performed:     · None    Significant Findings / Test Results:     · None    Incidental Findings:   · None     Test Results Pending at Discharge (will require follow up): · None     Outpatient Tests Requested:  · None    Complications:  None    Hospital Course:     Patient was admitted for dizziness  MRI was ordered to rule out posterior circulation stroke  No acute intracranial pathology was seen  Patient's dizziness improved and she was able to get up and go the bathroom    She was then discharged home  She states meclizine helped  We will discharge her home with meclizine  Condition at Discharge: stable     Discharge Day Visit / Exam:     Subjective:  Patient seen and examined this afternoon with  at bedside  Her dizziness is much improved  She states the meclizine helped  She is eager to go home  No other issues reported  Vitals: Blood Pressure: 139/95 (07/20/19 0909)  Pulse: 90 (07/20/19 0909)  Temperature: 98 4 °F (36 9 °C) (07/20/19 0909)  Temp Source: Oral (07/19/19 2211)  Respirations: 18 (07/19/19 2211)  Height: 5' 1" (154 9 cm) (07/19/19 1845)  Weight - Scale: 85 3 kg (188 lb) (07/19/19 1845)  SpO2: 90 % (07/20/19 0909)  Exam:   Physical Exam   Constitutional: She is oriented to person, place, and time  She appears well-developed and well-nourished  No distress  Eyes: No scleral icterus  Cardiovascular: Normal rate, regular rhythm and normal heart sounds  Pulmonary/Chest: Effort normal and breath sounds normal    Neurological: She is alert and oriented to person, place, and time  Psychiatric: She has a normal mood and affect  Discussion with Family:  Discussed with  at bedside  Discharge instructions/Information to patient and family:   See after visit summary for information provided to patient and family  Provisions for Follow-Up Care:  See after visit summary for information related to follow-up care and any pertinent home health orders  Planned Readmission:  None     Discharge Statement:  I spent 25 minutes discharging the patient  This time was spent on the day of discharge  I had direct contact with the patient on the day of discharge  Greater than 50% of the total time was spent examining patient, answering all patient questions, arranging and discussing plan of care with patient as well as directly providing post-discharge instructions  Additional time then spent on discharge activities      Discharge Medications:  See after visit summary for reconciled discharge medications provided to patient and family        ** Please Note: This note has been constructed using a voice recognition system **

## 2019-07-20 NOTE — H&P
H&P- Yash West 1950, 68 y o  female MRN: [id number]    Unit/Bed#: ED 15 Encounter: [id number]    Primary Care Provider: Marychuy William MD   Date and time admitted to hospital: 7/19/2019  7:04 PM    * Dizziness  Assessment & Plan  · Patient with persistent dizziness described as fluttering sensation and ataxic gait  · Suspect vertigo as most likely cause  · Patient's neurologic exam is benign-- no unilateral muscle weakness, sensation is full and equal bilaterally, no nystagmus  · ED ordered STAT MRI to rule out posterior circulation stroke-- F/U results  · Q4 neuro checks  · Meclizine, Zofran  · If no improvement or evidence of CVA on MRI- neurology consultation and physical therapy and occupational therapy    COPD (chronic obstructive pulmonary disease) ([facility] )  Assessment & Plan  · No evidence of acute exacerbation  · Continue home inhaled regimen     Essential hypertension  Assessment & Plan  · BP adequate  · Continue home regimen     Chronic pain syndrome  Assessment & Plan  · Continue home pain regimen     Type 2 diabetes mellitus with hyperglycemia, without long-term current use of insulin (HCC)  Assessment & Plan  Lab Results   Component Value Date    HGBA1C 6 0 (H) 05/08/2019       No results for input(s): POCGLU in the last 72 hours  Blood Sugar Average: Last 72 hrs:  · continue home regimen       VTE Prophylaxis: Enoxaparin (Lovenox)  / reason for no mechanical VTE prophylaxis Moderate risk, not indicated   Code Status:  Level 1 full code  POLST: There is no POLST form on file for this patient (pre-hospital)  Discussion with family:   at bedside    Anticipated Length of Stay:  Patient will be admitted on an Observation basis with an anticipated length of stay of  less than 2 midnights  Justification for Hospital Stay:  Treatment of dizziness    Total Time for Visit, including Counseling / Coordination of Care: 30 minutes    Greater than 50% of this total time spent on direct patient counseling and coordination of care  Chief Complaint:   Dizziness    History of Present Illness:    Tc Estrella is a 68 y o  female with multiple medical conditions presents the ED for evaluation of dizziness x1 day  Patient reports she woke at approximately 6:30 a m  This morning, and when she opened her eyes doctor television her television was fluttering    Patient states she set up to see if her symptoms would improve, but they did not  She tried to stand, but was having significant difficulty maintaining balance  She did not have any falls  Patient reports no prior history of similar symptoms  Reports associated with nausea  States that change in position of her head make symptoms worse  No headache, blurry vision, double vision, slurred speech, facial droop, unilateral extremity weakness, change in sensation, numbness or tingling  No prior history of vertigo, no prior history of stroke  No family history of stroke  Did not try any medications at home for symptoms prior to arrival in the ER  Review of Systems:    Review of Systems   Constitutional: Negative  HENT: Negative  Eyes: Negative  Respiratory: Negative  Cardiovascular: Negative  Gastrointestinal: Positive for nausea  Genitourinary: Negative  Musculoskeletal: Positive for gait problem  Skin: Negative  Neurological: Positive for dizziness  Hematological: Negative  Psychiatric/Behavioral: Negative          Past Medical and Surgical History:     Past Medical History:   Diagnosis Date    Asthma     Chronic obstructive lung disease ([facility] )     Community acquired pneumonia     LAST ASSESSED: [id number]    COPD (chronic obstructive pulmonary disease) ([facility] )     Diabetes mellitus ([facility] )     History of MRSA infection     2008 liver sx    Liver disease     Sleep apnea     Viral warts     LAST ASSESSED: [id number]       Past Surgical History:   Procedure Laterality Date    ABDOMINAL SURGERY      ABDOMINOPLASTY  CHOLECYSTECTOMY      COSMETIC SURGERY      EYE SURGERY      Bilateral cataracts 2015 Dr Xi Ling   FINGER SURGERY      HAND SURGERY      HYSTERECTOMY      LIVER SURGERY      IN REPAIR INTERCARP/CARP-METACARP JT Right 9/9/2016    Procedure: THUMB TRAPEZIECTOMY; BASAL JOINT ARTHROPLASTY WITH APL AUTOGRAFT;  Surgeon: Kelsea Nazario MD;  Location: [facility];  Service: Orthopedics    IN REPAIR INTERCARP/CARP-METACARP JT Left 5/29/2018    Procedure: Ware Shoulder;  Surgeon: Stevie Martinez MD;  Location: [facility];  Service: Orthopedics    IN TRANSPLANT HAND TENDON Left 5/29/2018    Procedure: TRANSFER TENDON HAND/WRIST FCR from forearm to wrist;  Surgeon: Stevie Martinez MD;  Location: [facility];  Service: Orthopedics       Meds/Allergies:    Prior to Admission medications    Medication Sig Start Date End Date Taking? Authorizing Provider   albuterol (PROVENTIL HFA,VENTOLIN HFA) 90 mcg/act inhaler Inhale 2 puffs every 6 (six) hours as needed for wheezing   Yes Historical Provider, MD   diphenhydrAMINE (BENADRYL) 25 mg capsule Take 25 mg by mouth every 6 (six) hours as needed for itching   Yes Historical Provider, MD   glipiZIDE (GLUCOTROL XL) 5 mg 24 hr tablet TAKE 1 TABLET EVERY DAY  30  MINUTES BEFORE BREAKFAST 6/24/19  Yes Estela Montano MD   glucose blood (ACCU-CHEK JESS PLUS) test strip Test BS 3 times daily    DX E11 9 11/30/18  Yes Estela Montano MD   hydrochlorothiazide (HYDRODIURIL) 25 mg tablet Take 1 tablet (25 mg total) by mouth daily for 90 days 6/10/19 9/8/19 Yes Estela Montano MD   lisinopril (ZESTRIL) 20 mg tablet Take 1 tablet (20 mg total) by mouth daily 3/25/19  Yes Estela Montano MD   meloxicam (MOBIC) 7 5 mg tablet Take 1 tablet (7 5 mg total) by mouth 2 (two) times a day as needed for moderate pain 4/9/19  Yes Estela Montano MD   metFORMIN (GLUCOPHAGE) 1000 MG tablet TAKE 1 TABLET TWICE DAILY 6/24/19  Yes Estela Montano MD   traMADol (ULTRAM) 50 mg tablet Take 1 tablet (50 mg total) by mouth every 8 (eight) hours as needed for moderate pain 7/25/19  Yes [facility], DO     I have reviewed home medications with patient personally  Allergies: No Known Allergies    Social History:     Marital Status: /Civil Union   Occupation:  Retired  Patient Pre-hospital Living Situation:  Home with   Patient Pre-hospital Level of Mobility:  Typically independent  Patient Pre-hospital Diet Restrictions:  Diabetic prudent  Substance Use History:   Social History     Substance and Sexual Activity   Alcohol Use Not Currently     Social History     Tobacco Use   Smoking Status Current Every Day Smoker    Packs/day: 0 20   Smokeless Tobacco Never Used     Social History     Substance and Sexual Activity   Drug Use No       Family History:    Family History   Problem Relation Age of Onset    Heart attack Father     Heart disease Family     Diabetes Family     Thyroid disease Family     Ovarian cancer Family     Heart attack Mother     Ovarian cancer Sister     Heart disease Other         CARDIAC DISORDER    Diabetes Other     Liver cancer Other     Breast cancer Other     Stomach cancer Other        Physical Exam:     Vitals:   Blood Pressure: 148/67 (07/19/19 2030)  Pulse: 92 (07/19/19 2000)  Temperature: 98 8 °F (37 1 °C) (07/19/19 1845)  Respirations: 16 (07/19/19 2000)  Height: 5' 1" (154 9 cm) (07/19/19 1845)  Weight - Scale: 85 3 kg (188 lb) (07/19/19 1845)  SpO2: 92 % (07/19/19 2000)    Physical Exam   Constitutional: She is oriented to person, place, and time  No distress  HENT:   Head: Normocephalic and atraumatic  Eyes: Pupils are equal, round, and reactive to light  EOM are normal    No evidence of nystagmus   Neck: No JVD present  Cardiovascular: Normal rate and regular rhythm  Exam reveals no gallop and no friction rub  No murmur heard  Pulmonary/Chest: Effort normal and breath sounds normal  No stridor  No respiratory distress   She has no wheezes  Abdominal: Soft  Bowel sounds are normal  She exhibits no distension  There is no tenderness  There is no guarding  Musculoskeletal: Normal range of motion  She exhibits no edema  Neurological: She is alert and oriented to person, place, and time  She has normal strength and normal reflexes  No cranial nerve deficit or sensory deficit  Gait abnormal  GCS eye subscore is 4  GCS verbal subscore is 5  GCS motor subscore is 6  Skin: Skin is warm and dry  She is not diaphoretic  Psychiatric: She has a normal mood and affect  Her behavior is normal        Additional Data:     Lab Results: I have personally reviewed pertinent reports  Results from last 7 days   Lab Units 07/19/19 1940   WBC Thousand/uL 9 83   HEMOGLOBIN g/dL 14 8   HEMATOCRIT % 44 8   PLATELETS Thousands/uL 151   NEUTROS PCT % 76*   LYMPHS PCT % 15   MONOS PCT % 7   EOS PCT % 1     Results from last 7 days   Lab Units 07/19/19 1940   SODIUM mmol/L 144   POTASSIUM mmol/L 4 7   CHLORIDE mmol/L 106   CO2 mmol/L 30   BUN mg/dL 20   CREATININE mg/dL 0 66   ANION GAP mmol/L 8   CALCIUM mg/dL 10 3*   ALBUMIN g/dL 3 9   TOTAL BILIRUBIN mg/dL 0 30   ALK PHOS U/L 72   ALT U/L 27   AST U/L 13   GLUCOSE RANDOM mg/dL 142*                       Imaging: I have personally reviewed pertinent reports  MRI brain wo contrast    (Results Pending)       EKG, Pathology, and Other Studies Reviewed on Admission:   · EKG: NSR rate 83    Allscripts / Epic Records Reviewed: Yes     ** Please Note: This note has been constructed using a voice recognition system   **

## 2019-07-20 NOTE — ASSESSMENT & PLAN NOTE
· MRI showed no CVA  Patient's dizziness has improved  She says meclizine has helped  Will discharge home with meclizine

## 2019-07-20 NOTE — ASSESSMENT & PLAN NOTE
· Patient with persistent dizziness described as fluttering sensation and ataxic gait  · Suspect vertigo as most likely cause  · Patient's neurologic exam is benign-- no unilateral muscle weakness, sensation is full and equal bilaterally, no nystagmus  · ED ordered STAT MRI to rule out posterior circulation stroke-- F/U results  · Q4 neuro checks  · Meclizine, Zofran  · If no improvement or evidence of CVA on MRI- neurology consultation and physical therapy and occupational therapy

## 2019-07-20 NOTE — ASSESSMENT & PLAN NOTE
Lab Results   Component Value Date    HGBA1C 6 0 (H) 05/08/2019       No results for input(s): POCGLU in the last 72 hours      Blood Sugar Average: Last 72 hrs:  · continue home regimen

## 2019-07-20 NOTE — DISCHARGE SUMMARY
Discharge- Delpha Push 1950, 68 y o  female MRN: [id number]    Unit/Bed#: -01 Encounter: [id number]    Primary Care Provider: Haritha Robison MD   Date and time admitted to hospital: 7/19/2019  7:04 PM      COPD (chronic obstructive pulmonary disease) ([facility] )  Assessment & Plan  · No evidence of acute exacerbation  · Continue home inhaled regimen      Essential hypertension  Assessment & Plan  · BP adequate  · Continue home regimen      Chronic pain syndrome  Assessment & Plan  · Continue home pain regimen      Type 2 diabetes mellitus with hyperglycemia, without long-term current use of insulin (HCC)  Assessment & Plan        Lab Results   Component Value Date     HGBA1C 6 0 (H) 05/08/2019         No results for input(s): POCGLU in the last 72 hours      Blood Sugar Average: Last 72 hrs:  · continue home regimen      * Dizziness  Assessment & Plan  · MRI showed no CVA  Patient's dizziness has improved  She says meclizine has helped  Will discharge home with meclizine         Disposition:      Home     Reason for Admission:  Dizziness     Discharge Diagnoses:      Principal Problem:    Dizziness  Active Problems:    Type 2 diabetes mellitus with hyperglycemia, without long-term current use of insulin (HCC)    Chronic pain syndrome    Essential hypertension    COPD (chronic obstructive pulmonary disease) ([facility])  Resolved Problems:    * No resolved hospital problems  *        Consultations During Hospital Stay:  · None     Procedures Performed:      · None     Significant Findings / Test Results:      · None     Incidental Findings:   · None      Test Results Pending at Discharge (will require follow up): · None     Outpatient Tests Requested:  · None     Complications:  None     Hospital Course:      Patient was admitted for dizziness  MRI was ordered to rule out posterior circulation stroke  No acute intracranial pathology was seen    Patient's dizziness improved and she was able to get up and go the bathroom  She was then discharged home  She states meclizine helped  We will discharge her home with meclizine      Condition at Discharge: stable      Discharge Day Visit / Exam:      Subjective:  Patient seen and examined this afternoon with  at bedside  Her dizziness is much improved  She states the meclizine helped  She is eager to go home  No other issues reported  Vitals: Blood Pressure: 139/95 (07/20/19 0909)  Pulse: 90 (07/20/19 0909)  Temperature: 98 4 °F (36 9 °C) (07/20/19 0909)  Temp Source: Oral (07/19/19 2211)  Respirations: 18 (07/19/19 2211)  Height: 5' 1" (154 9 cm) (07/19/19 1845)  Weight - Scale: 85 3 kg (188 lb) (07/19/19 1845)  SpO2: 90 % (07/20/19 0909)  Exam:   Physical Exam   Constitutional: She is oriented to person, place, and time  She appears well-developed and well-nourished  No distress  Eyes: No scleral icterus  Cardiovascular: Normal rate, regular rhythm and normal heart sounds  Pulmonary/Chest: Effort normal and breath sounds normal    Neurological: She is alert and oriented to person, place, and time  Psychiatric: She has a normal mood and affect          Discussion with Family:  Discussed with  at bedside      Discharge instructions/Information to patient and family:   See after visit summary for information provided to patient and family        Provisions for Follow-Up Care:  See after visit summary for information related to follow-up care and any pertinent home health orders        Planned Readmission:  None     Discharge Statement:  I spent 25 minutes discharging the patient  This time was spent on the day of discharge  I had direct contact with the patient on the day of discharge  Greater than 50% of the total time was spent examining patient, answering all patient questions, arranging and discussing plan of care with patient as well as directly providing post-discharge instructions    Additional time then spent on discharge activities      Discharge Medications:  See after visit summary for reconciled discharge medications provided to patient and family        ** Please Note: This note has been constructed using a voice recognition system *

## 2019-07-23 ENCOUNTER — HOSPITAL ENCOUNTER (OUTPATIENT)
Facility: HOSPITAL | Age: 69
Setting detail: OUTPATIENT SURGERY
Discharge: HOME | End: 2019-07-23
Attending: ORTHOPAEDIC SURGERY | Admitting: ORTHOPAEDIC SURGERY
Payer: MEDICARE

## 2019-07-23 VITALS
BODY MASS INDEX: 35.5 KG/M2 | OXYGEN SATURATION: 94 % | RESPIRATION RATE: 20 BRPM | HEIGHT: 61 IN | SYSTOLIC BLOOD PRESSURE: 112 MMHG | HEART RATE: 61 BPM | TEMPERATURE: 98.2 F | WEIGHT: 188 LBS | DIASTOLIC BLOOD PRESSURE: 56 MMHG

## 2019-07-23 DIAGNOSIS — M65.331 TRIGGER FINGER, RIGHT MIDDLE FINGER: Primary | ICD-10-CM

## 2019-07-23 LAB — GLUCOSE SERPL-MCNC: 117 MG/DL (ref 65–140)

## 2019-07-23 PROCEDURE — 26055 INCISE FINGER TENDON SHEATH: CPT | Performed by: ORTHOPAEDIC SURGERY

## 2019-07-23 PROCEDURE — 82948 REAGENT STRIP/BLOOD GLUCOSE: CPT

## 2019-07-23 PROCEDURE — 26055 INCISE FINGER TENDON SHEATH: CPT | Performed by: PHYSICIAN ASSISTANT

## 2019-07-23 PROCEDURE — 99024 POSTOP FOLLOW-UP VISIT: CPT | Performed by: PHYSICIAN ASSISTANT

## 2019-07-23 RX ORDER — HYDROCODONE BITARTRATE AND ACETAMINOPHEN 5; 325 MG/1; MG/1
1 TABLET ORAL EVERY 6 HOURS PRN
Qty: 5 TABLET | Refills: 0 | Status: SHIPPED | OUTPATIENT
Start: 2019-07-23 | End: 2019-08-02 | Stop reason: ALTCHOICE

## 2019-07-23 RX ORDER — LIDOCAINE HYDROCHLORIDE AND EPINEPHRINE 10; 10 MG/ML; UG/ML
20 INJECTION, SOLUTION INFILTRATION; PERINEURAL ONCE
Status: DISCONTINUED | OUTPATIENT
Start: 2019-07-23 | End: 2019-07-23 | Stop reason: HOSPADM

## 2019-07-23 RX ORDER — MAGNESIUM HYDROXIDE 1200 MG/15ML
LIQUID ORAL AS NEEDED
Status: DISCONTINUED | OUTPATIENT
Start: 2019-07-23 | End: 2019-07-23 | Stop reason: HOSPADM

## 2019-07-23 RX ORDER — SENNOSIDES 8.6 MG
650 CAPSULE ORAL EVERY 8 HOURS
Qty: 15 TABLET | Refills: 0 | Status: SHIPPED | OUTPATIENT
Start: 2019-07-23 | End: 2019-07-28

## 2019-07-23 RX ADMIN — SODIUM BICARBONATE: 84 INJECTION, SOLUTION INTRAVENOUS at 11:07

## 2019-07-23 NOTE — ED PROVIDER NOTES
History  Chief Complaint   Patient presents with    Dizziness     pt states when she woke up today felt dizzy and has been vomiting     68 yr female earlier in day with sense of  External motion -  With n/v- x 2-- now with sense of being off balance--  When walking- no sense of external motion at present -- no recent illness/new meds- no ear comps-- no head/neck pain -- no diplopia/ dysarthria/ dysphagia/ dysphonia/  Dysmetria- no cp/sob/palp/ nearsyncope      History provided by:  Patient and spouse   used: No    Dizziness   Associated symptoms: nausea and vomiting    Associated symptoms: no blood in stool, no diarrhea, no headaches and no weakness        Prior to Admission Medications   Prescriptions Last Dose Informant Patient Reported? Taking? albuterol (PROVENTIL HFA,VENTOLIN HFA) 90 mcg/act inhaler  Self Yes Yes   Sig: Inhale 2 puffs every 6 (six) hours as needed for wheezing   diphenhydrAMINE (BENADRYL) 25 mg capsule  Self Yes Yes   Sig: Take 25 mg by mouth every 6 (six) hours as needed for itching   glipiZIDE (GLUCOTROL XL) 5 mg 24 hr tablet   No Yes   Sig: TAKE 1 TABLET EVERY DAY  30  MINUTES BEFORE BREAKFAST   glucose blood (ACCU-CHEK JESS PLUS) test strip  Self No Yes   Sig: Test BS 3 times daily    DX E11 9   hydrochlorothiazide (HYDRODIURIL) 25 mg tablet   No Yes   Sig: Take 1 tablet (25 mg total) by mouth daily for 90 days   lisinopril (ZESTRIL) 20 mg tablet  Self No Yes   Sig: Take 1 tablet (20 mg total) by mouth daily   meloxicam (MOBIC) 7 5 mg tablet  Self No Yes   Sig: Take 1 tablet (7 5 mg total) by mouth 2 (two) times a day as needed for moderate pain   metFORMIN (GLUCOPHAGE) 1000 MG tablet   No Yes   Sig: TAKE 1 TABLET TWICE DAILY   traMADol (ULTRAM) 50 mg tablet   No Yes   Sig: Take 1 tablet (50 mg total) by mouth every 8 (eight) hours as needed for moderate pain      Facility-Administered Medications: None       Past Medical History:   Diagnosis Date    Asthma     [facility] obstructive lung disease ([facility] )     Community acquired pneumonia     LAST ASSESSED: [id number]    COPD (chronic obstructive pulmonary disease) ([facility] )     Diabetes mellitus ([place] [address] )     History of MRSA infection     2008 liver sx    Liver disease     Sleep apnea     Viral warts     LAST ASSESSED: [id number]       Past Surgical History:   Procedure Laterality Date    ABDOMINAL SURGERY      ABDOMINOPLASTY      CHOLECYSTECTOMY      COSMETIC SURGERY      EYE SURGERY      Bilateral cataracts 2015 Dr Shaniqua Clifford   FINGER SURGERY      HAND SURGERY      HYSTERECTOMY      LIVER SURGERY      OR REPAIR INTERCARP/CARP-METACARP JT Right 9/9/2016    Procedure: THUMB TRAPEZIECTOMY; BASAL JOINT ARTHROPLASTY WITH APL AUTOGRAFT;  Surgeon: Valentino Marshal, MD;  [facility];  Service: Orthopedics    OR REPAIR INTERCARP/CARP-METACARP JT Left 5/29/2018    Procedure: Lambert Liu;  Surgeon: Radha Keita MD;  [facility];  Service: Orthopedics    OR TRANSPLANT HAND TENDON Left 5/29/2018    Procedure: TRANSFER TENDON HAND/WRIST FCR from forearm to wrist;  Surgeon: Radha Keita MD;  [facility];  Service: Orthopedics       Family History   Problem Relation Age of Onset    Heart attack Father     Heart disease Family     Diabetes Family     Thyroid disease Family     Ovarian cancer Family     Heart attack Mother     Ovarian cancer Sister     Heart disease Other         CARDIAC DISORDER    Diabetes Other     Liver cancer Other     Breast cancer Other     Stomach cancer Other      I have reviewed and agree with the history as documented  Social History     Tobacco Use    Smoking status: Current Every Day Smoker     Packs/day: 0 20    Smokeless tobacco: Never Used   Substance Use Topics    Alcohol use: Not Currently    Drug use: No        Review of Systems   Constitutional: Negative  HENT: Negative  Eyes: Negative  Respiratory: Negative  Cardiovascular: Negative  Gastrointestinal: Positive for nausea and vomiting  Negative for abdominal distention, abdominal pain, anal bleeding, blood in stool, constipation, diarrhea and rectal pain  Endocrine: Negative  Genitourinary: Negative  Musculoskeletal: Positive for gait problem  Negative for arthralgias, back pain, joint swelling, myalgias, neck pain and neck stiffness  Skin: Negative  Allergic/Immunologic: Negative  Neurological: Positive for dizziness  Negative for tremors, seizures, syncope, facial asymmetry, speech difficulty, weakness, light-headedness, numbness and headaches  Hematological: Negative  Psychiatric/Behavioral: Negative  Physical Exam  Physical Exam   Constitutional: She is oriented to person, place, and time  She appears well-developed and well-nourished  No distress  avss--pulse ox 90 % on ra- interpretation is low - no intervdntion    HENT:   Head: Normocephalic and atraumatic  Right Ear: External ear normal    Left Ear: External ear normal    Nose: Nose normal    Mouth/Throat: Oropharynx is clear and moist  No oropharyngeal exudate  Eyes: Pupils are equal, round, and reactive to light  Conjunctivae and EOM are normal  Right eye exhibits no discharge  Left eye exhibits no discharge  No scleral icterus  Mm pink   Neck: Normal range of motion  Neck supple  No JVD present  No tracheal deviation present  No thyromegaly present  Cardiovascular: Normal rate, regular rhythm, normal heart sounds and intact distal pulses  Exam reveals no gallop and no friction rub  No murmur heard  Pulmonary/Chest: Effort normal and breath sounds normal  No stridor  No respiratory distress  She has no wheezes  She has no rales  She exhibits no tenderness  Abdominal: Soft  Bowel sounds are normal  She exhibits no distension and no mass  There is no tenderness  There is no rebound and no guarding  No hernia     Soft tn/nd- no peritoneal signs- no cva tenderness- no pulsatile abd mass/ruit   Musculoskeletal: Normal range of motion  She exhibits no edema, tenderness or deformity  Equal bilateral radial/dp pulses- no ble edema/calf tenderness/asym/ erythema   Lymphadenopathy:     She has no cervical adenopathy  Neurological: She is alert and oriented to person, place, and time  She displays normal reflexes  No cranial nerve deficit or sensory deficit  She exhibits normal muscle tone  Coordination normal    Ataxic gait--mild diffiuclty with finger to nose bilaterally -- neg test of sckew   Skin: Skin is warm  Capillary refill takes less than 2 seconds  No rash noted  She is not diaphoretic  No erythema  No pallor  Psychiatric: She has a normal mood and affect  Her behavior is normal  Judgment and thought content normal    Nursing note and vitals reviewed        Vital Signs  ED Triage Vitals   Temperature Pulse Respirations Blood Pressure SpO2   07/19/19 1845 07/19/19 1845 07/19/19 1845 07/19/19 1845 07/19/19 1845   98 8 °F (37 1 °C) 81 16 165/73 96 %      Temp Source Heart Rate Source Patient Position - Orthostatic VS BP Location FiO2 (%)   07/19/19 2211 07/19/19 2000 07/19/19 2000 07/19/19 2000 --   Oral Monitor Lying Right arm       Pain Score       07/19/19 1845       No Pain           Vitals:    07/19/19 2000 07/19/19 2030 07/19/19 2211 07/20/19 0909   BP: 147/67 148/67 139/77 139/95   Pulse: 92  79 90   Patient Position - Orthostatic VS: Lying Lying Lying          Visual Acuity  Visual Acuity      Most Recent Value   L Pupil Size (mm)  3   R Pupil Size (mm)  3   L Pupil Shape  Round   R Pupil Shape  Round          ED Medications  Medications   ondansetron (ZOFRAN-ODT) dispersible tablet 8 mg (8 mg Oral Given 7/19/19 1930)   diazepam (VALIUM) injection 2 5 mg (2 5 mg Intravenous Given 7/19/19 1941)       Diagnostic Studies  Results Reviewed     Procedure Component Value Units Date/Time    Comprehensive metabolic panel [[id number]]  (Abnormal) Collected:  07/19/19 1940 Lab Status:  Final result Specimen:  Blood from Arm, Right Updated:  07/19/19 2005     Sodium 144 mmol/L      Potassium 4 7 mmol/L      Chloride 106 mmol/L      CO2 30 mmol/L      ANION GAP 8 mmol/L      BUN 20 mg/dL      Creatinine 0 66 mg/dL      Glucose 142 mg/dL      Calcium 10 3 mg/dL      AST 13 U/L      ALT 27 U/L      Alkaline Phosphatase 72 U/L      Total Protein 7 6 g/dL      Albumin 3 9 g/dL      Total Bilirubin 0 30 mg/dL      eGFR 89 ml/min/1 73sq m     Narrative:       National Kidney Disease Foundation guidelines for Chronic Kidney Disease (CKD):     Stage 1 with normal or high GFR (GFR > 90 mL/min/1 73 square meters)    Stage 2 Mild CKD (GFR = 60-89 mL/min/1 73 square meters)    Stage 3A Moderate CKD (GFR = 45-59 mL/min/1 73 square meters)    Stage 3B Moderate CKD (GFR = 30-44 mL/min/1 73 square meters)    Stage 4 Severe CKD (GFR = 15-29 mL/min/1 73 square meters)    Stage 5 End Stage CKD (GFR <15 mL/min/1 73 square meters)  Note: GFR calculation is accurate only with a steady state creatinine    CBC and differential [[id number]]  (Abnormal) Collected:  07/19/19 1940    Lab Status:  Final result Specimen:  Blood from Arm, Right Updated:  07/19/19 1953     WBC 9 83 Thousand/uL      RBC 4 61 Million/uL      Hemoglobin 14 8 g/dL      Hematocrit 44 8 %      MCV 97 fL      MCH 32 1 pg      MCHC 33 0 g/dL      RDW 13 5 %      MPV 10 8 fL      Platelets 624 Thousands/uL      nRBC 0 /100 WBCs      Neutrophils Relative 76 %      Immat GRANS % 0 %      Lymphocytes Relative 15 %      Monocytes Relative 7 %      Eosinophils Relative 1 %      Basophils Relative 1 %      Neutrophils Absolute 7 38 Thousands/µL      Immature Grans Absolute 0 04 Thousand/uL      Lymphocytes Absolute 1 50 Thousands/µL      Monocytes Absolute 0 73 Thousand/µL      Eosinophils Absolute 0 13 Thousand/µL      Basophils Absolute 0 05 Thousands/µL                  MRI brain wo contrast   Final Result by Aron Lema MD (07/19 2148) White matter changes suggestive of chronic microangiopathy  No acute intracranial pathology  Workstation performed: [id number]                    Procedures  Procedures       ED Course  ED Course as of Jul 23 0958 Fri Jul 19, 2019 2037 Er md note- pt - re-evaluated- still  with sense of being off balance-- not sense of external motion -- neuro exam has not chagnes- will admit  - mri ordered                                  MDM    Disposition  Final diagnoses:   Ataxia     Time reflects when diagnosis was documented in both MDM as applicable and the Disposition within this note     Time User Action Codes Description Comment    7/19/2019  8:45 PM Atiya Merino Add [R27 0] Ataxia     7/20/2019  2:14 PM Gurmeet Estes Add [R42] Dizziness       ED Disposition     ED Disposition Condition Date/Time Comment    Admit Stable Fri Jul 19, 2019  8:44 PM Case was discussed with dr Cecelia Hills and the patient's admission status was agreed to be Admission Status: observation status to the service of [place] [address]   Follow-up Information     Follow up With Specialties Details Why Contact Info    Haritha Robison MD Family Medicine Follow up Follow-up within 1-2 weeks   [address] [place] [address]  [phone]            Discharge Medication List as of 7/20/2019  3:23 PM      START taking these medications    Details   meclizine (ANTIVERT) 25 mg tablet Take 1 tablet (25 mg total) by mouth every 8 (eight) hours, Starting Sat 7/20/2019, Normal         CONTINUE these medications which have NOT CHANGED    Details   albuterol (PROVENTIL HFA,VENTOLIN HFA) 90 mcg/act inhaler Inhale 2 puffs every 6 (six) hours as needed for wheezing, Historical Med      diphenhydrAMINE (BENADRYL) 25 mg capsule Take 25 mg by mouth every 6 (six) hours as needed for itching, Historical Med      glipiZIDE (GLUCOTROL XL) 5 mg 24 hr tablet TAKE 1 TABLET EVERY DAY  30  MINUTES BEFORE BREAKFAST, Normal      glucose blood (ACCU-CHEK JESS PLUS) test strip Test BS 3 times daily  DX E11 9, Normal      hydrochlorothiazide (HYDRODIURIL) 25 mg tablet Take 1 tablet (25 mg total) by mouth daily for 90 days, Starting Mon 6/10/2019, Until Sun 9/8/2019, Normal      lisinopril (ZESTRIL) 20 mg tablet Take 1 tablet (20 mg total) by mouth daily, Starting Mon 3/25/2019, Normal      meloxicam (MOBIC) 7 5 mg tablet Take 1 tablet (7 5 mg total) by mouth 2 (two) times a day as needed for moderate pain, Starting Tue 4/9/2019, Normal      metFORMIN (GLUCOPHAGE) 1000 MG tablet TAKE 1 TABLET TWICE DAILY, Normal      traMADol (ULTRAM) 50 mg tablet Take 1 tablet (50 mg total) by mouth every 8 (eight) hours as needed for moderate pain, Starting Thu 7/25/2019, Normal           No discharge procedures on file      ED Provider  Electronically Signed by           Meghann Miranda MD  07/23/19 2203

## 2019-07-23 NOTE — H&P (VIEW-ONLY)
H&P Exam - Orthopedics   Beck Romo 68 y o  female MRN: [id number]  Unit/Bed#: [facility] 03    Assessment/Plan   Assessment:  R long and L index TFs  Plan:  R long TFR today followed by L index TFR at a later date    History of Present Illness   HPI:  Beck Romo is a 68 y o  y o  female who presents with continued complaints of R long and L index trigger fingers  Describes pain and clicking  Has tried splinting without relief  States only medical change is that she is now on Meclozine  Historical Information  Review Of Systems:   · Skin: Normal  · Neuro: See HPI  · Musculoskeletal: See HPI  · 14 point review of systems negative except as stated above     Past Medical History:   Past Medical History:   Diagnosis Date    Asthma     Chronic obstructive lung disease ([facility] )     Community acquired pneumonia     LAST ASSESSED: [id number]    COPD (chronic obstructive pulmonary disease) ([facility] )     Diabetes mellitus ([facility] )     History of MRSA infection     2008 liver sx    Liver disease     Sleep apnea     Viral warts     LAST ASSESSED: [id number]       Past Surgical History:   Past Surgical History:   Procedure Laterality Date    ABDOMINAL SURGERY      ABDOMINOPLASTY      CHOLECYSTECTOMY      COSMETIC SURGERY      EYE SURGERY      Bilateral cataracts 2015 Dr Summers Bare       FINGER SURGERY      HAND SURGERY      HYSTERECTOMY      LIVER SURGERY      NV REPAIR INTERCARP/CARP-METACARP JT Right 9/9/2016    Procedure: THUMB TRAPEZIECTOMY; BASAL JOINT ARTHROPLASTY WITH APL AUTOGRAFT;  Surgeon: Lizy Macedo MD;  [facility];  Service: Orthopedics    NV REPAIR INTERCARP/CARP-METACARP JT Left 5/29/2018    Procedure: Paul Lord;  Surgeon: Netta Wlash MD;  [facility];  Service: Orthopedics    NV TRANSPLANT HAND TENDON Left 5/29/2018    Procedure: TRANSFER TENDON HAND/WRIST FCR from forearm to wrist;  Surgeon: Netta Walsh MD;  [facility];  Service: Orthopedics       Family History:  Family history reviewed and non-contributory  Family History   Problem Relation Age of Onset    Heart attack Father     Heart disease Family     Diabetes Family     Thyroid disease Family     Ovarian cancer Family     Heart attack Mother     Ovarian cancer Sister     Heart disease Other         CARDIAC DISORDER    Diabetes Other     Liver cancer Other     Breast cancer Other     Stomach cancer Other        Social History:  Social History     Socioeconomic History    Marital status: /Civil Union     Spouse name: Not on file    Number of children: Not on file    Years of education: Not on file    Highest education level: Not on file   Occupational History    Not on file   Social Needs    Financial resource strain: Not on file    Food insecurity:     Worry: Not on file     Inability: Not on file    Transportation needs:     Medical: Not on file     Non-medical: Not on file   Tobacco Use    Smoking status: Current Every Day Smoker     Packs/day: 0 20    Smokeless tobacco: Never Used   Substance and Sexual Activity    Alcohol use: Not Currently    Drug use: No    Sexual activity: Not on file   Lifestyle    Physical activity:     Days per week: Not on file     Minutes per session: Not on file    Stress: Not on file   Relationships    Social connections:     Talks on phone: Not on file     Gets together: Not on file     Attends Jew service: Not on file     Active member of club or organization: Not on file     Attends meetings of clubs or organizations: Not on file     Relationship status: Not on file    Intimate partner violence:     Fear of current or ex partner: Not on file     Emotionally abused: Not on file     Physically abused: Not on file     Forced sexual activity: Not on file   Other Topics Concern    Not on file   Social History Narrative    Not on file       Allergies:   No Known Allergies        Labs:  0   Lab Value Date/Time    HCT 42 7 07/20/2019 0426    HCT 44 8 07/19/2019 1940    HCT 44 6 01/29/2019 1030    HCT 49 3 (H) 04/24/2018 0906    HGB 13 5 07/20/2019 0426    HGB 14 8 07/19/2019 1940    HGB 15 3 01/29/2019 1030    HGB 16 0 (H) 04/24/2018 0906    WBC 9 25 07/20/2019 0426    WBC 9 83 07/19/2019 1940    WBC 6 1 01/29/2019 1030    WBC 6 74 04/24/2018 0906       Meds:  No current facility-administered medications for this encounter  Blood Culture:   No results found for: BLOODCX    Wound Culture:   No results found for: WOUNDCULT    Ins and Outs:  No intake/output data recorded  Physical Exam  There were no vitals taken for this visit  There were no vitals taken for this visit  Gen: Alert and oriented to person, place, time  HEENT: EOMI, eyes clear, moist mucus membranes, hearing intact  Respiratory: Bilateral chest rise  No audible wheezing found  Cardiovascular: Regular Rate and Rhythm  Abdomen: soft nontender/nondistended  Ortho Exam: R long finger with + ttp A1 pulley, + nodule and + Triggering   L index finger with +ttp A1 pulley, + nodule and + triggering  Neuro Exam: Sensation and motor grossly intact    Lab Results: Reviewed  Imaging: Reviewed

## 2019-07-23 NOTE — OP NOTE
OPERATIVE REPORT  PATIENT NAME: Moises Giordano  :  1950  MRN: [id number]  Pt [facility]    SURGERY DATE: 19    Surgeon(s) and Role:     * Shilo Bullock MD - Primary     * Vernia Hatchet, PA-C - Assisting    Pre-Op Diagnosis:  Trigger finger, right middle finger [M65 331]    Post-Op Diagnosis Codes:     * Trigger finger, right middle finger [M65 331]    Procedure(s):  RELEASE TRIGGER FINGER RIGHT LONG (Right)    Specimen(s):  * No orders in the log *    Estimated Blood Loss:   Minimal      Anesthesia Type:   Local    Operative Indications: The patient has a history of Trigger Finger  right  long finger that was recalcitrant to conservative management  The decision was made to bring the patient to the operating room for Trigger Finger Release  right  long finger  Risks of the procedure were explained which include, but are not limited to bleeding; infection; damage to nerves, arteries,veins, tendons; scar; pain; need for reoperation; failure to give desired result; and risks of anaesthesia  All questions were answered to satisfaction and they were willing to proceed  Operative Findings:  Right long finger trigger finger    Complications:   None    Procedure and Technique:  After the patient, site, and procedure were identified, the patient was brought into the operating room in a supine position  Local anaesthesia was adminstered in the preoperative holding area  A tourniquet was not used  The  right upper extremity was then prepped and drapped in a normal, sterile, orthopedic fashion  After the patient, site, and procedure were once again identified, attention was turned to the right long finger  An incision was made over the flexor tendon sheath at the level of the A1 pulley  Dissection was carried out in-line with the flexor tendon sheath and the radial and ulnar digital artery and nerve were protected  The A1 pulley was identified at the base of the incision  Under direct visualization, the A1 pulley was divided along the midline in its entirety with care taken to avoid injury to the underlying tendon  The tendons were examined to ensure that no further catching, popping, clicking or locking occurred with motion of the finger  At the completion of the procedure, hemostasis was obtained with cautery and direct pressure  The wounds were copiously irrigated with sterile solution  The wounds were closed with Prolene  Sterile dressings were applied, including Xeroform, gauze, tweeners, webril, ACE  Please note, all sponge, needle, and instrument counts were correct prior to closure  Loupe magnification was utilized  The patient tolerated the procedure well       I was present for the entire procedure, A qualified resident physician was not available and A physician assistant was required during the procedure for retraction tissue handling,dissection and suturing    Patient Disposition:  APU and hemodynamically stable    SIGNATURE: Ken Gomez MD  DATE: 07/23/19  TIME: 11:34 AM

## 2019-07-23 NOTE — DISCHARGE INSTRUCTIONS
Post Operative Instructions    You have had surgery on your arm today, please read and follow the information below:  · Elevate your hand above your elbow during the next 24-48 hours to help with swelling  · Place your hand and arm over your head with motion at your shoulder three times a day  · Do not apply any cream/ointment/oil to your incisions including antibiotics  · Do not soak your hands in standing water (dishwater, tubs, Jacuzzi's, pools, etc ) until given permission (typically 2-3 weeks after injury)    Call the office if you notice any:  · Increased numbness or tingling of your hand or fingers that is not relieved with elevation  · Increasing pain that is not controlled with medication  · Difficulty chewing, breathing, swallowing  · Chest pains or shortness of breath  · Fever over 101 4 degrees  Bandage: Remove bandage after 5 days  Motion: Move fingers into a fist 5 times a day, DO NOT move any splinted fingers  Weight bearing status: Avoid heavy lifting (>5 pounds) with the extremity that was operated on until follow up appointment  Normal activities of daily living are OK  Ice: Ice for 10 minutes every hour as needed for swelling x 24 hours  Sling: No sling necessary  Medications: You may restart your Mobic   Tylenol Extended Release 650 mg every 8 hours  Norco/Hydrocodone one tab every 6 hours AS NEEDED for pain     Follow-up Appointment: 7-10 days  Please call the office if you have any questions or concerns regarding your post-operative care

## 2019-07-23 NOTE — H&P
H&P Exam - Orthopedics   Rafael Fritz 68 y o  female MRN: [id number]  Unit/Bed#: [facility] 03    Assessment/Plan   Assessment:  R long and L index TFs  Plan:  R long TFR today followed by L index TFR at a later date    History of Present Illness   HPI:  Rafael Fritz is a 68 y o  y o  female who presents with continued complaints of R long and L index trigger fingers  Describes pain and clicking  Has tried splinting without relief  States only medical change is that she is now on Meclozine  Historical Information  Review Of Systems:   · Skin: Normal  · Neuro: See HPI  · Musculoskeletal: See HPI  · 14 point review of systems negative except as stated above     Past Medical History:   Past Medical History:   Diagnosis Date    Asthma     [facility]     Community acquired pneumonia     LAST ASSESSED: [id number]    [facility]     Diabetes mellitus [facility]     History of MRSA infection     2008 liver sx    Liver disease     Sleep apnea     Viral warts     LAST ASSESSED: [id number]       Past Surgical History:   Past Surgical History:   Procedure Laterality Date    ABDOMINAL SURGERY      ABDOMINOPLASTY      CHOLECYSTECTOMY      COSMETIC SURGERY      EYE SURGERY      Bilateral cataracts 2015 Dr Immanuel Flynn       FINGER SURGERY      HAND SURGERY      HYSTERECTOMY      LIVER SURGERY      IN REPAIR INTERCARP/CARP-METACARP JT Right 9/9/2016    Procedure: THUMB TRAPEZIECTOMY; BASAL JOINT ARTHROPLASTY WITH APL AUTOGRAFT;  Surgeon: Claudeen Ewing, MD;  Location[facility];  Service: Orthopedics    IN REPAIR INTERCARP/CARP-METACARP JT Left 5/29/2018    Procedure: Gini Dias;  Surgeon: Marcelino Gomes MD;  [facility];  Service: Orthopedics    IN TRANSPLANT HAND TENDON Left 5/29/2018    Procedure: TRANSFER TENDON HAND/WRIST FCR from forearm to wrist;  Surgeon: Marcelino Gomes MD;  [facility];  Service: Orthopedics       Family History:  Family history reviewed and non-contributory  Family History   Problem Relation Age of Onset    Heart attack Father     Heart disease Family     Diabetes Family     Thyroid disease Family     Ovarian cancer Family     Heart attack Mother     Ovarian cancer Sister     Heart disease Other         CARDIAC DISORDER    Diabetes Other     Liver cancer Other     Breast cancer Other     Stomach cancer Other        Social History:  Social History     Socioeconomic History    Marital status: /Civil Union     Spouse name: Not on file    Number of children: Not on file    Years of education: Not on file    Highest education level: Not on file   Occupational History    Not on file   Social Needs    Financial resource strain: Not on file    Food insecurity:     Worry: Not on file     Inability: Not on file    Transportation needs:     Medical: Not on file     Non-medical: Not on file   Tobacco Use    Smoking status: Current Every Day Smoker     Packs/day: 0 20    Smokeless tobacco: Never Used   Substance and Sexual Activity    Alcohol use: Not Currently    Drug use: No    Sexual activity: Not on file   Lifestyle    Physical activity:     Days per week: Not on file     Minutes per session: Not on file    Stress: Not on file   Relationships    Social connections:     Talks on phone: Not on file     Gets together: Not on file     Attends Shinto service: Not on file     Active member of club or organization: Not on file     Attends meetings of clubs or organizations: Not on file     Relationship status: Not on file    Intimate partner violence:     Fear of current or ex partner: Not on file     Emotionally abused: Not on file     Physically abused: Not on file     Forced sexual activity: Not on file   Other Topics Concern    Not on file   Social History Narrative    Not on file       Allergies:   No Known Allergies        Labs:  0   Lab Value Date/Time    HCT 42 7 07/20/2019 0426    HCT 44 8 07/19/2019 1940    HCT 44 6 01/29/2019 1030    HCT 49 3 (H) 04/24/2018 0906    HGB 13 5 07/20/2019 0426    HGB 14 8 07/19/2019 1940    HGB 15 3 01/29/2019 1030    HGB 16 0 (H) 04/24/2018 0906    WBC 9 25 07/20/2019 0426    WBC 9 83 07/19/2019 1940    WBC 6 1 01/29/2019 1030    WBC 6 74 04/24/2018 0906       Meds:  No current facility-administered medications for this encounter  Blood Culture:   No results found for: BLOODCX    Wound Culture:   No results found for: WOUNDCULT    Ins and Outs:  No intake/output data recorded  Physical Exam  There were no vitals taken for this visit  There were no vitals taken for this visit  Gen: Alert and oriented to person, place, time  HEENT: EOMI, eyes clear, moist mucus membranes, hearing intact  Respiratory: Bilateral chest rise  No audible wheezing found  Cardiovascular: Regular Rate and Rhythm  Abdomen: soft nontender/nondistended  Ortho Exam: R long finger with + ttp A1 pulley, + nodule and + Triggering   L index finger with +ttp A1 pulley, + nodule and + triggering  Neuro Exam: Sensation and motor grossly intact    Lab Results: Reviewed  Imaging: Reviewed

## 2019-07-24 ENCOUNTER — TRANSITIONAL CARE MANAGEMENT (OUTPATIENT)
Dept: FAMILY MEDICINE CLINIC | Facility: CLINIC | Age: 69
End: 2019-07-24

## 2019-08-02 ENCOUNTER — OFFICE VISIT (OUTPATIENT)
Dept: FAMILY MEDICINE CLINIC | Facility: CLINIC | Age: 69
End: 2019-08-02
Payer: MEDICARE

## 2019-08-02 VITALS
WEIGHT: 184 LBS | DIASTOLIC BLOOD PRESSURE: 64 MMHG | HEART RATE: 71 BPM | BODY MASS INDEX: 34.74 KG/M2 | SYSTOLIC BLOOD PRESSURE: 122 MMHG | HEIGHT: 61 IN | TEMPERATURE: 98 F | OXYGEN SATURATION: 94 %

## 2019-08-02 DIAGNOSIS — E11.9 TYPE 2 DIABETES MELLITUS WITHOUT COMPLICATION, WITHOUT LONG-TERM CURRENT USE OF INSULIN (HCC): ICD-10-CM

## 2019-08-02 DIAGNOSIS — R42 VERTIGO: Primary | ICD-10-CM

## 2019-08-02 DIAGNOSIS — I10 ESSENTIAL HYPERTENSION: ICD-10-CM

## 2019-08-02 DIAGNOSIS — E11.65 TYPE 2 DIABETES MELLITUS WITH HYPERGLYCEMIA, WITHOUT LONG-TERM CURRENT USE OF INSULIN (HCC): ICD-10-CM

## 2019-08-02 DIAGNOSIS — M65.331 TRIGGER FINGER, RIGHT MIDDLE FINGER: ICD-10-CM

## 2019-08-02 PROCEDURE — 99495 TRANSJ CARE MGMT MOD F2F 14D: CPT | Performed by: FAMILY MEDICINE

## 2019-08-02 NOTE — ASSESSMENT & PLAN NOTE
Lab Results   Component Value Date    HGBA1C 6 0 (H) 05/08/2019    Blood sugars have been relatively well controlled  She is going to continue on her current therapy  No results for input(s): POCGLU in the last 72 hours      Blood Sugar Average: Last 72 hrs:

## 2019-08-02 NOTE — ASSESSMENT & PLAN NOTE
Blood pressure well controlled  No change in therapy  We did discuss the potential for giving her a combination lisinopril/hydrochlorothiazide tablet but she would like to continue with separate pills as she likes to take 1 in the morning and 1 in the evening

## 2019-08-02 NOTE — ASSESSMENT & PLAN NOTE
Patient has improving vertigo which developed acutely  I believe this is on the basis of benign positional vertigo most likely though without recently recurrent symptoms it is difficult to make this diagnosis definitively  She is going to wean down on her meclizine  If she has worsening of her symptoms she is going to call for re-evaluation  Would consider BPV therapy if recurrent  She is in agreement

## 2019-08-02 NOTE — PROGRESS NOTES
Assessment/Plan:     Vertigo  Patient has improving vertigo which developed acutely  I believe this is on the basis of benign positional vertigo most likely though without recently recurrent symptoms it is difficult to make this diagnosis definitively  She is going to wean down on her meclizine  If she has worsening of her symptoms she is going to call for re-evaluation  Would consider BPV therapy if recurrent  She is in agreement  Type 2 diabetes mellitus with hyperglycemia, without long-term current use of insulin ([facility])  Lab Results   Component Value Date    HGBA1C 6 0 (H) 05/08/2019    Blood sugars have been relatively well controlled  She is going to continue on her current therapy  No results for input(s): POCGLU in the last 72 hours  Blood Sugar Average: Last 72 hrs:      Essential hypertension  Blood pressure well controlled  No change in therapy  We did discuss the potential for giving her a combination lisinopril/hydrochlorothiazide tablet but she would like to continue with separate pills as she likes to take 1 in the morning and 1 in the evening  Trigger finger, right middle finger  Symptoms resolved after release  Very happy with results  Plan to have left completed next week  Diagnoses and all orders for this visit:    Vertigo    Type 2 diabetes mellitus without complication, without long-term current use of insulin ([facility])  -     glucose blood (ACCU-CHEK JESS PLUS) test strip; Test BS 3 times daily  DX [id number] [id number]    Type 2 diabetes mellitus with hyperglycemia, without long-term current use of insulin ([facility])    Essential hypertension    Trigger finger, right middle finger         Subjective:     Patient ID: Laly Harvey is a 68 y o  female  Had a great experience at [facility]  Admitted with acute dizziness, r/o TIA /CVA  MRI was negative  Meclizine helps  Worse with rolling over in bed or standing quickly  Had vomiting  No tinnitus recalled  No effect on hearing   No focal neuro sx  No FH Menière / premature hearing loss  No CVP c/o  FBS are [address]  Occasionally 100 or 140  HPI  The patient is 58-year-old female who presents for a transition of care visit  She was admitted on 07/19 and discharged on 07/20  She developed acute vertigo  She had nausea and vomiting  This was the 1st occurrence for her since the 1970s  There was concern that this may represent a posterior circulation CVA  She does have a history of hypertension and diabetes  Her blood sugars had been under reasonable control  She awoke that morning and developed acute dizziness when she rolled over in bed  She denied any tinnitus or hearing loss  She did have unsteadiness on her feet  She was admitted through the emergency room at [facility]  MRI that evening was negative for acute CVA  She was given meclizine the next morning which seemed to help with her dizziness and she was discharged home  She continues to have intermittent dizziness which is worse with rolling over in bed or standing up quickly  It is significantly improved on meclizine which she has been taking on a regular basis  She had no focal weakness  No dysphagia, dysarthria, ataxia X cetera  Blood pressures have been under reasonable control as well  Review of Systems   HENT: Positive for sneezing  Negative for ear pain, hearing loss and tinnitus  Respiratory: Negative  Cardiovascular: Negative  Gastrointestinal: Negative for blood in stool, constipation and diarrhea  Dumping syndrome  Endocrine: Negative for polydipsia and polyphagia  Neurological: Positive for dizziness  Negative for seizures, weakness and light-headedness  Objective:     Physical Exam   Constitutional: She is oriented to person, place, and time  She appears well-developed and well-nourished  Eyes: Pupils are equal, round, and reactive to light  EOM are normal  Right eye exhibits no discharge   Left eye exhibits no discharge  No scleral icterus  Neck: Normal range of motion  Neck supple  No JVD present  No thyromegaly present  Cardiovascular: Normal rate, regular rhythm and normal heart sounds  Exam reveals no friction rub  No murmur heard  Pulmonary/Chest: Effort normal and breath sounds normal    Musculoskeletal: She exhibits no edema  Lymphadenopathy:     She has no cervical adenopathy  Neurological: She is alert and oriented to person, place, and time  She displays normal reflexes  No cranial nerve deficit  She exhibits normal muscle tone  Coordination normal    Examination of rapid alternating movements at hands, fine finger movements, heel-shin, motor bulk strength and tone are all normal    Psychiatric: She has a normal mood and affect  Nursing note and vitals reviewed  Vitals:    08/02/19 1120 08/02/19 1154   BP: 140/80 122/64   BP Location:  Right arm   Patient Position:  Sitting   Cuff Size:  Large   Pulse: 71    Temp: 98 °F (36 7 °C)    SpO2: 94%    Weight: 83 5 kg (184 lb)    Height: 5' 1" (1 549 m)        Transitional Care Management Review:  Derick Cason is a 68 y o  female here for TCM follow up  During the TCM phone call patient stated:    TCM Call (since 7/2/2019)     Date and time call was made  7/23/2019  4:46 PM    Hospital care reviewed  Records reviewed    Patient was hospitialized at  [facility]    Date of Admission  07/19/19    Date of discharge  07/20/19    Diagnosis  Dizziness    Disposition  Home    Were the patients medications reviewed and updated  No    Current Symptoms  None      TCM Call (since 7/2/2019)     Post hospital issues  None    Should patient be enrolled in anticoag monitoring? No    Scheduled for follow up?   Yes    Did you obtain your prescribed medications  Yes    Do you need help managing your prescriptions or medications  No    Is transportation to your appointment needed  No    I have advised the patient to call PCP with any new or worsening symptoms  Sunita Hsu MD

## 2019-08-06 ENCOUNTER — HOSPITAL ENCOUNTER (OUTPATIENT)
Facility: HOSPITAL | Age: 69
Setting detail: OUTPATIENT SURGERY
Discharge: HOME | End: 2019-08-06
Attending: ORTHOPAEDIC SURGERY | Admitting: ORTHOPAEDIC SURGERY
Payer: MEDICARE

## 2019-08-06 VITALS
OXYGEN SATURATION: 97 % | HEART RATE: 58 BPM | BODY MASS INDEX: 33.86 KG/M2 | HEIGHT: 62 IN | DIASTOLIC BLOOD PRESSURE: 48 MMHG | SYSTOLIC BLOOD PRESSURE: 101 MMHG | TEMPERATURE: 97 F | RESPIRATION RATE: 12 BRPM | WEIGHT: 184 LBS

## 2019-08-06 DIAGNOSIS — M65.322 TRIGGER FINGER, LEFT INDEX FINGER: Primary | ICD-10-CM

## 2019-08-06 PROCEDURE — 26055 INCISE FINGER TENDON SHEATH: CPT | Performed by: PHYSICIAN ASSISTANT

## 2019-08-06 PROCEDURE — 26055 INCISE FINGER TENDON SHEATH: CPT | Performed by: ORTHOPAEDIC SURGERY

## 2019-08-06 RX ORDER — MAGNESIUM HYDROXIDE 1200 MG/15ML
LIQUID ORAL AS NEEDED
Status: DISCONTINUED | OUTPATIENT
Start: 2019-08-06 | End: 2019-08-06 | Stop reason: HOSPADM

## 2019-08-06 RX ORDER — HYDROCODONE BITARTRATE AND ACETAMINOPHEN 5; 325 MG/1; MG/1
1 TABLET ORAL EVERY 6 HOURS PRN
Qty: 5 TABLET | Refills: 0 | Status: SHIPPED | OUTPATIENT
Start: 2019-08-06 | End: 2019-10-10

## 2019-08-06 RX ORDER — SENNOSIDES 8.6 MG
650 CAPSULE ORAL EVERY 8 HOURS
Qty: 15 TABLET | Refills: 0 | Status: SHIPPED | OUTPATIENT
Start: 2019-08-06 | End: 2019-08-11

## 2019-08-06 RX ADMIN — SODIUM BICARBONATE: 84 INJECTION, SOLUTION INTRAVENOUS at 08:05

## 2019-08-06 NOTE — INTERVAL H&P NOTE
H&P reviewed  After examining the patient I find no changes in the patients condition since the H&P had been written  S/p right trigger finger release, doing well  Incision healed  Patient for left index trigger finger release today    BP (!) 101/48   Pulse 58   Temp (!) 97 °F (36 1 °C) (Tympanic)   Resp 12   Ht 5' 2" (1 575 m)   Wt 83 5 kg (184 lb)   SpO2 97%   BMI 33 65 kg/m²

## 2019-08-06 NOTE — DISCHARGE INSTRUCTIONS
Post Operative Instructions    You have had surgery on your arm today, please read and follow the information below:  · Elevate your hand above your elbow during the next 24-48 hours to help with swelling  · Place your hand and arm over your head with motion at your shoulder three times a day  · Do not apply any cream/ointment/oil to your incisions including antibiotics  · Do not soak your hands in standing water (dishwater, tubs, Jacuzzi's, pools, etc ) until given permission (typically 2-3 weeks after injury)    Call the office if you notice any:  · Increased numbness or tingling of your hand or fingers that is not relieved with elevation  · Increasing pain that is not controlled with medication  · Difficulty chewing, breathing, swallowing  · Chest pains or shortness of breath  · Fever over 101 4 degrees  Bandage: Remove bandage after 5 days  Motion: Move fingers into a fist 5 times a day, DO NOT move any splinted fingers  Weight bearing status: Avoid heavy lifting (>5 pounds) with the extremity that was operated on until follow up appointment  Normal activities of daily living are OK  Ice: Ice for 10 minutes every hour as needed for swelling x 24 hours  Sling: No sling necessary  Medications: You may continue your Mobic (meloxicam)  Tylenol Extended Release 650 mg every 8 hours  Norco/Hydrocodone one tab every 6 hours AS NEEDED for pain     Follow-up Appointment: 7-10 days  Please call the office if you have any questions or concerns regarding your post-operative care

## 2019-08-06 NOTE — OP NOTE
OPERATIVE REPORT  PATIENT NAME: Lyle Swain  :  1950  MRN: [id number]  Pt Location: [facility]    SURGERY DATE: 19    Surgeon(s) and Role:     * Maru Harrison MD - Primary     * Zafar Rivera PA-C - Assisting    Pre-Op Diagnosis:  Trigger finger, left index finger [M65 322]    Post-Op Diagnosis Codes:     * Trigger finger, left index finger [M65 322]    Procedure(s):  RELEASE TRIGGER FINGER LEFT INDEX (Left)    Specimen(s):  * No orders in the log *    Estimated Blood Loss:   Minimal      Anesthesia Type:   Local    Operative Indications: The patient has a history of Trigger Finger  left  index finger that was recalcitrant to conservative management  The decision was made to bring the patient to the operating room for Trigger Finger Release  left  index finger  Risks of the procedure were explained which include, but are not limited to bleeding; infection; damage to nerves, arteries,veins, tendons; scar; pain; need for reoperation; failure to give desired result; and risks of anaesthesia  All questions were answered to satisfaction and they were willing to proceed  Operative Findings:  Left index finger trigger finger with extensive tenosynovitis  Healed right trigger finger release, sutures removed without complication    Complications:   None    Procedure and Technique:  After the patient, site, and procedure were identified, the patient was brought into the operating room in a supine position  Local anaesthesia was adminstered in the preoperative holding area  A tourniquet was not used  The  left upper extremity was then prepped and drapped in a normal, sterile, orthopedic fashion  After the patient, site, and procedure were once again identified, attention was turned to the left index finger  An incision was made over the flexor tendon sheath at the level of the A1 pulley    Dissection was carried out in-line with the flexor tendon sheath and the radial and ulnar digital artery and nerve were protected  The A1 pulley was identified at the base of the incision  Under direct visualization, the A1 pulley was divided along the midline in its entirety with care taken to avoid injury to the underlying tendon  The tendons were examined to ensure that no further catching, popping, clicking or locking occurred with motion of the finger  There was significant tenosynovitis noted between the flexor digitorum profundus and superficialis tendon  We performed a tenosynovectomy  There was some crepitation with motion with abnormal tendon gliding  There was no evidence of triggering or binding of the flexor tendons in the operating room  At the completion of the procedure, hemostasis was obtained with cautery and direct pressure  The wounds were copiously irrigated with sterile solution  The wounds were closed with Prolene  Sterile dressings were applied, including Xeroform, gauze, tweeners, webril, ACE  Please note, all sponge, needle, and instrument counts were correct prior to closure  Loupe magnification was utilized  The patient tolerated the procedure well       I was present for the entire procedure, A qualified resident physician was not available and A physician assistant was required during the procedure for retraction tissue handling,dissection and suturing    Patient Disposition:  APU and hemodynamically stable    SIGNATURE: Radha Keita MD  DATE: 08/06/19  TIME: 9:01 AM

## 2019-08-12 DIAGNOSIS — I10 ESSENTIAL HYPERTENSION: ICD-10-CM

## 2019-08-12 RX ORDER — LISINOPRIL 20 MG/1
TABLET ORAL
Qty: 90 TABLET | Refills: 1 | Status: SHIPPED | OUTPATIENT
Start: 2019-08-12 | End: 2019-12-30 | Stop reason: SDUPTHER

## 2019-08-12 RX ORDER — HYDROCHLOROTHIAZIDE 25 MG/1
TABLET ORAL
Qty: 90 TABLET | Refills: 0 | Status: SHIPPED | OUTPATIENT
Start: 2019-08-12 | End: 2019-10-16 | Stop reason: SDUPTHER

## 2019-08-16 ENCOUNTER — OFFICE VISIT (OUTPATIENT)
Dept: OBGYN CLINIC | Facility: HOSPITAL | Age: 69
End: 2019-08-16

## 2019-08-16 VITALS
SYSTOLIC BLOOD PRESSURE: 153 MMHG | HEART RATE: 72 BPM | BODY MASS INDEX: 35.19 KG/M2 | DIASTOLIC BLOOD PRESSURE: 75 MMHG | WEIGHT: 186.4 LBS | HEIGHT: 61 IN

## 2019-08-16 DIAGNOSIS — M65.331 TRIGGER FINGER, RIGHT MIDDLE FINGER: ICD-10-CM

## 2019-08-16 DIAGNOSIS — M65.322 TRIGGER FINGER, LEFT INDEX FINGER: Primary | ICD-10-CM

## 2019-08-16 PROCEDURE — 99024 POSTOP FOLLOW-UP VISIT: CPT | Performed by: ORTHOPAEDIC SURGERY

## 2019-08-16 NOTE — PROGRESS NOTES
Assessment:   S/P:  Left index and right long finger trigger finger release    Plan: Today, the patient is doing well  The patient has no active restrictions, can return back to all activities to their tolerance  I will be happy to see the patient back on an as-needed basis if any problems or issues happen to arise  CHIEF COMPLAINT:  Chief Complaint   Patient presents with    Left Index Finger - Post-op    Right Middle Finger - Post-op         SUBJECTIVE:  Tracey Hernandez is a 68 y o  female who presents for follow up after left index and right long finger trigger finger release with resolution of catching, clicking, and locking  She is happy      PHYSICAL EXAMINATION:  Vital signs: /75   Pulse 72   Ht 5' 1" (1 549 m)   Wt 84 6 kg (186 lb 6 4 oz)   BMI 35 22 kg/m²   General: well developed and well nourished, alert, oriented times 3 and appears comfortable  Psychiatric: Normal    MUSCULOSKELETAL EXAMINATION:  Incision: Clean, dry, intact and healed  Range of Motion: full composite fist possible  Neurovascular status: Neuro intact, good cap refill  Done today: Sutures out and Steri strips applied      STUDIES REVIEWED:  No Studies to review      PROCEDURES PERFORMED:  Procedures  No Procedures performed today

## 2019-08-26 DIAGNOSIS — G89.29 CHRONIC RIGHT HIP PAIN: ICD-10-CM

## 2019-08-26 DIAGNOSIS — M25.551 CHRONIC RIGHT HIP PAIN: ICD-10-CM

## 2019-08-27 RX ORDER — MELOXICAM 7.5 MG/1
TABLET ORAL
Qty: 180 TABLET | Refills: 1 | Status: SHIPPED | OUTPATIENT
Start: 2019-08-27 | End: 2020-01-16

## 2019-10-10 ENCOUNTER — OFFICE VISIT (OUTPATIENT)
Dept: PAIN MEDICINE | Facility: CLINIC | Age: 69
End: 2019-10-10
Payer: MEDICARE

## 2019-10-10 VITALS — HEART RATE: 58 BPM | SYSTOLIC BLOOD PRESSURE: 133 MMHG | DIASTOLIC BLOOD PRESSURE: 62 MMHG

## 2019-10-10 DIAGNOSIS — Z79.891 LONG-TERM CURRENT USE OF OPIATE ANALGESIC: ICD-10-CM

## 2019-10-10 DIAGNOSIS — G89.29 CHRONIC BILATERAL LOW BACK PAIN, UNSPECIFIED WHETHER SCIATICA PRESENT: ICD-10-CM

## 2019-10-10 DIAGNOSIS — M54.16 LUMBAR RADICULOPATHY: ICD-10-CM

## 2019-10-10 DIAGNOSIS — M48.062 SPINAL STENOSIS OF LUMBAR REGION WITH NEUROGENIC CLAUDICATION: ICD-10-CM

## 2019-10-10 DIAGNOSIS — M51.36 DDD (DEGENERATIVE DISC DISEASE), LUMBAR: ICD-10-CM

## 2019-10-10 DIAGNOSIS — M54.50 CHRONIC BILATERAL LOW BACK PAIN, UNSPECIFIED WHETHER SCIATICA PRESENT: ICD-10-CM

## 2019-10-10 DIAGNOSIS — R42 DIZZINESS: ICD-10-CM

## 2019-10-10 DIAGNOSIS — F11.20 UNCOMPLICATED OPIOID DEPENDENCE (HCC): ICD-10-CM

## 2019-10-10 DIAGNOSIS — G89.4 CHRONIC PAIN SYNDROME: Primary | ICD-10-CM

## 2019-10-10 DIAGNOSIS — M47.816 FACET ARTHROPATHY, LUMBAR: ICD-10-CM

## 2019-10-10 DIAGNOSIS — M46.1 SACROILIITIS (HCC): ICD-10-CM

## 2019-10-10 DIAGNOSIS — M47.816 LUMBAR SPONDYLOSIS: ICD-10-CM

## 2019-10-10 PROCEDURE — 99214 OFFICE O/P EST MOD 30 MIN: CPT | Performed by: NURSE PRACTITIONER

## 2019-10-10 PROCEDURE — 80305 DRUG TEST PRSMV DIR OPT OBS: CPT | Performed by: NURSE PRACTITIONER

## 2019-10-10 RX ORDER — TRAMADOL HYDROCHLORIDE 50 MG/1
50 TABLET ORAL EVERY 8 HOURS PRN
Qty: 90 TABLET | Refills: 2 | Status: SHIPPED | OUTPATIENT
Start: 2019-10-10 | End: 2019-12-19 | Stop reason: SDUPTHER

## 2019-10-10 RX ORDER — MECLIZINE HYDROCHLORIDE 25 MG/1
25 TABLET ORAL EVERY 8 HOURS SCHEDULED
Qty: 90 TABLET | Refills: 1 | Status: ON HOLD | OUTPATIENT
Start: 2019-10-10 | End: 2020-06-16 | Stop reason: ALTCHOICE

## 2019-10-10 NOTE — PATIENT INSTRUCTIONS
Opioid Pain Management   AMBULATORY CARE:   An opioid  is a type of medicine used to treat pain  Examples of opioids are oxycodone, morphine, fentanyl, or codeine  Take opioid medicines as directed, for the condition it is prescribed:  Common problems that may occur when you do not take opioid medicines as directed include the following:  · Health problems  may occur  You may have trouble breathing  You may also develop liver or kidney damage, or stomach bleeding  Any of these health problems can become life-threatening  · Opioid dependence  means your body needs the opioid medicine to keep it from going through withdrawal      · Opioid tolerance  means the opioid does not control pain as well as it used to  You need higher doses of the opioid to get pain relief  · Opioid addiction  means you are not able to control the use of the opioid medicine  You use it when you do not have pain  You crave the opioid medicine  Call 911 or have someone call 911 for any of the following:   · You are breathing slower than normal, or you have trouble breathing  · You cannot be awakened  · You have a seizure  Seek care immediately if:   · Your heart is beating slower than usual     · Your heart feels like it is jumping or fluttering  · You are so sleepy that you cannot stay awake  · You have severe muscle pain or weakness  · You see or hear things that are not real   Contact your healthcare provider if:   · You are too dizzy to stand up  · Your pain gets worse or you have new pain  · You cannot do your usual activities because of side effects from the opioid  · You are constipated or have abdominal pain  · You have questions or concerns about your condition or care  Opioid safety measures:   · Take your medicine as directed  Ask if you need more information on how to take your medicine correctly  Follow up with your healthcare provider regularly  You may need to have your dose adjusted   Do not use opioid medicine if you are pregnant or breastfeeding  · Give your healthcare provider a list of all your medicines  Include any over-the-counter medicines, vitamins, and herbs  It can be dangerous to take opioids with certain other medicines, such as antihistamines  · Keep opioid medicine in a safe place  Store your opioid medicine in a locked cabinet to keep it away from children and others  · Do not drink alcohol while you use opioids  Alcohol use with an opioid medicine can make you sleepy and slow your breathing rate  You may stop breathing completely  · Do not drive or operate heavy machinery after you take opioid medicine  Opioid medicine can make you drowsy and make it hard to concentrate  You may injure yourself or others if you drive or operate heavy machinery while taking your medicine  · Drink fluids and eat high-fiber foods  Fluids and fiber will help prevent constipation  Ask your healthcare provider what fluids are right for you and how much you should drink  Also ask for a list of foods that contain fiber  Follow up with your healthcare provider as directed: You may need to have your dose adjusted  You may be referred to a pain specialist  Write down your questions so you remember to ask them during your visits  © 2017 [employer] Information is for End User's use only and may not be sold, redistributed or otherwise used for commercial purposes  All illustrations and images included in CareNotes® are the copyrighted property of [employer]  or [employer]  The above information is an  only  It is not intended as medical advice for individual conditions or treatments  Talk to your doctor, nurse or pharmacist before following any medical regimen to see if it is safe and effective for you

## 2019-10-10 NOTE — PROGRESS NOTES
Assessment:  1  Chronic pain syndrome    2  Chronic bilateral low back pain, unspecified whether sciatica present    3  Lumbar spondylosis    4  Spinal stenosis of lumbar region with neurogenic claudication    5  Lumbar radiculopathy    6  Sacroiliitis ([address] )    7  Uncomplicated opioid dependence ([address] )    8  Long-term current use of opiate analgesic    9  DDD (degenerative disc disease), lumbar    10  Facet arthropathy, lumbar        Plan:  The patient is a 68 y o  female last seen on 4/4/19 who presents for a follow up office visit  The patient has a history of chronic pain syndrome secondary to low back pain, lumbar spondylosis, lumbar stenosis, and lumbar degenerative disc disease  Patient presents today with ongoing low back pain  Her pain has improved since the last office visit, which she contributes to the tramadol 50 mg  She is receiving 75% relief from the medication and her pain is now a 2-3/10 on the numeric rating scale  Since the medication continues to provide moderate pain relief without side effects, I will continue her on as prescribed  A prescription with 2 refills was electronically sent to the pharmacy with a do not fill date of November 8, 2019  Since patient's pain has improved we will hold off on sacroiliac joint injections at this time  If pain would worsen, she was instructed to contact the office  The patient was also completed a BECKS depression inventory and SOAPP-R  today, as part of our yearly opioid management program        An opioid contract was reviewed with the patient  The patient was made aware they are only to receive opioid medication from our office, and must take the medication as prescribed  If the medication is lost or stolen, it will not be replaced  We also do not condone the use of illegal substances or alcohol with opioid medication  Random urine drug screens and pill counts will also be performed at office visits   Lastly, the patient was informed that office visits are needed for refills  Patient was agreeable and signed the contract  [facility] Prescription Drug Monitoring Program report was reviewed and was appropriate     A urine drug screen was collected at today's office visit as part of our medication management protocol  The point of care testing results were appropriate for what was being prescribed  The specimen will be sent for confirmatory testing  The drug screen is medically necessary because the patient is either dependent on opioid medication or is being considered for opioid medication therapy and the results could impact ongoing or future treatment  The drug screen is to evaluate for the presences or absence of prescribed, non-prescribed, and/or illicit drugs/substances  There are risks associated with opioid medications, including dependence, addiction and tolerance  The patient understands and agrees to use these medications only as prescribed  Potential side effects of the medications include, but are not limited to, constipation, drowsiness, addiction, impaired judgment and risk of fatal overdose if not taken as prescribed  The patient was warned against driving while taking sedation medications  Sharing medications is a felony  At this point in time, the patient is showing no signs of addiction, abuse, diversion or suicidal ideation  The patient will follow-up in 12 weeks for medication prescription refill and reevaluation  The patient was advised to contact the office should their symptoms worsen in the interim  The patient was agreeable and verbalized an understanding  History of Present Illness: The patient is a 68 y o  female last seen on 4/4/19 who presents for a follow up office visit  The patient has a history of chronic pain syndrome secondary to low back pain, lumbar spondylosis, lumbar stenosis, and lumbar degenerative disc disease    She presents today with ongoing low back pain which has improved since the last office visit  She states the low back pain that she was feeling at the last office visit has significantly improved  She has been taking tramadol 50 mg 2 to 3 times a day which she contributes the decreased pain 2  The medication is providing 75% pain relief without side effects  She states her pain is now down to a 2-3/10 on the numeric rating scale  Her pain now occurs occasionally but mostly in the evening  She describes as dull aching      Pain Contract Signed: 10/10/19  Last Urine Drug Screen: 10/10/19  Tramadol 1010/19 per pt  I have personally reviewed and/or updated the patient's past medical history, past surgical history, family history, social history, current medications, allergies, and vital signs today  Review of Systems:    Review of Systems   Respiratory: Negative for shortness of breath  Cardiovascular: Negative for chest pain  Gastrointestinal: Negative for constipation, diarrhea, nausea and vomiting  Musculoskeletal: Positive for gait problem  Negative for arthralgias, joint swelling and myalgias  Skin: Negative for rash  Neurological: Positive for dizziness  Negative for seizures and weakness  All other systems reviewed and are negative  Past Medical History:   Diagnosis Date    Asthma     Chronic obstructive lung disease ([facility] )     Community acquired pneumonia     LAST ASSESSED: [id number]    COPD (chronic obstructive pulmonary disease) ([facility] )     Diabetes mellitus ([facility] )     History of MRSA infection     2008 liver sx    Liver disease     Sleep apnea     Viral warts     LAST ASSESSED: [id number]       Past Surgical History:   Procedure Laterality Date    ABDOMINAL SURGERY      ABDOMINOPLASTY      CHOLECYSTECTOMY      COSMETIC SURGERY      EYE SURGERY      Bilateral cataracts 2015 Dr Tucker Hancock       FINGER SURGERY      HAND SURGERY      HYSTERECTOMY      LIVER SURGERY      [place] INCISE FINGER TENDON SHEATH Right 7/23/2019    Procedure: RELEASE TRIGGER FINGER RIGHT LONG;  Surgeon: Shani Green MD;  [facility];  Service: Orthopedics    NE INCISE FINGER TENDON SHEATH Left 8/6/2019    Procedure: RELEASE TRIGGER FINGER LEFT INDEX;  Surgeon: Shani Green MD;  [facility];  Service: Orthopedics    NE REPAIR INTERCARP/CARP-METACARP JT Right 9/9/2016    Procedure: THUMB TRAPEZIECTOMY; BASAL JOINT ARTHROPLASTY WITH APL AUTOGRAFT;  Surgeon: Sylvia Peña MD;  [facility];  Service: Orthopedics    NE REPAIR INTERCARP/CARP-METACARP JT Left 5/29/2018    Procedure: Thena Bending;  Surgeon: Shani Green MD;  [facility];  Service: Orthopedics    NE TRANSPLANT HAND TENDON Left 5/29/2018    Procedure: TRANSFER TENDON HAND/WRIST FCR from forearm to wrist;  Surgeon: Shani Green MD;  [facility];  Service: Orthopedics       Family History   Problem Relation Age of Onset    Heart attack Father     Heart disease Family     Diabetes Family     Thyroid disease Family     Ovarian cancer Family     Heart attack Mother     Ovarian cancer Sister     Heart disease Other         CARDIAC DISORDER    Diabetes Other     Liver cancer Other     Breast cancer Other     Stomach cancer Other        Social History     Occupational History    Not on file   Tobacco Use    Smoking status: Current Every Day Smoker     Packs/day: 0 20    Smokeless tobacco: Never Used   Substance and Sexual Activity    Alcohol use: Not Currently    Drug use: No    Sexual activity: Not on file         Current Outpatient Medications:     diphenhydrAMINE (BENADRYL) 25 mg capsule, Take 25 mg by mouth every 6 (six) hours as needed for itching, Disp: , Rfl:     glipiZIDE (GLUCOTROL XL) 5 mg 24 hr tablet, TAKE 1 TABLET EVERY DAY  30  MINUTES BEFORE BREAKFAST, Disp: 90 tablet, Rfl: 1    glucose blood (ACCU-CHEK JESS PLUS) test strip, Test BS 3 times daily    DX E11 9, Disp: 100 each, Rfl: 2   hydrochlorothiazide (HYDRODIURIL) 25 mg tablet, TAKE 1 TABLET DAILY, Disp: 90 tablet, Rfl: 0    lisinopril (ZESTRIL) 20 mg tablet, TAKE 1 TABLET EVERY DAY, Disp: 90 tablet, Rfl: 1    meclizine (ANTIVERT) 25 mg tablet, Take 1 tablet (25 mg total) by mouth every 8 (eight) hours, Disp: 90 tablet, Rfl: 1    meloxicam (MOBIC) 7 5 mg tablet, TAKE 1 TABLET (7 5 MG TOTAL) BY MOUTH TWO TIMES A DAY AS NEEDED FOR MODERATE PAIN , Disp: 180 tablet, Rfl: 1    metFORMIN (GLUCOPHAGE) 1000 MG tablet, TAKE 1 TABLET TWICE DAILY, Disp: 180 tablet, Rfl: 1    traMADol (ULTRAM) 50 mg tablet, Take 1 tablet (50 mg total) by mouth every 8 (eight) hours as needed for moderate pain, Disp: 90 tablet, Rfl: 2    No Known Allergies    Physical Exam:    /62   Pulse 58     Constitutional:normal, well developed, well nourished, alert, in no distress and non-toxic and no overt pain behavior  Eyes:anicteric  HEENT:grossly intact  Neck:supple, symmetric, trachea midline and no masses   Pulmonary:even and unlabored  Cardiovascular:No edema or pitting edema present  Skin:Normal without rashes or lesions and well hydrated  Psychiatric:Mood and affect appropriate  Neurologic:Cranial Nerves II-XII grossly intact  Musculoskeletal:normal      Imaging  MRI LUMBAR SPINE WITHOUT CONTRAST     INDICATION: M54 16: Radiculopathy, lumbar region  M48 062: Spinal stenosis, lumbar region with neurogenic claudication      COMPARISON:  MRI from 8/13/2014     TECHNIQUE:  Sagittal T1, sagittal T2, sagittal inversion recovery, axial T1 and axial T2, coronal T2     IMAGE QUALITY:  Diagnostic     FINDINGS:     VERTEBRAL BODIES:  There is a heterogeneous appearance of the visualized osseous structures without focal suspicious lesion  Vertebral body heights are maintained  There is Modic type I endplate degenerative change at L4-L5 and L5-S1      SACRUM:  Normal signal within the sacrum   No evidence of insufficiency or stress fracture      DISTAL CORD AND CONUS: Normal size and signal within the distal cord and conus        PARASPINAL SOFT TISSUES:  Paraspinal soft tissues are unremarkable      LOWER THORACIC DISC SPACES:  There is disc space narrowing within the lower thoracic spine      LUMBAR DISC SPACES:  There is multilevel disc space degeneration      L1-L2:  No significant canal stenosis or foraminal narrowing      L2-L3:  There is a minimal bulge  There is facet arthrosis  There is no significant canal stenosis  There is no significant foraminal narrowing      L3-L4: There is disc space narrowing  There is a bulging annulus  There is facet arthrosis with ligamentum flavum thickening  There is overall mild mass effect on thecal sac  Findings are overall progressed  There is mild foraminal narrowing      L4-L5:  There is disc space narrowing  There is vacuum disc phenomenon  There is a bulging annulus  There is dorsal osteophytosis  There is facet arthrosis  There is mild mass effect on the thecal sac  There is endplate spurring  There is mild to   moderate left and mild right foraminal narrowing      L5-S1:  There is vacuum disc phenomenon  There is a bulging annulus  There is facet arthrosis  There is endplate spurring  There is moderate to severe bilateral foraminal narrowing with contact of the exiting nerve roots  Findings are grossly   stable      IMPRESSION:     Multilevel degenerative changes of the lumbar spine, as described above      Multifactorial disease results in overall mild mass affect on the thecal sac at L3-L4, slightly progressed      Multilevel degenerative changes in the remainder of the spine are otherwise not significantly changed         Workstation performed: [id number]  No orders of the defined types were placed in this encounter

## 2019-10-16 DIAGNOSIS — I10 ESSENTIAL HYPERTENSION: ICD-10-CM

## 2019-10-17 RX ORDER — HYDROCHLOROTHIAZIDE 25 MG/1
TABLET ORAL
Qty: 90 TABLET | Refills: 0 | Status: SHIPPED | OUTPATIENT
Start: 2019-10-17 | End: 2019-12-23 | Stop reason: SDUPTHER

## 2019-11-06 DIAGNOSIS — E11.9 TYPE 2 DIABETES MELLITUS WITHOUT COMPLICATION, WITHOUT LONG-TERM CURRENT USE OF INSULIN (HCC): ICD-10-CM

## 2019-11-07 RX ORDER — GLIPIZIDE 5 MG/1
TABLET, FILM COATED, EXTENDED RELEASE ORAL
Qty: 90 TABLET | Refills: 1 | Status: SHIPPED | OUTPATIENT
Start: 2019-11-07 | End: 2020-03-23

## 2019-12-19 ENCOUNTER — OFFICE VISIT (OUTPATIENT)
Dept: PAIN MEDICINE | Facility: CLINIC | Age: 69
End: 2019-12-19
Payer: MEDICARE

## 2019-12-19 VITALS
SYSTOLIC BLOOD PRESSURE: 146 MMHG | BODY MASS INDEX: 35.22 KG/M2 | DIASTOLIC BLOOD PRESSURE: 67 MMHG | HEIGHT: 61 IN | HEART RATE: 84 BPM

## 2019-12-19 DIAGNOSIS — M47.816 FACET ARTHROPATHY, LUMBAR: ICD-10-CM

## 2019-12-19 DIAGNOSIS — Z79.891 ENCOUNTER FOR LONG-TERM OPIATE ANALGESIC USE: ICD-10-CM

## 2019-12-19 DIAGNOSIS — M51.36 DDD (DEGENERATIVE DISC DISEASE), LUMBAR: ICD-10-CM

## 2019-12-19 DIAGNOSIS — F11.20 UNCOMPLICATED OPIOID DEPENDENCE (HCC): ICD-10-CM

## 2019-12-19 DIAGNOSIS — M47.816 LUMBAR SPONDYLOSIS: ICD-10-CM

## 2019-12-19 DIAGNOSIS — M54.16 LUMBAR RADICULOPATHY: ICD-10-CM

## 2019-12-19 DIAGNOSIS — G89.4 CHRONIC PAIN SYNDROME: Primary | ICD-10-CM

## 2019-12-19 DIAGNOSIS — M46.1 SACROILIITIS (HCC): ICD-10-CM

## 2019-12-19 PROCEDURE — 99214 OFFICE O/P EST MOD 30 MIN: CPT | Performed by: NURSE PRACTITIONER

## 2019-12-19 RX ORDER — TRAMADOL HYDROCHLORIDE 50 MG/1
TABLET ORAL
Qty: 75 TABLET | Refills: 2 | Status: SHIPPED | OUTPATIENT
Start: 2019-12-21 | End: 2020-03-12 | Stop reason: SDUPTHER

## 2019-12-19 NOTE — PROGRESS NOTES
Assessment:  1  Chronic pain syndrome    2  Lumbar radiculopathy    3  Lumbar spondylosis    4  DDD (degenerative disc disease), lumbar    5  Facet arthropathy, lumbar    6  Sacroiliitis ([facility] )    7  Uncomplicated opioid dependence ([facility] )    8  Encounter for long-term opiate analgesic use        Plan:  Patient is happy with her overall pain relief  She is receiving about 80% relief from her medication regimen  She did have good relief with bilateral L2-5 RFA in the past   1   The patient may continue tramadol 50 mg  She does state that she only takes the medication 3 times a day approximately once or twice a week, therefore I will decrease the amount dispensed from #90 a month to #75 a month  The patient is agreeable  She was provided with 3 months worth of prescriptions with a DNF date of 12/21/19 with 2 refills  [place] Prescription Drug Monitoring Program report was reviewed and was appropriate     There are risks associated with opioid medications, including dependence, addiction and tolerance  The patient understands and agrees to use these medications only as prescribed  Potential side effects of the medications include, but are not limited to, constipation, drowsiness, addiction, impaired judgment and risk of fatal overdose if not taken as prescribed  The patient was warned against driving while taking sedation medications  Sharing medications is a felony  At this point in time, the patient is showing no signs of addiction, abuse, diversion or suicidal ideation  2  The patient may benefit from bilateral SI joint injections in the future, should her pain worsen  3  Patient may continue meloxicam 7 5 mg b i d  P r n   4  Patient will continue with her home exercise program  5  The patient will follow-up in 3 months for medication prescription refill and reevaluation  The patient was advised to contact the office should their symptoms worsen in the interim   The patient was agreeable and verbalized an understanding  M*Modal software was used to dictate this note  It may contain errors with dictating incorrect words or incorrect spelling  Please contact the provider directly with any questions  History of Present Illness: The patient is a 68 y o  female last seen on 10/10/19 who presents for a follow up office visit in regards to chronic lumbosacral back pain secondary to lumbar degenerative disc disease, lumbar spondylosis, lumbar stenosis and sacroiliitis  The patient denies any radicular symptoms into the lower extremities, bowel or bladder incontinence, or saddle anesthesia  She states she has been managing her low back pain on her current medication regimen and feels her back pain is significantly improved and tolerable at this time  She has been taking tramadol 50 mg 2 to 3 times a day as needed  The patient currently rates her pain a 2/10 on the numeric pain rating scale  She states her pain is occasional in nature and bothersome in the evening  Current pain medications includes:  Tramadol 50 mg 1 tablet 2-3 times daily p r n  and meloxicam 7 5 mg b i d  P r n     The patient reports that this regimen is providing 80% pain relief  The patient is reporting no side effects from this pain medication regimen  Pain Contract Signed: 10/10/19  Last Urine Drug Screen: 10/10/19  Tramadol 12/19/19 per pt        I have personally reviewed and/or updated the patient's past medical history, past surgical history, family history, social history, current medications, allergies, and vital signs today  Review of Systems:    Review of Systems   Respiratory: Negative for shortness of breath  Cardiovascular: Negative for chest pain  Gastrointestinal: Negative for constipation, diarrhea, nausea and vomiting  Musculoskeletal: Negative for arthralgias, gait problem, joint swelling and myalgias  Skin: Negative for rash  Neurological: Negative for dizziness, seizures and weakness     All other systems reviewed and are negative  Past Medical History:   Diagnosis Date    Asthma     [facility]     Community acquired pneumonia     LAST ASSESSED: [id number]    [facility]     Diabetes mellitus ([facility] )     History of MRSA infection     2008 liver sx    Liver disease     Sleep apnea     Viral warts     LAST ASSESSED: [id number]       Past Surgical History:   Procedure Laterality Date    ABDOMINAL SURGERY      ABDOMINOPLASTY      CHOLECYSTECTOMY      COSMETIC SURGERY      EYE SURGERY      Bilateral cataracts 2015 Dr Eladia Elaine       FINGER SURGERY      HAND SURGERY      HYSTERECTOMY      LIVER SURGERY      NE INCISE FINGER TENDON SHEATH Right 7/23/2019    Procedure: RELEASE TRIGGER FINGER RIGHT LONG;  Surgeon: Tracey Valiente MD;  [facility];  Service: Orthopedics    NE INCISE FINGER TENDON SHEATH Left 8/6/2019    Procedure: RELEASE TRIGGER FINGER LEFT INDEX;  Surgeon: Tracey Valiente MD;  [facility];  Service: Orthopedics    NE REPAIR INTERCARP/CARP-METACARP JT Right 9/9/2016    Procedure: THUMB TRAPEZIECTOMY; BASAL JOINT ARTHROPLASTY WITH APL AUTOGRAFT;  Surgeon: Lei Soto MD;  [facility];  Service: Orthopedics    NE REPAIR INTERCARP/CARP-METACARP JT Left 5/29/2018    Procedure: Hannah Champagne;  Surgeon: Tracey Valiente MD;  [facility];  Service: Orthopedics    NE TRANSPLANT HAND TENDON Left 5/29/2018    Procedure: TRANSFER TENDON HAND/WRIST FCR from forearm to wrist;  Surgeon: Tracey Valiente MD;  [facility];  Service: Orthopedics       Family History   Problem Relation Age of Onset    Heart attack Father     Heart disease Family     Diabetes Family     Thyroid disease Family     Ovarian cancer Family     Heart attack Mother     Ovarian cancer Sister     Heart disease Other         CARDIAC DISORDER    Diabetes Other     Liver cancer Other     Breast cancer Other     Stomach cancer Other        Social History     Occupational History    Not on file   Tobacco Use    Smoking status: Current Every Day Smoker     Packs/day: 0 20    Smokeless tobacco: Never Used   Substance and Sexual Activity    Alcohol use: Not Currently    Drug use: No    Sexual activity: Not on file         Current Outpatient Medications:     diphenhydrAMINE (BENADRYL) 25 mg capsule, Take 25 mg by mouth every 6 (six) hours as needed for itching, Disp: , Rfl:     glipiZIDE (GLUCOTROL XL) 5 mg 24 hr tablet, TAKE 1 TABLET EVERY DAY  30  MINUTES BEFORE BREAKFAST, Disp: 90 tablet, Rfl: 1    glucose blood (ACCU-CHEK JESS PLUS) test strip, Test BS 3 times daily  DX E11 9, Disp: 100 each, Rfl: 2    hydrochlorothiazide (HYDRODIURIL) 25 mg tablet, TAKE 1 TABLET EVERY DAY, Disp: 90 tablet, Rfl: 0    lisinopril (ZESTRIL) 20 mg tablet, TAKE 1 TABLET EVERY DAY, Disp: 90 tablet, Rfl: 1    meclizine (ANTIVERT) 25 mg tablet, Take 1 tablet (25 mg total) by mouth every 8 (eight) hours, Disp: 90 tablet, Rfl: 1    meloxicam (MOBIC) 7 5 mg tablet, TAKE 1 TABLET (7 5 MG TOTAL) BY MOUTH TWO TIMES A DAY AS NEEDED FOR MODERATE PAIN , Disp: 180 tablet, Rfl: 1    metFORMIN (GLUCOPHAGE) 1000 MG tablet, TAKE 1 TABLET TWICE DAILY, Disp: 180 tablet, Rfl: 1    [START ON 12/21/2019] traMADol (ULTRAM) 50 mg tablet, Take 1 PO Q8-12 hours PRN, Disp: 75 tablet, Rfl: 2    No Known Allergies    Physical Exam:    /67   Pulse 84   Ht 5' 1" (1 549 m)   BMI 35 22 kg/m²     Constitutional:normal, well developed, well nourished, alert, in no distress and non-toxic and no overt pain behavior    Eyes:anicteric  HEENT:grossly intact  Neck:supple, symmetric, trachea midline and no masses   Pulmonary:even and unlabored  Cardiovascular:No edema or pitting edema present  Skin:Normal without rashes or lesions and well hydrated  Psychiatric:Mood and affect appropriate  Neurologic:Cranial Nerves II-XII grossly intact  Musculoskeletal:normal gait      Imaging  No orders to display     MRI LUMBAR SPINE WITHOUT CONTRAST     INDICATION: M54 16: Radiculopathy, lumbar region  M48 062: Spinal stenosis, lumbar region with neurogenic claudication      COMPARISON:  MRI from 8/13/2014     TECHNIQUE:  Sagittal T1, sagittal T2, sagittal inversion recovery, axial T1 and axial T2, coronal T2     IMAGE QUALITY:  Diagnostic     FINDINGS:     VERTEBRAL BODIES:  There is a heterogeneous appearance of the visualized osseous structures without focal suspicious lesion  Vertebral body heights are maintained  There is Modic type I endplate degenerative change at L4-L5 and L5-S1      SACRUM:  Normal signal within the sacrum  No evidence of insufficiency or stress fracture      DISTAL CORD AND CONUS:  Normal size and signal within the distal cord and conus        PARASPINAL SOFT TISSUES:  Paraspinal soft tissues are unremarkable      LOWER THORACIC DISC SPACES:  There is disc space narrowing within the lower thoracic spine      LUMBAR DISC SPACES:  There is multilevel disc space degeneration      L1-L2:  No significant canal stenosis or foraminal narrowing      L2-L3:  There is a minimal bulge  There is facet arthrosis  There is no significant canal stenosis  There is no significant foraminal narrowing      L3-L4: There is disc space narrowing  There is a bulging annulus  There is facet arthrosis with ligamentum flavum thickening  There is overall mild mass effect on thecal sac  Findings are overall progressed  There is mild foraminal narrowing      L4-L5:  There is disc space narrowing  There is vacuum disc phenomenon  There is a bulging annulus  There is dorsal osteophytosis  There is facet arthrosis  There is mild mass effect on the thecal sac  There is endplate spurring  There is mild to   moderate left and mild right foraminal narrowing      L5-S1:  There is vacuum disc phenomenon  There is a bulging annulus  There is facet arthrosis  There is endplate spurring  There is moderate to severe bilateral foraminal narrowing with contact of the exiting nerve roots  Findings are grossly   stable      IMPRESSION:     Multilevel degenerative changes of the lumbar spine, as described above      Multifactorial disease results in overall mild mass affect on the thecal sac at L3-L4, slightly progressed      Multilevel degenerative changes in the remainder of the spine are otherwise not significantly changed        No orders of the defined types were placed in this encounter

## 2019-12-19 NOTE — PATIENT INSTRUCTIONS
Opioid Pain Management   AMBULATORY CARE:   An opioid  is a type of medicine used to treat pain  Examples of opioids are oxycodone, morphine, fentanyl, or codeine  Take opioid medicines as directed, for the condition it is prescribed:  Common problems that may occur when you do not take opioid medicines as directed include the following:  · Health problems  may occur  You may have trouble breathing  You may also develop liver or kidney damage, or stomach bleeding  Any of these health problems can become life-threatening  · Opioid dependence  means your body needs the opioid medicine to keep it from going through withdrawal      · Opioid tolerance  means the opioid does not control pain as well as it used to  You need higher doses of the opioid to get pain relief  · Opioid addiction  means you are not able to control the use of the opioid medicine  You use it when you do not have pain  You crave the opioid medicine  Call 911 or have someone call 911 for any of the following:   · You are breathing slower than normal, or you have trouble breathing  · You cannot be awakened  · You have a seizure  Seek care immediately if:   · Your heart is beating slower than usual     · Your heart feels like it is jumping or fluttering  · You are so sleepy that you cannot stay awake  · You have severe muscle pain or weakness  · You see or hear things that are not real   Contact your healthcare provider if:   · You are too dizzy to stand up  · Your pain gets worse or you have new pain  · You cannot do your usual activities because of side effects from the opioid  · You are constipated or have abdominal pain  · You have questions or concerns about your condition or care  Opioid safety measures:   · Take your medicine as directed  Ask if you need more information on how to take your medicine correctly  Follow up with your healthcare provider regularly  You may need to have your dose adjusted   Do not use opioid medicine if you are pregnant or breastfeeding  · Give your healthcare provider a list of all your medicines  Include any over-the-counter medicines, vitamins, and herbs  It can be dangerous to take opioids with certain other medicines, such as antihistamines  · Keep opioid medicine in a safe place  Store your opioid medicine in a locked cabinet to keep it away from children and others  · Do not drink alcohol while you use opioids  Alcohol use with an opioid medicine can make you sleepy and slow your breathing rate  You may stop breathing completely  · Do not drive or operate heavy machinery after you take opioid medicine  Opioid medicine can make you drowsy and make it hard to concentrate  You may injure yourself or others if you drive or operate heavy machinery while taking your medicine  · Drink fluids and eat high-fiber foods  Fluids and fiber will help prevent constipation  Ask your healthcare provider what fluids are right for you and how much you should drink  Also ask for a list of foods that contain fiber  Follow up with your healthcare provider as directed: You may need to have your dose adjusted  You may be referred to a pain specialist  Write down your questions so you remember to ask them during your visits  © 2017 [address] [employer] Information is for End User's use only and may not be sold, redistributed or otherwise used for commercial purposes  All illustrations and images included in CareNotes® are the copyrighted property of [employer]  or [employer]ed[employer] Soto  The above information is an  only  It is not intended as medical advice for individual conditions or treatments  Talk to your doctor, nurse or pharmacist before following any medical regimen to see if it is safe and effective for you

## 2019-12-23 DIAGNOSIS — I10 ESSENTIAL HYPERTENSION: ICD-10-CM

## 2019-12-23 RX ORDER — HYDROCHLOROTHIAZIDE 25 MG/1
TABLET ORAL
Qty: 90 TABLET | Refills: 0 | Status: SHIPPED | OUTPATIENT
Start: 2019-12-23 | End: 2020-02-24

## 2019-12-30 DIAGNOSIS — I10 ESSENTIAL HYPERTENSION: ICD-10-CM

## 2019-12-31 RX ORDER — LISINOPRIL 20 MG/1
TABLET ORAL
Qty: 90 TABLET | Refills: 0 | Status: SHIPPED | OUTPATIENT
Start: 2019-12-31 | End: 2020-03-12

## 2020-01-15 DIAGNOSIS — G89.29 CHRONIC RIGHT HIP PAIN: ICD-10-CM

## 2020-01-15 DIAGNOSIS — M25.551 CHRONIC RIGHT HIP PAIN: ICD-10-CM

## 2020-01-16 RX ORDER — MELOXICAM 7.5 MG/1
TABLET ORAL
Qty: 180 TABLET | Refills: 0 | Status: SHIPPED | OUTPATIENT
Start: 2020-01-16 | End: 2020-09-24 | Stop reason: SDUPTHER

## 2020-02-18 ENCOUNTER — OFFICE VISIT (OUTPATIENT)
Dept: SLEEP CENTER | Facility: CLINIC | Age: 70
End: 2020-02-18
Payer: MEDICARE

## 2020-02-18 VITALS
HEIGHT: 62 IN | WEIGHT: 187.4 LBS | BODY MASS INDEX: 34.48 KG/M2 | DIASTOLIC BLOOD PRESSURE: 58 MMHG | SYSTOLIC BLOOD PRESSURE: 140 MMHG

## 2020-02-18 DIAGNOSIS — G47.33 OSA (OBSTRUCTIVE SLEEP APNEA): Primary | ICD-10-CM

## 2020-02-18 PROCEDURE — 3077F SYST BP >= 140 MM HG: CPT | Performed by: INTERNAL MEDICINE

## 2020-02-18 PROCEDURE — 1160F RVW MEDS BY RX/DR IN RCRD: CPT | Performed by: INTERNAL MEDICINE

## 2020-02-18 PROCEDURE — 2022F DILAT RTA XM EVC RTNOPTHY: CPT | Performed by: INTERNAL MEDICINE

## 2020-02-18 PROCEDURE — 4040F PNEUMOC VAC/ADMIN/RCVD: CPT | Performed by: INTERNAL MEDICINE

## 2020-02-18 PROCEDURE — 99214 OFFICE O/P EST MOD 30 MIN: CPT | Performed by: INTERNAL MEDICINE

## 2020-02-18 PROCEDURE — 3008F BODY MASS INDEX DOCD: CPT | Performed by: INTERNAL MEDICINE

## 2020-02-18 PROCEDURE — 3078F DIAST BP <80 MM HG: CPT | Performed by: INTERNAL MEDICINE

## 2020-02-18 NOTE — PROGRESS NOTES
Progress Note - Sleep Center   Ángel Aranda ODR:[birth year] MRN: [id number]      Reason for Visit:  68 y  o female here for annual follow-up    Assessment:  Doing well on current therapy of CPAP 18 cm for very severe FRANK (AHI = 117)  Plan:  The patient's current CPAP machine is not functioning appropriately and the patient requires a new one    Follow up:  New CPAP device set at 18 cm with full face mask    History of Present Illness:  History of FRANK on PAP therapy  Fully compliant and deriving benefit  Review of Systems      Genitourinary none   Cardiology none   Gastrointestinal none   Neurology none   Constitutional none   Integumentary none   Psychiatry none   Musculoskeletal joint pain and back pain   Pulmonary none   ENT none   Endocrine none   Hematological none       I have reviewed and updated the review of systems as necessary      Historical Information    Past Medical History:   Past Medical History:   Diagnosis Date    Asthma     Chronic obstructive lung disease ([facility] )     Community acquired pneumonia     LAST ASSESSED: [id number]    COPD [facility]chronic obstructive [facility] disease) [facility] )     Diabetes mellitus ([facility] )     History of MRSA infection     2008 liver sx    Liver disease     Sleep apnea     Viral warts     LAST ASSESSED: [id number]         Past Surgical History:   Past Surgical History:   Procedure Laterality Date    ABDOMINAL SURGERY      ABDOMINOPLASTY      CHOLECYSTECTOMY      COSMETIC SURGERY      EYE SURGERY      Bilateral cataracts 2015 Dr Leslie Gan       FINGER SURGERY      HAND SURGERY      HYSTERECTOMY      LIVER SURGERY      CT INCISE FINGER TENDON SHEATH Right 7/23/2019    Procedure: RELEASE TRIGGER FINGER RIGHT LONG;  Surgeon: Vandy Runner, MD;  [facility];  Service: Orthopedics    CT INCISE FINGER TENDON SHEATH Left 8/6/2019    Procedure: RELEASE TRIGGER FINGER LEFT INDEX;  Surgeon: Vandy Runner, MD;  [facility];  Service: Orthopedics    MS REPAIR INTERCARP/CARP-METACARP JT Right 9/9/2016    Procedure: THUMB TRAPEZIECTOMY; BASAL JOINT ARTHROPLASTY WITH APL AUTOGRAFT;  Surgeon: Betty Bartlett MD;  [facility];  Service: Orthopedics    MS REPAIR INTERCARP/CARP-METACARP JT Left 5/29/2018    Procedure: Alton Gay;  Surgeon: Audrey St MD;  [facility];  Service: Orthopedics    MS TRANSPLANT HAND TENDON Left 5/29/2018    Procedure: TRANSFER TENDON HAND/WRIST FCR from forearm to wrist;  Surgeon: Audrey St MD;  [facility];  Service: Orthopedics       Social History:   Social History     Socioeconomic History    Marital status: /Civil Union     Spouse name: None    Number of children: None    Years of education: None    Highest education level: None   Occupational History    None   Social Needs    Financial resource strain: None    Food insecurity:     Worry: None     Inability: None    Transportation needs:     Medical: None     Non-medical: None   Tobacco Use    Smoking status: Current Every Day Smoker     Packs/day: 0 20    Smokeless tobacco: Never Used   Substance and Sexual Activity    Alcohol use: Yes     Comment: very seldom    Drug use: No    Sexual activity: None   Lifestyle    Physical activity:     Days per week: None     Minutes per session: None    Stress: None   Relationships    Social connections:     Talks on phone: None     Gets together: None     Attends [employer] service: None     Active member of club or organization: None     Attends meetings of clubs or organizations: None     Relationship status: None    Intimate partner violence:     Fear of current or ex partner: None     Emotionally abused: None     Physically abused: None     Forced sexual activity: None   Other Topics Concern    None   Social History Narrative    None       Family History:   Family History   Problem Relation Age of Onset    Heart attack Father     Heart disease Family     Diabetes Family     Thyroid disease Family     Ovarian cancer Family     Heart attack Mother     Ovarian cancer Sister     Heart disease Other         CARDIAC DISORDER    Diabetes Other     Liver cancer Other     Breast cancer Other     Stomach cancer Other        Medications/Allergies:      Current Outpatient Medications:     diphenhydrAMINE (BENADRYL) 25 mg capsule, Take 25 mg by mouth every 6 (six) hours as needed for itching, Disp: , Rfl:     glipiZIDE (GLUCOTROL XL) 5 mg 24 hr tablet, TAKE 1 TABLET EVERY DAY  30  MINUTES BEFORE BREAKFAST, Disp: 90 tablet, Rfl: 1    glucose blood (ACCU-CHEK JESS PLUS) test strip, Test BS 3 times daily  DX E11 9, Disp: 100 each, Rfl: 2    hydrochlorothiazide (HYDRODIURIL) 25 mg tablet, TAKE 1 TABLET EVERY DAY, Disp: 90 tablet, Rfl: 0    lisinopril (ZESTRIL) 20 mg tablet, TAKE 1 TABLET EVERY DAY, Disp: 90 tablet, Rfl: 0    meloxicam (MOBIC) 7 5 mg tablet, TAKE 1 TABLET (7 5 MG TOTAL) BY MOUTH TWO TIMES A DAY AS NEEDED FOR MODERATE PAIN , Disp: 180 tablet, Rfl: 0    metFORMIN (GLUCOPHAGE) 1000 MG tablet, TAKE 1 TABLET TWICE DAILY, Disp: 180 tablet, Rfl: 1    traMADol (ULTRAM) 50 mg tablet, Take 1 PO Q8-12 hours PRN, Disp: 75 tablet, Rfl: 2    meclizine (ANTIVERT) 25 mg tablet, Take 1 tablet (25 mg total) by mouth every 8 (eight) hours (Patient not taking: Reported on 2/18/2020), Disp: 90 tablet, Rfl: 1          Objective      Vital Signs:   Vitals:    02/18/20 0800   BP: 140/58     Porcupine Sleepiness Scale: Total score: 3        Physical Exam:    General: Alert, appropriate, cooperative, overweight    Head: NC/AT    Skin: Warm, dry    Neuro: No motor abnormalities, cranial nerves appear intact    Extremity: No clubbing, cyanosis      DME Provider:   [email or web address]        Counseling / Coordination of Care   I have spent 20 minutes with the patient today in which greater than 50% of this time was spent in counseling/coordination of care regarding: equipment and compliance  Board Certified Sleep Specialist    Portions of the record may have been created with voice recognition software  Occasional wrong word or "sound a like" substitutions may have occurred due to the inherent limitations of voice recognition software  Read the chart carefully and recognize, using context, where substitutions have occurred

## 2020-02-19 ENCOUNTER — TELEPHONE (OUTPATIENT)
Dept: SLEEP CENTER | Facility: CLINIC | Age: 70
End: 2020-02-19

## 2020-02-24 ENCOUNTER — TELEPHONE (OUTPATIENT)
Dept: SLEEP CENTER | Facility: CLINIC | Age: 70
End: 2020-02-24

## 2020-02-24 DIAGNOSIS — I10 ESSENTIAL HYPERTENSION: ICD-10-CM

## 2020-02-24 RX ORDER — HYDROCHLOROTHIAZIDE 25 MG/1
TABLET ORAL
Qty: 90 TABLET | Refills: 0 | Status: SHIPPED | OUTPATIENT
Start: 2020-02-24 | End: 2020-04-27

## 2020-02-24 NOTE — TELEPHONE ENCOUNTER
Patient received machine in 2017  Not eligible at this time  Informed patient and canceled appointments

## 2020-02-26 ENCOUNTER — OFFICE VISIT (OUTPATIENT)
Dept: FAMILY MEDICINE CLINIC | Facility: CLINIC | Age: 70
End: 2020-02-26
Payer: MEDICARE

## 2020-02-26 VITALS
SYSTOLIC BLOOD PRESSURE: 138 MMHG | DIASTOLIC BLOOD PRESSURE: 80 MMHG | BODY MASS INDEX: 34.04 KG/M2 | HEIGHT: 62 IN | WEIGHT: 185 LBS

## 2020-02-26 DIAGNOSIS — M47.816 LUMBAR SPONDYLOSIS: ICD-10-CM

## 2020-02-26 DIAGNOSIS — R42 VERTIGO: ICD-10-CM

## 2020-02-26 DIAGNOSIS — G47.33 OBSTRUCTIVE SLEEP APNEA OF ADULT: ICD-10-CM

## 2020-02-26 DIAGNOSIS — I10 ESSENTIAL HYPERTENSION: ICD-10-CM

## 2020-02-26 DIAGNOSIS — J30.89 NON-SEASONAL ALLERGIC RHINITIS, UNSPECIFIED TRIGGER: ICD-10-CM

## 2020-02-26 DIAGNOSIS — J44.9 CHRONIC OBSTRUCTIVE PULMONARY DISEASE, UNSPECIFIED COPD TYPE (HCC): ICD-10-CM

## 2020-02-26 DIAGNOSIS — E11.65 TYPE 2 DIABETES MELLITUS WITH HYPERGLYCEMIA, WITHOUT LONG-TERM CURRENT USE OF INSULIN (HCC): Primary | ICD-10-CM

## 2020-02-26 LAB — SL AMB POCT HEMOGLOBIN AIC: 6.2 (ref ?–6.5)

## 2020-02-26 PROCEDURE — 3079F DIAST BP 80-89 MM HG: CPT | Performed by: FAMILY MEDICINE

## 2020-02-26 PROCEDURE — 3044F HG A1C LEVEL LT 7.0%: CPT | Performed by: FAMILY MEDICINE

## 2020-02-26 PROCEDURE — 4040F PNEUMOC VAC/ADMIN/RCVD: CPT | Performed by: FAMILY MEDICINE

## 2020-02-26 PROCEDURE — 1160F RVW MEDS BY RX/DR IN RCRD: CPT | Performed by: FAMILY MEDICINE

## 2020-02-26 PROCEDURE — 3075F SYST BP GE 130 - 139MM HG: CPT | Performed by: FAMILY MEDICINE

## 2020-02-26 PROCEDURE — 99214 OFFICE O/P EST MOD 30 MIN: CPT | Performed by: FAMILY MEDICINE

## 2020-02-26 PROCEDURE — 83036 HEMOGLOBIN GLYCOSYLATED A1C: CPT | Performed by: FAMILY MEDICINE

## 2020-02-26 PROCEDURE — 2022F DILAT RTA XM EVC RTNOPTHY: CPT | Performed by: FAMILY MEDICINE

## 2020-02-26 PROCEDURE — 36415 COLL VENOUS BLD VENIPUNCTURE: CPT | Performed by: FAMILY MEDICINE

## 2020-02-26 PROCEDURE — 3008F BODY MASS INDEX DOCD: CPT | Performed by: FAMILY MEDICINE

## 2020-02-26 NOTE — PROGRESS NOTES
BMI Counseling: Body mass index is 33 84 kg/m²  The BMI is above normal  Nutrition recommendations include decreasing portion sizes, encouraging healthy choices of fruits and vegetables, consuming healthier snacks and moderation in carbohydrate intake  Exercise recommendations include moderate physical activity 150 minutes/week and exercising 3-5 times per week  No pharmacotherapy was ordered  Assessment/Plan:  Non-seasonal allergic rhinitis  Try loratadine    Essential hypertension  BP acceptably controlled  Continue lisinopril and HCTZ    Lumbar spondylosis  Meloxicam and tramadol somewhat effective but has daily pain  Continues with PM    Type 2 diabetes mellitus with hyperglycemia, without long-term current use of insulin ([facility])    Lab Results   Component Value Date    HGBA1C 6 2 02/26/2020   Hemoglobin A1c today is acceptable  It has risen somewhat from 6-6 2  She does note that she was less than completely compliant with diet over the holidays but has resumed compliance  We encouraged her to maintain her diet and try to lose some weight as well as to walk daily for exercise  Obstructive sleep apnea of adult  She remains compliant with her CPAP  COPD (chronic obstructive pulmonary disease) ([facility])  Presently asymptomatic without pharmacotherapy  Vertigo  Vertigo is very intermittent now  She has not used any Antivert recently  Diagnoses and all orders for this visit:    Type 2 diabetes mellitus with hyperglycemia, without long-term current use of insulin ([facility])  -     CBC  -     Comprehensive metabolic panel  -     Lipid panel  -     POCT hemoglobin A1c    Non-seasonal allergic rhinitis, unspecified trigger    Obstructive sleep apnea of adult    Essential hypertension    Chronic obstructive pulmonary disease, unspecified COPD type ([facility])    Lumbar spondylosis    Vertigo    Other orders  -     Multiple Vitamins-Minerals (OCUVITE ADULT FORMULA PO);  Take by mouth          Subjective:   Chief Complaint   Patient presents with    Follow-up     Med Check/FBW/Foot Exam        Patient ID: Norma Calhoun is a 68 y o  female  HPI  The patient is a 57-year-old female presents today for routine follow-up of type 2 diabetes, essential hypertension, obesity, history of COPD as well as obstructive sleep apnea in addition to chronic back pain from lumbar spondylosis  She states that overall she is feeling fairly well  She denies any cardiovascular pulmonary complaint presently  She continues to have back pain and follows with pain management  She uses tramadol as well as Mobic for relief of her pain  She is compliant with her metformin and glipizide for her diabetes  She is compliant with her lisinopril and hydrochlorothiazide for hypertension  She denies polyuria polydipsia polyphagia  The following portions of the patient's history were reviewed and updated as appropriate: allergies, current medications, past medical history, past social history, past surgical history and problem list     Review of Systems   Constitution: Negative for fever and malaise/fatigue  HENT: Positive for congestion  Cardiovascular: Negative for chest pain, irregular heartbeat, leg swelling, orthopnea and paroxysmal nocturnal dyspnea  Respiratory: Negative for cough, shortness of breath and wheezing  Endocrine: Negative for polydipsia, polyphagia and polyuria  Hematologic/Lymphatic: Negative for adenopathy and bleeding problem  Does not bruise/bleed easily  Gastrointestinal: Negative for constipation, diarrhea and hematochezia  Genitourinary: Negative for bladder incontinence, frequency and urgency  Neurological: Positive for vertigo  Negative for dizziness and headaches  Occasional BPV sx  Psychiatric/Behavioral: Negative for depression  The patient does not have insomnia and is not nervous/anxious  Objective:    Physical Exam   Constitutional: She is oriented to person, place, and time  She appears well-developed  Obese and in NAD   Neck: Neck supple  No JVD present  No thyromegaly present  Cardiovascular: Normal rate, regular rhythm and normal heart sounds  Pulses are no weak pulses  No murmur heard  Pulses:       Dorsalis pedis pulses are 2+ on the right side, and 2+ on the left side  Pulmonary/Chest: Effort normal and breath sounds normal  No respiratory distress  She has no wheezes  She has no rales  Musculoskeletal: She exhibits no edema  Feet:   Right Foot:   Skin Integrity: Negative for ulcer, skin breakdown, erythema, warmth, callus or dry skin  Left Foot:   Skin Integrity: Negative for ulcer, skin breakdown, erythema, warmth, callus or dry skin  Lymphadenopathy:     She has no cervical adenopathy  Neurological: She is alert and oriented to person, place, and time  Psychiatric: She has a normal mood and affect  Her behavior is normal  Thought content normal      Patient's shoes and socks removed  Right Foot/Ankle   Right Foot Inspection  Skin Exam: skin normal and skin intact no dry skin, no warmth, no callus, no erythema, no maceration, no abnormal color, no pre-ulcer, no ulcer and no callus                          Toe Exam: ROM and strength within normal limits  Sensory       Monofilament testing: intact  Vascular  Capillary refills: < 3 seconds  The right DP pulse is 2+  Left Foot/Ankle  Left Foot Inspection  Skin Exam: skin normal and skin intactno dry skin, no warmth, no erythema, no maceration, normal color, no pre-ulcer, no ulcer and no callus                         Toe Exam: ROM and strength within normal limits                   Sensory       Monofilament: intact  Vascular  Capillary refills: < 3 seconds  The left DP pulse is 2+  Assign Risk Category:  No deformity present; No loss of protective sensation;  No weak pulses       Risk: 0

## 2020-02-26 NOTE — ASSESSMENT & PLAN NOTE
Lab Results   Component Value Date    HGBA1C 6 2 02/26/2020   Hemoglobin A1c today is acceptable  It has risen somewhat from 6-6 2  She does note that she was less than completely compliant with diet over the holidays but has resumed compliance  We encouraged her to maintain her diet and try to lose some weight as well as to walk daily for exercise

## 2020-02-27 LAB
ALBUMIN SERPL-MCNC: 4.1 G/DL (ref 3.6–5.1)
ALBUMIN/GLOB SERPL: 1.5 (CALC) (ref 1–2.5)
ALP SERPL-CCNC: 57 U/L (ref 37–153)
ALT SERPL-CCNC: 12 U/L (ref 6–29)
AST SERPL-CCNC: 10 U/L (ref 10–35)
BILIRUB SERPL-MCNC: 0.3 MG/DL (ref 0.2–1.2)
BUN SERPL-MCNC: 26 MG/DL (ref 7–25)
BUN/CREAT SERPL: 28 (CALC) (ref 6–22)
CALCIUM SERPL-MCNC: 10 MG/DL (ref 8.6–10.4)
CHLORIDE SERPL-SCNC: 106 MMOL/L (ref 98–110)
CHOLEST SERPL-MCNC: 237 MG/DL
CHOLEST/HDLC SERPL: 4.6 (CALC)
CO2 SERPL-SCNC: 29 MMOL/L (ref 20–32)
CREAT SERPL-MCNC: 0.92 MG/DL (ref 0.6–0.93)
ERYTHROCYTE [DISTWIDTH] IN BLOOD BY AUTOMATED COUNT: 12.3 % (ref 11–15)
GLOBULIN SER CALC-MCNC: 2.7 G/DL (CALC) (ref 1.9–3.7)
GLUCOSE SERPL-MCNC: 119 MG/DL (ref 65–99)
HCT VFR BLD AUTO: 41.2 % (ref 35–45)
HDLC SERPL-MCNC: 51 MG/DL
HGB BLD-MCNC: 13.7 G/DL (ref 11.7–15.5)
LDLC SERPL CALC-MCNC: 155 MG/DL (CALC)
MCH RBC QN AUTO: 31.5 PG (ref 27–33)
MCHC RBC AUTO-ENTMCNC: 33.3 G/DL (ref 32–36)
MCV RBC AUTO: 94.7 FL (ref 80–100)
NONHDLC SERPL-MCNC: 186 MG/DL (CALC)
PLATELET # BLD AUTO: 162 THOUSAND/UL (ref 140–400)
PMV BLD REES-ECKER: 11.7 FL (ref 7.5–12.5)
POTASSIUM SERPL-SCNC: 4.5 MMOL/L (ref 3.5–5.3)
PROT SERPL-MCNC: 6.8 G/DL (ref 6.1–8.1)
RBC # BLD AUTO: 4.35 MILLION/UL (ref 3.8–5.1)
SL AMB EGFR AFRICAN AMERICAN: 73 ML/MIN/1.73M2
SL AMB EGFR NON AFRICAN AMERICAN: 63 ML/MIN/1.73M2
SODIUM SERPL-SCNC: 141 MMOL/L (ref 135–146)
TRIGL SERPL-MCNC: 176 MG/DL
WBC # BLD AUTO: 8 THOUSAND/UL (ref 3.8–10.8)

## 2020-03-09 ENCOUNTER — TRANSCRIBE ORDERS (OUTPATIENT)
Dept: ADMINISTRATIVE | Facility: HOSPITAL | Age: 70
End: 2020-03-09

## 2020-03-09 DIAGNOSIS — G47.30 SLEEP APNEA, UNSPECIFIED TYPE: Primary | ICD-10-CM

## 2020-03-11 DIAGNOSIS — I10 ESSENTIAL HYPERTENSION: ICD-10-CM

## 2020-03-12 ENCOUNTER — OFFICE VISIT (OUTPATIENT)
Dept: PAIN MEDICINE | Facility: CLINIC | Age: 70
End: 2020-03-12
Payer: MEDICARE

## 2020-03-12 VITALS
SYSTOLIC BLOOD PRESSURE: 155 MMHG | WEIGHT: 185 LBS | HEART RATE: 84 BPM | BODY MASS INDEX: 34.04 KG/M2 | HEIGHT: 62 IN | DIASTOLIC BLOOD PRESSURE: 81 MMHG

## 2020-03-12 DIAGNOSIS — F11.20 UNCOMPLICATED OPIOID DEPENDENCE (HCC): ICD-10-CM

## 2020-03-12 DIAGNOSIS — M51.36 DDD (DEGENERATIVE DISC DISEASE), LUMBAR: ICD-10-CM

## 2020-03-12 DIAGNOSIS — Z79.891 ENCOUNTER FOR LONG-TERM OPIATE ANALGESIC USE: ICD-10-CM

## 2020-03-12 DIAGNOSIS — M47.816 LUMBAR SPONDYLOSIS: ICD-10-CM

## 2020-03-12 DIAGNOSIS — M54.16 LUMBAR RADICULOPATHY: ICD-10-CM

## 2020-03-12 DIAGNOSIS — M47.816 FACET ARTHROPATHY, LUMBAR: ICD-10-CM

## 2020-03-12 DIAGNOSIS — G89.4 CHRONIC PAIN SYNDROME: Primary | ICD-10-CM

## 2020-03-12 PROCEDURE — 3079F DIAST BP 80-89 MM HG: CPT | Performed by: NURSE PRACTITIONER

## 2020-03-12 PROCEDURE — 2022F DILAT RTA XM EVC RTNOPTHY: CPT | Performed by: NURSE PRACTITIONER

## 2020-03-12 PROCEDURE — 4040F PNEUMOC VAC/ADMIN/RCVD: CPT | Performed by: NURSE PRACTITIONER

## 2020-03-12 PROCEDURE — 80305 DRUG TEST PRSMV DIR OPT OBS: CPT | Performed by: NURSE PRACTITIONER

## 2020-03-12 PROCEDURE — 1160F RVW MEDS BY RX/DR IN RCRD: CPT | Performed by: NURSE PRACTITIONER

## 2020-03-12 PROCEDURE — 3044F HG A1C LEVEL LT 7.0%: CPT | Performed by: NURSE PRACTITIONER

## 2020-03-12 PROCEDURE — 3008F BODY MASS INDEX DOCD: CPT | Performed by: NURSE PRACTITIONER

## 2020-03-12 PROCEDURE — 99214 OFFICE O/P EST MOD 30 MIN: CPT | Performed by: NURSE PRACTITIONER

## 2020-03-12 PROCEDURE — 3077F SYST BP >= 140 MM HG: CPT | Performed by: NURSE PRACTITIONER

## 2020-03-12 RX ORDER — TRAMADOL HYDROCHLORIDE 50 MG/1
TABLET ORAL
Qty: 75 TABLET | Refills: 2 | Status: SHIPPED | OUTPATIENT
Start: 2020-04-05 | End: 2020-07-28 | Stop reason: SDUPTHER

## 2020-03-12 RX ORDER — LISINOPRIL 20 MG/1
TABLET ORAL
Qty: 90 TABLET | Refills: 0 | Status: SHIPPED | OUTPATIENT
Start: 2020-03-12 | End: 2020-05-11

## 2020-03-12 NOTE — PATIENT INSTRUCTIONS
Opioid Pain Management   AMBULATORY CARE:   An opioid  is a type of medicine used to treat pain  Examples of opioids are oxycodone, morphine, fentanyl, or codeine  Take opioid medicines as directed, for the condition it is prescribed:  Common problems that may occur when you do not take opioid medicines as directed include the following:  · Health problems  may occur  You may have trouble breathing  You may also develop liver or kidney damage, or stomach bleeding  Any of these health problems can become life-threatening  · Opioid dependence  means your body needs the opioid medicine to keep it from going through withdrawal      · Opioid tolerance  means the opioid does not control pain as well as it used to  You need higher doses of the opioid to get pain relief  · Opioid addiction  means you are not able to control the use of the opioid medicine  You use it when you do not have pain  You crave the opioid medicine  Call 911 or have someone call 911 for any of the following:   · You are breathing slower than normal, or you have trouble breathing  · You cannot be awakened  · You have a seizure  Seek care immediately if:   · Your heart is beating slower than usual     · Your heart feels like it is jumping or fluttering  · You are so sleepy that you cannot stay awake  · You have severe muscle pain or weakness  · You see or hear things that are not real   Contact your healthcare provider if:   · You are too dizzy to stand up  · Your pain gets worse or you have new pain  · You cannot do your usual activities because of side effects from the opioid  · You are constipated or have abdominal pain  · You have questions or concerns about your condition or care  Opioid safety measures:   · Take your medicine as directed  Ask if you need more information on how to take your medicine correctly  Follow up with your healthcare provider regularly  You may need to have your dose adjusted   Do not use opioid medicine if you are pregnant or breastfeeding  · Give your healthcare provider a list of all your medicines  Include any over-the-counter medicines, vitamins, and herbs  It can be dangerous to take opioids with certain other medicines, such as antihistamines  · Keep opioid medicine in a safe place  Store your opioid medicine in a locked cabinet to keep it away from children and others  · Do not drink alcohol while you use opioids  Alcohol use with an opioid medicine can make you sleepy and slow your breathing rate  You may stop breathing completely  · Do not drive or operate heavy machinery after you take opioid medicine  Opioid medicine can make you drowsy and make it hard to concentrate  You may injure yourself or others if you drive or operate heavy machinery while taking your medicine  · Drink fluids and eat high-fiber foods  Fluids and fiber will help prevent constipation  Ask your healthcare provider what fluids are right for you and how much you should drink  Also ask for a list of foods that contain fiber  Follow up with your healthcare provider as directed: You may need to have your dose adjusted  You may be referred to a pain specialist  Write down your questions so you remember to ask them during your visits  © 2017 [address] [employer] Information is for End User's use only and may not be sold, redistributed or otherwise used for commercial purposes  All illustrations and images included in CareNotes® are the copyrighted property of [employer]  or [employer] S[employer]  The above information is an  only  It is not intended as medical advice for individual conditions or treatments  Talk to your doctor, nurse or pharmacist before following any medical regimen to see if it is safe and effective for you

## 2020-03-12 NOTE — PROGRESS NOTES
Assessment:  1  Chronic pain syndrome    2  Uncomplicated opioid dependence ([address] )    3  Encounter for long-term opiate analgesic use    4  Lumbar radiculopathy    5  Lumbar spondylosis    6  DDD (degenerative disc disease), lumbar    7  Facet arthropathy, lumbar        Plan:  1  Overall, the patient is happy with her current pain relief and is receiving 80% relief from tramadol 50 mg which she takes 2-3 times daily as needed for pain #75 tablets for 30 days  I will refill this today  The patient was given a 3 month supply of prescriptions with a Do Not Fill date(s) of April 5, 2020 with 2 refills  [place] Prescription Drug Monitoring Program report was reviewed and was appropriate     A urine drug screen was collected at today's office visit as part of our medication management protocol  The point of care testing results were appropriate for what was being prescribed  The specimen will be sent for confirmatory testing  The drug screen is medically necessary because the patient is either dependent on opioid medication or is being considered for opioid medication therapy and the results could impact ongoing or future treatment  The drug screen is to evaluate for the presences or absence of prescribed, non-prescribed, and/or illicit drugs/substances  There are risks associated with opioid medications, including dependence, addiction and tolerance  The patient understands and agrees to use these medications only as prescribed  Potential side effects of the medications include, but are not limited to, constipation, drowsiness, addiction, impaired judgment and risk of fatal overdose if not taken as prescribed  The patient was warned against driving while taking sedation medications  Sharing medications is a felony  At this point in time, the patient is showing no signs of addiction, abuse, diversion or suicidal ideation  2  The patient may continue meloxicam as prescribed by her PCP  3   The patient will continue with her home exercise program  4  The patient will follow-up in 12 weeks for medication prescription refill and reevaluation  The patient was advised to contact the office should their symptoms worsen in the interim  The patient was agreeable and verbalized an understanding  [employer] software was used to dictate this note  It may contain errors with dictating incorrect words or incorrect spelling  Please contact the provider directly with any questions  History of Present Illness: The patient is a 65 y o  female last seen on 12/19/19 who presents for a follow up office visit in regards to chronic lumbosacral back pain secondary to lumbar degenerative disc disease, lumbar spondylosis, lumbar stenosis and chronic pain syndrome  The patient denies radicular symptoms into the lower extremities, bowel or bladder incontinence, or saddle anesthesia  The patient has been managing her back pain with tramadol 50 mg which she takes 2 to 3 times a day as needed  She did state that she took a holiday from the medication for 3 days and noticed that it was providing significant relief  The patient currently rates her pain a 4/10 on the numeric pain rating scale  She states her pain is occasional in nature and bothersome in the evening  She characterizes the pain as dull aching  Current pain medications includes:  Tramadol 50 mg 1 tablet 2-3 times daily p r n  Pain and meloxicam 7 5 mg b i d  P r n  As prescribed by her PCP   The patient reports that this regimen is providing 80% pain relief  The patient is reporting no side effects from this pain medication regimen  Pain Contract Signed: 10/10/19  Last Urine Drug Screen: 3/12/20  Last tramadol 3/12/20 per pt  I have personally reviewed and/or updated the patient's past medical history, past surgical history, family history, social history, current medications, allergies, and vital signs today         Review of Systems:    Review of Systems Respiratory: Negative for shortness of breath  Cardiovascular: Negative for chest pain  Gastrointestinal: Negative for constipation, diarrhea, nausea and vomiting  Musculoskeletal: Negative for arthralgias, gait problem, joint swelling and myalgias  Skin: Negative for rash  Neurological: Negative for dizziness, seizures and weakness  All other systems reviewed and are negative  Past Medical History:   Diagnosis Date    Asthma     Chronic obstructive lung disease ([place] [address] )     Community acquired pneumonia     LAST ASSESSED: [id number]    COPD (chronic obstructive pulmonary disease) ([place] [address] )     Diabetes mellitus ([place] [address] )     History of MRSA infection     2008 liver sx    Liver disease     Sleep apnea     Viral warts     LAST ASSESSED: [id number]       Past Surgical History:   Procedure Laterality Date    ABDOMINAL SURGERY      ABDOMINOPLASTY      CHOLECYSTECTOMY      COSMETIC SURGERY      EYE SURGERY      Bilateral cataracts 2015 Dr Barney [place]       FINGER SURGERY      HAND SURGERY      HYSTERECTOMY      LIVER SURGERY      [facility] INCISE FINGER TENDON SHEATH Right 7/23/2019    Procedure: RELEASE TRIGGER FINGER RIGHT LONG;  Surgeon: Mana Adamson MD;  [facility];  Service: Orthopedics    [facility] INCISE FINGER TENDON SHEATH Left 8/6/2019    Procedure: RELEASE TRIGGER FINGER LEFT INDEX;  Surgeon: Mana Adamson MD;  [facility];  Service: Orthopedics    [facility] REPAIR INTERCARP/CARP-METACARP JT Right 9/9/2016    Procedure: THUMB TRAPEZIECTOMY; BASAL JOINT ARTHROPLASTY WITH APL AUTOGRAFT;  Surgeon: Bharat Louis MD;  [facility];  Service: Orthopedics    [facility] REPAIR INTERCARP/CARP-METACARP JT Left 5/29/2018    Procedure: Shaina Prudent;  Surgeon: Mana Adamson MD;  [facility];  Service: Orthopedics    W[place] TRANSPLANT HAND TENDON Left 5/29/2018    Procedure: TRANSFER TENDON HAND/WRIST FCR from forearm to wrist;  Surgeon: Mana Adamson MD; [facility];  Service: Orthopedics       Family History   Problem Relation Age of Onset    Heart attack Father     Heart disease Family     Diabetes Family     Thyroid disease Family     Ovarian cancer Family     Heart attack Mother     Ovarian cancer Sister     Heart disease Other         CARDIAC DISORDER    Diabetes Other     Liver cancer Other     Breast cancer Other     Stomach cancer Other        Social History     Occupational History    Not on file   Tobacco Use    Smoking status: Current Every Day Smoker     Packs/day: 0 20    Smokeless tobacco: Never Used   Substance and Sexual Activity    Alcohol use: Yes     Comment: very seldom    Drug use: No    Sexual activity: Not on file         Current Outpatient Medications:     diphenhydrAMINE (BENADRYL) 25 mg capsule, Take 25 mg by mouth every 6 (six) hours as needed for itching, Disp: , Rfl:     glipiZIDE (GLUCOTROL XL) 5 mg 24 hr tablet, TAKE 1 TABLET EVERY DAY  30  MINUTES BEFORE BREAKFAST, Disp: 90 tablet, Rfl: 1    glucose blood (ACCU-CHEK JESS PLUS) test strip, Test BS 3 times daily    DX E11 9, Disp: 100 each, Rfl: 2    hydrochlorothiazide (HYDRODIURIL) 25 mg tablet, TAKE 1 TABLET EVERY DAY, Disp: 90 tablet, Rfl: 0    lisinopril (ZESTRIL) 20 mg tablet, TAKE 1 TABLET EVERY DAY, Disp: 90 tablet, Rfl: 0    meloxicam (MOBIC) 7 5 mg tablet, TAKE 1 TABLET (7 5 MG TOTAL) BY MOUTH TWO TIMES A DAY AS NEEDED FOR MODERATE PAIN , Disp: 180 tablet, Rfl: 0    metFORMIN (GLUCOPHAGE) 1000 MG tablet, TAKE 1 TABLET TWICE DAILY, Disp: 180 tablet, Rfl: 1    Multiple Vitamins-Minerals (OCUVITE ADULT FORMULA PO), Take by mouth, Disp: , Rfl:     [START ON 4/5/2020] traMADol (ULTRAM) 50 mg tablet, Take 1 PO Q8-12 hours PRN, Disp: 75 tablet, Rfl: 2    meclizine (ANTIVERT) 25 mg tablet, Take 1 tablet (25 mg total) by mouth every 8 (eight) hours (Patient not taking: Reported on 2/18/2020), Disp: 90 tablet, Rfl: 1    No Known Allergies    Physical Exam:    /81   Pulse 84   Ht 5' 2" (1 575 m)   Wt 83 9 kg (185 lb)   BMI 33 84 kg/m²     Constitutional:overweight  Eyes:anicteric  HEENT:grossly intact  Neck:supple, symmetric, trachea midline and no masses   Pulmonary:even and unlabored  Cardiovascular:No edema or pitting edema present  Skin:Normal without rashes or lesions and well hydrated  Psychiatric:Mood and affect appropriate  Neurologic:Cranial Nerves II-XII grossly intact  Musculoskeletal:Slightly antalgic gait but steady without the use of assistive devices      Imaging  No orders to display     MRI LUMBAR SPINE WITHOUT CONTRAST     INDICATION: M54 [id number]: Radiculopathy, lumbar region  M48 062: Spinal stenosis, lumbar region with neurogenic claudication      COMPARISON:  MRI from 8/13/2014     TECHNIQUE:  Sagittal T1, sagittal T2, sagittal inversion recovery, axial T1 and axial T2, coronal T2     IMAGE QUALITY:  Diagnostic     FINDINGS:     VERTEBRAL BODIES:  There is a heterogeneous appearance of the visualized osseous structures without focal suspicious lesion  Vertebral body heights are maintained  There is Modic type I endplate degenerative change at L4-L5 and L5-S1      SACRUM:  Normal signal within the sacrum  No evidence of insufficiency or stress fracture      DISTAL CORD AND CONUS:  Normal size and signal within the distal cord and conus        PARASPINAL SOFT TISSUES:  Paraspinal soft tissues are unremarkable      LOWER THORACIC DISC SPACES:  There is disc space narrowing within the lower thoracic spine      LUMBAR DISC SPACES:  There is multilevel disc space degeneration      L1-L2:  No significant canal stenosis or foraminal narrowing      L2-L3:  There is a minimal bulge  There is facet arthrosis  There is no significant canal stenosis  There is no significant foraminal narrowing      L3-L4: There is disc space narrowing  There is a bulging annulus  There is facet arthrosis with ligamentum flavum thickening    There is overall mild mass effect on thecal sac  Findings are overall progressed  There is mild foraminal narrowing      L4-L5:  There is disc space narrowing  There is vacuum disc phenomenon  There is a bulging annulus  There is dorsal osteophytosis  There is facet arthrosis  There is mild mass effect on the thecal sac  There is endplate spurring  There is mild to   moderate left and mild right foraminal narrowing      L5-S1:  There is vacuum disc phenomenon  There is a bulging annulus  There is facet arthrosis  There is endplate spurring  There is moderate to severe bilateral foraminal narrowing with contact of the exiting nerve roots  Findings are grossly   stable      IMPRESSION:     Multilevel degenerative changes of the lumbar spine, as described above      Multifactorial disease results in overall mild mass affect on the thecal sac at L3-L4, slightly progressed      Multilevel degenerative changes in the remainder of the spine are otherwise not significantly changed      Orders Placed This Encounter   Procedures    MM ALL_Prescribed Meds and Special Instructions    MM DT_Alprazolam Definitive Test    MM DT_Amphetamine Definitive Test    MM DT_Aripiprazole Definitive Test    MM DT_Bath Salts Definitive Test    MM DT_Buprenorphine Definitive Test    MM DT_Butalbital Definitive Test    MM DT_Clonazepam Definitive Test    MM DT_Clozapine Definitive Test    MM DT_Cocaine Definitive Test    MM DT_Codeine Definitive Test    MM DT_Desipramine Definitive Test    MM DT_Dextromethorphan Definitive Test    MM Diazepam Definitive Test    MM DT_Ethyl Glucuronide/Ethyl Sulfate Definitive Test    MM DT_Fentanyl Definitive Test    MM DT_Haloperidol Definitive Test    MM DT_Heroin Definitive Test    MM DT_Hydrocodone Definitive Test    MM DT_Hydromorphone Definitive Test    MM DT_Imipramine Definitive Test    MM DT_Kratom Definitive Test    MM DT_Levorphanol Definitive Test    MM Lorazepam Definitive Test  MM DT_MDMA Definitive Test    MM DT_Meperidine Definitive Test    MM DT_Methadone Definitive Test    MM DT_Methamphetamine Definitive Test    MM DT_Morphine Definitive Test    MM DT_Olanzapine Definitive Test    MM DT_Oxazepam Definitive Test    MM DT_Oxycodone Definitive Test    MM DT_Oxymorphone Definitive Test    MM DT_Phencyclidine Definitive Test    MM DT_Phenobarbital Definitive Test    MM DT_Phentermine Definitive Test    MM DT_Quetiapine Definitive Test    MM DT_Risperidone Definitive Test    MM DT_Secobarbital Definitive Test    MM DT_Spice Definitive Test    MM DT_Tapentadol Definitive Test    MM DT_Temazapam Definitive Test    MM DT_THC Definitive Test    MM DT_Tramadol Definitive Test    MM DT_Methylphenidate Definitive Test

## 2020-03-14 LAB
6MAM UR QL CFM: NEGATIVE NG/ML
7AMINOCLONAZEPAM UR QL CFM: NEGATIVE NG/ML
A-OH ALPRAZ UR QL CFM: NEGATIVE NG/ML
AMPHET UR QL CFM: NEGATIVE NG/ML
AMPHET UR QL CFM: NEGATIVE NG/ML
BUPRENORPHINE UR QL CFM: NEGATIVE NG/ML
BUTALBITAL UR QL CFM: NEGATIVE NG/ML
BZE UR QL CFM: NEGATIVE NG/ML
CODEINE UR QL CFM: NEGATIVE NG/ML
DESIPRAMINE UR QL CFM: NEGATIVE NG/ML
DESIPRAMINE UR QL CFM: NEGATIVE NG/ML
EDDP UR QL CFM: NEGATIVE NG/ML
ETHYL GLUCURONIDE UR QL CFM: NEGATIVE NG/ML
ETHYL SULFATE UR QL SCN: NEGATIVE NG/ML
FENTANYL UR QL CFM: NEGATIVE NG/ML
GLIADIN IGG SER IA-ACNC: NEGATIVE NG/ML
GLUCOSE 30M P 50 G LAC PO SERPL-MCNC: NEGATIVE NG/ML
HYDROCODONE UR QL CFM: NEGATIVE NG/ML
HYDROCODONE UR QL CFM: NEGATIVE NG/ML
HYDROMORPHONE UR QL CFM: NEGATIVE NG/ML
IMIPRAMINE UR QL CFM: NEGATIVE NG/ML
LORAZEPAM UR QL CFM: NEGATIVE NG/ML
MDMA UR QL CFM: NEGATIVE NG/ML
ME-PHENIDATE UR QL CFM: NEGATIVE NG/ML
MEPERIDINE UR QL CFM: NEGATIVE NG/ML
MEPHEDRONE UR QL CFM: NEGATIVE NG/ML
METHADONE UR QL CFM: NEGATIVE NG/ML
METHAMPHET UR QL CFM: NEGATIVE NG/ML
MORPHINE UR QL CFM: NEGATIVE NG/ML
MORPHINE UR QL CFM: NEGATIVE NG/ML
NORBUPRENORPHINE UR QL CFM: NEGATIVE NG/ML
NORDIAZEPAM UR QL CFM: NEGATIVE NG/ML
NORFENTANYL UR QL CFM: NEGATIVE NG/ML
NORHYDROCODONE UR QL CFM: NEGATIVE NG/ML
NORHYDROCODONE UR QL CFM: NEGATIVE NG/ML
NORMEPERIDINE UR QL CFM: NEGATIVE NG/ML
NOROXYCODONE UR QL CFM: NEGATIVE NG/ML
OLANZAPINE QUANTIFICATION: NEGATIVE NG/ML
OPC-3373 QUANTIFICATION: NEGATIVE
OXAZEPAM UR QL CFM: NEGATIVE NG/ML
OXYCODONE UR QL CFM: NEGATIVE NG/ML
OXYMORPHONE UR QL CFM: NEGATIVE NG/ML
OXYMORPHONE UR QL CFM: NEGATIVE NG/ML
PCP UR QL CFM: NEGATIVE NG/ML
PHENOBARB UR QL CFM: NEGATIVE NG/ML
SECOBARBITAL UR QL CFM: NEGATIVE NG/ML
SL AMB 3-METHYL-FENTANYL QUANTIFICATION: NORMAL NG/ML
SL AMB 4-ANPP QUANTIFICATION: NORMAL NG/ML
SL AMB 4-FIBF QUANTIFICATION: NORMAL NG/ML
SL AMB 5F-ADB-M7 METABOLITE QUANTIFICATION: NEGATIVE NG/ML
SL AMB 7-OH-MITRAGYNINE (KRATOM ALKALOID) QUANTIFICATION: NEGATIVE NG/ML
SL AMB AB-FUBINACA-M3 METABOLITE QUANTIFICATION: NEGATIVE NG/ML
SL AMB ACETYL FENTANYL QUANTIFICATION: NORMAL NG/ML
SL AMB ACETYL NORFENTANYL QUANTIFICATION: NORMAL NG/ML
SL AMB ACRYL FENTANYL QUANTIFICATION: NORMAL NG/ML
SL AMB BATH SALTS: NEGATIVE NG/ML
SL AMB BUTRYL FENTANYL QUANTIFICATION: NORMAL NG/ML
SL AMB CARFENTANIL QUANTIFICATION: NORMAL NG/ML
SL AMB CLOZAPINE QUANTIFICATION: NEGATIVE NG/ML
SL AMB CTHC (MARIJUANA METABOLITE) QUANTIFICATION: NEGATIVE NG/ML
SL AMB CYCLOPROPYL FENTANYL QUANTIFICATION: NORMAL NG/ML
SL AMB DEXTROMETHORPHAN QUANTIFICATION: NEGATIVE NG/ML
SL AMB DEXTRORPHAN (DEXTROMETHORPHAN METABOLITE) QUANT: NEGATIVE NG/ML
SL AMB DEXTRORPHAN (DEXTROMETHORPHAN METABOLITE) QUANT: NEGATIVE NG/ML
SL AMB FURANYL FENTANYL QUANTIFICATION: NORMAL NG/ML
SL AMB HALOPERIDOL  QUANTIFICATION: NEGATIVE NG/ML
SL AMB HALOPERIDOL METABOLITE QUANTIFICATION: NEGATIVE NG/ML
SL AMB HYDROXYRISPERIDONE QUANTIFICATION: NEGATIVE NG/ML
SL AMB JWH018 METABOLITE QUANTIFICATION: NEGATIVE NG/ML
SL AMB JWH073 METABOLITE QUANTIFICATION: NEGATIVE NG/ML
SL AMB MDMB-FUBINACA-M1 METABOLITE QUANTIFICATION: NEGATIVE NG/ML
SL AMB METHOXYACETYL FENTANYL QUANTIFICATION: NORMAL NG/ML
SL AMB METHYLONE QUANTIFICATION: NEGATIVE NG/ML
SL AMB N-DESMETHYL U-47700 QUANTIFICATION: NORMAL NG/ML
SL AMB N-DESMETHYL-TRAMADOL QUANTIFICATION: NEGATIVE NG/ML
SL AMB N-DESMETHYLCLOZAPINE QUANTIFICATION: NEGATIVE NG/ML
SL AMB NORQUETIAPINE QUANTIFICATION: NEGATIVE NG/ML
SL AMB O-DESMETHYL-TRAMADOL QUANT-MEASURED RESULT_SALIV: 3894.82 NG/ML
SL AMB PHENTERMINE QUANTIFICATION: NEGATIVE NG/ML
SL AMB QUETIAPINE QUANTIFICATION: NEGATIVE NG/ML
SL AMB RCS4 METABOLITE QUANTIFICATION: NEGATIVE NG/ML
SL AMB RISPERIDONE QUANTIFICATION: NEGATIVE NG/ML
SL AMB RITALINIC ACID QUANTIFICATION: NEGATIVE NG/ML
SL AMB U-47700 QUANTIFICATION: NORMAL NG/ML
SPECIMEN DRAWN SERPL: NEGATIVE NG/ML
TAPENTADOL UR QL CFM: NEGATIVE NG/ML
TEMAZEPAM UR QL CFM: NEGATIVE NG/ML
TEMAZEPAM UR QL CFM: NEGATIVE NG/ML
TRAMADOL UR CFM-MCNC: 1789.34 NG/ML

## 2020-03-23 DIAGNOSIS — E11.9 TYPE 2 DIABETES MELLITUS WITHOUT COMPLICATION, WITHOUT LONG-TERM CURRENT USE OF INSULIN (HCC): ICD-10-CM

## 2020-03-23 RX ORDER — GLIPIZIDE 5 MG/1
TABLET, FILM COATED, EXTENDED RELEASE ORAL
Qty: 90 TABLET | Refills: 1 | Status: SHIPPED | OUTPATIENT
Start: 2020-03-23 | End: 2020-08-17

## 2020-04-02 ENCOUNTER — PATIENT OUTREACH (OUTPATIENT)
Dept: FAMILY MEDICINE CLINIC | Facility: CLINIC | Age: 70
End: 2020-04-02

## 2020-04-25 DIAGNOSIS — I10 ESSENTIAL HYPERTENSION: ICD-10-CM

## 2020-04-27 ENCOUNTER — TELEPHONE (OUTPATIENT)
Dept: PAIN MEDICINE | Facility: CLINIC | Age: 70
End: 2020-04-27

## 2020-04-27 RX ORDER — HYDROCHLOROTHIAZIDE 25 MG/1
TABLET ORAL
Qty: 90 TABLET | Refills: 0 | Status: SHIPPED | OUTPATIENT
Start: 2020-04-27 | End: 2020-07-30

## 2020-05-08 DIAGNOSIS — I10 ESSENTIAL HYPERTENSION: ICD-10-CM

## 2020-05-11 RX ORDER — LISINOPRIL 20 MG/1
TABLET ORAL
Qty: 90 TABLET | Refills: 0 | Status: SHIPPED | OUTPATIENT
Start: 2020-05-11 | End: 2020-07-21

## 2020-05-22 ENCOUNTER — OFFICE VISIT (OUTPATIENT)
Dept: OBGYN CLINIC | Facility: HOSPITAL | Age: 70
End: 2020-05-22
Payer: MEDICARE

## 2020-05-22 VITALS — HEIGHT: 60 IN | WEIGHT: 189.2 LBS | BODY MASS INDEX: 37.15 KG/M2

## 2020-05-22 DIAGNOSIS — M65.341 TRIGGER RING FINGER OF RIGHT HAND: Primary | ICD-10-CM

## 2020-05-22 PROCEDURE — 1160F RVW MEDS BY RX/DR IN RCRD: CPT | Performed by: PHYSICIAN ASSISTANT

## 2020-05-22 PROCEDURE — 2022F DILAT RTA XM EVC RTNOPTHY: CPT | Performed by: PHYSICIAN ASSISTANT

## 2020-05-22 PROCEDURE — 4040F PNEUMOC VAC/ADMIN/RCVD: CPT | Performed by: PHYSICIAN ASSISTANT

## 2020-05-22 PROCEDURE — 3079F DIAST BP 80-89 MM HG: CPT | Performed by: PHYSICIAN ASSISTANT

## 2020-05-22 PROCEDURE — 3044F HG A1C LEVEL LT 7.0%: CPT | Performed by: PHYSICIAN ASSISTANT

## 2020-05-22 PROCEDURE — 3077F SYST BP >= 140 MM HG: CPT | Performed by: PHYSICIAN ASSISTANT

## 2020-05-22 PROCEDURE — 99214 OFFICE O/P EST MOD 30 MIN: CPT | Performed by: PHYSICIAN ASSISTANT

## 2020-05-22 PROCEDURE — 3008F BODY MASS INDEX DOCD: CPT | Performed by: PHYSICIAN ASSISTANT

## 2020-05-22 RX ORDER — LIDOCAINE HYDROCHLORIDE AND EPINEPHRINE 10; 10 MG/ML; UG/ML
1 INJECTION, SOLUTION INFILTRATION; PERINEURAL ONCE
Status: CANCELLED | OUTPATIENT
Start: 2020-05-22 | End: 2020-05-22

## 2020-06-04 ENCOUNTER — TELEPHONE (OUTPATIENT)
Dept: PAIN MEDICINE | Facility: CLINIC | Age: 70
End: 2020-06-04

## 2020-06-16 ENCOUNTER — HOSPITAL ENCOUNTER (OUTPATIENT)
Facility: HOSPITAL | Age: 70
Setting detail: OUTPATIENT SURGERY
Discharge: HOME | End: 2020-06-16
Attending: ORTHOPAEDIC SURGERY | Admitting: ORTHOPAEDIC SURGERY
Payer: MEDICARE

## 2020-06-16 VITALS
TEMPERATURE: 97.2 F | BODY MASS INDEX: 37.11 KG/M2 | DIASTOLIC BLOOD PRESSURE: 55 MMHG | OXYGEN SATURATION: 95 % | HEIGHT: 60 IN | RESPIRATION RATE: 17 BRPM | HEART RATE: 85 BPM | WEIGHT: 189 LBS | SYSTOLIC BLOOD PRESSURE: 176 MMHG

## 2020-06-16 PROBLEM — M65.341 TRIGGER RING FINGER OF RIGHT HAND: Status: ACTIVE | Noted: 2019-06-07

## 2020-06-16 LAB — GLUCOSE SERPL-MCNC: 121 MG/DL (ref 65–140)

## 2020-06-16 PROCEDURE — 82948 REAGENT STRIP/BLOOD GLUCOSE: CPT

## 2020-06-16 PROCEDURE — 26055 INCISE FINGER TENDON SHEATH: CPT | Performed by: ORTHOPAEDIC SURGERY

## 2020-06-16 PROCEDURE — 26055 INCISE FINGER TENDON SHEATH: CPT | Performed by: PHYSICIAN ASSISTANT

## 2020-06-16 RX ORDER — LIDOCAINE HYDROCHLORIDE AND EPINEPHRINE 10; 10 MG/ML; UG/ML
1 INJECTION, SOLUTION INFILTRATION; PERINEURAL ONCE
Status: DISCONTINUED | OUTPATIENT
Start: 2020-06-16 | End: 2020-06-16 | Stop reason: HOSPADM

## 2020-06-16 RX ORDER — MAGNESIUM HYDROXIDE 1200 MG/15ML
LIQUID ORAL AS NEEDED
Status: DISCONTINUED | OUTPATIENT
Start: 2020-06-16 | End: 2020-06-16 | Stop reason: HOSPADM

## 2020-06-16 RX ADMIN — SODIUM BICARBONATE: 84 INJECTION, SOLUTION INTRAVENOUS at 08:00

## 2020-06-26 ENCOUNTER — OFFICE VISIT (OUTPATIENT)
Dept: OBGYN CLINIC | Facility: HOSPITAL | Age: 70
End: 2020-06-26

## 2020-06-26 VITALS
BODY MASS INDEX: 36.67 KG/M2 | SYSTOLIC BLOOD PRESSURE: 161 MMHG | HEART RATE: 88 BPM | DIASTOLIC BLOOD PRESSURE: 75 MMHG | WEIGHT: 186.8 LBS | HEIGHT: 60 IN

## 2020-06-26 DIAGNOSIS — M65.341 TRIGGER RING FINGER OF RIGHT HAND: Primary | ICD-10-CM

## 2020-06-26 PROCEDURE — 4040F PNEUMOC VAC/ADMIN/RCVD: CPT | Performed by: ORTHOPAEDIC SURGERY

## 2020-06-26 PROCEDURE — 3078F DIAST BP <80 MM HG: CPT | Performed by: ORTHOPAEDIC SURGERY

## 2020-06-26 PROCEDURE — 99024 POSTOP FOLLOW-UP VISIT: CPT | Performed by: ORTHOPAEDIC SURGERY

## 2020-06-26 PROCEDURE — 1160F RVW MEDS BY RX/DR IN RCRD: CPT | Performed by: ORTHOPAEDIC SURGERY

## 2020-06-26 PROCEDURE — 2022F DILAT RTA XM EVC RTNOPTHY: CPT | Performed by: ORTHOPAEDIC SURGERY

## 2020-06-26 PROCEDURE — 3077F SYST BP >= 140 MM HG: CPT | Performed by: ORTHOPAEDIC SURGERY

## 2020-06-29 ENCOUNTER — TELEPHONE (OUTPATIENT)
Dept: PAIN MEDICINE | Facility: CLINIC | Age: 70
End: 2020-06-29

## 2020-06-29 ENCOUNTER — TELEMEDICINE (OUTPATIENT)
Dept: PAIN MEDICINE | Facility: CLINIC | Age: 70
End: 2020-06-29
Payer: MEDICARE

## 2020-06-29 DIAGNOSIS — Z79.891 ENCOUNTER FOR LONG-TERM OPIATE ANALGESIC USE: ICD-10-CM

## 2020-06-29 DIAGNOSIS — G89.4 CHRONIC PAIN SYNDROME: Primary | ICD-10-CM

## 2020-06-29 DIAGNOSIS — F11.20 UNCOMPLICATED OPIOID DEPENDENCE (HCC): ICD-10-CM

## 2020-06-29 DIAGNOSIS — M48.062 SPINAL STENOSIS OF LUMBAR REGION WITH NEUROGENIC CLAUDICATION: ICD-10-CM

## 2020-06-29 DIAGNOSIS — M51.36 DDD (DEGENERATIVE DISC DISEASE), LUMBAR: ICD-10-CM

## 2020-06-29 DIAGNOSIS — M54.16 LUMBAR RADICULOPATHY: ICD-10-CM

## 2020-06-29 DIAGNOSIS — M47.816 FACET ARTHROPATHY, LUMBAR: ICD-10-CM

## 2020-06-29 DIAGNOSIS — M47.816 LUMBAR SPONDYLOSIS: ICD-10-CM

## 2020-06-29 PROCEDURE — 99442 PR PHYS/QHP TELEPHONE EVALUATION 11-20 MIN: CPT | Performed by: NURSE PRACTITIONER

## 2020-07-13 LAB
LEFT EYE DIABETIC RETINOPATHY: NORMAL
RIGHT EYE DIABETIC RETINOPATHY: NORMAL

## 2020-07-15 ENCOUNTER — PATIENT OUTREACH (OUTPATIENT)
Dept: CASE MANAGEMENT | Facility: OTHER | Age: 70
End: 2020-07-15

## 2020-07-15 NOTE — PROGRESS NOTES
[facility] Longitudinal Care Management Call:    Would the patient like to make an appointment? Patient wants to make her own appointment  Does the patient have a smart device? Yes    Is the patient a smoker? YES  Is the patient interested in smoking cessation counseling? NO    Is the patient Diabetic? YES    What is the patient's A1C? 6 2  (equal or greater than 9 - refer to [address] [place] [address])    Who helps the patient manage their medications? Patient manages her own medications  Does the patient have any issues in obtaining or affording medications? NO  (Yes = Referral to [place], No, n/a)    Other barriers identified during the call: NONE    Summary of Call: Patient stated she is feeling good  She has been controlling her Blood pressure and diabetes with diet and medication  When her blood sugar gets low she eats cookies to help bring it up  Patient agrees to further outreaches from Iam Jerome

## 2020-07-17 DIAGNOSIS — I10 ESSENTIAL HYPERTENSION: ICD-10-CM

## 2020-07-21 RX ORDER — LISINOPRIL 20 MG/1
TABLET ORAL
Qty: 90 TABLET | Refills: 0 | Status: SHIPPED | OUTPATIENT
Start: 2020-07-21 | End: 2020-10-05 | Stop reason: SDUPTHER

## 2020-07-28 ENCOUNTER — OFFICE VISIT (OUTPATIENT)
Dept: PAIN MEDICINE | Facility: CLINIC | Age: 70
End: 2020-07-28
Payer: MEDICARE

## 2020-07-28 VITALS
SYSTOLIC BLOOD PRESSURE: 183 MMHG | WEIGHT: 186 LBS | DIASTOLIC BLOOD PRESSURE: 84 MMHG | BODY MASS INDEX: 36.52 KG/M2 | HEART RATE: 80 BPM | TEMPERATURE: 99.1 F | HEIGHT: 60 IN

## 2020-07-28 DIAGNOSIS — G89.4 CHRONIC PAIN SYNDROME: Primary | ICD-10-CM

## 2020-07-28 DIAGNOSIS — M47.816 FACET ARTHROPATHY, LUMBAR: ICD-10-CM

## 2020-07-28 DIAGNOSIS — M51.36 DDD (DEGENERATIVE DISC DISEASE), LUMBAR: ICD-10-CM

## 2020-07-28 DIAGNOSIS — Z79.891 LONG-TERM CURRENT USE OF OPIATE ANALGESIC: ICD-10-CM

## 2020-07-28 DIAGNOSIS — F11.20 UNCOMPLICATED OPIOID DEPENDENCE (HCC): ICD-10-CM

## 2020-07-28 DIAGNOSIS — M47.816 LUMBAR SPONDYLOSIS: ICD-10-CM

## 2020-07-28 DIAGNOSIS — Z79.891 ENCOUNTER FOR LONG-TERM OPIATE ANALGESIC USE: ICD-10-CM

## 2020-07-28 DIAGNOSIS — M54.16 LUMBAR RADICULOPATHY: ICD-10-CM

## 2020-07-28 DIAGNOSIS — M48.062 SPINAL STENOSIS OF LUMBAR REGION WITH NEUROGENIC CLAUDICATION: ICD-10-CM

## 2020-07-28 PROCEDURE — 99214 OFFICE O/P EST MOD 30 MIN: CPT | Performed by: NURSE PRACTITIONER

## 2020-07-28 PROCEDURE — 4040F PNEUMOC VAC/ADMIN/RCVD: CPT | Performed by: NURSE PRACTITIONER

## 2020-07-28 PROCEDURE — 3044F HG A1C LEVEL LT 7.0%: CPT | Performed by: NURSE PRACTITIONER

## 2020-07-28 PROCEDURE — 1160F RVW MEDS BY RX/DR IN RCRD: CPT | Performed by: NURSE PRACTITIONER

## 2020-07-28 PROCEDURE — 2022F DILAT RTA XM EVC RTNOPTHY: CPT | Performed by: NURSE PRACTITIONER

## 2020-07-28 PROCEDURE — 3077F SYST BP >= 140 MM HG: CPT | Performed by: NURSE PRACTITIONER

## 2020-07-28 PROCEDURE — 3079F DIAST BP 80-89 MM HG: CPT | Performed by: NURSE PRACTITIONER

## 2020-07-28 PROCEDURE — 3008F BODY MASS INDEX DOCD: CPT | Performed by: NURSE PRACTITIONER

## 2020-07-28 PROCEDURE — 80305 DRUG TEST PRSMV DIR OPT OBS: CPT | Performed by: NURSE PRACTITIONER

## 2020-07-28 RX ORDER — TRAMADOL HYDROCHLORIDE 50 MG/1
TABLET ORAL
Qty: 75 TABLET | Refills: 2 | Status: SHIPPED | OUTPATIENT
Start: 2020-08-10 | End: 2020-10-20 | Stop reason: SDUPTHER

## 2020-07-28 NOTE — PATIENT INSTRUCTIONS
Opioid Pain Management   AMBULATORY CARE:   An opioid  is a type of medicine used to treat pain  Examples of opioids are oxycodone, morphine, fentanyl, or codeine  Take opioid medicines as directed, for the condition it is prescribed:  Common problems that may occur when you do not take opioid medicines as directed include the following:  · Health problems  may occur  You may have trouble breathing  You may also develop liver or kidney damage, or stomach bleeding  Any of these health problems can become life-threatening  · Opioid dependence  means your body needs the opioid medicine to keep it from going through withdrawal      · Opioid tolerance  means the opioid does not control pain as well as it used to  You need higher doses of the opioid to get pain relief  · Opioid addiction  means you are not able to control the use of the opioid medicine  You use it when you do not have pain  You crave the opioid medicine  Call 911 or have someone call 911 for any of the following:   · You are breathing slower than normal, or you have trouble breathing  · You cannot be awakened  · You have a seizure  Seek care immediately if:   · Your heart is beating slower than usual     · Your heart feels like it is jumping or fluttering  · You are so sleepy that you cannot stay awake  · You have severe muscle pain or weakness  · You see or hear things that are not real   Contact your healthcare provider if:   · You are too dizzy to stand up  · Your pain gets worse or you have new pain  · You cannot do your usual activities because of side effects from the opioid  · You are constipated or have abdominal pain  · You have questions or concerns about your condition or care  Opioid safety measures:   · Take your medicine as directed  Ask if you need more information on how to take your medicine correctly  Follow up with your healthcare provider regularly  You may need to have your dose adjusted   Do not use opioid medicine if you are pregnant or breastfeeding  · Give your healthcare provider a list of all your medicines  Include any over-the-counter medicines, vitamins, and herbs  It can be dangerous to take opioids with certain other medicines, such as antihistamines  · Keep opioid medicine in a safe place  Store your opioid medicine in a locked cabinet to keep it away from children and others  · Do not drink alcohol while you use opioids  Alcohol use with an opioid medicine can make you sleepy and slow your breathing rate  You may stop breathing completely  · Do not drive or operate heavy machinery after you take opioid medicine  Opioid medicine can make you drowsy and make it hard to concentrate  You may injure yourself or others if you drive or operate heavy machinery while taking your medicine  · Drink fluids and eat high-fiber foods  Fluids and fiber will help prevent constipation  Ask your healthcare provider what fluids are right for you and how much you should drink  Also ask for a list of foods that contain fiber  Follow up with your healthcare provider as directed: You may need to have your dose adjusted  You may be referred to a pain specialist  Write down your questions so you remember to ask them during your visits  © 2017 [address] [employer] Information is for End User's use only and may not be sold, redistributed or otherwise used for commercial purposes  All illustrations and images included in CareNotes® are the copyrighted property of [employer]  or [employer]edric S[employer]  The above information is an  only  It is not intended as medical advice for individual conditions or treatments  Talk to your doctor, nurse or pharmacist before following any medical regimen to see if it is safe and effective for you

## 2020-07-28 NOTE — PROGRESS NOTES
Assessment:  1  Chronic pain syndrome    2  Uncomplicated opioid dependence ([address] )    3  Long-term current use of opiate analgesic    4  Lumbar radiculopathy    5  Lumbar spondylosis    6  DDD (degenerative disc disease), lumbar    7  Facet arthropathy, lumbar    8  Encounter for long-term opiate analgesic use    9  Spinal stenosis of lumbar region with neurogenic claudication        Plan:  1  The patient may continue tramadol 50 mg 1 tablet 2 to 3 times a day as needed for pain #75 tablets for 30 days  The patient was given a 3 month supply of prescriptions with a Do Not Fill date(s) of August 10, 2020 with 2 refills    While the patient was in the office today an opioid contract was thoroughly reviewed and signed by the patient  The patient was given adequate time to ask questions in regards to the contract and a signed copy was sent home for their records  [place] Prescription Drug Monitoring Program report was reviewed and was appropriate     A urine drug screen was collected at today's office visit as part of our medication management protocol  The point of care testing results were appropriate for what was being prescribed  The specimen will be sent for confirmatory testing  The drug screen is medically necessary because the patient is either dependent on opioid medication or is being considered for opioid medication therapy and the results could impact ongoing or future treatment  The drug screen is to evaluate for the presences or absence of prescribed, non-prescribed, and/or illicit drugs/substances  There are risks associated with opioid medications, including dependence, addiction and tolerance  The patient understands and agrees to use these medications only as prescribed  Potential side effects of the medications include, but are not limited to, constipation, drowsiness, addiction, impaired judgment and risk of fatal overdose if not taken as prescribed   The patient was warned against driving while taking sedation medications  Sharing medications is a felony  At this point in time, the patient is showing no signs of addiction, abuse, diversion or suicidal ideation  2  We can repeat bilateral L3-4, L4-5 and L5-S1 medial branch block x1 and if appropriate response, move forward with RFA p r n   3  Patient may continue meloxicam 7 5 mg daily p r n   4  Patient will continue with her home exercise program  5  The patient will follow-up in 3 months for medication prescription refill and reevaluation  The patient was advised to contact the office should their symptoms worsen in the interim  The patient was agreeable and verbalized an understanding  M*GnamGnam software was used to dictate this note  It may contain errors with dictating incorrect words or incorrect spelling  Please contact the provider directly with any questions  History of Present Illness: The patient is a 65 y o  female last seen on 6/29/20 who presents for a follow up office visit in regards to chronic low back pain secondary to  lumbar degenerative disc disease, lumbar spondylosis, lumbar stenosis and chronic pain syndrome  The patient denies any radicular complaints into the lower extremities, bowel or bladder incontinence or saddle anesthesia  Overall, she is doing well with tramadol 50 mg 1 tablet 2 to 3 times a day and meloxicam 7 5 mg daily as needed for her pain with an 85% improvement without side effects  She has had bilateral L3-5 RFA in the past, however does not feel that her pain is severe enough to repeat this procedure at this time  She currently rates her pain a 2/10 on the numeric pain rating scale  She states her pain is intermittent in nature and bothersome in the evening at night  She characterizes the pain as dull aching, pressure like and stiffness  Pain Contract Signed: 7/28/20  Last Urine Drug Screen: 7/28/20  Last Tramadol 7/28/20 per pt      I have personally reviewed and/or updated the patient's past medical history, past surgical history, family history, social history, current medications, allergies, and vital signs today  Review of Systems:    Review of Systems   Respiratory: Negative for shortness of breath  Cardiovascular: Negative for chest pain  Gastrointestinal: Negative for constipation, diarrhea, nausea and vomiting  Musculoskeletal: Negative for arthralgias, gait problem, joint swelling and myalgias  Skin: Negative for rash  Neurological: Negative for dizziness, seizures and weakness  All other systems reviewed and are negative  Past Medical History:   Diagnosis Date    Asthma     Chronic obstructive lung disease ([place] [address] )     Community acquired pneumonia     LAST ASSESSED: [id number]    COPD (chronic obstructive pulmonary disease) ([place] [address] )     Diabetes mellitus ([place] [address] )     History of MRSA infection     2008 liver sx    Liver disease     Sleep apnea     Viral warts     LAST ASSESSED: [id number]       Past Surgical History:   Procedure Laterality Date    ABDOMINAL SURGERY      ABDOMINOPLASTY      CHOLECYSTECTOMY      COSMETIC SURGERY      EYE SURGERY      Bilateral cataracts 2015 Dr Key Lee       FINGER SURGERY      HAND SURGERY      HYSTERECTOMY      LIVER SURGERY      [place] INCISE FINGER TENDON SHEATH Right 7/23/2019    Procedure: RELEASE TRIGGER FINGER RIGHT LONG;  Surgeon: Keesha Anne MD;  [facility];  Service: Orthopedics    [place] INC[place] FINGER TENDON SHEATH Left 8/6/2019    Procedure: RELEASE TRIGGER FINGER LEFT INDEX;  Surgeon: Keesha Anne MD;  [facility];  Service: Orthopedics    [place] INCISE FINGER TENDON SHEATH Right 6/16/2020    Procedure: RELEASE TRIGGER FINGER, right ring;  Surgeon: Keesha Anne MD;  [facility];  Service: Orthopedics    [place] REPAIR INTERCARP/CARP-METACARP JT Right 9/9/2016    Procedure: THUMB TRAPEZIECTOMY; BASAL JOINT ARTHROPLASTY WITH APL AUTOGRAFT;  Surgeon: Carin Jaffe MD; [facility];  Service: Orthopedics    UT REPAIR INTERCARP/CARP-METACARP JT Left 5/29/2018    Procedure: Irma Peabody;  Surgeon: Kota Crowell MD;  [facility];  Service: Orthopedics    UT TRANSPLANT HAND TENDON Left 5/29/2018    Procedure: TRANSFER TENDON HAND/WRIST FCR from forearm to wrist;  Surgeon: Kota Crowell MD;  [facility];  Service: Orthopedics       Family History   Problem Relation Age of Onset    Heart attack Father     Heart disease Family     Diabetes Family     Thyroid disease Family     Ovarian cancer Family     Heart attack Mother     Ovarian cancer Sister     Heart disease Other         CARDIAC DISORDER    Diabetes Other     Liver cancer Other     Breast cancer Other     Stomach cancer Other        Social History     Occupational History    Not on file   Tobacco Use    Smoking status: Current Every Day Smoker     Packs/day: 0 20    Smokeless tobacco: Never Used   Substance and Sexual Activity    Alcohol use: Yes     Comment: very seldom    Drug use: No    Sexual activity: Not on file         Current Outpatient Medications:     diphenhydrAMINE (BENADRYL) 25 mg capsule, Take 25 mg by mouth every 6 (six) hours as needed for itching, Disp: , Rfl:     glipiZIDE (GLUCOTROL XL) 5 mg 24 hr tablet, TAKE 1 TABLET EVERY DAY  30  MINUTES BEFORE BREAKFAST, Disp: 90 tablet, Rfl: 1    glucose blood (ACCU-CHEK JESS PLUS) test strip, Test BS 3 times daily    DX E11 9, Disp: 100 each, Rfl: 2    hydrochlorothiazide (HYDRODIURIL) 25 mg tablet, TAKE 1 TABLET EVERY DAY, Disp: 90 tablet, Rfl: 0    lisinopril (ZESTRIL) 20 mg tablet, TAKE 1 TABLET EVERY DAY, Disp: 90 tablet, Rfl: 0    meloxicam (MOBIC) 7 5 mg tablet, TAKE 1 TABLET (7 5 MG TOTAL) BY MOUTH TWO TIMES A DAY AS NEEDED FOR MODERATE PAIN , Disp: 180 tablet, Rfl: 0    metFORMIN (GLUCOPHAGE) 1000 MG tablet, TAKE 1 TABLET TWICE DAILY, Disp: 180 tablet, Rfl: 1    Multiple Vitamins-Minerals (OCUVITE ADULT FORMULA PO), Take by mouth, Disp: , Rfl:     [START ON 8/10/2020] traMADol (ULTRAM) 50 mg tablet, Take 1 PO Q8-12 hours PRN, Disp: 75 tablet, Rfl: 2    No Known Allergies    Physical Exam:    BP (!) 183/84   Pulse 80   Temp 99 1 °F (37 3 °C)   Ht 5' (1 524 m)   Wt 84 4 kg (186 lb)   BMI 36 33 kg/m²     Constitutional:normal, well developed, well nourished, alert, in no distress and non-toxic and no overt pain behavior    Eyes:anicteric  HEENT:grossly intact  Neck:supple, symmetric, trachea midline and no masses   Pulmonary:even and unlabored  Cardiovascular:No edema or pitting edema present  Skin:Normal without rashes or lesions and well hydrated  Psychiatric:Mood and affect appropriate  Neurologic:Cranial Nerves II-XII grossly intact  Musculoskeletal:normal gait      Imaging  No orders to display         Orders Placed This Encounter   Procedures    MM ALL_Prescribed Meds and Special Instructions    MM DT_Alprazolam Definitive Test    MM DT_Amphetamine Definitive Test    MM DT_Aripiprazole Definitive Test    MM DT_Bath Salts Definitive Test    MM DT_Buprenorphine Definitive Test    MM DT_Butalbital Definitive Test    MM DT_Clonazepam Definitive Test    MM DT_Clozapine Definitive Test    MM DT_Cocaine Definitive Test    MM DT_Codeine Definitive Test    MM DT_Desipramine Definitive Test    MM DT_Dextromethorphan Definitive Test    MM Diazepam Definitive Test    MM DT_Ethyl Glucuronide/Ethyl Sulfate Definitive Test    MM DT_Fentanyl Definitive Test    MM DT_Haloperidol Definitive Test    MM DT_Heroin Definitive Test    MM DT_Hydrocodone Definitive Test    MM DT_Hydromorphone Definitive Test    MM DT_Imipramine Definitive Test    MM DT_Kratom Definitive Test    MM DT_Levorphanol Definitive Test    MM Lorazepam Definitive Test    MM DT_MDMA Definitive Test    MM DT_Meperidine Definitive Test    MM DT_Methadone Definitive Test    MM DT_Methamphetamine Definitive Test    MM DT_Morphine Definitive Test    MM DT_Olanzapine Definitive Test    MM DT_Oxazepam Definitive Test    MM DT_Oxycodone Definitive Test    MM DT_Oxymorphone Definitive Test    MM DT_Phencyclidine Definitive Test    MM DT_Phenobarbital Definitive Test    MM DT_Phentermine Definitive Test    MM DT_Quetiapine Definitive Test    MM DT_Risperidone Definitive Test    MM DT_Secobarbital Definitive Test    MM DT_Spice Definitive Test    MM DT_Tapentadol Definitive Test    MM DT_Temazapam Definitive Test    MM DT_THC Definitive Test    MM DT_Tramadol Definitive Test    MM DT_Methylphenidate Definitive Test

## 2020-07-29 DIAGNOSIS — I10 ESSENTIAL HYPERTENSION: ICD-10-CM

## 2020-07-30 LAB
6MAM UR QL CFM: NEGATIVE NG/ML
7AMINOCLONAZEPAM UR QL CFM: NEGATIVE NG/ML
A-OH ALPRAZ UR QL CFM: NEGATIVE NG/ML
AMPHET UR QL CFM: NEGATIVE NG/ML
AMPHET UR QL CFM: NEGATIVE NG/ML
BUPRENORPHINE UR QL CFM: NEGATIVE NG/ML
BUTALBITAL UR QL CFM: NEGATIVE NG/ML
BZE UR QL CFM: NEGATIVE NG/ML
CODEINE UR QL CFM: NEGATIVE NG/ML
DESIPRAMINE UR QL CFM: NEGATIVE NG/ML
DESIPRAMINE UR QL CFM: NEGATIVE NG/ML
EDDP UR QL CFM: NEGATIVE NG/ML
ETHYL GLUCURONIDE UR QL CFM: NEGATIVE NG/ML
ETHYL SULFATE UR QL SCN: NEGATIVE NG/ML
FENTANYL UR QL CFM: NEGATIVE NG/ML
GLIADIN IGG SER IA-ACNC: NEGATIVE NG/ML
GLUCOSE 30M P 50 G LAC PO SERPL-MCNC: NEGATIVE NG/ML
HYDROCODONE UR QL CFM: NEGATIVE NG/ML
HYDROCODONE UR QL CFM: NEGATIVE NG/ML
HYDROMORPHONE UR QL CFM: NEGATIVE NG/ML
IMIPRAMINE UR QL CFM: NEGATIVE NG/ML
LORAZEPAM UR QL CFM: NEGATIVE NG/ML
MDMA UR QL CFM: NEGATIVE NG/ML
ME-PHENIDATE UR QL CFM: NEGATIVE NG/ML
MEPERIDINE UR QL CFM: NEGATIVE NG/ML
MEPHEDRONE UR QL CFM: NEGATIVE NG/ML
METHADONE UR QL CFM: NEGATIVE NG/ML
METHAMPHET UR QL CFM: NEGATIVE NG/ML
MORPHINE UR QL CFM: NEGATIVE NG/ML
MORPHINE UR QL CFM: NEGATIVE NG/ML
NORBUPRENORPHINE UR QL CFM: NEGATIVE NG/ML
NORDIAZEPAM UR QL CFM: NEGATIVE NG/ML
NORFENTANYL UR QL CFM: NEGATIVE NG/ML
NORHYDROCODONE UR QL CFM: NEGATIVE NG/ML
NORHYDROCODONE UR QL CFM: NEGATIVE NG/ML
NORMEPERIDINE UR QL CFM: NEGATIVE NG/ML
NOROXYCODONE UR QL CFM: NEGATIVE NG/ML
OLANZAPINE QUANTIFICATION: NEGATIVE NG/ML
OPC-3373 QUANTIFICATION: NEGATIVE
OXAZEPAM UR QL CFM: NEGATIVE NG/ML
OXYCODONE UR QL CFM: NEGATIVE NG/ML
OXYMORPHONE UR QL CFM: NEGATIVE NG/ML
OXYMORPHONE UR QL CFM: NEGATIVE NG/ML
PCP UR QL CFM: NEGATIVE NG/ML
PHENOBARB UR QL CFM: NEGATIVE NG/ML
SECOBARBITAL UR QL CFM: NEGATIVE NG/ML
SL AMB 3-METHYL-FENTANYL QUANTIFICATION: NORMAL NG/ML
SL AMB 4-ANPP QUANTIFICATION: NORMAL NG/ML
SL AMB 4-FIBF QUANTIFICATION: NORMAL NG/ML
SL AMB 5F-ADB-M7 METABOLITE QUANTIFICATION: NEGATIVE NG/ML
SL AMB 7-OH-MITRAGYNINE (KRATOM ALKALOID) QUANTIFICATION: NEGATIVE NG/ML
SL AMB AB-FUBINACA-M3 METABOLITE QUANTIFICATION: NEGATIVE NG/ML
SL AMB ACETYL FENTANYL QUANTIFICATION: NORMAL NG/ML
SL AMB ACETYL NORFENTANYL QUANTIFICATION: NORMAL NG/ML
SL AMB ACRYL FENTANYL QUANTIFICATION: NORMAL NG/ML
SL AMB BATH SALTS: NEGATIVE NG/ML
SL AMB BUTRYL FENTANYL QUANTIFICATION: NORMAL NG/ML
SL AMB CARFENTANIL QUANTIFICATION: NORMAL NG/ML
SL AMB CLOZAPINE QUANTIFICATION: NEGATIVE NG/ML
SL AMB CTHC (MARIJUANA METABOLITE) QUANTIFICATION: NEGATIVE NG/ML
SL AMB CYCLOPROPYL FENTANYL QUANTIFICATION: NORMAL NG/ML
SL AMB DEXTROMETHORPHAN QUANTIFICATION-MEASURED RESULT: ABNORMAL NG/ML
SL AMB DEXTRORPHAN QUANTIFICATION-MEASURED RESULT: ABNORMAL NG/ML
SL AMB DEXTRORPHAN QUANTIFICATION-MEASURED RESULT: ABNORMAL NG/ML
SL AMB FURANYL FENTANYL QUANTIFICATION: NORMAL NG/ML
SL AMB HALOPERIDOL  QUANTIFICATION: NEGATIVE NG/ML
SL AMB HALOPERIDOL METABOLITE QUANTIFICATION: NEGATIVE NG/ML
SL AMB HYDROXYRISPERIDONE QUANTIFICATION: NEGATIVE NG/ML
SL AMB JWH018 METABOLITE QUANTIFICATION: NEGATIVE NG/ML
SL AMB JWH073 METABOLITE QUANTIFICATION: NEGATIVE NG/ML
SL AMB MDMB-FUBINACA-M1 METABOLITE QUANTIFICATION: NEGATIVE NG/ML
SL AMB METHOXYACETYL FENTANYL QUANTIFICATION: NORMAL NG/ML
SL AMB METHYLONE QUANTIFICATION: NEGATIVE NG/ML
SL AMB N-DESMETHYL U-47700 QUANTIFICATION: NORMAL NG/ML
SL AMB N-DESMETHYL-TRAMADOL QUANT-MEASURED RESULT: 773.75 NG/ML
SL AMB N-DESMETHYLCLOZAPINE QUANTIFICATION: NEGATIVE NG/ML
SL AMB NORQUETIAPINE QUANTIFICATION: NEGATIVE NG/ML
SL AMB O-DESMETHYL-TRAMADOL QUANT-MEASURED RESULT_SALIV: NORMAL NG/ML
SL AMB PHENTERMINE QUANTIFICATION: NEGATIVE NG/ML
SL AMB QUETIAPINE QUANTIFICATION: NEGATIVE NG/ML
SL AMB RCS4 METABOLITE QUANTIFICATION: NEGATIVE NG/ML
SL AMB RISPERIDONE QUANTIFICATION: NEGATIVE NG/ML
SL AMB RITALINIC ACID QUANTIFICATION: NEGATIVE NG/ML
SL AMB U-47700 QUANTIFICATION: NORMAL NG/ML
SPECIMEN DRAWN SERPL: NEGATIVE NG/ML
TAPENTADOL UR QL CFM: NEGATIVE NG/ML
TEMAZEPAM UR QL CFM: NEGATIVE NG/ML
TEMAZEPAM UR QL CFM: NEGATIVE NG/ML
TRAMADOL UR CFM-MCNC: NORMAL NG/ML

## 2020-07-30 RX ORDER — HYDROCHLOROTHIAZIDE 25 MG/1
TABLET ORAL
Qty: 90 TABLET | Refills: 0 | Status: SHIPPED | OUTPATIENT
Start: 2020-07-30 | End: 2020-09-24

## 2020-08-14 DIAGNOSIS — E11.9 TYPE 2 DIABETES MELLITUS WITHOUT COMPLICATION, WITHOUT LONG-TERM CURRENT USE OF INSULIN (HCC): ICD-10-CM

## 2020-08-17 RX ORDER — GLIPIZIDE 5 MG/1
TABLET, FILM COATED, EXTENDED RELEASE ORAL
Qty: 90 TABLET | Refills: 1 | Status: SHIPPED | OUTPATIENT
Start: 2020-08-17 | End: 2021-01-07

## 2020-09-24 DIAGNOSIS — M25.551 CHRONIC RIGHT HIP PAIN: ICD-10-CM

## 2020-09-24 DIAGNOSIS — I10 ESSENTIAL HYPERTENSION: ICD-10-CM

## 2020-09-24 DIAGNOSIS — G89.29 CHRONIC RIGHT HIP PAIN: ICD-10-CM

## 2020-09-24 RX ORDER — MELOXICAM 7.5 MG/1
7.5 TABLET ORAL 2 TIMES DAILY
Qty: 180 TABLET | Refills: 0 | Status: SHIPPED | OUTPATIENT
Start: 2020-09-24 | End: 2020-11-20

## 2020-09-24 RX ORDER — HYDROCHLOROTHIAZIDE 25 MG/1
TABLET ORAL
Qty: 90 TABLET | Refills: 0 | Status: SHIPPED | OUTPATIENT
Start: 2020-09-24 | End: 2020-11-30

## 2020-09-25 DIAGNOSIS — I10 ESSENTIAL HYPERTENSION: ICD-10-CM

## 2020-09-28 RX ORDER — LISINOPRIL 20 MG/1
TABLET ORAL
Qty: 90 TABLET | Refills: 0 | OUTPATIENT
Start: 2020-09-28

## 2020-10-05 DIAGNOSIS — I10 ESSENTIAL HYPERTENSION: ICD-10-CM

## 2020-10-05 RX ORDER — LISINOPRIL 20 MG/1
20 TABLET ORAL DAILY
Qty: 90 TABLET | Refills: 0 | Status: SHIPPED | OUTPATIENT
Start: 2020-10-05 | End: 2020-12-03

## 2020-10-13 ENCOUNTER — OFFICE VISIT (OUTPATIENT)
Dept: FAMILY MEDICINE CLINIC | Facility: CLINIC | Age: 70
End: 2020-10-13
Payer: MEDICARE

## 2020-10-13 VITALS
BODY MASS INDEX: 36.91 KG/M2 | SYSTOLIC BLOOD PRESSURE: 136 MMHG | HEIGHT: 60 IN | DIASTOLIC BLOOD PRESSURE: 62 MMHG | TEMPERATURE: 98 F | WEIGHT: 188 LBS

## 2020-10-13 DIAGNOSIS — R01.1 HEART MURMUR, SYSTOLIC: ICD-10-CM

## 2020-10-13 DIAGNOSIS — E11.65 TYPE 2 DIABETES MELLITUS WITH HYPERGLYCEMIA, WITHOUT LONG-TERM CURRENT USE OF INSULIN (HCC): Primary | ICD-10-CM

## 2020-10-13 DIAGNOSIS — I10 ESSENTIAL HYPERTENSION: ICD-10-CM

## 2020-10-13 DIAGNOSIS — M46.1 SACROILIITIS (HCC): ICD-10-CM

## 2020-10-13 DIAGNOSIS — G47.33 OBSTRUCTIVE SLEEP APNEA OF ADULT: ICD-10-CM

## 2020-10-13 LAB — SL AMB POCT HEMOGLOBIN AIC: 6.7 (ref ?–6.5)

## 2020-10-13 PROCEDURE — 99214 OFFICE O/P EST MOD 30 MIN: CPT | Performed by: FAMILY MEDICINE

## 2020-10-13 PROCEDURE — 83036 HEMOGLOBIN GLYCOSYLATED A1C: CPT | Performed by: FAMILY MEDICINE

## 2020-10-19 ENCOUNTER — PATIENT OUTREACH (OUTPATIENT)
Dept: CASE MANAGEMENT | Facility: OTHER | Age: 70
End: 2020-10-19

## 2020-10-20 ENCOUNTER — OFFICE VISIT (OUTPATIENT)
Dept: PAIN MEDICINE | Facility: CLINIC | Age: 70
End: 2020-10-20
Payer: MEDICARE

## 2020-10-20 ENCOUNTER — PATIENT OUTREACH (OUTPATIENT)
Dept: CASE MANAGEMENT | Facility: OTHER | Age: 70
End: 2020-10-20

## 2020-10-20 VITALS
SYSTOLIC BLOOD PRESSURE: 153 MMHG | HEART RATE: 87 BPM | HEIGHT: 60 IN | TEMPERATURE: 98.6 F | WEIGHT: 187.6 LBS | BODY MASS INDEX: 36.83 KG/M2 | DIASTOLIC BLOOD PRESSURE: 66 MMHG

## 2020-10-20 DIAGNOSIS — G89.4 CHRONIC PAIN SYNDROME: Primary | ICD-10-CM

## 2020-10-20 DIAGNOSIS — M47.816 LUMBAR SPONDYLOSIS: ICD-10-CM

## 2020-10-20 DIAGNOSIS — M54.16 LUMBAR RADICULOPATHY: ICD-10-CM

## 2020-10-20 DIAGNOSIS — M48.062 SPINAL STENOSIS OF LUMBAR REGION WITH NEUROGENIC CLAUDICATION: ICD-10-CM

## 2020-10-20 DIAGNOSIS — M51.36 DDD (DEGENERATIVE DISC DISEASE), LUMBAR: ICD-10-CM

## 2020-10-20 DIAGNOSIS — M46.1 SACROILIITIS (HCC): ICD-10-CM

## 2020-10-20 DIAGNOSIS — Z79.891 ENCOUNTER FOR LONG-TERM OPIATE ANALGESIC USE: ICD-10-CM

## 2020-10-20 DIAGNOSIS — M47.816 FACET ARTHROPATHY, LUMBAR: ICD-10-CM

## 2020-10-20 DIAGNOSIS — F11.20 UNCOMPLICATED OPIOID DEPENDENCE (HCC): ICD-10-CM

## 2020-10-20 PROCEDURE — 99214 OFFICE O/P EST MOD 30 MIN: CPT | Performed by: NURSE PRACTITIONER

## 2020-10-20 PROCEDURE — 80305 DRUG TEST PRSMV DIR OPT OBS: CPT | Performed by: NURSE PRACTITIONER

## 2020-10-20 RX ORDER — TRAMADOL HYDROCHLORIDE 50 MG/1
TABLET ORAL
Qty: 75 TABLET | Refills: 2 | Status: SHIPPED | OUTPATIENT
Start: 2020-10-28 | End: 2021-01-12 | Stop reason: SDUPTHER

## 2020-10-21 ENCOUNTER — PATIENT OUTREACH (OUTPATIENT)
Dept: CASE MANAGEMENT | Facility: OTHER | Age: 70
End: 2020-10-21

## 2020-10-22 LAB
6MAM UR QL CFM: NEGATIVE NG/ML
7AMINOCLONAZEPAM UR QL CFM: NEGATIVE NG/ML
A-OH ALPRAZ UR QL CFM: NEGATIVE NG/ML
AMPHET UR QL CFM: NEGATIVE NG/ML
AMPHET UR QL CFM: NEGATIVE NG/ML
BUPRENORPHINE UR QL CFM: NEGATIVE NG/ML
BUTALBITAL UR QL CFM: NEGATIVE NG/ML
BZE UR QL CFM: NEGATIVE NG/ML
CODEINE UR QL CFM: NEGATIVE NG/ML
DESIPRAMINE UR QL CFM: NEGATIVE NG/ML
DESIPRAMINE UR QL CFM: NEGATIVE NG/ML
EDDP UR QL CFM: NEGATIVE NG/ML
ETHYL GLUCURONIDE UR QL CFM: NEGATIVE NG/ML
ETHYL SULFATE UR QL SCN: NEGATIVE NG/ML
FENTANYL UR QL CFM: NEGATIVE NG/ML
GLIADIN IGG SER IA-ACNC: NEGATIVE NG/ML
GLUCOSE 30M P 50 G LAC PO SERPL-MCNC: NEGATIVE NG/ML
HYDROCODONE UR QL CFM: NEGATIVE NG/ML
HYDROCODONE UR QL CFM: NEGATIVE NG/ML
HYDROMORPHONE UR QL CFM: NEGATIVE NG/ML
IMIPRAMINE UR QL CFM: NEGATIVE NG/ML
LORAZEPAM UR QL CFM: NEGATIVE NG/ML
MDMA UR QL CFM: NEGATIVE NG/ML
ME-PHENIDATE UR QL CFM: NEGATIVE NG/ML
MEPERIDINE UR QL CFM: NEGATIVE NG/ML
MEPHEDRONE UR QL CFM: NEGATIVE NG/ML
METHADONE UR QL CFM: NEGATIVE NG/ML
METHAMPHET UR QL CFM: NEGATIVE NG/ML
MORPHINE UR QL CFM: NEGATIVE NG/ML
MORPHINE UR QL CFM: NEGATIVE NG/ML
NORBUPRENORPHINE UR QL CFM: NEGATIVE NG/ML
NORDIAZEPAM UR QL CFM: NEGATIVE NG/ML
NORFENTANYL UR QL CFM: NEGATIVE NG/ML
NORHYDROCODONE UR QL CFM: NEGATIVE NG/ML
NORHYDROCODONE UR QL CFM: NEGATIVE NG/ML
NORMEPERIDINE UR QL CFM: NEGATIVE NG/ML
NOROXYCODONE UR QL CFM: NEGATIVE NG/ML
OLANZAPINE QUANTIFICATION: NEGATIVE NG/ML
OPC-3373 QUANTIFICATION: NEGATIVE
OXAZEPAM UR QL CFM: NEGATIVE NG/ML
OXYCODONE UR QL CFM: NEGATIVE NG/ML
OXYMORPHONE UR QL CFM: NEGATIVE NG/ML
OXYMORPHONE UR QL CFM: NEGATIVE NG/ML
PCP UR QL CFM: NEGATIVE NG/ML
PHENOBARB UR QL CFM: NEGATIVE NG/ML
SECOBARBITAL UR QL CFM: NEGATIVE NG/ML
SL AMB 3-METHYL-FENTANYL QUANTIFICATION: NORMAL NG/ML
SL AMB 4-ANPP QUANTIFICATION: NORMAL NG/ML
SL AMB 4-FIBF QUANTIFICATION: NORMAL NG/ML
SL AMB 5F-ADB-M7 METABOLITE QUANTIFICATION: NEGATIVE NG/ML
SL AMB 7-OH-MITRAGYNINE (KRATOM ALKALOID) QUANTIFICATION: NEGATIVE NG/ML
SL AMB AB-FUBINACA-M3 METABOLITE QUANTIFICATION: NEGATIVE NG/ML
SL AMB ACETYL FENTANYL QUANTIFICATION: NORMAL NG/ML
SL AMB ACETYL NORFENTANYL QUANTIFICATION: NORMAL NG/ML
SL AMB ACRYL FENTANYL QUANTIFICATION: NORMAL NG/ML
SL AMB BATH SALTS: NEGATIVE NG/ML
SL AMB BUTRYL FENTANYL QUANTIFICATION: NORMAL NG/ML
SL AMB CARFENTANIL QUANTIFICATION: NORMAL NG/ML
SL AMB CLOZAPINE QUANTIFICATION: NEGATIVE NG/ML
SL AMB CTHC (MARIJUANA METABOLITE) QUANTIFICATION: NEGATIVE NG/ML
SL AMB CYCLOPROPYL FENTANYL QUANTIFICATION: NORMAL NG/ML
SL AMB DEXTROMETHORPHAN QUANTIFICATION: ABNORMAL NG/ML
SL AMB DEXTRORPHAN (DEXTROMETHORPHAN METABOLITE) QUANT: ABNORMAL NG/ML
SL AMB DEXTRORPHAN (DEXTROMETHORPHAN METABOLITE) QUANT: ABNORMAL NG/ML
SL AMB FURANYL FENTANYL QUANTIFICATION: NORMAL NG/ML
SL AMB HALOPERIDOL  QUANTIFICATION: NEGATIVE NG/ML
SL AMB HALOPERIDOL METABOLITE QUANTIFICATION: NEGATIVE NG/ML
SL AMB HYDROXYRISPERIDONE QUANTIFICATION: NEGATIVE NG/ML
SL AMB JWH018 METABOLITE QUANTIFICATION: NEGATIVE NG/ML
SL AMB JWH073 METABOLITE QUANTIFICATION: NEGATIVE NG/ML
SL AMB MDMB-FUBINACA-M1 METABOLITE QUANTIFICATION: NEGATIVE NG/ML
SL AMB METHOXYACETYL FENTANYL QUANTIFICATION: NORMAL NG/ML
SL AMB METHYLONE QUANTIFICATION: NEGATIVE NG/ML
SL AMB N-DESMETHYL U-47700 QUANTIFICATION: NORMAL NG/ML
SL AMB N-DESMETHYL-TRAMADOL QUANTIFICATION: ABNORMAL NG/ML
SL AMB N-DESMETHYLCLOZAPINE QUANTIFICATION: NEGATIVE NG/ML
SL AMB NORQUETIAPINE QUANTIFICATION: NEGATIVE NG/ML
SL AMB PHENTERMINE QUANTIFICATION: NEGATIVE NG/ML
SL AMB QUETIAPINE QUANTIFICATION: NEGATIVE NG/ML
SL AMB RCS4 METABOLITE QUANTIFICATION: NEGATIVE NG/ML
SL AMB RISPERIDONE QUANTIFICATION: NEGATIVE NG/ML
SL AMB RITALINIC ACID QUANTIFICATION: NEGATIVE NG/ML
SL AMB U-47700 QUANTIFICATION: NORMAL NG/ML
SPECIMEN DRAWN SERPL: NEGATIVE NG/ML
TAPENTADOL UR QL CFM: NEGATIVE NG/ML
TEMAZEPAM UR QL CFM: NEGATIVE NG/ML
TEMAZEPAM UR QL CFM: NEGATIVE NG/ML
TRAMADOL UR QL CFM: ABNORMAL NG/ML
URATE/CREAT 24H UR: ABNORMAL NG/ML

## 2020-11-20 DIAGNOSIS — G89.29 CHRONIC RIGHT HIP PAIN: ICD-10-CM

## 2020-11-20 DIAGNOSIS — M25.551 CHRONIC RIGHT HIP PAIN: ICD-10-CM

## 2020-11-20 RX ORDER — MELOXICAM 7.5 MG/1
TABLET ORAL
Qty: 180 TABLET | Refills: 0 | Status: SHIPPED | OUTPATIENT
Start: 2020-11-20 | End: 2021-01-27

## 2020-11-30 DIAGNOSIS — I10 ESSENTIAL HYPERTENSION: ICD-10-CM

## 2020-11-30 RX ORDER — HYDROCHLOROTHIAZIDE 25 MG/1
TABLET ORAL
Qty: 90 TABLET | Refills: 0 | Status: SHIPPED | OUTPATIENT
Start: 2020-11-30 | End: 2021-02-08

## 2020-12-02 DIAGNOSIS — I10 ESSENTIAL HYPERTENSION: ICD-10-CM

## 2020-12-03 RX ORDER — LISINOPRIL 20 MG/1
TABLET ORAL
Qty: 90 TABLET | Refills: 0 | Status: SHIPPED | OUTPATIENT
Start: 2020-12-03 | End: 2021-02-08

## 2021-01-06 DIAGNOSIS — E11.9 TYPE 2 DIABETES MELLITUS WITHOUT COMPLICATION, WITHOUT LONG-TERM CURRENT USE OF INSULIN (HCC): ICD-10-CM

## 2021-01-07 RX ORDER — GLIPIZIDE 5 MG/1
TABLET, FILM COATED, EXTENDED RELEASE ORAL
Qty: 90 TABLET | Refills: 1 | Status: SHIPPED | OUTPATIENT
Start: 2021-01-07 | End: 2021-05-27

## 2021-01-12 ENCOUNTER — TELEMEDICINE (OUTPATIENT)
Dept: PAIN MEDICINE | Facility: CLINIC | Age: 71
End: 2021-01-12
Payer: MEDICARE

## 2021-01-12 DIAGNOSIS — F11.20 UNCOMPLICATED OPIOID DEPENDENCE (HCC): ICD-10-CM

## 2021-01-12 DIAGNOSIS — M47.816 LUMBAR SPONDYLOSIS: ICD-10-CM

## 2021-01-12 DIAGNOSIS — G89.29 CHRONIC RIGHT HIP PAIN: ICD-10-CM

## 2021-01-12 DIAGNOSIS — M25.551 CHRONIC RIGHT HIP PAIN: ICD-10-CM

## 2021-01-12 DIAGNOSIS — Z79.891 ENCOUNTER FOR LONG-TERM OPIATE ANALGESIC USE: ICD-10-CM

## 2021-01-12 DIAGNOSIS — M47.816 FACET ARTHROPATHY, LUMBAR: ICD-10-CM

## 2021-01-12 DIAGNOSIS — M51.36 DDD (DEGENERATIVE DISC DISEASE), LUMBAR: ICD-10-CM

## 2021-01-12 DIAGNOSIS — M48.062 SPINAL STENOSIS OF LUMBAR REGION WITH NEUROGENIC CLAUDICATION: ICD-10-CM

## 2021-01-12 DIAGNOSIS — G89.4 CHRONIC PAIN SYNDROME: Primary | ICD-10-CM

## 2021-01-12 DIAGNOSIS — M54.16 LUMBAR RADICULOPATHY: ICD-10-CM

## 2021-01-12 PROCEDURE — 99442 PR PHYS/QHP TELEPHONE EVALUATION 11-20 MIN: CPT | Performed by: NURSE PRACTITIONER

## 2021-01-12 RX ORDER — TRAMADOL HYDROCHLORIDE 50 MG/1
TABLET ORAL
Qty: 75 TABLET | Refills: 2 | Status: SHIPPED | OUTPATIENT
Start: 2021-02-02 | End: 2021-04-05 | Stop reason: SDUPTHER

## 2021-01-12 NOTE — PATIENT INSTRUCTIONS
Plan:  1  The patient may continue tramadol 50 mg 1 tablet every 8-12 hours p r n  Pain #75 tablets for 30 days as prescribed  The patient was given a 3 month supply of prescriptions with a Do Not Fill date(s) of February 2, 2021 with 2 refills  [place] Prescription Drug Monitoring Program report was reviewed and was appropriate  The risks and side effects of chronic opioid treatment were discussed in detail with the patient  Side effects include, but are not limited to: nausea, vomiting, GI intolerance, sedation, constipation, mental clouding, opioid induced hyperalgesia, endocrine dysfunction, addiction, dependence, and tolerance  The patient was asked to take their medications only as prescribed and directed, never in excess, and never for any other reason other than for pain control  The patient was also asked to keep his medications out of the reach of others and away from children, preferably in a locked drawer  The patient was warned against driving while taking sedation medications  Sharing medications is a felony  At this point in time, the patient is showing no signs of addiction, abuse, diversion or suicidal ideation  The patient verbalized understanding and wished to use these opioid medications  2  We can repeat bilateral L3-5 RFA p r n   3  The patient may continue meloxicam 7 5 mg b i d  P r n  As prescribed by her PCP  4  The patient will continue with her home exercise program  5  The patient will follow-up in 12 weeks for medication prescription refill and reevaluation  The patient was advised to contact the office should their symptoms worsen in the interim  The patient was agreeable and verbalized an understanding

## 2021-01-12 NOTE — PROGRESS NOTES
Virtual Brief Visit    Assessment/Plan:    Problem List Items Addressed This Visit        Nervous and Auditory    Lumbar radiculopathy    Relevant Medications    traMADol (ULTRAM) 50 mg tablet (Start on 2/2/2021)       Musculoskeletal and Integument    Lumbar spondylosis    Relevant Medications    traMADol (ULTRAM) 50 mg tablet (Start on 2/2/2021)    DDD (degenerative disc disease), lumbar    Relevant Medications    traMADol (ULTRAM) 50 mg tablet (Start on 2/2/2021)    Facet arthropathy, lumbar    Relevant Medications    traMADol (ULTRAM) 50 mg tablet (Start on 2/2/2021)       Other    Spinal stenosis of lumbar region with neurogenic claudication    Chronic right hip pain    Chronic pain syndrome - Primary    Relevant Medications    traMADol (ULTRAM) 50 mg tablet (Start on 8/1/1286)    Uncomplicated opioid dependence ([facility] [address] )    Encounter for long-term opiate analgesic use                Reason for visit is chronic lumbosacral back pain    Chief Complaint   Patient presents with    Virtual Brief Visit        Encounter provider CARRI Morrison    Provider located at [address] [place] [id number]    Recent Visits  No visits were found meeting these conditions  Showing recent visits within past 7 days and meeting all other requirements     Today's Visits  Date Type Provider Dept   01/12/21 Telemedicine CARRI Evans  [facility]   Showing today's visits and meeting all other requirements     Future Appointments  No visits were found meeting these conditions  Showing future appointments within next 150 days and meeting all other requirements        After connecting through telephone, the patient was identified by name and date of birth  Fabian Dumont was informed that this is a telemedicine visit and that the visit is being conducted through telephone  My office door was closed  No one else was in the room    She acknowledged consent and understanding of privacy and security of the platform  The patient has agreed to participate and understands she can discontinue the visit at any time  It was my intent to perform this visit via video technology but the patient was not able to do a video connection so the visit was completed via audio telephone only  Patient is aware this is a billable service  Karen Galan is a 65 y o  female with a history of COPD last seen in the office on October 20, 2020 presents for follow-up visit in regards to chronic lumbosacral back pain that is non radiating to the lower extremities secondary to lumbar spondylosis, lumbar degenerative disc disease, lumbar stenosis and chronic pain syndrome  The patient denies bowel or bladder incontinence or saddle anesthesia  The patient has had great relief with bilateral L3-5 RFA in the past   She continues to manage her pain with tramadol 50 mg 1 tablet every 8-12 hours as needed for pain with a 70% improvement without side effects  She also takes meloxicam 7 5 mg twice a day p r n  As prescribed by her PCP  Pain Contract Signed: 07/28/2020  Last Urine Drug Screen: 10/20/2020    HPI     Past Medical History:   Diagnosis Date    Asthma     [facility] disease ([facility] )     Community acquired pneumonia     LAST ASSESSED: [id number]    [facility]     Diabetes mellitus ([facility] )     History of MRSA infection     2008 liver sx    Liver disease     Sleep apnea     Viral warts     LAST ASSESSED: [id number]       Past Surgical History:   Procedure Laterality Date    ABDOMINAL SURGERY      ABDOMINOPLASTY      CHOLECYSTECTOMY      COSMETIC SURGERY      EYE SURGERY      Bilateral cataracts 2015 Dr Sindy Miner       FINGER SURGERY      HAND SURGERY      HYSTERECTOMY      LIVER SURGERY      [place] INCISE FINGER TENDON SHEATH Right 7/23/2019    Procedure: RELEASE TRIGGER FINGER RIGHT LONG;  Surgeon: Sha Coronel MD;  [facility];  Service: Orthopedics    [place] INCISE FINGER TENDON SHEATH Left 8/6/2019    Procedure: RELEASE TRIGGER FINGER LEFT INDEX;  Surgeon: Sha Coronel MD;  [facility];  Service: Orthopedics    UT INCISE FINGER TENDON SHEATH Right 6/16/2020    Procedure: RELEASE TRIGGER FINGER, right ring;  Surgeon: Sha Coronel MD;  [facility];  Service: Orthopedics    UT REPAIR INTERCARP/CARP-METACARP JT Right 9/9/2016    Procedure: THUMB TRAPEZIECTOMY; BASAL JOINT ARTHROPLASTY WITH APL AUTOGRAFT;  Surgeon: Jaret Saleh MD;  [facility];  Service: Orthopedics    UT REPAIR INTERCARP/CARP-METACARP JT Left 5/29/2018    Procedure: Pinky Cap;  Surgeon: Sha Coronel MD;  [facility];  Service: Orthopedics    UT TRANSPLANT HAND TENDON Left 5/29/2018    Procedure: TRANSFER TENDON HAND/WRIST FCR from forearm to wrist;  Surgeon: Sha Coronel MD;  [facility];  Service: Orthopedics       Current Outpatient Medications   Medication Sig Dispense Refill    diphenhydrAMINE (BENADRYL) 25 mg capsule Take 25 mg by mouth every 6 (six) hours as needed for itching      glipiZIDE (GLUCOTROL XL) 5 mg 24 hr tablet TAKE 1 TABLET EVERY DAY  30  MINUTES BEFORE BREAKFAST 90 tablet 1    glucose blood (ACCU-CHEK JESS PLUS) test strip Test BS 3 times daily  DX E11 9 100 each 2    hydrochlorothiazide (HYDRODIURIL) 25 mg tablet TAKE 1 TABLET EVERY DAY 90 tablet 0    lisinopril (ZESTRIL) 20 mg tablet TAKE 1 TABLET EVERY DAY 90 tablet 0    meloxicam (MOBIC) 7 5 mg tablet TAKE 1 TABLET TWICE DAILY 180 tablet 0    metFORMIN (GLUCOPHAGE) 1000 MG tablet TAKE 1 TABLET TWICE DAILY 180 tablet 1    Multiple Vitamins-Minerals (OCUVITE ADULT FORMULA PO) Take by mouth      [START ON 2/2/2021] traMADol (ULTRAM) 50 mg tablet Take 1 PO Q8-12 hours PRN 75 tablet 2     No current facility-administered medications for this visit           No Known Allergies    Review of Systems    There were no vitals filed for this visit  Plan:  1  The patient may continue tramadol 50 mg 1 tablet every 8-12 hours p r n  Pain #75 tablets for 30 days as prescribed  The patient was given a 3 month supply of prescriptions with a Do Not Fill date(s) of February 2, 2021 with 2 refills  [place] Prescription Drug Monitoring Program report was reviewed and was appropriate  The risks and side effects of chronic opioid treatment were discussed in detail with the patient  Side effects include, but are not limited to: nausea, vomiting, GI intolerance, sedation, constipation, mental clouding, opioid induced hyperalgesia, endocrine dysfunction, addiction, dependence, and tolerance  The patient was asked to take their medications only as prescribed and directed, never in excess, and never for any other reason other than for pain control  The patient was also asked to keep his medications out of the reach of others and away from children, preferably in a locked drawer  The patient was warned against driving while taking sedation medications  Sharing medications is a felony  At this point in time, the patient is showing no signs of addiction, abuse, diversion or suicidal ideation  The patient verbalized understanding and wished to use these opioid medications  2  We can repeat bilateral L3-5 RFA p r n   3  The patient may continue meloxicam 7 5 mg b i d  P r n  As prescribed by her PCP  4  The patient will continue with her home exercise program  5  The patient will follow-up in 12 weeks for medication prescription refill and reevaluation  The patient was advised to contact the office should their symptoms worsen in the interim  The patient was agreeable and verbalized an understanding        I spent 15 minutes directly with the patient during this visit    VIRTUAL VISIT [address] Harry acknowledges that she has consented to an online visit or consultation  She understands that the online visit is based solely on information provided by her, and that, in the absence of a face-to-face physical evaluation by the physician, the diagnosis she receives is both limited and provisional in terms of accuracy and completeness  This is not intended to replace a full medical face-to-face evaluation by the physician  Hermila Curry understands and accepts these terms

## 2021-01-19 ENCOUNTER — PATIENT OUTREACH (OUTPATIENT)
Dept: CASE MANAGEMENT | Facility: HOSPITAL | Age: 71
End: 2021-01-19

## 2021-01-19 NOTE — PROGRESS NOTES
1st Attempt: This CHW called patient this day in regards to longitudinal care management  No answer, line was busy

## 2021-01-20 ENCOUNTER — PATIENT OUTREACH (OUTPATIENT)
Dept: CASE MANAGEMENT | Facility: OTHER | Age: 71
End: 2021-01-20

## 2021-01-20 NOTE — PROGRESS NOTES
2nd Attempt: This Care Manager assistant  called patient this day in regards to Longitudinal Care Management call  No answer, left voicemail with my name and phone number so patient could contact me

## 2021-01-21 ENCOUNTER — PATIENT OUTREACH (OUTPATIENT)
Dept: CASE MANAGEMENT | Facility: OTHER | Age: 71
End: 2021-01-21

## 2021-01-21 NOTE — PROGRESS NOTES
3rd Attempt: This Care Manager assistant  called patient this day in regards to Longitudinal Care Management call  No answer, left voicemail with my name and phone number so patient could contact me

## 2021-01-21 NOTE — PROGRESS NOTES
[facility] Longitudinal Care Management Call:    Would the patient like to make an appointment? Yes to get Covid Vaccine  (Yes or No)    Does the patient have a smart device? Yes  (Yes or No)    Is the patient active with MyChart? Yes  (Yes or No)    Is the patient interested in completing the SDOH questionnaire? No  (Yes or No)    Is the patient a smoker? Smoke  (Yes or No)    Is the patient interested in smoking cessation counseling? No  (Yes, No, or n/a)    Is the patient Diabetic? Yes  (Yes or No)    What is the patient's A1C? 6 7  (equal or greater than 9 - refer to [address] [place] [address])    Who helps the patient manage their medications? Self    Does the patient have any issues in obtaining or affording medications? None  (Yes = Referral to [place], No, n/a)    Other barriers identified during the call: No     Summary of Call: The patient was very nice to talk to  She stated she has been trying to schedule her covid vaccine I explained she can go onto my chart and sign up she stated she already signed up  The patient saw her PCP on 10/13/20  She takes her medications as prescribed and has no trouble getting or paying for her medications  She practices social distancing due to Covid  She denied needing help with resources at this time  I provided the patient with my contact information  The patient was agreeable to additional outreaches from this care manager assistant  This CM assistant will follow up in three months

## 2021-01-27 DIAGNOSIS — G89.29 CHRONIC RIGHT HIP PAIN: ICD-10-CM

## 2021-01-27 DIAGNOSIS — M25.551 CHRONIC RIGHT HIP PAIN: ICD-10-CM

## 2021-01-27 RX ORDER — MELOXICAM 7.5 MG/1
TABLET ORAL
Qty: 180 TABLET | Refills: 0 | Status: SHIPPED | OUTPATIENT
Start: 2021-01-27 | End: 2021-04-10

## 2021-02-03 ENCOUNTER — TELEPHONE (OUTPATIENT)
Dept: PAIN MEDICINE | Facility: MEDICAL CENTER | Age: 71
End: 2021-02-03

## 2021-02-03 NOTE — TELEPHONE ENCOUNTER
S/W Ruba at the pharmacy  Advised her of the same and that the script was written on 1/12 stating do not fill before 2/2  She stated she has the script and will get it ready  S/W pt  Advised pt of the same  Pt verbalized understanding

## 2021-02-03 NOTE — TELEPHONE ENCOUNTER
Pt called stating that tramadol script was not received by the pharmacy      Pt can be reached at  [phone]

## 2021-02-08 DIAGNOSIS — I10 ESSENTIAL HYPERTENSION: ICD-10-CM

## 2021-02-08 RX ORDER — HYDROCHLOROTHIAZIDE 25 MG/1
TABLET ORAL
Qty: 90 TABLET | Refills: 0 | Status: SHIPPED | OUTPATIENT
Start: 2021-02-08 | End: 2021-04-16 | Stop reason: SDUPTHER

## 2021-02-08 RX ORDER — LISINOPRIL 20 MG/1
TABLET ORAL
Qty: 90 TABLET | Refills: 0 | Status: SHIPPED | OUTPATIENT
Start: 2021-02-08 | End: 2021-04-16 | Stop reason: SDUPTHER

## 2021-02-08 NOTE — PROGRESS NOTES
Virtual Brief Visit    Assessment/Plan:  The patient is doing well on her current CPAP settings  She is 100% compliant  Problem List Items Addressed This Visit     None      Visit Diagnoses     Sleep apnea, unspecified type            Reason for Visit:  68 y  o female here for annual follow-up     Assessment:  Doing well on current therapy of CPAP 18 cm for very severe FRANK (AHI = 117)  Reason for visit is No chief complaint on file  Encounter provider Wilda Spatz, MD    Provider located at [address] [address] [phone]    Recent Visits  No visits were found meeting these conditions  Showing recent visits within past 7 days and meeting all other requirements     Today's Visits  Date Type Provider Dept   02/09/21 Telemedicine Wilda Spatz, MD  [facility] today's visits and meeting all other requirements     Future Appointments  No visits were found meeting these conditions  Showing future appointments within next 150 days and meeting all other requirements        After connecting through [employer] and patient was informed that this is a secure, HIPAA-compliant platform  She agrees to proceed  , the patient was identified by name and date of birth  Pineda Matthews was informed that this is a telemedicine visit and that the visit is being conducted through [employer] and patient was informed that this is a secure, HIPAA-compliant platform  She agrees to proceed     My office door was closed  No one else was in the room  She acknowledged consent and understanding of privacy and security of the platform  The patient has agreed to participate and understands she can discontinue the visit at any time  Patient is aware this is a billable service  Subjective    Pineda Matthews is a 65 y o  female with very severe obstructive sleep apnea    She has no problems with her equipment and is fully compliant  HPI     Past Medical History:   Diagnosis Date    Asthma     [facility] obstructive lung disease ([facility] )     Community acquired pneumonia     LAST ASSESSED: [id number]    COPD (chronic obstructive pulmonary disease) ([facility] )     Diabetes mellitus ([facility] )     History of MRSA infection     2008 liver sx    Liver disease     Sleep apnea     Viral warts     LAST ASSESSED: [id number]       Past Surgical History:   Procedure Laterality Date    ABDOMINAL SURGERY      ABDOMINOPLASTY      CHOLECYSTECTOMY      COSMETIC SURGERY      EYE SURGERY      Bilateral cataracts 2015 Dr Benitez Purple       FINGER SURGERY      HAND SURGERY      HYSTERECTOMY      LIVER SURGERY      NE INCISE FINGER TENDON SHEATH Right 7/23/2019    Procedure: RELEASE TRIGGER FINGER RIGHT LONG;  Surgeon: Juliane Trevino MD;  [facility];  Service: Orthopedics    NE INCISE FINGER TENDON SHEATH Left 8/6/2019    Procedure: RELEASE TRIGGER FINGER LEFT INDEX;  Surgeon: Juliane Trevino MD;  [facility];  Service: Orthopedics    NE INCISE FINGER TENDON SHEATH Right 6/16/2020    Procedure: RELEASE TRIGGER FINGER, right ring;  Surgeon: Juliane Trevino MD;  [facility];  Service: Orthopedics    NE REPAIR INTERCARP/CARP-METACARP JT Right 9/9/2016    Procedure: THUMB TRAPEZIECTOMY; BASAL JOINT ARTHROPLASTY WITH APL AUTOGRAFT;  Surgeon: Dorcas Power MD;  [facility];  Service: Orthopedics    NE REPAIR INTERCARP/CARP-METACARP JT Left 5/29/2018    Procedure: Anurag Cintrons;  Surgeon: Juliane Trevino MD;  [facility];  Service: Orthopedics    NE TRANSPLANT HAND TENDON Left 5/29/2018    Procedure: TRANSFER TENDON HAND/WRIST FCR from forearm to wrist;  Surgeon: Juliane Trevino MD;  [facility];  Service: Orthopedics       Current Outpatient Medications   Medication Sig Dispense Refill    diphenhydrAMINE (BENADRYL) 25 mg capsule Take 25 mg by mouth every 6 (six) hours as needed for itching      glipiZIDE (GLUCOTROL XL) 5 mg 24 hr tablet TAKE 1 TABLET EVERY DAY  30  MINUTES BEFORE BREAKFAST 90 tablet 1    glucose blood (ACCU-CHEK JESS PLUS) test strip Test BS 3 times daily  DX E11 9 100 each 2    hydrochlorothiazide (HYDRODIURIL) 25 mg tablet TAKE 1 TABLET EVERY DAY 90 tablet 0    lisinopril (ZESTRIL) 20 mg tablet TAKE 1 TABLET EVERY DAY 90 tablet 0    meloxicam (MOBIC) 7 5 mg tablet TAKE 1 TABLET TWICE DAILY 180 tablet 0    metFORMIN (GLUCOPHAGE) 1000 MG tablet TAKE 1 TABLET TWICE DAILY 180 tablet 1    Multiple Vitamins-Minerals (OCUVITE ADULT FORMULA PO) Take by mouth      traMADol (ULTRAM) 50 mg tablet Take 1 PO Q8-12 hours PRN 75 tablet 2     No current facility-administered medications for this visit  General: Alert, appropriate  No distress    Eyes: EOMI, sclerae normal    Nose: No septal deviation    Neuro: Normal motor function  Normal cranial nerve function    Skin: No rash or lesions  Psych: Normal mood and affect    No Known Allergies    Review of Systems  Review of Systems      Genitourinary none   Cardiology none   Gastrointestinal none   Neurology none   Constitutional none   Integumentary none   Psychiatry none   Musculoskeletal back pain   Pulmonary none   ENT none   Endocrine none   Hematological none       There were no vitals filed for this visit  I spent 10 minutes directly with the patient during this visit    VIRTUAL VISIT 92 [address] Harry acknowledges that she has consented to an online visit or consultation  She understands that the online visit is based solely on information provided by her, and that, in the absence of a face-to-face physical evaluation by the physician, the diagnosis she receives is both limited and provisional in terms of accuracy and completeness  This is not intended to replace a full medical face-to-face evaluation by the physician   Reeta Saint understands and accepts these terms               [employer] [phone]

## 2021-02-09 ENCOUNTER — TELEMEDICINE (OUTPATIENT)
Dept: SLEEP CENTER | Facility: CLINIC | Age: 71
End: 2021-02-09
Payer: MEDICARE

## 2021-02-09 DIAGNOSIS — G47.30 SLEEP APNEA, UNSPECIFIED TYPE: ICD-10-CM

## 2021-02-09 PROCEDURE — 99213 OFFICE O/P EST LOW 20 MIN: CPT | Performed by: INTERNAL MEDICINE

## 2021-02-13 DIAGNOSIS — Z23 ENCOUNTER FOR IMMUNIZATION: ICD-10-CM

## 2021-02-19 ENCOUNTER — IMMUNIZATIONS (OUTPATIENT)
Dept: FAMILY MEDICINE CLINIC | Facility: HOSPITAL | Age: 71
End: 2021-02-19

## 2021-02-19 DIAGNOSIS — Z23 ENCOUNTER FOR IMMUNIZATION: Primary | ICD-10-CM

## 2021-02-19 PROCEDURE — 0001A SARS-COV-2 / COVID-19 MRNA VACCINE (PFIZER-BIONTECH) 30 MCG: CPT

## 2021-02-19 PROCEDURE — 91300 SARS-COV-2 / COVID-19 MRNA VACCINE (PFIZER-BIONTECH) 30 MCG: CPT

## 2021-03-11 ENCOUNTER — IMMUNIZATIONS (OUTPATIENT)
Dept: FAMILY MEDICINE CLINIC | Facility: HOSPITAL | Age: 71
End: 2021-03-11

## 2021-03-11 DIAGNOSIS — Z23 ENCOUNTER FOR IMMUNIZATION: Primary | ICD-10-CM

## 2021-03-11 PROCEDURE — 91300 SARS-COV-2 / COVID-19 MRNA VACCINE (PFIZER-BIONTECH) 30 MCG: CPT

## 2021-03-11 PROCEDURE — 0002A SARS-COV-2 / COVID-19 MRNA VACCINE (PFIZER-BIONTECH) 30 MCG: CPT

## 2021-04-05 ENCOUNTER — OFFICE VISIT (OUTPATIENT)
Dept: PAIN MEDICINE | Facility: CLINIC | Age: 71
End: 2021-04-05
Payer: MEDICARE

## 2021-04-05 VITALS
WEIGHT: 190 LBS | HEART RATE: 85 BPM | TEMPERATURE: 98.5 F | HEIGHT: 60 IN | SYSTOLIC BLOOD PRESSURE: 197 MMHG | DIASTOLIC BLOOD PRESSURE: 87 MMHG | BODY MASS INDEX: 37.3 KG/M2

## 2021-04-05 DIAGNOSIS — M48.062 SPINAL STENOSIS OF LUMBAR REGION WITH NEUROGENIC CLAUDICATION: ICD-10-CM

## 2021-04-05 DIAGNOSIS — M47.816 FACET ARTHROPATHY, LUMBAR: ICD-10-CM

## 2021-04-05 DIAGNOSIS — Z79.891 ENCOUNTER FOR LONG-TERM OPIATE ANALGESIC USE: ICD-10-CM

## 2021-04-05 DIAGNOSIS — M51.36 DDD (DEGENERATIVE DISC DISEASE), LUMBAR: ICD-10-CM

## 2021-04-05 DIAGNOSIS — M54.16 LUMBAR RADICULOPATHY: ICD-10-CM

## 2021-04-05 DIAGNOSIS — G89.4 CHRONIC PAIN SYNDROME: Primary | ICD-10-CM

## 2021-04-05 DIAGNOSIS — Z79.891 LONG-TERM CURRENT USE OF OPIATE ANALGESIC: ICD-10-CM

## 2021-04-05 DIAGNOSIS — F11.20 UNCOMPLICATED OPIOID DEPENDENCE (HCC): ICD-10-CM

## 2021-04-05 DIAGNOSIS — M47.816 LUMBAR SPONDYLOSIS: ICD-10-CM

## 2021-04-05 PROCEDURE — 99214 OFFICE O/P EST MOD 30 MIN: CPT | Performed by: NURSE PRACTITIONER

## 2021-04-05 PROCEDURE — 80305 DRUG TEST PRSMV DIR OPT OBS: CPT | Performed by: NURSE PRACTITIONER

## 2021-04-05 RX ORDER — TRAMADOL HYDROCHLORIDE 50 MG/1
TABLET ORAL
Qty: 75 TABLET | Refills: 0 | Status: SHIPPED | OUTPATIENT
Start: 2021-05-13 | End: 2021-06-01 | Stop reason: SDUPTHER

## 2021-04-05 NOTE — PROGRESS NOTES
Assessment:  1  Chronic pain syndrome    2  Uncomplicated opioid dependence ([address] )    3  Long-term current use of opiate analgesic    4  Lumbar radiculopathy    5  Lumbar spondylosis    6  DDD (degenerative disc disease), lumbar    7  Encounter for long-term opiate analgesic use    8  Spinal stenosis of lumbar region with neurogenic claudication    9  Facet arthropathy, lumbar        Plan:  1  The patient may continue Tramadol 50mg 1 tab every 8-12 hours PRN pain #75 tablets for 30 days  The patient still has a refill on file at the pharmacy, therefore 1 more month of prescriptions were sent to the pharmacy today with a DNF date of 5/13/21  While the patient was in the office today an opioid contract was thoroughly reviewed and signed by the patient  The patient was given adequate time to ask questions in regards to the contract and a signed copy was sent home for their records  The patient also completed a BECKS depression inventory and SOAPP-R today, as part of our yearly opioid management program       The patient did not have their opioid medications available for confirmation or counting while in the office today  I reviewed with the patient that as per the opioid contract, they are to bring in the last filled prescription for all of their opioid medications, with what they have left to every office visit  I advised the patient that if they would continue to not bring in their prescriptions as discussed, we may not be able to continue prescribing opioid medications in the future  The patient was agreeable and verbalized an understanding  [place] Prescription Drug Monitoring Program report was reviewed and was appropriate     A urine drug screen was collected at today's office visit as part of our medication management protocol  The point of care testing results were appropriate for what was being prescribed  The specimen will be sent for confirmatory testing   The drug screen is medically necessary because the patient is either dependent on opioid medication or is being considered for opioid medication therapy and the results could impact ongoing or future treatment  The drug screen is to evaluate for the presences or absence of prescribed, non-prescribed, and/or illicit drugs/substances  There are risks associated with opioid medications, including dependence, addiction and tolerance  The patient understands and agrees to use these medications only as prescribed  Potential side effects of the medications include, but are not limited to, constipation, drowsiness, addiction, impaired judgment and risk of fatal overdose if not taken as prescribed  The patient was warned against driving while taking sedation medications  Sharing medications is a felony  At this point in time, the patient is showing no signs of addiction, abuse, diversion or suicidal ideation  2    After schedule the patient for repeat bilateral L3-5 RFA, however she does not feel the pain warrants a repeat time  She will call the office if she wishes to schedule prior to her next appointment  3  The patient may continue meloxicam as prescribed by her PCP  4  The patient will continue with her home exercise program  5  The patient will follow-up in 8 weeks or sooner needed     M*Modal software was used to dictate this note  It may contain errors with dictating incorrect words or incorrect spelling  Please contact the provider directly with any questions  History of Present Illness: The patient is a 66 y o  female with a history of COPD last seen on 1/12/21 who presents for a follow up office visit in regards to chronic axial lumbosacral back pain is nonradiating into the lower extremities secondary to  Lumbar spondylosis, lumbar degenerative disc disease, lumbar stenosis and chronic pain syndrome  The patient denies bowel or bladder incontinence or saddle anesthesia    The patient has had great relief with bilateral L3-5 RFA in the past   She continues to manage her pain with tramadol 50 mg 1 tablet every 8-12 hours as needed for pain with a 60% improvement of her pain without side effects  She currently rates her pain a 3/10 on the numeric pain rating scale  She states her pain is intermittent in nature and bothersome the evening  She characterizes the pain as dull aching  Pain Contract Signed: 4/5/21  Last Urine Drug Screen: 4/5/21  Last Tramadol per pt 4/5/21    I have personally reviewed and/or updated the patient's past medical history, past surgical history, family history, social history, current medications, allergies, and vital signs today  Review of Systems:    Review of Systems   Respiratory: Negative for shortness of breath  Cardiovascular: Negative for chest pain  Gastrointestinal: Negative for constipation, diarrhea, nausea and vomiting  Musculoskeletal: Negative for arthralgias, gait problem, joint swelling and myalgias  Skin: Negative for rash  Neurological: Negative for dizziness, seizures and weakness  All other systems reviewed and are negative  Past Medical History:   Diagnosis Date    Asthma     Chronic obstructive [facility] disease ([facility] )     Community acquired pneumonia     LAST ASSESSED: [id number]    COPD (chronic obstructive pulmonary disease) ([facility] )     Diabetes mellitus ([place] [address] )     History of MRSA infection     2008 liver sx    Liver disease     Sleep apnea     Viral warts     LAST ASSESSED: [id number]       Past Surgical History:   Procedure Laterality Date    ABDOMINAL SURGERY      ABDOMINOPLASTY      CHOLECYSTECTOMY      COSMETIC SURGERY      EYE SURGERY      Bilateral cataracts 2015 Dr Gabby Langley       FINGER SURGERY      HAND SURGERY      HYSTERECTOMY      LIVER SURGERY      VANESSA HAGAN FINGER TENDON SHEATH Right 7/23/2019    Procedure: RELEASE TRIGGER FINGER RIGHT LONG;  Surgeon: Sarah Crump MD;  [facility];  Service: Orthopedics    VANESSA HAGAN FINGER TENDON SHEATH Left 8/6/2019    Procedure: RELEASE TRIGGER FINGER LEFT INDEX;  Surgeon: Otilia Rothman MD;  [facility];  Service: Orthopedics    MD INCISE FINGER TENDON SHEATH Right 6/16/2020    Procedure: RELEASE TRIGGER FINGER, right ring;  Surgeon: Otilia Rothman MD;  [facility];  Service: Orthopedics    MD REPAIR INTERCARP/CARP-METACARP JT Right 9/9/2016    Procedure: THUMB TRAPEZIECTOMY; BASAL JOINT ARTHROPLASTY WITH APL AUTOGRAFT;  Surgeon: Ann-Marie Odonnell MD;  [facility];  Service: Orthopedics    MD REPAIR INTERCARP/CARP-METACARP JT Left 5/29/2018    Procedure: Remus Handsome;  Surgeon: Otilia Rothman MD;  [facility];  Service: Orthopedics    MD TRANSPLANT HAND TENDON Left 5/29/2018    Procedure: TRANSFER TENDON HAND/WRIST FCR from forearm to wrist;  Surgeon: Otilia Rothman MD;  [facility];  Service: Orthopedics       Family History   Problem Relation Age of Onset    Heart attack Father     Heart disease Family     Diabetes Family     Thyroid disease Family     Ovarian cancer Family     Heart attack Mother     Ovarian cancer Sister     Heart disease Other         CARDIAC DISORDER    Diabetes Other     Liver cancer Other     Breast cancer Other     Stomach cancer Other        Social History     Occupational History    Not on file   Tobacco Use    Smoking status: Current Every Day Smoker     Packs/day: 0 20    Smokeless tobacco: Never Used   Substance and Sexual Activity    Alcohol use: Yes     Comment: very seldom    Drug use: No    Sexual activity: Not on file         Current Outpatient Medications:     diphenhydrAMINE (BENADRYL) 25 mg capsule, Take 25 mg by mouth every 6 (six) hours as needed for itching, Disp: , Rfl:     glipiZIDE (GLUCOTROL XL) 5 mg 24 hr tablet, TAKE 1 TABLET EVERY DAY  30  MINUTES BEFORE BREAKFAST, Disp: 90 tablet, Rfl: 1    glucose blood (ACCU-CHEK JESS PLUS) test strip, Test BS 3 times daily  DX E11 9, Disp: 100 each, Rfl: 2    hydrochlorothiazide (HYDRODIURIL) 25 mg tablet, TAKE 1 TABLET EVERY DAY, Disp: 90 tablet, Rfl: 0    lisinopril (ZESTRIL) 20 mg tablet, TAKE 1 TABLET EVERY DAY, Disp: 90 tablet, Rfl: 0    meloxicam (MOBIC) 7 5 mg tablet, TAKE 1 TABLET TWICE DAILY, Disp: 180 tablet, Rfl: 0    metFORMIN (GLUCOPHAGE) 1000 MG tablet, TAKE 1 TABLET TWICE DAILY, Disp: 180 tablet, Rfl: 1    Multiple Vitamins-Minerals (OCUVITE ADULT FORMULA PO), Take by mouth, Disp: , Rfl:     [START ON 5/13/2021] traMADol (ULTRAM) 50 mg tablet, Take 1 PO Q8-12 hours PRN, Disp: 75 tablet, Rfl: 0    No Known Allergies    Physical Exam:    BP (!) 197/87   Pulse 85   Temp 98 5 °F (36 9 °C)   Ht 5' (1 524 m)   Wt 86 2 kg (190 lb)   BMI 37 11 kg/m²     Constitutional:normal, well developed, well nourished, alert, in no distress and non-toxic and no overt pain behavior  Eyes:anicteric  HEENT:grossly intact  Neck:supple, symmetric, trachea midline and no masses   Pulmonary:even and unlabored  Cardiovascular:No edema or pitting edema present  Skin:Normal without rashes or lesions and well hydrated  Psychiatric:Mood and affect appropriate  Neurologic:Cranial Nerves II-XII grossly intact  Musculoskeletal: slightly antalgic gait but steady without the use of assistive devices      Imaging  No orders to display     MRI LUMBAR SPINE WITHOUT CONTRAST     INDICATION: M54 [id number]: Radiculopathy, lumbar region  M48 062: Spinal stenosis, lumbar region with neurogenic claudication      COMPARISON:  MRI from 8/13/2014     TECHNIQUE:  Sagittal T1, sagittal T2, sagittal inversion recovery, axial T1 and axial T2, coronal T2     IMAGE QUALITY:  Diagnostic     FINDINGS:     VERTEBRAL BODIES:  There is a heterogeneous appearance of the visualized osseous structures without focal suspicious lesion  Vertebral body heights are maintained    There is Modic type I endplate degenerative change at L4-L5 and L5-S1      SACRUM: Normal signal within the sacrum  No evidence of insufficiency or stress fracture      DISTAL CORD AND CONUS:  Normal size and signal within the distal cord and conus        PARASPINAL SOFT TISSUES:  Paraspinal soft tissues are unremarkable      LOWER THORACIC DISC SPACES:  There is disc space narrowing within the lower thoracic spine      LUMBAR DISC SPACES:  There is multilevel disc space degeneration      L1-L2:  No significant canal stenosis or foraminal narrowing      L2-L3:  There is a minimal bulge  There is facet arthrosis  There is no significant canal stenosis  There is no significant foraminal narrowing      L3-L4: There is disc space narrowing  There is a bulging annulus  There is facet arthrosis with ligamentum flavum thickening  There is overall mild mass effect on thecal sac  Findings are overall progressed  There is mild foraminal narrowing      L4-L5:  There is disc space narrowing  There is vacuum disc phenomenon  There is a bulging annulus  There is dorsal osteophytosis  There is facet arthrosis  There is mild mass effect on the thecal sac  There is endplate spurring  There is mild to   moderate left and mild right foraminal narrowing      L5-S1:  There is vacuum disc phenomenon  There is a bulging annulus  There is facet arthrosis  There is endplate spurring  There is moderate to severe bilateral foraminal narrowing with contact of the exiting nerve roots  Findings are grossly   stable      IMPRESSION:     Multilevel degenerative changes of the lumbar spine, as described above      Multifactorial disease results in overall mild mass affect on the thecal sac at L3-L4, slightly progressed      Multilevel degenerative changes in the remainder of the spine are otherwise not significantly changed      Orders Placed This Encounter   Procedures    MM ALL_Prescribed Meds and Special Instructions    MM DT_Alprazolam Definitive Test    MM DT_Amphetamine Definitive Test    MM DT_Aripiprazole Definitive Test    MM DT_Bath Salts Definitive Test    MM DT_Buprenorphine Definitive Test    MM DT_Butalbital Definitive Test    MM DT_Clonazepam Definitive Test    MM DT_Clozapine Definitive Test    MM DT_Cocaine Definitive Test    MM DT_Codeine Definitive Test    MM DT_Desipramine Definitive Test    MM DT_Dextromethorphan Definitive Test    MM Diazepam Definitive Test    MM DT_Ethyl Glucuronide/Ethyl Sulfate Definitive Test    MM DT_Fentanyl Definitive Test    MM DT_Haloperidol Definitive Test    MM DT_Heroin Definitive Test    MM DT_Hydrocodone Definitive Test    MM DT_Hydromorphone Definitive Test    MM DT_Imipramine Definitive Test    MM DT_Kratom Definitive Test    MM DT_Levorphanol Definitive Test    MM Lorazepam Definitive Test    MM DT_MDMA Definitive Test    MM DT_Meperidine Definitive Test    MM DT_Methadone Definitive Test    MM DT_Methamphetamine Definitive Test    MM DT_Morphine Definitive Test    MM DT_Olanzapine Definitive Test    MM DT_Oxazepam Definitive Test    MM DT_Oxycodone Definitive Test    MM DT_Oxymorphone Definitive Test    MM DT_Phencyclidine Definitive Test    MM DT_Phenobarbital Definitive Test    MM DT_Phentermine Definitive Test    MM DT_Quetiapine Definitive Test    MM DT_Risperidone Definitive Test    MM DT_Secobarbital Definitive Test    MM DT_Spice Definitive Test    MM DT_Tapentadol Definitive Test    MM DT_Temazapam Definitive Test    MM DT_THC Definitive Test    MM DT_Tramadol Definitive Test    MM DT_Methylphenidate Definitive Test

## 2021-04-05 NOTE — PATIENT INSTRUCTIONS
Opioid Safety   AMBULATORY CARE:   Opioid safety  includes the correct use, storage, and disposal of opioids  Examples of opioid medicines to treat pain include oxycodone, morphine, fentanyl, and codeine  Call your local emergency number (911 in the [address]), or have someone else call if:   · You have a seizure  · You cannot be woken  · You have trouble staying awake and your breathing is slow or shallow  · Your speech is slurred, or you are confused  · You are dizzy or stumble when you walk  Call your doctor, or have someone close to you call if:   · You are extremely drowsy, or you have trouble staying awake or speaking  · You have pale or clammy skin  · You have blue fingernails or lips  · Your heartbeat is slower than normal     · You cannot stop vomiting  · You have questions or concerns about your condition or care  Use opioids safely:   · Take prescribed opioids exactly as directed  Opioids come with directions based on the kind of opioid and how it is given  Do not take more than the recommended amount, or for longer than needed  · Do not give opioids to others or take opioids that belong to someone else  Misuse of opioids can lead to an addiction or overdose  · Do not mix opioids with other medicines or alcohol  The combination can cause an overdose, or lead to a coma  · Do not drive or operate heavy machinery after you take the opioid  Your provider or pharmacist can tell you how long to wait after a dose before you do these activities  · Talk to your healthcare provider if you have any side effects  He or she can help you prevent or relieve side effects  Side effects include nausea, sleepiness, itching, and trouble thinking clearly  Manage constipation:  Constipation is the most common side effect of opioid medicine  Constipation is when you have hard, dry bowel movements, or you go longer than usual between bowel movements   Tell your healthcare provider about all changes in your bowel movements while you are taking opioids  He or she may recommend laxative medicine to help you have a bowel movement  He or she may also change the kind of opioid you are taking, or change when you take it  The following are more ways you can prevent or relieve constipation:  · Drink liquids as directed  You may need to drink extra liquids to help soften and move your bowels  Ask how much liquid to drink each day and which liquids are best for you  · Eat high-fiber foods  This may help decrease constipation by adding bulk to your bowel movements  High-fiber foods include fruits, vegetables, whole-grain breads and cereals, and beans  Your healthcare provider or dietitian can help you create a high-fiber meal plan  Your provider may also recommend a fiber supplement if you cannot get enough fiber from food  · Exercise regularly  Regular physical activity can help stimulate your intestines  Walking is a good exercise to prevent or relieve constipation  Ask which exercises are best for you  · Schedule a time each day to have a bowel movement  This may help train your body to have regular bowel movements  Bend forward while you are on the toilet to help move the bowel movement out  Sit on the toilet for at least 10 minutes, even if you do not have a bowel movement  Store opioids safely:   · Store opioids where others cannot easily get them  Keep them in a locked cabinet or secure area  Do not  keep them in a purse or other bag you carry with you  A person may be looking for something else and find the opioids  · Make sure opioids are stored out of the reach of children  A child can easily overdose on opioids  Opioids may look like candy to a small child  The best way to dispose of opioids: The laws vary by country and area   In the [place], the best way is to return the opioids through a take-back program  This program is offered by the [place] [employer] ([address])  The following are options for using the program:  · Take the opioids to a NAZARIO collection site  The site is often a law enforcement center  Call your local law enforcement center for scheduled take-back days in your area  You will be given information on where to go if the collection site is in a different location  · Take the opioids to an approved pharmacy or hospital   A pharmacy or hospital may be set up as a collection site  You will need to ask if it is a NAZARIO collection site if you were not directed there  A pharmacy or doctor's office may not be able to take back opioids unless it is a NAZARIO site  · Use a mail-back system  This means you are given containers to put the opioids into  You will then mail them in the containers  · Use a take-back drop box  This is a place to leave the opioids at any time  People and animals will not be able to get into the box  Your local law enforcement agency can tell you where to find a drop box in your area  Other safe ways to dispose of opioids: The medicine may come with disposal instructions  The instructions may vary depending on the brand of medicine you are using  Instructions may come in a Medication Guide, but not every medicine has one  You may instead get instructions from your pharmacy or doctor  Follow instructions carefully  The following are general guidelines to follow:  · Find out if you can flush the opioid  Some opioids can be flushed down the toilet or poured into the sink  You will need to contact authorities in your area to see if this is an option for you  The FDA also offers a list of medicines that are safe to flush down the toilet  You can check the list if you cannot get the information for your local area  · Ask your waste management company about rules for putting opioids in the trash  The company will be able to give you specific directions   Scratch out personal information on the original medicine label so it cannot be read  Then put it in the trash  Do not label the trash or put any information on it about the opioids  It should look like regular household trash so no one is tempted to look for the opioids  Keep the trash out of the reach of children and animals  Always make sure trash is secure  · Talk to officials if you live in a facility  If you live in a nursing home or assisted living center, talk to an official  The person will know the rules for your area  Other ways to manage pain:   · Ask your healthcare provider about non-opioid medicines to control pain  Nonprescription medicines include NSAIDs (such as ibuprofen) and acetaminophen  Prescription medicines include muscle relaxers, antidepressants, and steroids  · Pain may be managed without any medicines  Some ways to relieve pain include massage, aromatherapy, or meditation  Physical or occupational therapy may also help  For more information:   · Drug Enforcement Administration  [facility] [facility] [address]  Phone: 4- 398 - [phone]  Web Address: [facility]/drug_disposal/    · [address]  and Drug Administration  [facility]  [place] [address] [facility]  Phone: 4- 639 - [phone]  Web Address: http://[employer]/  Follow up with your doctor or pain specialist as directed: You may need to have your dose adjusted  Your doctor or pain specialist can also help you find ways to manage pain without opioids  Write down your questions so you remember to ask them during your visits  © Copyright [address] Information is for End User's use only and may not be sold, redistributed or otherwise used for commercial purposes  All illustrations and images included in CareNotes® are the copyrighted property of [employer]  or [facility] Marcelo Gaytan   The above information is an  only  It is not intended as medical advice for individual conditions or treatments   Talk to your doctor, nurse or pharmacist before following any medical regimen to see if it is safe and effective for you

## 2021-04-09 DIAGNOSIS — M25.551 CHRONIC RIGHT HIP PAIN: ICD-10-CM

## 2021-04-09 DIAGNOSIS — G89.29 CHRONIC RIGHT HIP PAIN: ICD-10-CM

## 2021-04-09 LAB
6MAM UR QL CFM: NEGATIVE NG/ML
7AMINOCLONAZEPAM UR QL CFM: NEGATIVE NG/ML
A-OH ALPRAZ UR QL CFM: NEGATIVE NG/ML
AMPHET UR QL CFM: NEGATIVE NG/ML
AMPHET UR QL CFM: NEGATIVE NG/ML
BUPRENORPHINE UR QL CFM: NEGATIVE NG/ML
BUTALBITAL UR QL CFM: NEGATIVE NG/ML
BZE UR QL CFM: NEGATIVE NG/ML
CODEINE UR QL CFM: NEGATIVE NG/ML
DESIPRAMINE UR QL CFM: NEGATIVE NG/ML
DESIPRAMINE UR QL CFM: NEGATIVE NG/ML
EDDP UR QL CFM: NEGATIVE NG/ML
ETHYL GLUCURONIDE UR QL CFM: NEGATIVE NG/ML
ETHYL SULFATE UR QL SCN: NEGATIVE NG/ML
FENTANYL UR QL CFM: NEGATIVE NG/ML
GLIADIN IGG SER IA-ACNC: NEGATIVE NG/ML
GLUCOSE 30M P 50 G LAC PO SERPL-MCNC: NEGATIVE NG/ML
HYDROCODONE UR QL CFM: NEGATIVE NG/ML
HYDROCODONE UR QL CFM: NEGATIVE NG/ML
HYDROMORPHONE UR QL CFM: NEGATIVE NG/ML
IMIPRAMINE UR QL CFM: NEGATIVE NG/ML
LORAZEPAM UR QL CFM: NEGATIVE NG/ML
MDMA UR QL CFM: NEGATIVE NG/ML
ME-PHENIDATE UR QL CFM: NEGATIVE NG/ML
MEPERIDINE UR QL CFM: NEGATIVE NG/ML
MEPHEDRONE UR QL CFM: NEGATIVE NG/ML
METHADONE UR QL CFM: NEGATIVE NG/ML
METHAMPHET UR QL CFM: NEGATIVE NG/ML
MORPHINE UR QL CFM: NEGATIVE NG/ML
MORPHINE UR QL CFM: NEGATIVE NG/ML
NORBUPRENORPHINE UR QL CFM: NEGATIVE NG/ML
NORDIAZEPAM UR QL CFM: NEGATIVE NG/ML
NORFENTANYL UR QL CFM: NEGATIVE NG/ML
NORHYDROCODONE UR QL CFM: NEGATIVE NG/ML
NORHYDROCODONE UR QL CFM: NEGATIVE NG/ML
NORMEPERIDINE UR QL CFM: NEGATIVE NG/ML
NOROXYCODONE UR QL CFM: NEGATIVE NG/ML
OLANZAPINE QUANTIFICATION: NEGATIVE NG/ML
OPC-3373 QUANTIFICATION: NEGATIVE
OXAZEPAM UR QL CFM: NEGATIVE NG/ML
OXYCODONE UR QL CFM: NEGATIVE NG/ML
OXYMORPHONE UR QL CFM: NEGATIVE NG/ML
OXYMORPHONE UR QL CFM: NEGATIVE NG/ML
PCP UR QL CFM: NEGATIVE NG/ML
PHENOBARB UR QL CFM: NEGATIVE NG/ML
SECOBARBITAL UR QL CFM: NEGATIVE NG/ML
SL AMB 3-METHYL-FENTANYL QUANTIFICATION: NORMAL NG/ML
SL AMB 4-ANPP QUANTIFICATION: NORMAL NG/ML
SL AMB 4-FIBF QUANTIFICATION: NORMAL NG/ML
SL AMB 5F-ADB-M7 METABOLITE QUANTIFICATION: NEGATIVE NG/ML
SL AMB 7-OH-MITRAGYNINE (KRATOM ALKALOID) QUANTIFICATION: NEGATIVE NG/ML
SL AMB AB-FUBINACA-M3 METABOLITE QUANTIFICATION: NEGATIVE NG/ML
SL AMB ACETYL FENTANYL QUANTIFICATION: NORMAL NG/ML
SL AMB ACETYL NORFENTANYL QUANTIFICATION: NORMAL NG/ML
SL AMB ACRYL FENTANYL QUANTIFICATION: NORMAL NG/ML
SL AMB BATH SALTS: NEGATIVE NG/ML
SL AMB BUTRYL FENTANYL QUANTIFICATION: NORMAL NG/ML
SL AMB CARFENTANIL QUANTIFICATION: NORMAL NG/ML
SL AMB CLOZAPINE QUANTIFICATION: NEGATIVE NG/ML
SL AMB CTHC (MARIJUANA METABOLITE) QUANTIFICATION: NEGATIVE NG/ML
SL AMB CYCLOPROPYL FENTANYL QUANTIFICATION: NORMAL NG/ML
SL AMB DEXTROMETHORPHAN QUANTIFICATION: ABNORMAL NG/ML
SL AMB DEXTRORPHAN (DEXTROMETHORPHAN METABOLITE) QUANT: ABNORMAL NG/ML
SL AMB DEXTRORPHAN (DEXTROMETHORPHAN METABOLITE) QUANT: ABNORMAL NG/ML
SL AMB FURANYL FENTANYL QUANTIFICATION: NORMAL NG/ML
SL AMB HALOPERIDOL  QUANTIFICATION: NEGATIVE NG/ML
SL AMB HALOPERIDOL METABOLITE QUANTIFICATION: NEGATIVE NG/ML
SL AMB HYDROXYRISPERIDONE QUANTIFICATION: NEGATIVE NG/ML
SL AMB JWH018 METABOLITE QUANTIFICATION: NEGATIVE NG/ML
SL AMB JWH073 METABOLITE QUANTIFICATION: NEGATIVE NG/ML
SL AMB MDMB-FUBINACA-M1 METABOLITE QUANTIFICATION: NEGATIVE NG/ML
SL AMB METHOXYACETYL FENTANYL QUANTIFICATION: NORMAL NG/ML
SL AMB METHYLONE QUANTIFICATION: NEGATIVE NG/ML
SL AMB N-DESMETHYL U-47700 QUANTIFICATION: NORMAL NG/ML
SL AMB N-DESMETHYL-TRAMADOL QUANTIFICATION: NORMAL NG/ML
SL AMB N-DESMETHYLCLOZAPINE QUANTIFICATION: NEGATIVE NG/ML
SL AMB NORQUETIAPINE QUANTIFICATION: NEGATIVE NG/ML
SL AMB PHENTERMINE QUANTIFICATION: NEGATIVE NG/ML
SL AMB QUETIAPINE QUANTIFICATION: NEGATIVE NG/ML
SL AMB RCS4 METABOLITE QUANTIFICATION: NEGATIVE NG/ML
SL AMB RISPERIDONE QUANTIFICATION: NEGATIVE NG/ML
SL AMB RITALINIC ACID QUANTIFICATION: NEGATIVE NG/ML
SL AMB U-47700 QUANTIFICATION: NORMAL NG/ML
SPECIMEN DRAWN SERPL: NEGATIVE NG/ML
TAPENTADOL UR QL CFM: NEGATIVE NG/ML
TEMAZEPAM UR QL CFM: NEGATIVE NG/ML
TEMAZEPAM UR QL CFM: NEGATIVE NG/ML
TRAMADOL UR QL CFM: NORMAL NG/ML
URATE/CREAT 24H UR: NORMAL NG/ML

## 2021-04-10 RX ORDER — MELOXICAM 7.5 MG/1
TABLET ORAL
Qty: 180 TABLET | Refills: 0 | Status: SHIPPED | OUTPATIENT
Start: 2021-04-10 | End: 2021-06-14

## 2021-04-16 DIAGNOSIS — I10 ESSENTIAL HYPERTENSION: ICD-10-CM

## 2021-04-16 RX ORDER — LISINOPRIL 20 MG/1
TABLET ORAL
Qty: 90 TABLET | Refills: 0 | Status: SHIPPED | OUTPATIENT
Start: 2021-04-16 | End: 2021-06-24

## 2021-04-16 RX ORDER — HYDROCHLOROTHIAZIDE 25 MG/1
TABLET ORAL
Qty: 90 TABLET | Refills: 0 | Status: SHIPPED | OUTPATIENT
Start: 2021-04-16 | End: 2021-06-24

## 2021-05-19 ENCOUNTER — OFFICE VISIT (OUTPATIENT)
Dept: FAMILY MEDICINE CLINIC | Facility: CLINIC | Age: 71
End: 2021-05-19
Payer: MEDICARE

## 2021-05-19 VITALS
DIASTOLIC BLOOD PRESSURE: 72 MMHG | BODY MASS INDEX: 35.93 KG/M2 | HEIGHT: 60 IN | WEIGHT: 183 LBS | SYSTOLIC BLOOD PRESSURE: 138 MMHG

## 2021-05-19 DIAGNOSIS — Z12.12 SCREENING FOR COLORECTAL CANCER: ICD-10-CM

## 2021-05-19 DIAGNOSIS — F17.200 CURRENT SMOKER ON SOME DAYS: ICD-10-CM

## 2021-05-19 DIAGNOSIS — M54.16 LUMBAR RADICULOPATHY: ICD-10-CM

## 2021-05-19 DIAGNOSIS — E11.65 TYPE 2 DIABETES MELLITUS WITH HYPERGLYCEMIA, WITHOUT LONG-TERM CURRENT USE OF INSULIN (HCC): Primary | ICD-10-CM

## 2021-05-19 DIAGNOSIS — I10 ESSENTIAL HYPERTENSION: ICD-10-CM

## 2021-05-19 DIAGNOSIS — Z78.0 ASYMPTOMATIC POSTMENOPAUSAL STATE: ICD-10-CM

## 2021-05-19 DIAGNOSIS — Z12.2 ENCOUNTER FOR SCREENING FOR LUNG CANCER: ICD-10-CM

## 2021-05-19 DIAGNOSIS — G47.33 OBSTRUCTIVE SLEEP APNEA OF ADULT: ICD-10-CM

## 2021-05-19 DIAGNOSIS — Z12.11 SCREENING FOR COLORECTAL CANCER: ICD-10-CM

## 2021-05-19 DIAGNOSIS — Z12.31 ENCOUNTER FOR SCREENING MAMMOGRAM FOR MALIGNANT NEOPLASM OF BREAST: ICD-10-CM

## 2021-05-19 DIAGNOSIS — M85.88 OSTEOPENIA OF OTHER SITE: ICD-10-CM

## 2021-05-19 DIAGNOSIS — J30.89 NON-SEASONAL ALLERGIC RHINITIS, UNSPECIFIED TRIGGER: ICD-10-CM

## 2021-05-19 DIAGNOSIS — J44.9 CHRONIC OBSTRUCTIVE PULMONARY DISEASE, UNSPECIFIED COPD TYPE (HCC): ICD-10-CM

## 2021-05-19 DIAGNOSIS — Z00.00 MEDICARE ANNUAL WELLNESS VISIT, SUBSEQUENT: ICD-10-CM

## 2021-05-19 LAB — SL AMB POCT HEMOGLOBIN AIC: 6.7 (ref ?–6.5)

## 2021-05-19 PROCEDURE — 99214 OFFICE O/P EST MOD 30 MIN: CPT | Performed by: FAMILY MEDICINE

## 2021-05-19 PROCEDURE — 1123F ACP DISCUSS/DSCN MKR DOCD: CPT | Performed by: FAMILY MEDICINE

## 2021-05-19 PROCEDURE — G0439 PPPS, SUBSEQ VISIT: HCPCS | Performed by: FAMILY MEDICINE

## 2021-05-19 PROCEDURE — 83036 HEMOGLOBIN GLYCOSYLATED A1C: CPT | Performed by: FAMILY MEDICINE

## 2021-05-19 RX ORDER — GUAIFENESIN, PSEUDOEPHEDRINE HYDROCHLORIDE 600; 60 MG/1; MG/1
1 TABLET, EXTENDED RELEASE ORAL EVERY 12 HOURS
COMMUNITY
End: 2022-05-23 | Stop reason: ALTCHOICE

## 2021-05-19 NOTE — ASSESSMENT & PLAN NOTE
The patient presents today for Medicare subsequent wellness visit  All components of the examination were addressed with the patient  She has several gaps in care which are discussed  We asked her to begin working on these asap  She is overdue for mammography in the should be her 1st priority  She also needs colonoscopy, lung CT screen as well as DEXA scan  Will follow up with these results are available  She agrees with this plan

## 2021-05-19 NOTE — PROGRESS NOTES
Assessment and Plan:     Problem List Items Addressed This Visit        Endocrine    Type 2 diabetes mellitus with hyperglycemia, without long-term current use of insulin ([facility] [address] ) - Primary       Lab Results   Component Value Date    HGBA1C 6 7 (A) 05/19/2021   Her type 2 diabetes remains under acceptable control with an A1c of 6 7 today  She is compliant with her metformin and glipizide  We asked her to continue to watch her diet try to get in some walking possible  This is limited by her significant lumbago  Relevant Orders    POCT hemoglobin A1c (Completed)    CBC    Comprehensive metabolic panel    Lipid panel       Respiratory    Obstructive sleep apnea of adult     Continue compliance with CPAP         Non-seasonal allergic rhinitis     Using Mucinex D which may be raising her BP         COPD (chronic obstructive pulmonary disease) ([facility])     PRN albuterol HFA         Relevant Medications    pseudoephedrine-guaifenesin (MUCINEX D)  MG per tablet       Cardiovascular and Mediastinum    Essential hypertension     Blood pressure control suboptimal   May be due to her use of Mucinex D  We asked her to discontinue this and change to either plain Mucinex or Mucinex DM  She understands and agrees with this plan  Relevant Orders    Comprehensive metabolic panel       Nervous and Auditory    Lumbar radiculopathy     PM tramadol and Mobic            Other    Medicare annual wellness visit, subsequent     The patient presents today for Medicare subsequent wellness visit  All components of the examination were addressed with the patient  She has several gaps in care which are discussed  We asked her to begin working on these asap  She is overdue for mammography in the should be her 1st priority  She also needs colonoscopy, lung CT screen as well as DEXA scan  Will follow up with these results are available  She agrees with this plan           Current smoker on some days     Again discussed the need to discontinue tobacco use  We also discussed her smoking history and the recommendation that she undergo CT lung screen  She will consider this after she has worked on colonoscopy mammography [facility]  Other Visit Diagnoses     Encounter for screening mammogram for malignant neoplasm of breast        Relevant Orders    Mammo screening bilateral w 3d & cad    Screening for colorectal cancer        Relevant Orders    Ambulatory referral to Gastroenterology    Encounter for screening for lung cancer        Relevant Orders    CT lung screening program    Asymptomatic postmenopausal state        Relevant Orders    DXA bone density spine hip and pelvis    Osteopenia of other site        Relevant Orders    DXA bone density spine hip and pelvis        BMI Counseling: Body mass index is 35 74 kg/m²  The BMI is above normal  Nutrition recommendations include decreasing portion sizes, encouraging healthy choices of fruits and vegetables, consuming healthier snacks, limiting drinks that contain sugar and moderation in carbohydrate intake  Exercise recommendations include moderate physical activity 150 minutes/week and exercising 3-5 times per week  No pharmacotherapy was ordered  Preventive health issues were discussed with patient, and age appropriate screening tests were ordered as noted in patient's After Visit Summary  Personalized health advice and appropriate referrals for health education or preventive services given if needed, as noted in patient's After Visit Summary       History of Present Illness:     Patient presents for Medicare Annual Wellness visit    Patient Care Team:  Verónica Hobbs MD as PCP - General  MD Leonidas Rodriguez MD Madlyn Schiller, DO Patrici Code as [facility] Worker (Care Coordination)     Problem List:     Patient Active Problem List   Diagnosis    Lumbar radiculopathy    Spinal stenosis of lumbar region with neurogenic claudication    Lumbar spondylosis    Chronic right hip pain    Type 2 diabetes mellitus with hyperglycemia, without long-term current use of insulin (HCC)    Primary osteoarthritis of first carpometacarpal joint of left hand    Arthritis of carpometacarpal (CMC) joint of left thumb    Cataract of both eyes    DDD (degenerative disc disease), lumbar    Facet arthropathy, lumbar    Keratosis    Obesity (BMI 30-39  9)    Obstructive sleep apnea of adult    Secondary polycythemia    Chronic pain syndrome    Current smoker on some days    Essential hypertension    Uncomplicated opioid dependence ([place] [address] )    Medicare annual wellness visit, subsequent    Trigger finger, left index finger    Trigger ring finger of right hand    Sacroiliitis ([place] [address] )    Encounter for long-term opiate analgesic use    Vertigo    COPD [facility]chronic obstructive [facility] disease) ([facility]    Non-seasonal allergic rhinitis    Heart murmur, systolic      Past Medical and Surgical History:     Past Medical History:   Diagnosis Date    Asthma     Chronic obstructive lung disease ([facility] )     Community acquired pneumonia     LAST ASSESSED: [id number]    [facility]     Diabetes mellitus ([place] [address] )     History of MRSA infection     2008 liver sx    Liver disease     Sleep apnea     Viral warts     LAST ASSESSED: [id number]     Past Surgical History:   Procedure Laterality Date    ABDOMINAL SURGERY      ABDOMINOPLASTY      CHOLECYSTECTOMY      COSMETIC SURGERY      EYE SURGERY      Bilateral cataracts 2015 Dr Phillip Scanlon       FINGER SURGERY      HAND SURGERY      HYSTERECTOMY      LIVER SURGERY      AK INCISE FINGER TENDON SHEATH Right 7/23/2019    Procedure: RELEASE TRIGGER FINGER RIGHT LONG;  Surgeon: Desiree Spicer MD;  [facility];  Service: Orthopedics    AK INCISE FINGER TENDON SHEATH Left 8/6/2019    Procedure: RELEASE TRIGGER FINGER LEFT INDEX;  Surgeon: Desiree Spicer MD;  [facility];  Service: Orthopedics    VA INCISE FINGER TENDON SHEATH Right 6/16/2020    Procedure: RELEASE TRIGGER FINGER, right ring;  Surgeon: Surinder Hills MD;  [facility];  Service: Orthopedics    VA REPAIR INTERCARP/CARP-METACARP JT Right 9/9/2016    Procedure: THUMB TRAPEZIECTOMY; BASAL JOINT ARTHROPLASTY WITH APL AUTOGRAFT;  Surgeon: Bhavana Casas MD;  [facility];  Service: Orthopedics    VA REPAIR INTERCARP/CARP-METACARP JT Left 5/29/2018    Procedure: Joe Louie;  Surgeon: Surinder Hills MD;  [facility];  Service: Orthopedics    VA TRANSPLANT HAND TENDON Left 5/29/2018    Procedure: TRANSFER TENDON HAND/WRIST FCR from forearm to wrist;  Surgeon: Surinder Hills MD;  [facility];  Service: Orthopedics      Family History:     Family History   Problem Relation Age of Onset    Heart attack Father     Heart disease Family     Diabetes Family     Thyroid disease Family     Ovarian cancer Family     Heart attack Mother     Ovarian cancer Sister     Heart disease Other         CARDIAC DISORDER    Diabetes Other     Liver cancer Other     Breast cancer Other     Stomach cancer Other       Social History:        Social History     Socioeconomic History    Marital status: /Civil Union     Spouse name: None    Number of children: None    Years of education: None    Highest education level: None   Occupational History    None   Social Needs    Financial resource strain: None    Food insecurity     Worry: None     Inability: None    Transportation needs     Medical: None     Non-medical: None   Tobacco Use    Smoking status: Current Every Day Smoker     Packs/day: 0 20    Smokeless tobacco: Never Used   Substance and Sexual Activity    Alcohol use: Yes     Comment: very seldom    Drug use: No    Sexual activity: None   Lifestyle    Physical activity     Days per week: None     Minutes per session: None    Stress: None   Relationships    Social connections     Talks on phone: None     Gets together: None     Attends [employer] service: None     Active member of club or organization: None     Attends meetings of clubs or organizations: None     Relationship status: None    Intimate partner violence     Fear of current or ex partner: None     Emotionally abused: None     Physically abused: None     Forced sexual activity: None   Other Topics Concern    None   Social History Narrative    None      Medications and Allergies:     Current Outpatient Medications   Medication Sig Dispense Refill    diphenhydrAMINE (BENADRYL) 25 mg capsule Take 25 mg by mouth every 6 (six) hours as needed for itching      glipiZIDE (GLUCOTROL XL) 5 mg 24 hr tablet TAKE 1 TABLET EVERY DAY  30  MINUTES BEFORE BREAKFAST 90 tablet 1    glucose blood (ACCU-CHEK JESS PLUS) test strip Test BS 3 times daily  DX E11 9 100 each 2    hydrochlorothiazide (HYDRODIURIL) 25 mg tablet TAKE 1 TABLET EVERY DAY 90 tablet 0    lisinopril (ZESTRIL) 20 mg tablet TAKE 1 TABLET EVERY DAY 90 tablet 0    meloxicam (MOBIC) 7 5 mg tablet TAKE 1 TABLET TWICE DAILY 180 tablet 0    metFORMIN (GLUCOPHAGE) 1000 MG tablet TAKE 1 TABLET TWICE DAILY 180 tablet 1    Multiple Vitamins-Minerals (OCUVITE ADULT FORMULA PO) Take by mouth      pseudoephedrine-guaifenesin (MUCINEX D)  MG per tablet Take 1 tablet by mouth every 12 (twelve) hours      traMADol (ULTRAM) 50 mg tablet Take 1 PO Q8-12 hours PRN 75 tablet 0     No current facility-administered medications for this visit        No Known Allergies   Immunizations:     Immunization History   Administered Date(s) Administered    INFLUENZA 10/02/2013, 01/07/2016, 01/14/2017, 09/05/2017    Influenza Split High Dose Preservative Free IM 09/05/2017    Influenza, high dose seasonal 0 7 mL 09/10/2020    Influenza, seasonal, injectable 10/02/2013, 01/14/2017    Influenza, seasonal, injectable, preservative free 09/13/2018    Pneumococcal Conjugate 13-Valent 08/24/2017    Pneumococcal Conjugate PCV 7 09/06/2017    Pneumococcal Polysaccharide PPV23 08/15/2018    SARS-CoV-2 / COVID-19 mRNA IM (Pfizer-BioNTech) 02/19/2021, 03/11/2021    Tdap 09/04/2014    Zoster 10/02/2013    influenza, trivalent, adjuvanted 09/11/2019      Health Maintenance:         Topic Date Due    MAMMOGRAM  07/30/2015    Colorectal Cancer Screening  02/04/2018    Hepatitis C Screening  Completed     There are no preventive care reminders to display for this patient  Medicare Health Risk Assessment:     /72 (BP Location: Left arm, Patient Position: Sitting, Cuff Size: Large)   Ht 5' (1 524 m)   Wt 83 kg (183 lb)   BMI 35 74 kg/m²      Shweta Hoffmann is here for her Subsequent Wellness visit  Health Risk Assessment:   Patient rates overall health as fair  Patient feels that their physical health rating is same  Patient is satisfied with their life  Eyesight was rated as same  Hearing was rated as same  Patient feels that their emotional and mental health rating is slightly worse  Patients states they are never, rarely angry  Patient states they are often unusually tired/fatigued  Pain experienced in the last 7 days has been some  Patient's pain rating has been 8/10  Patient states that she has experienced no weight loss or gain in last 6 months  Lumbago is relatively severe at times  Blue Emu creme seems to be helpful  Difficulty with prolonged standing and walking  No incontinence of B/B  No LE weakness  Depression Screening:   PHQ-2 Score: 2      Fall Risk Screening: In the past year, patient has experienced: no history of falling in past year      Urinary Incontinence Screening:   Patient has not leaked urine accidently in the last six months  Home Safety:  Patient has trouble with stairs inside or outside of their home  Patient has working smoke alarms and has working carbon monoxide detector   Home safety hazards include: none  Due to lumbago    Nutrition:   Current diet is Diabetic, Low Carb and Limited junk food  Medications:   Patient is currently taking over-the-counter supplements  OTC medications include: Ocuvite  Patient is able to manage medications  Activities of Daily Living (ADLs)/Instrumental Activities of Daily Living (IADLs):   Walk and transfer into and out of bed and chair?: Yes  Dress and groom yourself?: Yes    Bathe or shower yourself?: Yes    Feed yourself? Yes  Do your laundry/housekeeping?: Yes  Manage your money, pay your bills and track your expenses?: Yes  Make your own meals?: Yes    Do your own shopping?: Yes    Durable Medical Equipment Suppliers  CPAP [place]    Previous Hospitalizations:   Any hospitalizations or ED visits within the last 12 months?: Yes    How many hospitalizations have you had in the last year?: 1-2    Hospitalization Comments: Trigger finger release 6/20    Advance Care Planning:   Living will: Yes    Durable POA for healthcare:  Yes    Advanced directive: Yes      Cognitive Screening:   Provider or family/friend/caregiver concerned regarding cognition?: No    PREVENTIVE SCREENINGS      Cardiovascular Screening:    General: Screening Current      Diabetes Screening:     General: Screening Not Indicated and History Diabetes      Colorectal Cancer Screening:     General: Risks and Benefits Discussed    Due for: Colonoscopy - Low Risk      Breast Cancer Screening:     General: Risks and Benefits Discussed    Due for: Mammogram        Cervical Cancer Screening:    General: Screening Not Indicated      Osteoporosis Screening:    General: Risks and Benefits Discussed    Due for: DXA Axial      Abdominal Aortic Aneurysm (AAA) Screening:        General: Screening Not Indicated      Lung Cancer Screening:     General: Screening Not Indicated and Risks and Benefits Discussed      Hepatitis C Screening:    General: Screening Current    Screening, Brief Intervention, and Referral to Treatment (SBIRT)    Screening  Typical number of drinks in a day: 0  Typical number of drinks in a week: 0  Interpretation: Low risk drinking behavior      Single Item Drug Screening:  How often have you used an illegal drug (including marijuana) or a prescription medication for non-medical reasons in the past year? never    Single Item Drug Screen Score: 0  Interpretation: Negative screen for possible drug use disorder    Review of Current Opioid Use  Opioid Risk Tool (ORT) Score: 0  Opioid Risk Tool (ORT) Interpretation: Score 0-3: Low risk for opioid misuse      Wesly Addison MD

## 2021-05-19 NOTE — PROGRESS NOTES
Assessment/Plan:  Type 2 diabetes mellitus with hyperglycemia, without long-term current use of insulin (Formerly [facility])    Lab Results   Component Value Date    HGBA1C 6 7 (A) 05/19/2021   Her type 2 diabetes remains under acceptable control with an A1c of 6 7 today  She is compliant with her metformin and glipizide  We asked her to continue to watch her diet try to get in some walking possible  This is limited by her significant lumbago  COPD (chronic obstructive pulmonary disease) (Former[facility])  PRN albuterol HFA    Non-seasonal allergic rhinitis  Using Mucinex D which may be raising her BP    Lumbar radiculopathy  PM tramadol and Mobic    Obstructive sleep apnea of adult  Continue compliance with CPAP    Essential hypertension  Blood pressure control suboptimal   May be due to her use of Mucinex D  We asked her to discontinue this and change to either plain Mucinex or Mucinex DM  She understands and agrees with this plan  Current smoker on some days  Again discussed the need to discontinue tobacco use  We also discussed her smoking history and the recommendation that she undergo CT lung screen  She will consider this after she has worked on colonoscopy mammography [facility]  Diagnoses and all orders for this visit:    Type 2 diabetes mellitus with hyperglycemia, without long-term current use of insulin ([facility])  -     POCT hemoglobin A1c  -     CBC  -     Comprehensive metabolic panel  -     Lipid panel    Essential hypertension  -     Comprehensive metabolic panel    Encounter for screening mammogram for malignant neoplasm of breast  -     Mammo screening bilateral w 3d & cad; Future    Medicare annual wellness visit, subsequent    Screening for colorectal cancer  -     Ambulatory referral to Gastroenterology; Future    Encounter for screening for lung cancer  -     CT lung screening program; Future    Asymptomatic postmenopausal state  -     DXA bone density spine hip and pelvis;  Future    Osteopenia of other site  -     DXA bone density spine hip and pelvis; Future    Obstructive sleep apnea of adult    Chronic obstructive pulmonary disease, unspecified COPD type (HCC)    Non-seasonal allergic rhinitis, unspecified trigger    Lumbar radiculopathy    Current smoker on some days    Other orders  -     pseudoephedrine-guaifenesin (MUCINEX D)  MG per tablet; Take 1 tablet by mouth every 12 (twelve) hours          Subjective:   Chief Complaint   Patient presents with    Medicare Wellness Visit     Sub AWV/med check/Needs foot exam        Patient ID: José Elena is a 66 y o  female  HPI  The patient is 75-year-old female who presents today for a Medicare subsequent wellness visit as well as routine follow-up of multiple medical problems in the should include type 2 diabetes which is not insulin dependent, obstructive sleep apnea, COPD, allergic rhinitis, essential hypertension, obesity in addition to lumbar spinal stenosis with chronic lumbago  She states that she continues to have significant back pain for which she takes tramadol and Mobic which is somewhat effective  She continues to follow with pain management  She has had a lot more nasal congestion recently  She has been using Mucinex D and this may be responsible for her elevated blood pressure today  She has no headache diplopia  No chest pain or shortness of breath  She has an albuterol inhaler to use for her COPD as needed but she has had no recent use of it  No nocturnal awakenings  No PND orthopnea edema [facility]  She is compliant with her metformin and glipizide for type 2 diabetes  She is compliant with her hydrochlorothiazide and lisinopril for her central hypertension    The following portions of the patient's history were reviewed and updated as appropriate: allergies, current medications, past family history, past medical history, past social history, past surgical history and problem list     Review of Systems   Constitution: Negative  Cardiovascular: Negative  Respiratory: Negative  Endocrine: Negative  Hematologic/Lymphatic: Negative  Musculoskeletal: Positive for back pain and stiffness  Gastrointestinal: Negative  Genitourinary: Negative  Neurological: Negative for dizziness and headaches  Objective:    Physical Exam   Constitutional: She is oriented to person, place, and time  She appears well-developed  Obese in no distress   Neck: No JVD present  No thyromegaly present  Cardiovascular: Normal rate and regular rhythm  Pulses are no weak pulses  Pulses:       Dorsalis pedis pulses are 2+ on the right side and 2+ on the left side  Pulmonary/Chest: Effort normal and breath sounds normal  She has no wheezes  She has no rales  Musculoskeletal:         General: Tenderness present  No edema  Comments: Lumbosacral region  No step-off or deformity  Feet:   Right Foot:   Skin Integrity: Negative for ulcer, skin breakdown, erythema, warmth, callus or dry skin  Left Foot:   Skin Integrity: Negative for ulcer, skin breakdown, erythema, warmth, callus or dry skin  Lymphadenopathy:     She has no cervical adenopathy  Neurological: She is alert and oriented to person, place, and time  Psychiatric: She has a normal mood and affect  Judgment and thought content normal    Nursing note and vitals reviewed  Patient's shoes and socks removed  Right Foot/Ankle   Right Foot Inspection  Skin Exam: skin normal and skin intact no dry skin, no warmth, no callus, no erythema, no maceration, no abnormal color, no pre-ulcer, no ulcer and no callus                          Toe Exam:  no right toe deformity  Sensory       Monofilament testing: intact  Vascular  Capillary refills: < 3 seconds  The right DP pulse is 2+       Left Foot/Ankle  Left Foot Inspection  Skin Exam: skin normal and skin intactno dry skin, no warmth, no erythema, no maceration, normal color, no pre-ulcer, no ulcer and no callus Toe Exam: no left toe deformity                   Sensory       Monofilament: intact  Vascular  Capillary refills: < 3 seconds  The left DP pulse is 2+  Assign Risk Category:  No deformity present; No loss of protective sensation;  No weak pulses       Risk: 0

## 2021-05-19 NOTE — ASSESSMENT & PLAN NOTE
Lab Results   Component Value Date    HGBA1C 6 7 (A) 05/19/2021   Her type 2 diabetes remains under acceptable control with an A1c of 6 7 today  She is compliant with her metformin and glipizide  We asked her to continue to watch her diet try to get in some walking possible  This is limited by her significant lumbago

## 2021-05-19 NOTE — ASSESSMENT & PLAN NOTE
Again discussed the need to discontinue tobacco use  We also discussed her smoking history and the recommendation that she undergo CT lung screen  She will consider this after she has worked on colonoscopy mammography [facility]

## 2021-05-19 NOTE — PATIENT INSTRUCTIONS
Medicare Preventive Visit Patient Instructions  Thank you for completing your Welcome to Medicare Visit or Medicare Annual Wellness Visit today  Your next wellness visit will be due in one year (5/20/2022)  The screening/preventive services that you may require over the next 5-10 years are detailed below  Some tests may not apply to you based off risk factors and/or age  Screening tests ordered at today's visit but not completed yet may show as past due  Also, please note that scanned in results may not display below  Preventive Screenings:  Service Recommendations Previous Testing/Comments   Colorectal Cancer Screening  * Colonoscopy    * Fecal Occult Blood Test (FOBT)/Fecal Immunochemical Test (FIT)  * Fecal DNA/Cologuard Test  * Flexible Sigmoidoscopy Age: 54-65 years old   Colonoscopy: every 10 years (may be performed more frequently if at higher risk)  OR  FOBT/FIT: every 1 year  OR  Cologuard: every 3 years  OR  Sigmoidoscopy: every 5 years  Screening may be recommended earlier than age 48 if at higher risk for colorectal cancer  Also, an individualized decision between you and your healthcare provider will decide whether screening between the ages of 74-80 would be appropriate  Colonoscopy: 02/04/2008  FOBT/FIT: Not on file  Cologuard: Not on file  Sigmoidoscopy: Not on file          Breast Cancer Screening Age: 36 years old  Frequency: every 1-2 years  Not required if history of left and right mastectomy Mammogram: 07/30/2014        Cervical Cancer Screening Between the ages of 21-29, pap smear recommended once every 3 years  Between the ages of 33-67, can perform pap smear with HPV co-testing every 5 years     Recommendations may differ for women with a history of total hysterectomy, cervical cancer, or abnormal pap smears in past  Pap Smear: Not on file    Screening Not Indicated   Hepatitis C Screening Once for adults born between [facility]  More frequently in patients at high risk for Hepatitis C Hep C Antibody: 01/29/2019    Screening Current   Diabetes Screening 1-2 times per year if you're at risk for diabetes or have pre-diabetes Fasting glucose: 135 mg/dL   A1C: 6 7    Screening Not Indicated  History Diabetes   Cholesterol Screening Once every 5 years if you don't have a lipid disorder  May order more often based on risk factors  Lipid panel: 02/26/2020    Screening Current     Other Preventive Screenings Covered by Medicare:  1  Abdominal Aortic Aneurysm (AAA) Screening: covered once if your at risk  You're considered to be at risk if you have a family history of AAA  2  Lung Cancer Screening: covers low dose CT scan once per year if you meet all of the following conditions: (1) Age 50-69; (2) No signs or symptoms of lung cancer; (3) Current smoker or have quit smoking within the last 15 years; (4) You have a tobacco smoking history of at least 30 pack years (packs per day multiplied by number of years you smoked); (5) You get a written order from a healthcare provider  3  Glaucoma Screening: covered annually if you're considered high risk: (1) You have diabetes OR (2) Family history of glaucoma OR (3)  aged 48 and older OR (3)  American aged 72 and older  3  Osteoporosis Screening: covered every 2 years if you meet one of the following conditions: (1) You're estrogen deficient and at risk for osteoporosis based off medical history and other findings; (2) Have a vertebral abnormality; (3) On glucocorticoid therapy for more than 3 months; (4) Have primary hyperparathyroidism; (5) On osteoporosis medications and need to assess response to drug therapy  · Last bone density test (DXA Scan): Not on file  5  HIV Screening: covered annually if you're between the age of 12-76  Also covered annually if you are younger than 13 and older than 72 with risk factors for HIV infection  For pregnant patients, it is covered up to 3 times per pregnancy      Immunizations:  Immunization Recommendations   Influenza Vaccine Annual influenza vaccination during flu season is recommended for all persons aged >= 6 months who do not have contraindications   Pneumococcal Vaccine (Prevnar and Pneumovax)  * Prevnar = PCV13  * Pneumovax = PPSV23   Adults 25-60 years old: 1-3 doses may be recommended based on certain risk factors  Adults 72 years old: Prevnar (PCV13) vaccine recommended followed by Pneumovax (PPSV23) vaccine  If already received PPSV23 since turning 65, then PCV13 recommended at least one year after PPSV23 dose  Hepatitis B Vaccine 3 dose series if at intermediate or high risk (ex: diabetes, end stage renal disease, liver disease)   Tetanus (Td) Vaccine - COST NOT COVERED BY MEDICARE PART B Following completion of primary series, a booster dose should be given every 10 years to maintain immunity against tetanus  Td may also be given as tetanus wound prophylaxis  Tdap Vaccine - COST NOT COVERED BY MEDICARE PART B Recommended at least once for all adults  For pregnant patients, recommended with each pregnancy  Shingles Vaccine (Shingrix) - COST NOT COVERED BY MEDICARE PART B  2 shot series recommended in those aged 48 and above     Health Maintenance Due:      Topic Date Due    MAMMOGRAM  07/30/2015    Colorectal Cancer Screening  02/04/2018    Hepatitis C Screening  Completed     Immunizations Due:  There are no preventive care reminders to display for this patient  Advance Directives   What are advance directives? Advance directives are legal documents that state your wishes and plans for medical care  These plans are made ahead of time in case you lose your ability to make decisions for yourself  Advance directives can apply to any medical decision, such as the treatments you want, and if you want to donate organs  What are the types of advance directives? There are many types of advance directives, and each state has rules about how to use them   You may choose a combination of any of the following:  · Living will: This is a written record of the treatment you want  You can also choose which treatments you do not want, which to limit, and which to stop at a certain time  This includes surgery, medicine, IV fluid, and tube feedings  · Durable power of  for healthcare [facility]): This is a written record that states who you want to make healthcare choices for you when you are unable to make them for yourself  This person, called a proxy, is usually a family member or a friend  You may choose more than 1 proxy  · Do not resuscitate (DNR) order:  A DNR order is used in case your heart stops beating or you stop breathing  It is a request not to have certain forms of treatment, such as CPR  A DNR order may be included in other types of advance directives  · Medical directive: This covers the care that you want if you are in a coma, near death, or unable to make decisions for yourself  You can list the treatments you want for each condition  Treatment may include pain medicine, surgery, blood transfusions, dialysis, IV or tube feedings, and a ventilator (breathing machine)  · Values history: This document has questions about your views, beliefs, and how you feel and think about life  This information can help others choose the care that you would choose  Why are advance directives important? An advance directive helps you control your care  Although spoken wishes may be used, it is better to have your wishes written down  Spoken wishes can be misunderstood, or not followed  Treatments may be given even if you do not want them  An advance directive may make it easier for your family to make difficult choices about your care  Cigarette Smoking and Your Health   Risks to your health if you smoke:  Nicotine and other chemicals found in tobacco damage every cell in your body  Even if you are a light smoker, you have an increased risk for cancer, heart disease, and lung disease   If you are pregnant or have diabetes, smoking increases your risk for complications  Benefits to your health if you stop smoking:   · You decrease respiratory symptoms such as coughing, wheezing, and shortness of breath  · You reduce your risk for cancers of the lung, mouth, throat, kidney, bladder, pancreas, stomach, and cervix  If you already have cancer, you increase the benefits of chemotherapy  You also reduce your risk for cancer returning or a second cancer from developing  · You reduce your risk for heart disease, blood clots, heart attack, and stroke  · You reduce your risk for lung infections, and diseases such as pneumonia, asthma, chronic bronchitis, and emphysema  · Your circulation improves  More oxygen can be delivered to your body  If you have diabetes, you lower your risk for complications, such as kidney, artery, and eye diseases  You also lower your risk for nerve damage  Nerve damage can lead to amputations, poor vision, and blindness  · You improve your body's ability to heal and to fight infections  For more information and support to stop smoking:   · [employer]  Phone: 1- 663 - 610-4644  Web Address: [employer]  Weight Management   Why it is important to manage your weight:  Being overweight increases your risk of health conditions such as heart disease, high blood pressure, type 2 diabetes, and certain types of cancer  It can also increase your risk for osteoarthritis, sleep apnea, and other respiratory problems  Aim for a slow, steady weight loss  Even a small amount of weight loss can lower your risk of health problems  How to lose weight safely:  A safe and healthy way to lose weight is to eat fewer calories and get regular exercise  You can lose up about 1 pound a week by decreasing the number of calories you eat by 500 calories each day     Healthy meal plan for weight management:  A healthy meal plan includes a variety of foods, contains fewer calories, and helps you stay healthy  A healthy meal plan includes the following:  · Eat whole-grain foods more often  A healthy meal plan should contain fiber  Fiber is the part of grains, fruits, and vegetables that is not broken down by your body  Whole-grain foods are healthy and provide extra fiber in your diet  Some examples of whole-grain foods are whole-wheat breads and pastas, oatmeal, brown rice, and bulgur  · Eat a variety of vegetables every day  Include dark, leafy greens such as spinach, kale, jose greens, and mustard greens  Eat yellow and orange vegetables such as carrots, sweet potatoes, and winter squash  · Eat a variety of fruits every day  Choose fresh or canned fruit (canned in its own juice or light syrup) instead of juice  Fruit juice has very little or no fiber  · Eat low-fat dairy foods  Drink fat-free (skim) milk or 1% milk  Eat fat-free yogurt and low-fat cottage cheese  Try low-fat cheeses such as mozzarella and other reduced-fat cheeses  · Choose meat and other protein foods that are low in fat  Choose beans or other legumes such as split peas or lentils  Choose fish, skinless poultry (chicken or turkey), or lean cuts of red meat (beef or pork)  Before you cook meat or poultry, cut off any visible fat  · Use less fat and oil  Try baking foods instead of frying them  Add less fat, such as margarine, sour cream, regular salad dressing and mayonnaise to foods  Eat fewer high-fat foods  Some examples of high-fat foods include french fries, doughnuts, ice cream, and cakes  · Eat fewer sweets  Limit foods and drinks that are high in sugar  This includes candy, cookies, regular soda, and sweetened drinks  Exercise:  Exercise at least 30 minutes per day on most days of the week  Some examples of exercise include walking, biking, dancing, and swimming  You can also fit in more physical activity by taking the stairs instead of the elevator or parking farther away from stores   Ask your healthcare provider about the best exercise plan for you  © Copyright [employer] 2018 Information is for End User's use only and may not be sold, redistributed or otherwise used for commercial purposes  All illustrations and images included in CareNotes® are the copyrighted property of [employer]  or [facility] Preventive Visit Patient Instructions  Thank you for completing your Welcome to Medicare Visit or Medicare Annual Wellness Visit today  Your next wellness visit will be due in one year (5/20/2022)  The screening/preventive services that you may require over the next 5-10 years are detailed below  Some tests may not apply to you based off risk factors and/or age  Screening tests ordered at today's visit but not completed yet may show as past due  Also, please note that scanned in results may not display below  Preventive Screenings:  Service Recommendations Previous Testing/Comments   Colorectal Cancer Screening  * Colonoscopy    * Fecal Occult Blood Test (FOBT)/Fecal Immunochemical Test (FIT)  * Fecal DNA/Cologuard Test  * Flexible Sigmoidoscopy Age: 54-65 years old   Colonoscopy: every 10 years (may be performed more frequently if at higher risk)  OR  FOBT/FIT: every 1 year  OR  Cologuard: every 3 years  OR  Sigmoidoscopy: every 5 years  Screening may be recommended earlier than age 48 if at higher risk for colorectal cancer  Also, an individualized decision between you and your healthcare provider will decide whether screening between the ages of 74-80 would be appropriate  Colonoscopy: 02/04/2008  FOBT/FIT: Not on file  Cologuard: Not on file  Sigmoidoscopy: Not on file          Breast Cancer Screening Age: 36 years old  Frequency: every 1-2 years  Not required if history of left and right mastectomy Mammogram: 07/30/2014        Cervical Cancer Screening Between the ages of 21-29, pap smear recommended once every 3 years     Between the ages of 33-67, can perform pap smear with HPV co-testing every 5 years  Recommendations may differ for women with a history of total hysterectomy, cervical cancer, or abnormal pap smears in past  Pap Smear: Not on file    Screening Not Indicated   Hepatitis C Screening Once for adults born between 1945 and 1965  More frequently in patients at high risk for Hepatitis C Hep C Antibody: 01/29/2019    Screening Current   Diabetes Screening 1-2 times per year if you're at risk for diabetes or have pre-diabetes Fasting glucose: 135 mg/dL   A1C: 6 7    Screening Not Indicated  History Diabetes   Cholesterol Screening Once every 5 years if you don't have a lipid disorder  May order more often based on risk factors  Lipid panel: 02/26/2020    Screening Current     Other Preventive Screenings Covered by Medicare:  6  Abdominal Aortic Aneurysm (AAA) Screening: covered once if your at risk  You're considered to be at risk if you have a family history of AAA  7  Lung Cancer Screening: covers low dose CT scan once per year if you meet all of the following conditions: (1) Age 50-69; (2) No signs or symptoms of lung cancer; (3) Current smoker or have quit smoking within the last 15 years; (4) You have a tobacco smoking history of at least 30 pack years (packs per day multiplied by number of years you smoked); (5) You get a written order from a healthcare provider  8  Glaucoma Screening: covered annually if you're considered high risk: (1) You have diabetes OR (2) Family history of glaucoma OR (3)  aged 48 and older OR (3)  American aged 72 and older  5  Osteoporosis Screening: covered every 2 years if you meet one of the following conditions: (1) You're estrogen deficient and at risk for osteoporosis based off medical history and other findings; (2) Have a vertebral abnormality; (3) On glucocorticoid therapy for more than 3 months; (4) Have primary hyperparathyroidism; (5) On osteoporosis medications and need to assess response to drug therapy  · Last bone density test (DXA Scan): Not on file  10  HIV Screening: covered annually if you're between the age of 12-76  Also covered annually if you are younger than [place] and older than 72 with risk factors for HIV infection  For pregnant patients, it is covered up to 3 times per pregnancy  Immunizations:  Immunization Recommendations   Influenza Vaccine Annual influenza vaccination during flu season is recommended for all persons aged >= 6 months who do not have contraindications   Pneumococcal Vaccine (Prevnar and Pneumovax)  * Prevnar = PCV13  * Pneumovax = PPSV23   Adults 25-60 years old: 1-3 doses may be recommended based on certain risk factors  Adults 72 years old: Prevnar (PCV13) vaccine recommended followed by Pneumovax (PPSV23) vaccine  If already received PPSV23 since turning 65, then PCV13 recommended at least one year after PPSV23 dose  Hepatitis B Vaccine 3 dose series if at intermediate or high risk (ex: diabetes, end stage renal disease, liver disease)   Tetanus (Td) Vaccine - COST NOT COVERED BY MEDICARE PART B Following completion of primary series, a booster dose should be given every 10 years to maintain immunity against tetanus  Td may also be given as tetanus wound prophylaxis  Tdap Vaccine - COST NOT COVERED BY MEDICARE PART B Recommended at least once for all adults  For pregnant patients, recommended with each pregnancy  Shingles Vaccine (Shingrix) - COST NOT COVERED BY MEDICARE PART B  2 shot series recommended in those aged 48 and above     Health Maintenance Due:      Topic Date Due    MAMMOGRAM  07/30/2015    Colorectal Cancer Screening  02/04/2018    Hepatitis C Screening  Completed     Immunizations Due:  There are no preventive care reminders to display for this patient  Advance Directives   What are advance directives? Advance directives are legal documents that state your wishes and plans for medical care   These plans are made ahead of time in case you lose your ability to make decisions for yourself  Advance directives can apply to any medical decision, such as the treatments you want, and if you want to donate organs  What are the types of advance directives? There are many types of advance directives, and each state has rules about how to use them  You may choose a combination of any of the following:  · Living will: This is a written record of the treatment you want  You can also choose which treatments you do not want, which to limit, and which to stop at a certain time  This includes surgery, medicine, IV fluid, and tube feedings  · Durable power of  for healthcare [facility]): This is a written record that states who you want to make healthcare choices for you when you are unable to make them for yourself  This person, called a proxy, is usually a family member or a friend  You may choose more than 1 proxy  · Do not resuscitate (DNR) order:  A DNR order is used in case your heart stops beating or you stop breathing  It is a request not to have certain forms of treatment, such as CPR  A DNR order may be included in other types of advance directives  · Medical directive: This covers the care that you want if you are in a coma, near death, or unable to make decisions for yourself  You can list the treatments you want for each condition  Treatment may include pain medicine, surgery, blood transfusions, dialysis, IV or tube feedings, and a ventilator (breathing machine)  · Values history: This document has questions about your views, beliefs, and how you feel and think about life  This information can help others choose the care that you would choose  Why are advance directives important? An advance directive helps you control your care  Although spoken wishes may be used, it is better to have your wishes written down  Spoken wishes can be misunderstood, or not followed  Treatments may be given even if you do not want them   An advance directive may make it easier for your family to make difficult choices about your care  Cigarette Smoking and Your Health   Risks to your health if you smoke:  Nicotine and other chemicals found in tobacco damage every cell in your body  Even if you are a light smoker, you have an increased risk for cancer, heart disease, and lung disease  If you are pregnant or have diabetes, smoking increases your risk for complications  Benefits to your health if you stop smoking:   · You decrease respiratory symptoms such as coughing, wheezing, and shortness of breath  · You reduce your risk for cancers of the lung, mouth, throat, kidney, bladder, pancreas, stomach, and cervix  If you already have cancer, you increase the benefits of chemotherapy  You also reduce your risk for cancer returning or a second cancer from developing  · You reduce your risk for heart disease, blood clots, heart attack, and stroke  · You reduce your risk for lung infections, and diseases such as pneumonia, asthma, chronic bronchitis, and emphysema  · Your circulation improves  More oxygen can be delivered to your body  If you have diabetes, you lower your risk for complications, such as kidney, artery, and eye diseases  You also lower your risk for nerve damage  Nerve damage can lead to amputations, poor vision, and blindness  · You improve your body's ability to heal and to fight infections  For more information and support to stop smoking:   · [employer]  Phone: 6- 629 - 362-8451  Web Address: www [email or web address]  Weight Management   Why it is important to manage your weight:  Being overweight increases your risk of health conditions such as heart disease, high blood pressure, type 2 diabetes, and certain types of cancer  It can also increase your risk for osteoarthritis, sleep apnea, and other respiratory problems  Aim for a slow, steady weight loss  Even a small amount of weight loss can lower your risk of health problems    How to lose weight safely:  A safe and healthy way to lose weight is to eat fewer calories and get regular exercise  You can lose up about 1 pound a week by decreasing the number of calories you eat by 500 calories each day  Healthy meal plan for weight management:  A healthy meal plan includes a variety of foods, contains fewer calories, and helps you stay healthy  A healthy meal plan includes the following:  · Eat whole-grain foods more often  A healthy meal plan should contain fiber  Fiber is the part of grains, fruits, and vegetables that is not broken down by your body  Whole-grain foods are healthy and provide extra fiber in your diet  Some examples of whole-grain foods are whole-wheat breads and pastas, oatmeal, brown rice, and bulgur  · Eat a variety of vegetables every day  Include dark, leafy greens such as spinach, kale, jose greens, and mustard greens  Eat yellow and orange vegetables such as carrots, sweet potatoes, and winter squash  · Eat a variety of fruits every day  Choose fresh or canned fruit (canned in its own juice or light syrup) instead of juice  Fruit juice has very little or no fiber  · Eat low-fat dairy foods  Drink fat-free (skim) milk or 1% milk  Eat fat-free yogurt and low-fat cottage cheese  Try low-fat cheeses such as mozzarella and other reduced-fat cheeses  · Choose meat and other protein foods that are low in fat  Choose beans or other legumes such as split peas or lentils  Choose fish, skinless poultry (chicken or turkey), or lean cuts of red meat (beef or pork)  Before you cook meat or poultry, cut off any visible fat  · Use less fat and oil  Try baking foods instead of frying them  Add less fat, such as margarine, sour cream, regular salad dressing and mayonnaise to foods  Eat fewer high-fat foods  Some examples of high-fat foods include french fries, doughnuts, ice cream, and cakes  · Eat fewer sweets  Limit foods and drinks that are high in sugar   This includes candy, cookies, regular soda, and sweetened drinks  Exercise:  Exercise at least 30 minutes per day on most days of the week  Some examples of exercise include walking, biking, dancing, and swimming  You can also fit in more physical activity by taking the stairs instead of the elevator or parking farther away from stores  Ask your healthcare provider about the best exercise plan for you  © Copyright [employer] 2018 Information is for End User's use only and may not be sold, redistributed or otherwise used for commercial purposes  All illustrations and images included in CareNotes® are the copyrighted property of [employer]  or [facility] Preventive Visit Patient Instructions  Thank you for completing your Welcome to Medicare Visit or Medicare Annual Wellness Visit today  Your next wellness visit will be due in one year (5/20/2022)  The screening/preventive services that you may require over the next 5-10 years are detailed below  Some tests may not apply to you based off risk factors and/or age  Screening tests ordered at today's visit but not completed yet may show as past due  Also, please note that scanned in results may not display below  Preventive Screenings:  Service Recommendations Previous Testing/Comments   Colorectal Cancer Screening  * Colonoscopy    * Fecal Occult Blood Test (FOBT)/Fecal Immunochemical Test (FIT)  * Fecal DNA/Cologuard Test  * Flexible Sigmoidoscopy Age: 54-65 years old   Colonoscopy: every 10 years (may be performed more frequently if at higher risk)  OR  FOBT/FIT: every 1 year  OR  Cologuard: every 3 years  OR  Sigmoidoscopy: every 5 years  Screening may be recommended earlier than age 48 if at higher risk for colorectal cancer  Also, an individualized decision between you and your healthcare provider will decide whether screening between the ages of 74-80 would be appropriate   Colonoscopy: 02/04/2008  FOBT/FIT: Not on file  Cologuard: Not on file  Sigmoidoscopy: Not on file          Breast Cancer Screening Age: 36 years old  Frequency: every 1-2 years  Not required if history of left and right mastectomy Mammogram: 07/30/2014        Cervical Cancer Screening Between the ages of 21-29, pap smear recommended once every 3 years  Between the ages of 33-67, can perform pap smear with HPV co-testing every 5 years  Recommendations may differ for women with a history of total hysterectomy, cervical cancer, or abnormal pap smears in past  Pap Smear: Not on file    Screening Not Indicated   Hepatitis C Screening Once for adults born between 1945 and 1965  More frequently in patients at high risk for Hepatitis C Hep C Antibody: 01/29/2019    Screening Current   Diabetes Screening 1-2 times per year if you're at risk for diabetes or have pre-diabetes Fasting glucose: 135 mg/dL   A1C: 6 7    Screening Not Indicated  History Diabetes   Cholesterol Screening Once every 5 years if you don't have a lipid disorder  May order more often based on risk factors  Lipid panel: 02/26/2020    Screening Current     Other Preventive Screenings Covered by Medicare:  11  Abdominal Aortic Aneurysm (AAA) Screening: covered once if your at risk  You're considered to be at risk if you have a family history of AAA  12  Lung Cancer Screening: covers low dose CT scan once per year if you meet all of the following conditions: (1) Age 50-69; (2) No signs or symptoms of lung cancer; (3) Current smoker or have quit smoking within the last 15 years; (4) You have a tobacco smoking history of at least 30 pack years (packs per day multiplied by number of years you smoked); (5) You get a written order from a healthcare provider  15  Glaucoma Screening: covered annually if you're considered high risk: (1) You have diabetes OR (2) Family history of glaucoma OR (3)  aged 48 and older OR (3)  American aged 72 and older  15   Osteoporosis Screening: covered every 2 years if you meet one of the following conditions: (1) You're estrogen deficient and at risk for osteoporosis based off medical history and other findings; (2) Have a vertebral abnormality; (3) On glucocorticoid therapy for more than 3 months; (4) Have primary hyperparathyroidism; (5) On osteoporosis medications and need to assess response to drug therapy  · Last bone density test (DXA Scan): Not on file  15  HIV Screening: covered annually if you're between the age of 12-76  Also covered annually if you are younger than 13 and older than 72 with risk factors for HIV infection  For pregnant patients, it is covered up to 3 times per pregnancy  Immunizations:  Immunization Recommendations   Influenza Vaccine Annual influenza vaccination during flu season is recommended for all persons aged >= 6 months who do not have contraindications   Pneumococcal Vaccine (Prevnar and Pneumovax)  * Prevnar = PCV13  * Pneumovax = PPSV23   Adults 25-60 years old: 1-3 doses may be recommended based on certain risk factors  Adults 72 years old: Prevnar (PCV13) vaccine recommended followed by Pneumovax (PPSV23) vaccine  If already received PPSV23 since turning 65, then PCV13 recommended at least one year after PPSV23 dose  Hepatitis B Vaccine 3 dose series if at intermediate or high risk (ex: diabetes, end stage renal disease, liver disease)   Tetanus (Td) Vaccine - COST NOT COVERED BY MEDICARE PART B Following completion of primary series, a booster dose should be given every 10 years to maintain immunity against tetanus  Td may also be given as tetanus wound prophylaxis  Tdap Vaccine - COST NOT COVERED BY MEDICARE PART B Recommended at least once for all adults  For pregnant patients, recommended with each pregnancy     Shingles Vaccine (Shingrix) - COST NOT COVERED BY MEDICARE PART B  2 shot series recommended in those aged 48 and above     Health Maintenance Due:      Topic Date Due    MAMMOGRAM  07/30/2015    Colorectal Cancer Screening  02/04/2018  Hepatitis C Screening  Completed     Immunizations Due:  There are no preventive care reminders to display for this patient  Advance Directives   What are advance directives? Advance directives are legal documents that state your wishes and plans for medical care  These plans are made ahead of time in case you lose your ability to make decisions for yourself  Advance directives can apply to any medical decision, such as the treatments you want, and if you want to donate organs  What are the types of advance directives? There are many types of advance directives, and each state has rules about how to use them  You may choose a combination of any of the following:  · Living will: This is a written record of the treatment you want  You can also choose which treatments you do not want, which to limit, and which to stop at a certain time  This includes surgery, medicine, IV fluid, and tube feedings  · Durable power of  for healthcare [facility]): This is a written record that states who you want to make healthcare choices for you when you are unable to make them for yourself  This person, called a proxy, is usually a family member or a friend  You may choose more than 1 proxy  · Do not resuscitate (DNR) order:  A DNR order is used in case your heart stops beating or you stop breathing  It is a request not to have certain forms of treatment, such as CPR  A DNR order may be included in other types of advance directives  · Medical directive: This covers the care that you want if you are in a coma, near death, or unable to make decisions for yourself  You can list the treatments you want for each condition  Treatment may include pain medicine, surgery, blood transfusions, dialysis, IV or tube feedings, and a ventilator (breathing machine)  · Values history: This document has questions about your views, beliefs, and how you feel and think about life   This information can help others choose the care that you would choose  Why are advance directives important? An advance directive helps you control your care  Although spoken wishes may be used, it is better to have your wishes written down  Spoken wishes can be misunderstood, or not followed  Treatments may be given even if you do not want them  An advance directive may make it easier for your family to make difficult choices about your care  Cigarette Smoking and Your Health   Risks to your health if you smoke:  Nicotine and other chemicals found in tobacco damage every cell in your body  Even if you are a light smoker, you have an increased risk for cancer, heart disease, and lung disease  If you are pregnant or have diabetes, smoking increases your risk for complications  Benefits to your health if you stop smoking:   · You decrease respiratory symptoms such as coughing, wheezing, and shortness of breath  · You reduce your risk for cancers of the lung, mouth, throat, kidney, bladder, pancreas, stomach, and cervix  If you already have cancer, you increase the benefits of chemotherapy  You also reduce your risk for cancer returning or a second cancer from developing  · You reduce your risk for heart disease, blood clots, heart attack, and stroke  · You reduce your risk for lung infections, and diseases such as pneumonia, asthma, chronic bronchitis, and emphysema  · Your circulation improves  More oxygen can be delivered to your body  If you have diabetes, you lower your risk for complications, such as kidney, artery, and eye diseases  You also lower your risk for nerve damage  Nerve damage can lead to amputations, poor vision, and blindness  · You improve your body's ability to heal and to fight infections  For more information and support to stop smoking:   · [employer]  Phone: [phone]  Web Address: [employer]  Weight Management   Why it is important to manage your weight:  Being overweight increases your risk of health conditions such as heart disease, high blood pressure, type 2 diabetes, and certain types of cancer  It can also increase your risk for osteoarthritis, sleep apnea, and other respiratory problems  Aim for a slow, steady weight loss  Even a small amount of weight loss can lower your risk of health problems  How to lose weight safely:  A safe and healthy way to lose weight is to eat fewer calories and get regular exercise  You can lose up about 1 pound a week by decreasing the number of calories you eat by 500 calories each day  Healthy meal plan for weight management:  A healthy meal plan includes a variety of foods, contains fewer calories, and helps you stay healthy  A healthy meal plan includes the following:  · Eat whole-grain foods more often  A healthy meal plan should contain fiber  Fiber is the part of grains, fruits, and vegetables that is not broken down by your body  Whole-grain foods are healthy and provide extra fiber in your diet  Some examples of whole-grain foods are whole-wheat breads and pastas, oatmeal, brown rice, and bulgur  · Eat a variety of vegetables every day  Include dark, leafy greens such as spinach, kale, jose greens, and mustard greens  Eat yellow and orange vegetables such as carrots, sweet potatoes, and winter squash  · Eat a variety of fruits every day  Choose fresh or canned fruit (canned in its own juice or light syrup) instead of juice  Fruit juice has very little or no fiber  · Eat low-fat dairy foods  Drink fat-free (skim) milk or 1% milk  Eat fat-free yogurt and low-fat cottage cheese  Try low-fat cheeses such as mozzarella and other reduced-fat cheeses  · Choose meat and other protein foods that are low in fat  Choose beans or other legumes such as split peas or lentils  Choose fish, skinless poultry (chicken or turkey), or lean cuts of red meat (beef or pork)  Before you cook meat or poultry, cut off any visible fat  · Use less fat and oil    Try baking foods instead of frying them  Add less fat, such as margarine, sour cream, regular salad dressing and mayonnaise to foods  Eat fewer high-fat foods  Some examples of high-fat foods include french fries, doughnuts, ice cream, and cakes  · Eat fewer sweets  Limit foods and drinks that are high in sugar  This includes candy, cookies, regular soda, and sweetened drinks  Exercise:  Exercise at least 30 minutes per day on most days of the week  Some examples of exercise include walking, biking, dancing, and swimming  You can also fit in more physical activity by taking the stairs instead of the elevator or parking farther away from stores  Ask your healthcare provider about the best exercise plan for you  © Copyright [employer] 2018 Information is for End User's use only and may not be sold, redistributed or otherwise used for commercial purposes   All illustrations and images included in CareNotes® are the copyrighted property of [employer] M [employer]  or [address] [place] [address]

## 2021-05-19 NOTE — ASSESSMENT & PLAN NOTE
Blood pressure control suboptimal   May be due to her use of Mucinex D  We asked her to discontinue this and change to either plain Mucinex or Mucinex DM  She understands and agrees with this plan

## 2021-05-20 LAB
ALBUMIN SERPL-MCNC: 3.9 G/DL (ref 3.6–5.1)
ALBUMIN/GLOB SERPL: 1.5 (CALC) (ref 1–2.5)
ALP SERPL-CCNC: 73 U/L (ref 37–153)
ALT SERPL-CCNC: 14 U/L (ref 6–29)
AST SERPL-CCNC: 12 U/L (ref 10–35)
BILIRUB SERPL-MCNC: 0.4 MG/DL (ref 0.2–1.2)
BUN SERPL-MCNC: 26 MG/DL (ref 7–25)
BUN/CREAT SERPL: 34 (CALC) (ref 6–22)
CALCIUM SERPL-MCNC: 9.4 MG/DL (ref 8.6–10.4)
CHLORIDE SERPL-SCNC: 104 MMOL/L (ref 98–110)
CHOLEST SERPL-MCNC: 220 MG/DL
CHOLEST/HDLC SERPL: 3.8 (CALC)
CO2 SERPL-SCNC: 30 MMOL/L (ref 20–32)
CREAT SERPL-MCNC: 0.77 MG/DL (ref 0.6–0.93)
ERYTHROCYTE [DISTWIDTH] IN BLOOD BY AUTOMATED COUNT: 13 % (ref 11–15)
GLOBULIN SER CALC-MCNC: 2.6 G/DL (CALC) (ref 1.9–3.7)
GLUCOSE SERPL-MCNC: 121 MG/DL (ref 65–99)
HCT VFR BLD AUTO: 44.6 % (ref 35–45)
HDLC SERPL-MCNC: 58 MG/DL
HGB BLD-MCNC: 14.5 G/DL (ref 11.7–15.5)
LDLC SERPL CALC-MCNC: 133 MG/DL (CALC)
MCH RBC QN AUTO: 31.2 PG (ref 27–33)
MCHC RBC AUTO-ENTMCNC: 32.5 G/DL (ref 32–36)
MCV RBC AUTO: 95.9 FL (ref 80–100)
NONHDLC SERPL-MCNC: 162 MG/DL (CALC)
PLATELET # BLD AUTO: 162 THOUSAND/UL (ref 140–400)
PMV BLD REES-ECKER: 11.5 FL (ref 7.5–12.5)
POTASSIUM SERPL-SCNC: 4.3 MMOL/L (ref 3.5–5.3)
PROT SERPL-MCNC: 6.5 G/DL (ref 6.1–8.1)
RBC # BLD AUTO: 4.65 MILLION/UL (ref 3.8–5.1)
SL AMB EGFR AFRICAN AMERICAN: 89 ML/MIN/1.73M2
SL AMB EGFR NON AFRICAN AMERICAN: 77 ML/MIN/1.73M2
SODIUM SERPL-SCNC: 141 MMOL/L (ref 135–146)
TRIGL SERPL-MCNC: 158 MG/DL
WBC # BLD AUTO: 8.4 THOUSAND/UL (ref 3.8–10.8)

## 2021-05-21 ENCOUNTER — TELEPHONE (OUTPATIENT)
Dept: SLEEP CENTER | Facility: CLINIC | Age: 71
End: 2021-05-21

## 2021-05-21 DIAGNOSIS — G47.33 OSA (OBSTRUCTIVE SLEEP APNEA): Primary | ICD-10-CM

## 2021-05-21 NOTE — TELEPHONE ENCOUNTER
Patient called, [facility] Joelle Shay advised that Dr Contreras Forrest will need to write a new Medicare script for her supplies, they are on hold and her mask is falling apart  Can you please write a script for resupplies, she uses [facility]

## 2021-05-26 DIAGNOSIS — E11.9 TYPE 2 DIABETES MELLITUS WITHOUT COMPLICATION, WITHOUT LONG-TERM CURRENT USE OF INSULIN (HCC): ICD-10-CM

## 2021-05-27 RX ORDER — GLIPIZIDE 5 MG/1
TABLET, FILM COATED, EXTENDED RELEASE ORAL
Qty: 90 TABLET | Refills: 1 | Status: SHIPPED | OUTPATIENT
Start: 2021-05-27 | End: 2021-10-13

## 2021-05-27 NOTE — TELEPHONE ENCOUNTER
[facility]  Left voice message for patient  Made aware of above

## 2021-06-01 ENCOUNTER — OFFICE VISIT (OUTPATIENT)
Dept: PAIN MEDICINE | Facility: CLINIC | Age: 71
End: 2021-06-01
Payer: MEDICARE

## 2021-06-01 ENCOUNTER — TELEPHONE (OUTPATIENT)
Dept: PAIN MEDICINE | Facility: CLINIC | Age: 71
End: 2021-06-01

## 2021-06-01 VITALS
HEIGHT: 60 IN | BODY MASS INDEX: 35.93 KG/M2 | HEART RATE: 73 BPM | WEIGHT: 183 LBS | SYSTOLIC BLOOD PRESSURE: 194 MMHG | TEMPERATURE: 98.5 F | DIASTOLIC BLOOD PRESSURE: 81 MMHG

## 2021-06-01 DIAGNOSIS — Z79.891 ENCOUNTER FOR LONG-TERM OPIATE ANALGESIC USE: ICD-10-CM

## 2021-06-01 DIAGNOSIS — M51.36 DDD (DEGENERATIVE DISC DISEASE), LUMBAR: ICD-10-CM

## 2021-06-01 DIAGNOSIS — M47.816 LUMBAR SPONDYLOSIS: ICD-10-CM

## 2021-06-01 DIAGNOSIS — F11.20 UNCOMPLICATED OPIOID DEPENDENCE (HCC): ICD-10-CM

## 2021-06-01 DIAGNOSIS — M54.16 LUMBAR RADICULOPATHY: ICD-10-CM

## 2021-06-01 DIAGNOSIS — M47.816 FACET ARTHROPATHY, LUMBAR: ICD-10-CM

## 2021-06-01 DIAGNOSIS — G89.4 CHRONIC PAIN SYNDROME: Primary | ICD-10-CM

## 2021-06-01 PROCEDURE — 99214 OFFICE O/P EST MOD 30 MIN: CPT | Performed by: NURSE PRACTITIONER

## 2021-06-01 RX ORDER — TRAMADOL HYDROCHLORIDE 50 MG/1
TABLET ORAL
Qty: 75 TABLET | Refills: 2 | Status: SHIPPED | OUTPATIENT
Start: 2021-06-01 | End: 2021-08-24 | Stop reason: SDUPTHER

## 2021-06-01 NOTE — PROGRESS NOTES
Assessment:  1  Chronic pain syndrome    2  Lumbar radiculopathy    3  Lumbar spondylosis    4  DDD (degenerative disc disease), lumbar    5  Facet arthropathy, lumbar    6  Uncomplicated opioid dependence ([address] )    7  Encounter for long-term opiate analgesic use        Plan:  1  The patient may continue tramadol 50 mg 1 tablet every 8-12 hours p r n  pain #75 tablets for 30 days as prescribed  The patient was given a 3 month supply of prescriptions with a Do Not Fill date(s) of today with 2 refills  [place] Prescription Drug Monitoring Program report was reviewed and was appropriate      The patient was selected for a random pill count at [facility] office visit  The medication was available for the count and the amount present was found to be appropriate according to the date(s) filled and the medication prescription instructions noted on the prescription container  The medication was returned to the patient and the documentation form can be found in the patient's chart  There are risks associated with opioid medications, including dependence, addiction and tolerance  The patient understands and agrees to use these medications only as prescribed  Potential side effects of the medications include, but are not limited to, constipation, drowsiness, addiction, impaired judgment and risk of fatal overdose if not taken as prescribed  The patient was warned against driving while taking sedation medications  Sharing medications is a felony  At this point in time, the patient is showing no signs of addiction, abuse, diversion or suicidal ideation  2  We can repeat bilateral L3-5 RFA p r n   3  The patient will continue with her home exercise program  4  The patient may continue meloxicam as prescribed by her PCP  5  The patient will follow-up in 3 months or sooner if needed     M*Modal software was used to dictate this note  It may contain errors with dictating incorrect words or incorrect spelling   Please contact the provider directly with any questions  History of Present Illness: The patient is a 66 y o  female a history of COPD last seen on 4/5/21 who presents for a follow up office visit in regards to chronic axial lumbosacral back pain that is nonradiating into the lower extremities secondary to  Lumbar spondylosis, lumbar degenerative disc disease, lumbar stenosis and chronic pain syndrome  The patient denies any bowel or bladder incontinence or saddle anesthesia  The patient has had good relief in the past with bilateral L3-5 RFA  She continues to manage her pain with tramadol 50 mg 1 tablet Q 8-12 hours p r n  pain with a 80% improvement of her pain without side effects  She rates her pain a 2/10 on the numeric pain rating scale  She states her pain is intermittent in nature bothersome in the evening  She characterizes pain as dull aching    Pain Contract Signed: 4/5/21  Last Urine Drug Screen: 4/5/21  Last Tramadol per pt  6/1/21    I have personally reviewed and/or updated the patient's past medical history, past surgical history, family history, social history, current medications, allergies, and vital signs today  Review of Systems:    Review of Systems   Respiratory: Negative for shortness of breath  Cardiovascular: Negative for chest pain  Gastrointestinal: Negative for constipation, diarrhea, nausea and vomiting  Musculoskeletal: Negative for arthralgias, gait problem, joint swelling and myalgias  Skin: Negative for rash  Neurological: Negative for dizziness, seizures and weakness  All other systems reviewed and are negative          Past Medical History:   Diagnosis Date    Asthma     Chronic obstructive lung disease ([facility] )     Community acquired pneumonia     LAST ASSESSED: [id number]    COPD (chronic obstructive pulmonary disease) ([facility] )     Diabetes mellitus ([facility] )     History of MRSA infection     2008 liver sx    Liver disease     Sleep apnea     Viral warts LAST ASSESSED: [id number]       Past Surgical History:   Procedure Laterality Date    ABDOMINAL SURGERY      ABDOMINOPLASTY      CHOLECYSTECTOMY      COSMETIC SURGERY      EYE SURGERY      Bilateral cataracts 2015 Dr Phillip Scanlon       FINGER SURGERY      HAND SURGERY      HYSTERECTOMY      LIVER SURGERY      NV INCISE FINGER TENDON SHEATH Right 7/23/2019    Procedure: RELEASE TRIGGER FINGER RIGHT LONG;  Surgeon: Desiree Spicer MD;  [facility];  Service: Orthopedics    NV INCISE FINGER TENDON SHEATH Left 8/6/2019    Procedure: RELEASE TRIGGER FINGER LEFT INDEX;  Surgeon: Desiree Spicer MD;  [facility];  Service: Orthopedics    NV INCISE FINGER TENDON SHEATH Right 6/16/2020    Procedure: RELEASE TRIGGER FINGER, right ring;  Surgeon: Desiree Spicer MD;  [facility];  Service: Orthopedics    NV REPAIR INTERCARP/CARP-METACARP JT Right 9/9/2016    Procedure: THUMB TRAPEZIECTOMY; BASAL JOINT ARTHROPLASTY WITH APL AUTOGRAFT;  Surgeon: Mana Perez MD;  [facility];  Service: Orthopedics    NV REPAIR INTERCARP/CARP-METACARP JT Left 5/29/2018    Procedure: Priscila Kurtz;  Surgeon: Desiree Spicer MD;  [facility];  Service: Orthopedics    NV TRANSPLANT HAND TENDON Left 5/29/2018    Procedure: TRANSFER TENDON HAND/WRIST FCR from forearm to wrist;  Surgeon: Desiree Spicer MD;  [facility];  Service: Orthopedics       Family History   Problem Relation Age of Onset    Heart attack Father     Heart disease Family     Diabetes Family     Thyroid disease Family     Ovarian cancer Family     Heart attack Mother     Ovarian cancer Sister     Heart disease Other         CARDIAC DISORDER    Diabetes Other     Liver cancer Other     Breast cancer Other     Stomach cancer Other        Social History     Occupational History    Not on file   Tobacco Use    Smoking status: Current Every Day Smoker     Packs/day: 0 20    Smokeless tobacco: Never Used   Substance and Sexual Activity    Alcohol use: Yes     Comment: very seldom    Drug use: No    Sexual activity: Not on file         Current Outpatient Medications:     diphenhydrAMINE (BENADRYL) 25 mg capsule, Take 25 mg by mouth every 6 (six) hours as needed for itching, Disp: , Rfl:     glipiZIDE (GLUCOTROL XL) 5 mg 24 hr tablet, TAKE 1 TABLET EVERY DAY  30  MINUTES BEFORE BREAKFAST, Disp: 90 tablet, Rfl: 1    glucose blood (ACCU-CHEK JESS PLUS) test strip, Test BS 3 times daily  DX E11 9, Disp: 100 each, Rfl: 2    hydrochlorothiazide (HYDRODIURIL) 25 mg tablet, TAKE 1 TABLET EVERY DAY, Disp: 90 tablet, Rfl: 0    lisinopril (ZESTRIL) 20 mg tablet, TAKE 1 TABLET EVERY DAY, Disp: 90 tablet, Rfl: 0    meloxicam (MOBIC) 7 5 mg tablet, TAKE 1 TABLET TWICE DAILY, Disp: 180 tablet, Rfl: 0    metFORMIN (GLUCOPHAGE) 1000 MG tablet, TAKE 1 TABLET TWICE DAILY, Disp: 180 tablet, Rfl: 1    Multiple Vitamins-Minerals (OCUVITE ADULT FORMULA PO), Take by mouth, Disp: , Rfl:     pseudoephedrine-guaifenesin (MUCINEX D)  MG per tablet, Take 1 tablet by mouth every 12 (twelve) hours, Disp: , Rfl:     traMADol (ULTRAM) 50 mg tablet, Take 1 PO Q8-12 hours PRN, Disp: 75 tablet, Rfl: 2    No Known Allergies    Physical Exam:    BP (!) 194/81   Pulse 73   Temp 98 5 °F (36 9 °C)   Ht 5' (1 524 m)   Wt 83 kg (183 lb)   BMI 35 74 kg/m²     Constitutional:normal, well developed, well nourished, alert, in no distress and non-toxic and no overt pain behavior    Eyes:anicteric  HEENT:grossly intact  Neck:supple, symmetric, trachea midline and no masses   Pulmonary:even and unlabored  Cardiovascular:No edema or pitting edema present  Skin:Normal without rashes or lesions and well hydrated  Psychiatric:Mood and affect appropriate  Neurologic:Cranial Nerves II-XII grossly intact  Musculoskeletal:Slightly antalgic gait but steady without the use of assistive devices      Imaging  No orders to display         No orders of the defined types were placed in this encounter

## 2021-06-01 NOTE — TELEPHONE ENCOUNTER
Please call pharmacy and cancel RX of Tramadol with a DNF date of 5/13/21 that has 0 refills  I will be sending in a new RX today with refills on it

## 2021-06-01 NOTE — PATIENT INSTRUCTIONS
Opioid Safety   WHAT YOU NEED TO KNOW:   An opioid medicine is used to treat pain  Examples are oxycodone, morphine, fentanyl, or codeine  Pain control and management may help you rest, heal, and return to your daily activities  You and your family will receive information about how to manage your pain at home  The instructions will include what to do if you have side effects as your pain is managed  You will get information on how to handle opioid medicine safely  You will also get suggestions on how to control pain without opioids  It is important to follow all instructions so your pain is managed effectively  DISCHARGE INSTRUCTIONS:   Call your local emergency number (911 in the [place]), or have someone else call if:   · You have a seizure  · You cannot be woken  · You have trouble staying awake and your breathing is slow or shallow  · Your speech is slurred, or you are confused  · You are dizzy or stumble when you walk  Call your doctor, or have someone close to you call if:   · You are extremely drowsy, or you have trouble staying awake or speaking  · You have pale or clammy skin  · You have blue fingernails or lips  · Your heartbeat is slower than normal     · You cannot stop vomiting  · You have questions or concerns about your condition or care  Use opioids safely:   · Take prescribed opioids exactly as directed  Opioids come with directions based on the kind and how it is given  Talk to your healthcare provider or a pharmacist if you have any questions  Do not take more than the recommended amount  Too much can cause a life-threatening overdose  Do not continue to take it after your pain stops  You may develop tolerance  This means you keep needing higher doses to get the same effect  You may also develop opioid use disorder  This means you are not able to control your opioid use  · Do not give opioids to others or take opioids that belong to someone else    The kind or amount one person takes may not be right for another  The person you share them with may also be taking medicines that do not mix with opioids  He or she may drink alcohol or use other drugs that can cause life-threatening problems when mixed with opioids  · Do not mix opioids with other medicines or alcohol  The combination can cause an overdose, or cause you to stop breathing  Alcohol, sleeping pills, and medicines such as antihistamines can make you sleepy  A combination with opioids can lead to a coma  · Do not drive or operate heavy machinery after you use an opioid  You may feel drowsy or have trouble concentrating  You can injure yourself or others if you drive or use heavy machinery when you are not alert  Your provider or pharmacist can tell you how long to wait after a dose before you do these activities  · Talk to your healthcare provider if you have any side effects  Side effects include nausea, sleepiness, itching, and trouble thinking clearly  Your provider may need to make changes to the kind or amount of opioid you are taking  He or she can also help you find ways to prevent or relieve side effects  Manage constipation:  Constipation is the most common side effect of opioid medicine  Constipation is when you have hard, dry bowel movements, or you go longer than usual between bowel movements  Tell your healthcare provider about all changes in your bowel movements while you are taking opioids  He or she may recommend laxative medicine to help you have a bowel movement  He or she may also change the kind of opioid you are taking, or change when you take it  The following are more ways you can prevent or relieve constipation:  · Drink liquids as directed  You may need to drink extra liquids to help soften and move your bowels  Ask how much liquid to drink each day and which liquids are best for you  · Eat high-fiber foods    This may help decrease constipation by adding bulk to your bowel movements  High-fiber foods include fruits, vegetables, whole-grain breads and cereals, and beans  Your healthcare provider or dietitian can help you create a high-fiber meal plan  Your provider may also recommend a fiber supplement if you cannot get enough fiber from food  · Exercise regularly  Regular physical activity can help stimulate your intestines  Walking is a good exercise to prevent or relieve constipation  Ask which exercises are best for you  · Schedule a time each day to have a bowel movement  This may help train your body to have regular bowel movements  Bend forward while you are on the toilet to help move the bowel movement out  Sit on the toilet for at least 10 minutes, even if you do not have a bowel movement  Store opioids safely:   · Store opioids where others cannot easily get them  Keep them in a locked cabinet or secure area  Do not  keep them in a purse or other bag you carry with you  A person may be looking for something else and find the opioids  · Make sure opioids are stored out of the reach of children  A child can easily overdose on opioids  Opioids may look like candy to a small child  The best way to dispose of opioids: The laws vary by country and area  In the [place], the best way is to return the opioids through a take-back program  This program is offered by the [employer]  The following are options for using the program:  · Take the opioids to a NAZARIO collection site  The site is often a law enforcement center  Call your local law enforcement center for scheduled take-back days in your area  You will be given information on where to go if the collection site is in a different location  · Take the opioids to an approved pharmacy or hospital   A pharmacy or hospital may be set up as a collection site  You will need to ask if it is a NAZ[employer]IO collection site if you were not directed there   A pharmacy or doctor's office may not be able to take back opioids unless it is a NAZARIO site  · Use a mail-back system  This means you are given containers to put the opioids into  You will then mail them in the containers  · Use a take-back drop box  This is a place to leave the opioids at any time  People and animals will not be able to get into the box  Your local law enforcement agency can tell you where to find a drop box in your area  Other safe ways to dispose of opioids: The medicine may come with disposal instructions  The instructions may vary depending on the brand of medicine you are using  Instructions may come in a Medication Guide, but not every medicine has one  You may instead get instructions from your pharmacy or doctor  Follow instructions carefully  The following are general guidelines to follow:  · Find out if you can flush the opioid  Some opioids can be flushed down the toilet or poured into the sink  You will need to contact authorities in your area to see if this is an option for you  The FDA also offers a list of medicines that are safe to flush down the toilet  You can check the list if you cannot get the information for your local area  · Ask your waste management company about rules for putting opioids in the trash  The company will be able to give you specific directions  Scratch out personal information on the original medicine label so it cannot be read  Then put it in the trash  Do not label the trash or put any information on it about the opioids  It should look like regular household trash so no one is tempted to look for the opioids  Keep the trash out of the reach of children and animals  Always make sure trash is secure  · Talk to officials if you live in a facility  If you live in a nursing home or assisted living center, talk to an official  The person will know the rules for your area  Other ways to manage pain:   · Ask your healthcare provider about non-opioid medicines to control pain  Some medicines may even work better than opioids, depending on the cause of your pain  Nonprescription medicines include NSAIDs (such as ibuprofen) and acetaminophen  Prescription medicines include muscle relaxers, antidepressants, and steroids  · Pain may be managed without any medicines  Some ways to relieve pain include massage, aromatherapy, or meditation  Physical or occupational therapy may also help  For more information:   · Drug Enforcement Administration  [address] [facility] [address]  Phone: 9- 408 - 674-1383  Web Address: [facility]/drug_disposal/    · [address] and Drug Administration  [place] , [address]  Phone: 1- 198 - 976-7668  Web Address: http://[employer]/  Follow up with your doctor or pain specialist as directed: You may need to have your dose adjusted  Your doctor or pain specialist can also help you find ways to manage pain without opioids  Write down your questions so you remember to ask them during your visits  © Copyright [address] [place] [address] Information is for End User's use only and may not be sold, redistributed or otherwise used for commercial purposes  All illustrations and images included in CareNotes® are the copyrighted property of [employer]  or [facility] Marcelo Mendoza  The above information is an  only  It is not intended as medical advice for individual conditions or treatments  Talk to your doctor, nurse or pharmacist before following any medical regimen to see if it is safe and effective for you

## 2021-06-12 DIAGNOSIS — M25.551 CHRONIC RIGHT HIP PAIN: ICD-10-CM

## 2021-06-12 DIAGNOSIS — G89.29 CHRONIC RIGHT HIP PAIN: ICD-10-CM

## 2021-06-14 RX ORDER — MELOXICAM 7.5 MG/1
TABLET ORAL
Qty: 180 TABLET | Refills: 0 | Status: SHIPPED | OUTPATIENT
Start: 2021-06-14 | End: 2021-08-19

## 2021-06-17 ENCOUNTER — PATIENT OUTREACH (OUTPATIENT)
Dept: CASE MANAGEMENT | Facility: OTHER | Age: 71
End: 2021-06-17

## 2021-06-18 DIAGNOSIS — E11.65 TYPE 2 DIABETES MELLITUS WITH HYPERGLYCEMIA, WITHOUT LONG-TERM CURRENT USE OF INSULIN (HCC): Primary | ICD-10-CM

## 2021-06-18 RX ORDER — ROSUVASTATIN CALCIUM 20 MG/1
20 TABLET, COATED ORAL DAILY
Qty: 90 TABLET | Refills: 1 | Status: SHIPPED | OUTPATIENT
Start: 2021-06-18 | End: 2021-12-02

## 2021-06-22 ENCOUNTER — PATIENT MESSAGE (OUTPATIENT)
Dept: FAMILY MEDICINE CLINIC | Facility: CLINIC | Age: 71
End: 2021-06-22

## 2021-06-23 DIAGNOSIS — I10 ESSENTIAL HYPERTENSION: ICD-10-CM

## 2021-06-24 RX ORDER — HYDROCHLOROTHIAZIDE 25 MG/1
TABLET ORAL
Qty: 90 TABLET | Refills: 0 | Status: SHIPPED | OUTPATIENT
Start: 2021-06-24 | End: 2021-08-30

## 2021-06-24 RX ORDER — LISINOPRIL 20 MG/1
TABLET ORAL
Qty: 90 TABLET | Refills: 0 | Status: SHIPPED | OUTPATIENT
Start: 2021-06-24 | End: 2021-08-30

## 2021-07-28 ENCOUNTER — CONSULT (OUTPATIENT)
Dept: GASTROENTEROLOGY | Facility: AMBULARY SURGERY CENTER | Age: 71
End: 2021-07-28
Payer: MEDICARE

## 2021-07-28 ENCOUNTER — TELEPHONE (OUTPATIENT)
Dept: GASTROENTEROLOGY | Facility: AMBULARY SURGERY CENTER | Age: 71
End: 2021-07-28

## 2021-07-28 VITALS
WEIGHT: 185.4 LBS | HEIGHT: 60 IN | SYSTOLIC BLOOD PRESSURE: 190 MMHG | BODY MASS INDEX: 36.4 KG/M2 | DIASTOLIC BLOOD PRESSURE: 68 MMHG

## 2021-07-28 DIAGNOSIS — Z12.11 SCREENING FOR COLORECTAL CANCER: ICD-10-CM

## 2021-07-28 DIAGNOSIS — Z12.12 SCREENING FOR COLORECTAL CANCER: ICD-10-CM

## 2021-07-28 DIAGNOSIS — Z86.010 HISTORY OF COLON POLYPS: Primary | ICD-10-CM

## 2021-07-28 PROCEDURE — 99203 OFFICE O/P NEW LOW 30 MIN: CPT | Performed by: PHYSICIAN ASSISTANT

## 2021-07-28 RX ORDER — CETIRIZINE HYDROCHLORIDE 10 MG/1
10 TABLET ORAL DAILY
COMMUNITY
End: 2022-05-23 | Stop reason: ALTCHOICE

## 2021-07-28 NOTE — PROGRESS NOTES
[facility] - Outpatient Consultation  Yash West 66 y o  female MRN: [id number]  Encounter: [id number]          ASSESSMENT AND PLAN:      1  History of colon polyps  53-year-old female with past medical history of type 2 diabetes, FRANK on CPAP, COPD, hypertension who presents for colon consult  It appears her last colonoscopy was in 2008 with removal of 1 polyp with unknown pathology  Labs from 5/2021 with normal Hgb of 14 5  Plt normal  LFTs normal   He denies any significant constipation, diarrhea, blood in the stool, urgency, abdominal pain no family history of colon cancer  Patient reports being placed on 6 L of O2 at last colonoscopy  She has COPD without recent complications and is not on home O2  She is smoking 4 cigarettes a day     -schedule colonoscopy to be performed in hospital   -MiraLax Dulcolax prep  -discussed risks of procedures including bleeding, infection and perforation  Follow up after procedure as needed  ______________________________________________________________________    HPI:      Yash West is a 53-year-old female with past medical history of type 2 diabetes, FRANK on CPAP, COPD, hypertension presents for colon consult  Patient states her last colonoscopy was in 2008 with removal of one small polyp and states her repeat was not recommended for 10 years  She currently has no complaints and denies any constipation, blood in the stool, urgency, abdominal pain  She does report looser stools about once a month  She denies any nausea, vomiting, reflux, dysphagia, odynophagia, decreased appetite or unintentional weight loss  Denies family history of colon cancer  Abdominal surgeries consist of removal of liver with GB in 2007 due to liver abscess, hysterectomy  She has history of ovarian cancer which was contained and she did not need any her radiation or chemo  She smokes about 4 cigarettes a day  Alcohol 2x a year       Of note, patient reports last time she had a colonoscopy in 2008 she had you to replaced on 6 L of oxygen  It had she does not know exactly why this had happened  She reports her COPD is controlled, she is on no home oxygen the and she uses CPAP  Appears last colonoscopy was in 2008 with removal of one polyp  REVIEW OF SYSTEMS:    CONSTITUTIONAL: Denies any fever, chills, rigors, and weight loss  HEENT: No earache or tinnitus  Denies hearing loss or visual disturbances  CARDIOVASCULAR: No chest pain or palpitations  RESPIRATORY: Denies any cough, hemoptysis, shortness of breath or dyspnea on exertion  GASTROINTESTINAL: As noted in the History of Present Illness  GENITOURINARY: No problems with urination  Denies any hematuria or dysuria  NEUROLOGIC: No dizziness or vertigo, denies headaches  MUSCULOSKELETAL: Denies any muscle or joint pain  SKIN: Denies skin rashes or itching  ENDOCRINE: Denies excessive thirst  Denies intolerance to heat or cold  PSYCHOSOCIAL: Denies depression or anxiety  Denies any recent memory loss  Historical Information   Past Medical History:   Diagnosis Date    Asthma     [facility] disease ([facility] )     Community acquired pneumonia     LAST ASSESSED: [id number]    COPD [facility]chronic obstructive pulmonary disease) ([facility] )     Diabetes mellitus ([facility] )     History of MRSA infection     2008 liver sx    Liver disease     Sleep apnea     Viral warts     LAST ASSESSED: [id number]     Past Surgical History:   Procedure Laterality Date    ABDOMINAL SURGERY      ABDOMINOPLASTY      CHOLECYSTECTOMY      COSMETIC SURGERY      EYE SURGERY      Bilateral cataracts 2015 Dr Krystal Liang       FINGER SURGERY      HAND SURGERY      HYSTERECTOMY      LIVER SURGERY      [place] DANYA FINGER TENDON SHEATH Right 7/23/2019    Procedure: RELEASE TRIGGER FINGER RIGHT LONG;  Surgeon: Amanuel Echavarria MD;  [facility];  Service: Orthopedics    [place] DANYA [address] [place] [address] Left 8/6/2019    Procedure: RELEASE TRIGGER FINGER LEFT INDEX;  Surgeon: Stevie Martinez MD;  [facility];  Service: Orthopedics    IN INCISE FINGER TENDON SHEATH Right 6/16/2020    Procedure: RELEASE TRIGGER FINGER, right ring;  Surgeon: Stevie Martinez MD;  [facility];  Service: Orthopedics    IN REPAIR INTERCARP/CARP-METACARP JT Right 9/9/2016    Procedure: THUMB TRAPEZIECTOMY; BASAL JOINT ARTHROPLASTY WITH APL AUTOGRAFT;  Surgeon: Kelsea Nazario MD;  [facility];  Service: Orthopedics    IN REPAIR INTERCARP/CARP-METACARP JT Left 5/29/2018    Procedure: Ware Shoulder;  Surgeon: Stevie Martinez MD;  [facility];  Service: Orthopedics    IN TRANSPLANT HAND TENDON Left 5/29/2018    Procedure: TRANSFER TENDON HAND/WRIST FCR from forearm to wrist;  Surgeon: Stevie Martinez MD;  [facility];  Service: Orthopedics     Social History   Social History     Substance and Sexual Activity   Alcohol Use Yes    Comment: very seldom     Social History     Substance and Sexual Activity   Drug Use No     Social History     Tobacco Use   Smoking Status Current Every Day Smoker    Packs/day: 0 20   Smokeless Tobacco Never Used     Family History   Problem Relation Age of Onset    Heart attack Father     Heart disease Family     Diabetes Family     Thyroid disease Family     Ovarian cancer Family     Heart attack Mother     Ovarian cancer Sister     Heart disease Other         CARDIAC DISORDER    Diabetes Other     Liver cancer Other     Breast cancer Other     Stomach cancer Other        Meds/Allergies       Current Outpatient Medications:     diphenhydrAMINE (BENADRYL) 25 mg capsule    glipiZIDE (GLUCOTROL XL) 5 mg 24 hr tablet    glucose blood (ACCU-CHEK JESS PLUS) test strip    hydrochlorothiazide (HYDRODIURIL) 25 mg tablet    lisinopril (ZESTRIL) 20 mg tablet    meloxicam (MOBIC) 7 5 mg tablet    metFORMIN (GLUCOPHAGE) 1000 MG tablet    Multiple Vitamins-Minerals (OCUVITE ADULT FORMULA PO)    pseudoephedrine-guaifenesin (MUCINEX D)  MG per tablet    rosuvastatin (CRESTOR) 20 MG tablet    traMADol (ULTRAM) 50 mg tablet    No Known Allergies        Objective     There were no vitals taken for this visit  There is no height or weight on file to calculate BMI  PHYSICAL EXAM:      General Appearance:   Alert, cooperative, no distress   HEENT:   Normocephalic, atraumatic, anicteric      Neck:  Supple, symmetrical, trachea midline   Lungs:   Clear to auscultation bilaterally; no rales, rhonchi or wheezing; respirations unlabored    Heart[de-identified]   Regular rate and rhythm; no murmur, rub, or gallop  Abdomen:   Soft, non-tender, non-distended; normal bowel sounds; no masses, no organomegaly    Genitalia:   Deferred    Rectal:   Deferred    Extremities:  No cyanosis, clubbing or edema    Pulses:  2+ and symmetric    Skin:  No jaundice, rashes, or lesions    Lymph nodes:  No palpable cervical lymphadenopathy        Lab Results:   No visits with results within 1 Day(s) from this visit  Latest known visit with results is:   Orders Only on 07/15/2021   Component Date Value    Right Eye Diabetic Retin* 07/15/2021 None     Left Eye Diabetic Retino* 07/15/2021 None          Radiology Results:   No results found

## 2021-07-28 NOTE — TELEPHONE ENCOUNTER
Patient is scheduled for colonoscopy on August 2 , 2021 at [facility] with Kate Donnelly MD  Patient is aware of pre-procedure prep of Miralax/Dulcolax and they will be called the day prior between 2 and 6 pm for time to report for procedure

## 2021-07-30 ENCOUNTER — TELEPHONE (OUTPATIENT)
Dept: GASTROENTEROLOGY | Facility: HOSPITAL | Age: 71
End: 2021-07-30

## 2021-08-02 ENCOUNTER — HOSPITAL ENCOUNTER (OUTPATIENT)
Dept: GASTROENTEROLOGY | Facility: HOSPITAL | Age: 71
Setting detail: OUTPATIENT SURGERY
Discharge: HOME | End: 2021-08-02
Attending: INTERNAL MEDICINE | Admitting: INTERNAL MEDICINE
Payer: MEDICARE

## 2021-08-02 ENCOUNTER — ANESTHESIA (OUTPATIENT)
Dept: GASTROENTEROLOGY | Facility: HOSPITAL | Age: 71
End: 2021-08-02

## 2021-08-02 ENCOUNTER — ANESTHESIA EVENT (OUTPATIENT)
Dept: GASTROENTEROLOGY | Facility: HOSPITAL | Age: 71
End: 2021-08-02

## 2021-08-02 VITALS
RESPIRATION RATE: 20 BRPM | SYSTOLIC BLOOD PRESSURE: 135 MMHG | BODY MASS INDEX: 34.01 KG/M2 | HEART RATE: 63 BPM | OXYGEN SATURATION: 93 % | WEIGHT: 174.16 LBS | DIASTOLIC BLOOD PRESSURE: 82 MMHG | TEMPERATURE: 96.6 F

## 2021-08-02 DIAGNOSIS — Z86.010 HISTORY OF COLON POLYPS: ICD-10-CM

## 2021-08-02 PROCEDURE — 88305 TISSUE EXAM BY PATHOLOGIST: CPT | Performed by: PATHOLOGY

## 2021-08-02 PROCEDURE — 45385 COLONOSCOPY W/LESION REMOVAL: CPT | Performed by: INTERNAL MEDICINE

## 2021-08-02 PROCEDURE — 45380 COLONOSCOPY AND BIOPSY: CPT | Performed by: INTERNAL MEDICINE

## 2021-08-02 RX ORDER — SODIUM CHLORIDE, SODIUM LACTATE, POTASSIUM CHLORIDE, CALCIUM CHLORIDE 600; 310; 30; 20 MG/100ML; MG/100ML; MG/100ML; MG/100ML
INJECTION, SOLUTION INTRAVENOUS CONTINUOUS PRN
Status: DISCONTINUED | OUTPATIENT
Start: 2021-08-02 | End: 2021-08-02

## 2021-08-02 RX ORDER — PROPOFOL 10 MG/ML
INJECTION, EMULSION INTRAVENOUS AS NEEDED
Status: DISCONTINUED | OUTPATIENT
Start: 2021-08-02 | End: 2021-08-02

## 2021-08-02 RX ORDER — LIDOCAINE HYDROCHLORIDE 10 MG/ML
INJECTION, SOLUTION EPIDURAL; INFILTRATION; INTRACAUDAL; PERINEURAL AS NEEDED
Status: DISCONTINUED | OUTPATIENT
Start: 2021-08-02 | End: 2021-08-02

## 2021-08-02 RX ADMIN — PROPOFOL 60 MG: 10 INJECTION, EMULSION INTRAVENOUS at 08:23

## 2021-08-02 RX ADMIN — PROPOFOL 100 MG: 10 INJECTION, EMULSION INTRAVENOUS at 08:18

## 2021-08-02 RX ADMIN — LIDOCAINE HYDROCHLORIDE 100 MG: 10 INJECTION, SOLUTION EPIDURAL; INFILTRATION; INTRACAUDAL at 08:11

## 2021-08-02 RX ADMIN — SODIUM CHLORIDE, SODIUM LACTATE, POTASSIUM CHLORIDE, AND CALCIUM CHLORIDE: .6; .31; .03; .02 INJECTION, SOLUTION INTRAVENOUS at 08:08

## 2021-08-02 RX ADMIN — PROPOFOL 100 MG: 10 INJECTION, EMULSION INTRAVENOUS at 08:11

## 2021-08-02 NOTE — ANESTHESIA POSTPROCEDURE EVALUATION
Post-Op Assessment Note    CV Status:  Stable  Pain Score: 0    Pain management: adequate     Mental Status:  Alert and awake   Hydration Status:  Euvolemic   PONV Controlled:  Controlled   Airway Patency:  Patent      Post Op Vitals Reviewed: Yes      Staff: CRNA         No complications documented      /53   Temp     Pulse  79   Resp   18   SpO2   98

## 2021-08-02 NOTE — H&P
History and Physical - [facility]  Laly Harvey 66 y o  female MRN: [id number]    HPI: Laly Harvey is a 66y o  year old female who presents for evaluation of colon polyps  Review of Systems    Historical Information   Past Medical History:   Diagnosis Date    Asthma     Chronic obstructive lung disease ([facility] )     Community acquired pneumonia     LAST ASSESSED: [id number]    COPD (chronic obstructive pulmonary disease) ([facility] )     Diabetes mellitus ([place] [address] )     History of MRSA infection     2008 liver sx    Liver disease     Sleep apnea     Viral warts     LAST ASSESSED: [id number]     Past Surgical History:   Procedure Laterality Date    ABDOMINAL SURGERY      ABDOMINOPLASTY      CHOLECYSTECTOMY      COSMETIC SURGERY      EYE SURGERY      Bilateral cataracts 2015 Dr Brian Obrien       FINGER SURGERY      HAND SURGERY      HYSTERECTOMY      LIVER SURGERY      [place] INCISE FINGER TENDON SHEATH Right 7/23/2019    Procedure: RELEASE TRIGGER FINGER RIGHT LONG;  Surgeon: Ken Gomez MD;  [facility];  Service: Orthopedics    [place] INCISE FINGER TENDON SHEATH Left 8/6/2019    Procedure: RELEASE TRIGGER FINGER LEFT INDEX;  Surgeon: Ken Gomez MD;  [facility];  Service: Orthopedics    AK INCISE FINGER TENDON SHEATH Right 6/16/2020    Procedure: RELEASE TRIGGER FINGER, right ring;  Surgeon: Ken Gomez MD;  [facility];  Service: Orthopedics    AK REPAIR INTERCARP/CARP-METACARP JT Right 9/9/2016    Procedure: THUMB TRAPEZIECTOMY; BASAL JOINT ARTHROPLASTY WITH APL AUTOGRAFT;  Surgeon: Pedro Shook MD;  [facility];  Service: Orthopedics    AK REPAIR INTERCARP/CARP-METACARP JT Left 5/29/2018    Procedure: ARTHROPLASTY Windy Moynahan;  Surgeon: Ken Gomez MD;  [facility];  Service: Orthopedics    AK TRANSPLANT HAND TENDON Left 5/29/2018    Procedure: TRANSFER TENDON HAND/WRIST FCR from forearm to wrist;  Surgeon: William Osborne MD;  [facility];  Service: Orthopedics     Social History   Social History     Substance and Sexual Activity   Alcohol Use Yes    Comment: very seldom     Social History     Substance and Sexual Activity   Drug Use No     Social History     Tobacco Use   Smoking Status Current Every Day Smoker    Packs/day: 0 20   Smokeless Tobacco Never Used     Family History   Problem Relation Age of Onset    Heart attack Father     Heart disease Family     Diabetes Family     Thyroid disease Family     Ovarian cancer Family     Heart attack Mother     Ovarian cancer Sister     Heart disease Other         CARDIAC DISORDER    Diabetes Other     Liver cancer Other     Breast cancer Other     Stomach cancer Other        Meds/Allergies     (Not in a hospital admission)      No Known Allergies    Objective     BP (!) 181/84   Pulse 86   Temp (!) 97 3 °F (36 3 °C) (Temporal)   Resp 20   Wt 79 kg (174 lb 2 6 oz)   SpO2 93%   BMI 34 01 kg/m²       PHYSICAL EXAM    Gen: NAD  CV: RRR  CHEST: Clear  ABD: soft, NT/ND  EXT: no edema  Neuro: AAO      ASSESSMENT/PLAN:  This is a 66y o  year old female here for colon cancer screening  PLAN:   Procedure:  Colonoscopy

## 2021-08-02 NOTE — ANESTHESIA PREPROCEDURE EVALUATION
Procedure:  COLONOSCOPY    Relevant Problems   CARDIO   (+) Essential hypertension   (+) Heart murmur, systolic      ENDO   (+) Type 2 diabetes mellitus with hyperglycemia, without long-term current use of insulin (HCC)      MUSCULOSKELETAL   (+) Arthritis of carpometacarpal (CMC) joint of left thumb   (+) DDD (degenerative disc disease), lumbar   (+) Lumbar spondylosis   (+) Primary osteoarthritis of first carpometacarpal joint of left hand   (+) Sacroiliitis (HCC)      NEURO/PSYCH   (+) Chronic pain syndrome      PULMONARY   (+) COPD (chronic obstructive pulmonary disease) ([facility])   (+) Current smoker on some days   (+) Obstructive sleep apnea of adult        Physical Exam    Airway    Mallampati score: II  TM Distance: >3 FB  Neck ROM: full     Dental       Cardiovascular  Rhythm: regular,     Pulmonary      Other Findings        Anesthesia Plan  ASA Score- 3     Anesthesia Type- IV sedation with anesthesia with ASA Monitors  Additional Monitors:   Airway Plan:           Plan Factors-    Induction-     Postoperative Plan-     Informed Consent- Anesthetic plan and risks discussed with patient  I personally reviewed this patient with the CRNA  Discussed and agreed on the Anesthesia Plan with the JACOB Wilson

## 2021-08-04 NOTE — RESULT ENCOUNTER NOTE
Please inform the patient that 2 of the polyps were tubular adenomas, the remainder were benign, would recommend repeat colonoscopy in 3 years

## 2021-08-18 DIAGNOSIS — M25.551 CHRONIC RIGHT HIP PAIN: ICD-10-CM

## 2021-08-18 DIAGNOSIS — G89.29 CHRONIC RIGHT HIP PAIN: ICD-10-CM

## 2021-08-19 RX ORDER — MELOXICAM 7.5 MG/1
TABLET ORAL
Qty: 180 TABLET | Refills: 0 | Status: SHIPPED | OUTPATIENT
Start: 2021-08-19 | End: 2021-12-02

## 2021-08-24 ENCOUNTER — OFFICE VISIT (OUTPATIENT)
Dept: PAIN MEDICINE | Facility: CLINIC | Age: 71
End: 2021-08-24
Payer: MEDICARE

## 2021-08-24 VITALS
WEIGHT: 174 LBS | DIASTOLIC BLOOD PRESSURE: 68 MMHG | SYSTOLIC BLOOD PRESSURE: 133 MMHG | BODY MASS INDEX: 34.16 KG/M2 | HEART RATE: 80 BPM | HEIGHT: 60 IN

## 2021-08-24 DIAGNOSIS — Z79.891 ENCOUNTER FOR LONG-TERM OPIATE ANALGESIC USE: ICD-10-CM

## 2021-08-24 DIAGNOSIS — G89.4 CHRONIC PAIN SYNDROME: Primary | ICD-10-CM

## 2021-08-24 DIAGNOSIS — M54.16 LUMBAR RADICULOPATHY: ICD-10-CM

## 2021-08-24 DIAGNOSIS — M47.816 LUMBAR SPONDYLOSIS: ICD-10-CM

## 2021-08-24 DIAGNOSIS — M51.36 DDD (DEGENERATIVE DISC DISEASE), LUMBAR: ICD-10-CM

## 2021-08-24 DIAGNOSIS — Z79.891 LONG-TERM CURRENT USE OF OPIATE ANALGESIC: ICD-10-CM

## 2021-08-24 DIAGNOSIS — M48.062 SPINAL STENOSIS OF LUMBAR REGION WITH NEUROGENIC CLAUDICATION: ICD-10-CM

## 2021-08-24 DIAGNOSIS — F11.20 UNCOMPLICATED OPIOID DEPENDENCE (HCC): ICD-10-CM

## 2021-08-24 DIAGNOSIS — M47.816 FACET ARTHROPATHY, LUMBAR: ICD-10-CM

## 2021-08-24 PROCEDURE — 80305 DRUG TEST PRSMV DIR OPT OBS: CPT | Performed by: NURSE PRACTITIONER

## 2021-08-24 PROCEDURE — 99214 OFFICE O/P EST MOD 30 MIN: CPT | Performed by: NURSE PRACTITIONER

## 2021-08-24 RX ORDER — TRAMADOL HYDROCHLORIDE 50 MG/1
TABLET ORAL
Qty: 75 TABLET | Refills: 2 | Status: SHIPPED | OUTPATIENT
Start: 2021-08-24 | End: 2021-12-02 | Stop reason: SDUPTHER

## 2021-08-24 NOTE — PROGRESS NOTES
Assessment:  1  Chronic pain syndrome    2  Uncomplicated opioid dependence ([address] )    3  Long-term current use of opiate analgesic    4  Lumbar radiculopathy    5  Lumbar spondylosis    6  DDD (degenerative disc disease), lumbar    7  Facet arthropathy, lumbar    8  Spinal stenosis of lumbar region with neurogenic claudication    9  Encounter for long-term opiate analgesic use        Plan:  1  Patient may continue tramadol 50 mg 1 tablet Q 8-12 hours PRN pain #75 tablets for 30 days as prescribed  The patient was given a 3 month supply of prescriptions with a Do Not Fill date(s) of  September 7, 2021 with 2 refills    [place] Prescription Drug Monitoring Program report was reviewed and was appropriate     A urine drug screen was collected at today's office visit as part of our medication management protocol  The point of care testing results were appropriate for what was being prescribed  The specimen will be sent for confirmatory testing  The drug screen is medically necessary because the patient is either dependent on opioid medication or is being considered for opioid medication therapy and the results could impact ongoing or future treatment  The drug screen is to evaluate for the presences or absence of prescribed, non-prescribed, and/or illicit drugs/substances  There are risks associated with opioid medications, including dependence, addiction and tolerance  The patient understands and agrees to use these medications only as prescribed  Potential side effects of the medications include, but are not limited to, constipation, drowsiness, addiction, impaired judgment and risk of fatal overdose if not taken as prescribed  The patient was warned against driving while taking sedation medications  Sharing medications is a felony  At this point in time, the patient is showing no signs of addiction, abuse, diversion or suicidal ideation        The patient was not picked for a pill count today, but did bring their medications as required  2  I offered to schedule the patient for repeat lumbar ablation, however she declines at this time   3  The patient will continue with her home exercise program  4  The patient may continue meloxicam as prescribed by her PCP  5  The patient will follow-up in 3 months or sooner if needed     M*Modal software was used to dictate this note  It may contain errors with dictating incorrect words or incorrect spelling  Please contact the provider directly with any questions  History of Present Illness: The patient is a 66 y o  female with a history of COPD last seen on 4/5/21 who presents for a follow up office visit in regards to chronic axial low back pain  The patient denies bowel or bladder incontinence or saddle anesthesia  The patient is mostly at baseline at this time in regards to her pain  She states that over the last 3 months  She has had periods of increased pain which she attributes to the weather  She is managing her pain with Tramadol 50 mg 1 tablet Q 8-12 hours for pain  With an 80% improvement of her pain without side effects  She has had bilateral L3-5 RFA in the past but is not interested in repeating at this time  She currently rates her pain as 6/10 on the numeric pain rating scale  She states she intermittently has pain in the evening which he describes as dull aching  Pain Contract Signed: 4/5/21  Last Urine Drug Screen: 8/24/21  Last tramadol per pt  8/24/21    I have personally reviewed and/or updated the patient's past medical history, past surgical history, family history, social history, current medications, allergies, and vital signs today         Review of Systems:    Review of Systems      Past Medical History:   Diagnosis Date    Asthma     Chronic obstructive lung disease ([facility] )     Community acquired pneumonia     LAST ASSESSED: [id number]    COPD (chronic obstructive pulmonary disease) ([facility] )     Diabetes mellitus ([facility] )     History of MRSA infection     2008 liver sx    Liver disease     Sleep apnea     Viral warts     LAST ASSESSED: [id number]       Past Surgical History:   Procedure Laterality Date    ABDOMINAL SURGERY      ABDOMINOPLASTY      CHOLECYSTECTOMY      COSMETIC SURGERY      EYE SURGERY      Bilateral cataracts 2015 Dr Juana Foster       FINGER SURGERY      HAND SURGERY      HYSTERECTOMY      LIVER SURGERY      ID INCISE FINGER TENDON SHEATH Right 7/23/2019    Procedure: RELEASE TRIGGER FINGER RIGHT LONG;  Surgeon: Castro Rosa MD;  [facility];  Service: Orthopedics    ID INCISE FINGER TENDON SHEATH Left 8/6/2019    Procedure: RELEASE TRIGGER FINGER LEFT INDEX;  Surgeon: Castro Rosa MD;  [facility];  Service: Orthopedics    ID INCISE FINGER TENDON SHEATH Right 6/16/2020    Procedure: RELEASE TRIGGER FINGER, right ring;  Surgeon: Castro Rosa MD;  [facility];  Service: Orthopedics    ID REPAIR INTERCARP/CARP-METACARP JT Right 9/9/2016    Procedure: THUMB TRAPEZIECTOMY; BASAL JOINT ARTHROPLASTY WITH APL AUTOGRAFT;  Surgeon: Shaye Sanrda MD;  [facility];  Service: Orthopedics    ID REPAIR INTERCARP/CARP-METACARP JT Left 5/29/2018    Procedure: Jodieel Todd;  Surgeon: Castro Rosa MD;  [facility];  Service: Orthopedics    ID TRANSPLANT HAND TENDON Left 5/29/2018    Procedure: TRANSFER TENDON HAND/WRIST FCR from forearm to wrist;  Surgeon: Castro Rosa MD;  [facility];  Service: Orthopedics       Family History   Problem Relation Age of Onset    Heart attack Father     Heart disease Family     Diabetes Family     Thyroid disease Family     Ovarian cancer Family     Heart attack Mother     Ovarian cancer Sister     Heart disease Other         CARDIAC DISORDER    Diabetes Other     Liver cancer Other     Breast cancer Other     Stomach cancer Other        Social History     Occupational History    Not on file   Tobacco Use    Smoking status: Current Every Day Smoker     Packs/day: 0 20    Smokeless tobacco: Never Used   Vaping Use    Vaping Use: Never used   Substance and Sexual Activity    Alcohol use: Yes     Comment: very seldom    Drug use: No    Sexual activity: Not on file         Current Outpatient Medications:     cetirizine (ZyrTEC) 10 mg tablet, Take 10 mg by mouth daily, Disp: , Rfl:     diphenhydrAMINE (BENADRYL) 25 mg capsule, Take 25 mg by mouth every 6 (six) hours as needed for itching, Disp: , Rfl:     glipiZIDE (GLUCOTROL XL) 5 mg 24 hr tablet, TAKE 1 TABLET EVERY DAY  30  MINUTES BEFORE BREAKFAST, Disp: 90 tablet, Rfl: 1    glucose blood (ACCU-CHEK JESS PLUS) test strip, Test BS 3 times daily  DX E11 9, Disp: 100 each, Rfl: 2    hydrochlorothiazide (HYDRODIURIL) 25 mg tablet, TAKE 1 TABLET EVERY DAY, Disp: 90 tablet, Rfl: 0    lisinopril (ZESTRIL) 20 mg tablet, TAKE 1 TABLET EVERY DAY, Disp: 90 tablet, Rfl: 0    meloxicam (MOBIC) 7 5 mg tablet, TAKE 1 TABLET TWICE DAILY, Disp: 180 tablet, Rfl: 0    metFORMIN (GLUCOPHAGE) 1000 MG tablet, TAKE 1 TABLET TWICE DAILY, Disp: 180 tablet, Rfl: 1    Multiple Vitamins-Minerals (OCUVITE ADULT FORMULA PO), Take by mouth, Disp: , Rfl:     pseudoephedrine-guaifenesin (MUCINEX D)  MG per tablet, Take 1 tablet by mouth every 12 (twelve) hours, Disp: , Rfl:     rosuvastatin (CRESTOR) 20 MG tablet, Take 1 tablet (20 mg total) by mouth daily, Disp: 90 tablet, Rfl: 1    traMADol (ULTRAM) 50 mg tablet, Take 1 PO Q8-12 hours PRN, Disp: 75 tablet, Rfl: 2    No Known Allergies    Physical Exam:    /68   Pulse 80   Ht 5' (1 524 m)   Wt 78 9 kg (174 lb)   BMI 33 98 kg/m²     Constitutional:normal, well developed, well nourished, alert, in no distress and non-toxic and no overt pain behavior    Eyes:anicteric  HEENT:grossly intact  Neck:supple, symmetric, trachea midline and no masses   Pulmonary:even and unlabored  Cardiovascular:No edema or pitting edema present  Skin:Normal without rashes or lesions and well hydrated  Psychiatric:Mood and affect appropriate  Neurologic:Cranial Nerves II-XII grossly intact  Musculoskeletal:antalgic gait but steady without the use of assistive devices      Imaging  No orders to display     MRI LUMBAR SPINE WITHOUT CONTRAST   INDICATION: M54 16: Radiculopathy, lumbar region   M48 062: Spinal stenosis, lumbar region with neurogenic claudication  COMPARISON: MRI from 8/13/2014   TECHNIQUE: Sagittal T1, sagittal T2, sagittal inversion recovery, axial T1 and axial T2, coronal T2    IMAGE QUALITY: Diagnostic   FINDINGS:   VERTEBRAL BODIES: There is a heterogeneous appearance of the visualized osseous structures without focal suspicious lesion  Vertebral body heights are maintained  There is Modic type I endplate degenerative change at L4-L5 and L5-S1  SACRUM: Normal signal within the sacrum  No evidence of insufficiency or stress fracture  DISTAL CORD AND CONUS: Normal size and signal within the distal cord and conus  PARASPINAL SOFT TISSUES: Paraspinal soft tissues are unremarkable  LOWER THORACIC DISC SPACES: There is disc space narrowing within the lower thoracic spine  LUMBAR DISC SPACES: There is multilevel disc space degeneration  L1-L2: No significant canal stenosis or foraminal narrowing  L2-L3: There is a minimal bulge  There is facet arthrosis  There is no significant canal stenosis  There is no significant foraminal narrowing  L3-L4: There is disc space narrowing  There is a bulging annulus  There is facet arthrosis with ligamentum flavum thickening  There is overall mild mass effect on thecal sac  Findings are overall progressed  There is mild foraminal narrowing  L4-L5: There is disc space narrowing  There is vacuum disc phenomenon  There is a bulging annulus  There is dorsal osteophytosis  There is facet arthrosis  There is mild mass effect on the thecal sac  There is endplate spurring  There is mild to   moderate left and mild right foraminal narrowing  L5-S1: There is vacuum disc phenomenon  There is a bulging annulus  There is facet arthrosis  There is endplate spurring  There is moderate to severe bilateral foraminal narrowing with contact of the exiting nerve roots  Findings are grossly   stable  IMPRESSION:   Multilevel degenerative changes of the lumbar spine, as described above  Multifactorial disease results in overall mild mass affect on the thecal sac at L3-L4, slightly progressed  Multilevel degenerative changes in the remainder of the spine are otherwise not significantly changed         Orders Placed This Encounter   Procedures    MM ALL_Prescribed Meds and Special Instructions    MM DT_Alprazolam Definitive Test    MM DT_Amphetamine Definitive Test    MM DT_Aripiprazole Definitive Test    MM DT_Bath Salts Definitive Test    MM DT_Buprenorphine Definitive Test    MM DT_Butalbital Definitive Test    MM DT_Clonazepam Definitive Test    MM DT_Clozapine Definitive Test    MM DT_Cocaine Definitive Test    MM DT_Codeine Definitive Test    MM DT_Desipramine Definitive Test    MM DT_Dextromethorphan Definitive Test    MM Diazepam Definitive Test    MM DT_Ethyl Glucuronide/Ethyl Sulfate Definitive Test    MM DT_Fentanyl Definitive Test    MM DT_Haloperidol Definitive Test    MM DT_Heroin Definitive Test    MM DT_Hydrocodone Definitive Test    MM DT_Hydromorphone Definitive Test    MM DT_Imipramine Definitive Test    MM DT_Kratom Definitive Test    MM DT_Levorphanol Definitive Test    MM Lorazepam Definitive Test    MM DT_MDMA Definitive Test    MM DT_Meperidine Definitive Test    MM DT_Methadone Definitive Test    MM DT_Methamphetamine Definitive Test    MM DT_Morphine Definitive Test    MM DT_Olanzapine Definitive Test    MM DT_Oxazepam Definitive Test    MM DT_Oxycodone Definitive Test    MM DT_Oxymorphone Definitive Test    MM DT_Phencyclidine Definitive Test    MM DT_Phenobarbital Definitive Test    MM DT_Phentermine Definitive Test    MM DT_Quetiapine Definitive Test    MM DT_Risperidone Definitive Test    MM DT_Secobarbital Definitive Test    MM DT_Spice Definitive Test    MM DT_Tapentadol Definitive Test    MM DT_Temazapam Definitive Test    MM DT_THC Definitive Test    MM DT_Tramadol Definitive Test    MM DT_Methylphenidate Definitive Test

## 2021-08-24 NOTE — PATIENT INSTRUCTIONS
Opioid Safety   WHAT YOU NEED TO KNOW:   An opioid medicine is used to treat pain  Examples are oxycodone, morphine, fentanyl, or codeine  Pain control and management may help you rest, heal, and return to your daily activities  You and your family will receive information about how to manage your pain at home  The instructions will include what to do if you have side effects as your pain is managed  You will get information on how to handle opioid medicine safely  You will also get suggestions on how to control pain without opioids  It is important to follow all instructions so your pain is managed effectively  DISCHARGE INSTRUCTIONS:   Call your local emergency number (911 in the [place]), or have someone else call if:   · You have a seizure  · You cannot be woken  · You have trouble staying awake and your breathing is slow or shallow  · Your speech is slurred, or you are confused  · You are dizzy or stumble when you walk  Call your doctor, or have someone close to you call if:   · You are extremely drowsy, or you have trouble staying awake or speaking  · You have pale or clammy skin  · You have blue fingernails or lips  · Your heartbeat is slower than normal     · You cannot stop vomiting  · You have questions or concerns about your condition or care  Use opioids safely:   · Take prescribed opioids exactly as directed  Opioids come with directions based on the kind and how it is given  Talk to your healthcare provider or a pharmacist if you have any questions  Do not take more than the recommended amount  Too much can cause a life-threatening overdose  Do not continue to take it after your pain stops  You may develop tolerance  This means you keep needing higher doses to get the same effect  You may also develop opioid use disorder  This means you are not able to control your opioid use  · Do not give opioids to others or take opioids that belong to someone else    The kind or amount one person takes may not be right for another  The person you share them with may also be taking medicines that do not mix with opioids  He or she may drink alcohol or use other drugs that can cause life-threatening problems when mixed with opioids  · Do not mix opioids with other medicines or alcohol  The combination can cause an overdose, or cause you to stop breathing  Alcohol, sleeping pills, and medicines such as antihistamines can make you sleepy  A combination with opioids can lead to a coma  · Do not drive or operate heavy machinery after you use an opioid  You may feel drowsy or have trouble concentrating  You can injure yourself or others if you drive or use heavy machinery when you are not alert  Your provider or pharmacist can tell you how long to wait after a dose before you do these activities  · Talk to your healthcare provider if you have any side effects  Side effects include nausea, sleepiness, itching, and trouble thinking clearly  Your provider may need to make changes to the kind or amount of opioid you are taking  He or she can also help you find ways to prevent or relieve side effects  Manage constipation:  Constipation is the most common side effect of opioid medicine  Constipation is when you have hard, dry bowel movements, or you go longer than usual between bowel movements  Tell your healthcare provider about all changes in your bowel movements while you are taking opioids  He or she may recommend laxative medicine to help you have a bowel movement  He or she may also change the kind of opioid you are taking, or change when you take it  The following are more ways you can prevent or relieve constipation:  · Drink liquids as directed  You may need to drink extra liquids to help soften and move your bowels  Ask how much liquid to drink each day and which liquids are best for you  · Eat high-fiber foods    This may help decrease constipation by adding bulk to your bowel movements  High-fiber foods include fruits, vegetables, whole-grain breads and cereals, and beans  Your healthcare provider or dietitian can help you create a high-fiber meal plan  Your provider may also recommend a fiber supplement if you cannot get enough fiber from food  · Exercise regularly  Regular physical activity can help stimulate your intestines  Walking is a good exercise to prevent or relieve constipation  Ask which exercises are best for you  · Schedule a time each day to have a bowel movement  This may help train your body to have regular bowel movements  Bend forward while you are on the toilet to help move the bowel movement out  Sit on the toilet for at least 10 minutes, even if you do not have a bowel movement  Store opioids safely:   · Store opioids where others cannot easily get them  Keep them in a locked cabinet or secure area  Do not  keep them in a purse or other bag you carry with you  A person may be looking for something else and find the opioids  · Make sure opioids are stored out of the reach of children  A child can easily overdose on opioids  Opioids may look like candy to a small child  The best way to dispose of opioids: The laws vary by country and area  In the [place], the best way is to return the opioids through a take-back program  This program is offered by the [employer] ([employer])  The following are options for using the program:  · Take the opioids to a [employer] collection site  The site is often a law enforcement center  Call your local law enforcement center for scheduled take-back days in your area  You will be given information on where to go if the collection site is in a different location  · Take the opioids to an approved pharmacy or hospital   A pharmacy or hospital may be set up as a collection site  You will need to ask if it is a [employer] collection site if you were not directed there   A pharmacy or doctor's office may not be able to take back opioids unless it is a [employer] site  · Use a mail-back system  This means you are given containers to put the opioids into  You will then mail them in the containers  · Use a take-back drop box  This is a place to leave the opioids at any time  People and animals will not be able to get into the box  Your local law enforcement agency can tell you where to find a drop box in your area  Other safe ways to dispose of opioids: The medicine may come with disposal instructions  The instructions may vary depending on the brand of medicine you are using  Instructions may come in a Medication Guide, but not every medicine has one  You may instead get instructions from your pharmacy or doctor  Follow instructions carefully  The following are general guidelines to follow:  · Find out if you can flush the opioid  Some opioids can be flushed down the toilet or poured into the sink  You will need to contact authorities in your area to see if this is an option for you  The FDA also offers a list of medicines that are safe to flush down the toilet  You can check the list if you cannot get the information for your local area  · Ask your waste management company about rules for putting opioids in the trash  The company will be able to give you specific directions  Scratch out personal information on the original medicine label so it cannot be read  Then put it in the trash  Do not label the trash or put any information on it about the opioids  It should look like regular household trash so no one is tempted to look for the opioids  Keep the trash out of the reach of children and animals  Always make sure trash is secure  · Talk to officials if you live in a facility  If you live in a nursing home or assisted living center, talk to an official  The person will know the rules for your area  Other ways to manage pain:   · Ask your healthcare provider about non-opioid medicines to control pain  Some medicines may even work better than opioids, depending on the cause of your pain  Nonprescription medicines include NSAIDs (such as ibuprofen) and acetaminophen  Prescription medicines include muscle relaxers, antidepressants, and steroids  · Pain may be managed without any medicines  Some ways to relieve pain include massage, aromatherapy, or meditation  Physical or occupational therapy may also help  For more information:   · Drug Enforcement Administration  [address]  Phone: 5- 122 - 035-9762  Web Address: [facility]/drug_disposal/    · [address] and Drug Administration  [place] , [address]  Phone: 4- 160 - 634-1380  Web Address: http://[employer]/  Follow up with your doctor or pain specialist as directed: You may need to have your dose adjusted  Your doctor or pain specialist can also help you find ways to manage pain without opioids  Write down your questions so you remember to ask them during your visits  © Copyright [employer] 2021 Information is for End User's use only and may not be sold, redistributed or otherwise used for commercial purposes  All illustrations and images included in CareNotes® are the copyrighted property of [employer]  or Lucila Mendoza  The above information is an  only  It is not intended as medical advice for individual conditions or treatments  Talk to your doctor, nurse or pharmacist before following any medical regimen to see if it is safe and effective for you

## 2021-08-30 DIAGNOSIS — I10 ESSENTIAL HYPERTENSION: ICD-10-CM

## 2021-08-30 RX ORDER — LISINOPRIL 20 MG/1
TABLET ORAL
Qty: 90 TABLET | Refills: 0 | Status: SHIPPED | OUTPATIENT
Start: 2021-08-30 | End: 2021-11-18 | Stop reason: SDUPTHER

## 2021-08-30 RX ORDER — HYDROCHLOROTHIAZIDE 25 MG/1
TABLET ORAL
Qty: 90 TABLET | Refills: 0 | Status: SHIPPED | OUTPATIENT
Start: 2021-08-30 | End: 2021-11-18 | Stop reason: SDUPTHER

## 2021-10-09 ENCOUNTER — IMMUNIZATIONS (OUTPATIENT)
Dept: FAMILY MEDICINE CLINIC | Facility: HOSPITAL | Age: 71
End: 2021-10-09

## 2021-10-09 DIAGNOSIS — Z23 ENCOUNTER FOR IMMUNIZATION: Primary | ICD-10-CM

## 2021-10-09 PROCEDURE — 91300 SARS-COV-2 / COVID-19 MRNA VACCINE (PFIZER-BIONTECH) 30 MCG: CPT

## 2021-10-09 PROCEDURE — 0001A SARS-COV-2 / COVID-19 MRNA VACCINE (PFIZER-BIONTECH) 30 MCG: CPT

## 2021-10-13 ENCOUNTER — PATIENT OUTREACH (OUTPATIENT)
Dept: CASE MANAGEMENT | Facility: OTHER | Age: 71
End: 2021-10-13

## 2021-11-18 DIAGNOSIS — I10 ESSENTIAL HYPERTENSION: ICD-10-CM

## 2021-11-18 RX ORDER — LISINOPRIL 20 MG/1
20 TABLET ORAL DAILY
Qty: 90 TABLET | Refills: 0 | Status: SHIPPED | OUTPATIENT
Start: 2021-11-18 | End: 2022-01-20

## 2021-11-18 RX ORDER — HYDROCHLOROTHIAZIDE 25 MG/1
25 TABLET ORAL DAILY
Qty: 90 TABLET | Refills: 0 | Status: SHIPPED | OUTPATIENT
Start: 2021-11-18 | End: 2022-01-20

## 2021-11-29 ENCOUNTER — TELEPHONE (OUTPATIENT)
Dept: PAIN MEDICINE | Facility: MEDICAL CENTER | Age: 71
End: 2021-11-29

## 2021-12-01 DIAGNOSIS — M25.551 CHRONIC RIGHT HIP PAIN: ICD-10-CM

## 2021-12-01 DIAGNOSIS — G89.29 CHRONIC RIGHT HIP PAIN: ICD-10-CM

## 2021-12-01 DIAGNOSIS — E11.65 TYPE 2 DIABETES MELLITUS WITH HYPERGLYCEMIA, WITHOUT LONG-TERM CURRENT USE OF INSULIN (HCC): ICD-10-CM

## 2021-12-02 ENCOUNTER — TELEMEDICINE (OUTPATIENT)
Dept: PAIN MEDICINE | Facility: CLINIC | Age: 71
End: 2021-12-02
Payer: MEDICARE

## 2021-12-02 DIAGNOSIS — F11.20 UNCOMPLICATED OPIOID DEPENDENCE (HCC): Primary | ICD-10-CM

## 2021-12-02 DIAGNOSIS — M47.816 LUMBAR SPONDYLOSIS: ICD-10-CM

## 2021-12-02 DIAGNOSIS — G89.4 CHRONIC PAIN SYNDROME: ICD-10-CM

## 2021-12-02 DIAGNOSIS — M51.36 DDD (DEGENERATIVE DISC DISEASE), LUMBAR: ICD-10-CM

## 2021-12-02 DIAGNOSIS — M47.816 FACET ARTHROPATHY, LUMBAR: ICD-10-CM

## 2021-12-02 DIAGNOSIS — M54.16 LUMBAR RADICULOPATHY: ICD-10-CM

## 2021-12-02 PROCEDURE — 99442 PR PHYS/QHP TELEPHONE EVALUATION 11-20 MIN: CPT | Performed by: ANESTHESIOLOGY

## 2021-12-02 RX ORDER — ROSUVASTATIN CALCIUM 20 MG/1
TABLET, COATED ORAL
Qty: 90 TABLET | Refills: 1 | Status: SHIPPED | OUTPATIENT
Start: 2021-12-02 | End: 2022-04-21

## 2021-12-02 RX ORDER — MELOXICAM 7.5 MG/1
TABLET ORAL
Qty: 180 TABLET | Refills: 0 | Status: SHIPPED | OUTPATIENT
Start: 2021-12-02 | End: 2022-02-07

## 2021-12-02 RX ORDER — TRAMADOL HYDROCHLORIDE 50 MG/1
50 TABLET ORAL 2 TIMES DAILY PRN
Qty: 60 TABLET | Refills: 2 | Status: SHIPPED | OUTPATIENT
Start: 2021-12-19 | End: 2022-05-23 | Stop reason: ALTCHOICE

## 2021-12-16 ENCOUNTER — RA CDI HCC (OUTPATIENT)
Dept: OTHER | Facility: HOSPITAL | Age: 71
End: 2021-12-16

## 2021-12-16 NOTE — PROGRESS NOTES
[facility]  coding opportunities          Number of diagnosis code(s) already on the problem list added to FYI fla                  Number of suggestions NOT actually used: 1     Patients insurance company: Medicare     Visit status: Patient arrived for their scheduled appointment        [facility]  coding opportunities          Number of diagnosis code(s) already on the problem list added to FYI fla                     Patients insurance company: Medicare           Based on clinical documentation indicated in your record, it appears that the patient may have the following conditions not assessed in :    M46 1 Sacroiliitis ([facility] )     If this is correct, please document and assess at your next visit,

## 2021-12-20 DIAGNOSIS — E11.9 TYPE 2 DIABETES MELLITUS WITHOUT COMPLICATION, WITHOUT LONG-TERM CURRENT USE OF INSULIN (HCC): ICD-10-CM

## 2021-12-21 RX ORDER — GLIPIZIDE 5 MG/1
TABLET, FILM COATED, EXTENDED RELEASE ORAL
Qty: 90 TABLET | Refills: 0 | Status: SHIPPED | OUTPATIENT
Start: 2021-12-21 | End: 2022-03-03

## 2021-12-23 ENCOUNTER — OFFICE VISIT (OUTPATIENT)
Dept: FAMILY MEDICINE CLINIC | Facility: CLINIC | Age: 71
End: 2021-12-23
Payer: MEDICARE

## 2021-12-23 VITALS
WEIGHT: 180 LBS | DIASTOLIC BLOOD PRESSURE: 70 MMHG | SYSTOLIC BLOOD PRESSURE: 136 MMHG | HEIGHT: 60 IN | BODY MASS INDEX: 35.34 KG/M2

## 2021-12-23 DIAGNOSIS — R01.1 HEART MURMUR, SYSTOLIC: ICD-10-CM

## 2021-12-23 DIAGNOSIS — J44.9 CHRONIC OBSTRUCTIVE PULMONARY DISEASE, UNSPECIFIED COPD TYPE (HCC): ICD-10-CM

## 2021-12-23 DIAGNOSIS — I10 ESSENTIAL HYPERTENSION: ICD-10-CM

## 2021-12-23 DIAGNOSIS — E11.65 TYPE 2 DIABETES MELLITUS WITH HYPERGLYCEMIA, WITHOUT LONG-TERM CURRENT USE OF INSULIN (HCC): Primary | ICD-10-CM

## 2021-12-23 DIAGNOSIS — D75.1 SECONDARY POLYCYTHEMIA: ICD-10-CM

## 2021-12-23 LAB — SL AMB POCT HEMOGLOBIN AIC: 7 (ref ?–6.5)

## 2021-12-23 PROCEDURE — 83036 HEMOGLOBIN GLYCOSYLATED A1C: CPT | Performed by: FAMILY MEDICINE

## 2021-12-23 PROCEDURE — 36415 COLL VENOUS BLD VENIPUNCTURE: CPT | Performed by: FAMILY MEDICINE

## 2021-12-23 PROCEDURE — 99214 OFFICE O/P EST MOD 30 MIN: CPT | Performed by: FAMILY MEDICINE

## 2021-12-24 LAB
ALBUMIN SERPL-MCNC: 4.1 G/DL (ref 3.6–5.1)
ALBUMIN/GLOB SERPL: 1.6 (CALC) (ref 1–2.5)
ALP SERPL-CCNC: 64 U/L (ref 37–153)
ALT SERPL-CCNC: 14 U/L (ref 6–29)
AST SERPL-CCNC: 14 U/L (ref 10–35)
BILIRUB SERPL-MCNC: 0.4 MG/DL (ref 0.2–1.2)
BUN SERPL-MCNC: 27 MG/DL (ref 7–25)
BUN/CREAT SERPL: 36 (CALC) (ref 6–22)
CALCIUM SERPL-MCNC: 9.7 MG/DL (ref 8.6–10.4)
CHLORIDE SERPL-SCNC: 104 MMOL/L (ref 98–110)
CHOLEST SERPL-MCNC: 107 MG/DL
CHOLEST/HDLC SERPL: 2.3 (CALC)
CO2 SERPL-SCNC: 28 MMOL/L (ref 20–32)
CREAT SERPL-MCNC: 0.76 MG/DL (ref 0.6–0.93)
ERYTHROCYTE [DISTWIDTH] IN BLOOD BY AUTOMATED COUNT: 12.2 % (ref 11–15)
GLOBULIN SER CALC-MCNC: 2.6 G/DL (CALC) (ref 1.9–3.7)
GLUCOSE SERPL-MCNC: 150 MG/DL (ref 65–99)
HCT VFR BLD AUTO: 44.1 % (ref 35–45)
HDLC SERPL-MCNC: 46 MG/DL
HGB BLD-MCNC: 14.9 G/DL (ref 11.7–15.5)
LDLC SERPL CALC-MCNC: 38 MG/DL (CALC)
MCH RBC QN AUTO: 31.3 PG (ref 27–33)
MCHC RBC AUTO-ENTMCNC: 33.8 G/DL (ref 32–36)
MCV RBC AUTO: 92.6 FL (ref 80–100)
NONHDLC SERPL-MCNC: 61 MG/DL (CALC)
PLATELET # BLD AUTO: 151 THOUSAND/UL (ref 140–400)
PMV BLD REES-ECKER: 11.9 FL (ref 7.5–12.5)
POTASSIUM SERPL-SCNC: 4.5 MMOL/L (ref 3.5–5.3)
PROT SERPL-MCNC: 6.7 G/DL (ref 6.1–8.1)
RBC # BLD AUTO: 4.76 MILLION/UL (ref 3.8–5.1)
SL AMB EGFR AFRICAN AMERICAN: 90 ML/MIN/1.73M2
SL AMB EGFR NON AFRICAN AMERICAN: 78 ML/MIN/1.73M2
SODIUM SERPL-SCNC: 140 MMOL/L (ref 135–146)
TRIGL SERPL-MCNC: 144 MG/DL
WBC # BLD AUTO: 9.9 THOUSAND/UL (ref 3.8–10.8)

## 2021-12-28 ENCOUNTER — TELEPHONE (OUTPATIENT)
Dept: PAIN MEDICINE | Facility: CLINIC | Age: 71
End: 2021-12-28

## 2022-02-03 ENCOUNTER — OFFICE VISIT (OUTPATIENT)
Dept: DERMATOLOGY | Facility: CLINIC | Age: 72
End: 2022-02-03
Payer: MEDICARE

## 2022-02-03 VITALS — WEIGHT: 180 LBS | TEMPERATURE: 97.3 F | HEIGHT: 61 IN | BODY MASS INDEX: 33.99 KG/M2

## 2022-02-03 DIAGNOSIS — L82.0 SEBORRHEIC KERATOSIS, INFLAMED: Primary | ICD-10-CM

## 2022-02-03 PROCEDURE — 17110 DESTRUCTION B9 LES UP TO 14: CPT | Performed by: DERMATOLOGY

## 2022-02-03 PROCEDURE — 99203 OFFICE O/P NEW LOW 30 MIN: CPT | Performed by: DERMATOLOGY

## 2022-02-03 NOTE — PATIENT INSTRUCTIONS
Assessment and Plan:  Based on a thorough discussion of this condition and the management approach to it (including a comprehensive discussion of the known risks, side effects and potential benefits of treatment), the patient (family) agrees to implement the following specific plan:   Liquid nitrogen was applied for 10-12 seconds to the skin lesion and the expected blistering or scabbing reaction explained  Do not pick at the area  Patient reminded to expect hypopigmented scars from the procedure  Return if lesion fails to fully resolve  Seborrheic Keratosis  A seborrheic keratosis is a harmless warty spot that appears during adult life as a common sign of skin aging  Seborrheic keratoses can arise on any area of skin, covered or uncovered, with the usual exception of the palms and soles  They do not arise from mucous membranes  Seborrheic keratoses can have highly variable appearance  Seborrheic keratoses are extremely common  It has been estimated that over 90% of adults over the age of 61 years have one or more of them  They occur in males and females of all races, typically beginning to erupt in the 35s or 45s  They are uncommon under the age of 21 years  The precise cause of seborrhoeic keratoses is not known  Seborrhoeic keratoses are considered degenerative in nature  As time goes by, seborrheic keratoses tend to become more numerous  Some people inherit a tendency to develop a very large number of them; some people may have hundreds of them  The name "seborrheic keratosis" is misleading, because these lesions are not limited to a seborrhoeic distribution (scalp, mid-face, chest, upper back), nor are they formed from sebaceous glands, nor are they associated with sebum -- which is greasy    Seborrheic keratosis may also be called "SK," "Seb K," "basal cell papilloma," "senile wart," or "barnacle "      Researchers have noted:   Eruptive seborrhoeic keratoses can follow sunburn or dermatitis   Skin friction may be the reason they appear in body folds   Viral cause (e g , human papillomavirus) seems unlikely   Stable and clonal mutations or activation of FRFR3, PIK3CA, IVON, AKT1 and EGFR genes are found in seborrhoeic keratoses   Seborrhoeic keratosis can arise from solar lentigo   FRFR3 mutations also arise in solar lentigines  These mutations are associated with increased age and location on the head and neck, suggesting a role of ultraviolet radiation in these lesions   Seborrheic keratoses do not harbour tumour suppressor gene mutations   Epidermal growth factor receptor inhibitors, which are used to treat some cancers, often result in an increase in verrucal (warty) keratoses  There is no easy way to remove multiple lesions on a single occasion  Unless a specific lesion is "inflamed" and is causing pain or stinging/burning or is bleeding, most insurance companies do not authorize treatment

## 2022-02-03 NOTE — PROGRESS NOTES
[facility] Note     Patient Name: Familia Smiley  Encounter Date: 02/03/2022     Have you been cared for by a [facility] in the last 3 years and, if so, which one? No    · Have you traveled outside of the [address] [place] [address] in the past 3 months or outside of the [facility] area in the last 2 weeks? No     May we call your Preferred Phone number to discuss your specific medical information? Yes     May we leave a detailed message that includes your specific medical information? Yes      Today's Chief Concerns:   Concern #1:  Skin Lesion right cheek       Past Medical History:  Have you personally ever had or currently have any of the following? · Skin cancer (such as Melanoma, Basal Cell Carcinoma, Squamous Cell Carcinoma? (If Yes, please provide more detail)- No  · Eczema: No  · Psoriasis: No  · HIV/AIDS: No  · Hepatitis B or C: No  · Tuberculosis: No  · Systemic Immunosuppression such as Diabetes, Biologic or Immunotherapy, Chemotherapy, Organ Transplantation, Bone Marrow Transplantation (If YES, please provide more detail): No  · Radiation Treatment (If YES, please provide more detail): No  · Any other major medical conditions/concerns? (If Yes, which types)- No    Social History:     What is/was your primary occupation? Retired      What are your hobbies/past-times? Eat    Family History:  Have any of your "first degree relatives" (parent, brother, sister, or child) had any of the following       · Skin cancer such as Melanoma or Merkel Cell Carcinoma or Pancreatic Cancer? YES, brother   · Eczema, Asthma, Hay Fever or Seasonal Allergies: No  · Psoriasis or Psoriatic Arthritis: No  · Do any other medical conditions seem to run in your family? If Yes, what condition and which relatives?   No    Current Medications:       Current Outpatient Medications:     cetirizine (ZyrTEC) 10 mg tablet, Take 10 mg by mouth daily, Disp: , Rfl:     glipiZIDE (GLUCOTROL XL) 5 mg 24 hr tablet, TAKE 1 TABLET EVERY DAY  30  MINUTES BEFORE BREAKFAST, Disp: 90 tablet, Rfl: 0    glucose blood (ACCU-CHEK JESS PLUS) test strip, Test BS 3 times daily  DX E11 9, Disp: 100 each, Rfl: 2    hydrochlorothiazide (HYDRODIURIL) 25 mg tablet, TAKE 1 TABLET EVERY DAY, Disp: 90 tablet, Rfl: 1    lisinopril (ZESTRIL) 20 mg tablet, TAKE 1 TABLET EVERY DAY, Disp: 90 tablet, Rfl: 1    meloxicam (MOBIC) 7 5 mg tablet, TAKE 1 TABLET TWICE DAILY, Disp: 180 tablet, Rfl: 0    metFORMIN (GLUCOPHAGE) 1000 MG tablet, TAKE 1 TABLET TWICE DAILY, Disp: 180 tablet, Rfl: 0    Multiple Vitamins-Minerals (OCUVITE ADULT FORMULA PO), Take by mouth, Disp: , Rfl:     rosuvastatin (CRESTOR) 20 MG tablet, TAKE 1 TABLET EVERY DAY, Disp: 90 tablet, Rfl: 1    diphenhydrAMINE (BENADRYL) 25 mg capsule, Take 25 mg by mouth every 6 (six) hours as needed for itching (Patient not taking: Reported on 12/23/2021 ), Disp: , Rfl:     pseudoephedrine-guaifenesin (MUCINEX D)  MG per tablet, Take 1 tablet by mouth every 12 (twelve) hours (Patient not taking: Reported on 12/23/2021 ), Disp: , Rfl:     traMADol (ULTRAM) 50 mg tablet, Take 1 tablet (50 mg total) by mouth 2 (two) times a day as needed for severe pain, Disp: 60 tablet, Rfl: 2      Review of Systems:  Have you recently had or currently have any of the following? If YES, what are you doing for the problem? · Fever, chills or unintended weight loss: No  · Sudden loss or change in your vision: No  · Nausea, vomiting or blood in your stool: No  · Painful or swollen joints: No  · Wheezing or cough: No  · Changing mole or non-healing wound: No  · Nosebleeds: No  · Excessive sweating: No  · Easy or prolonged bleeding? No  · Over the last 2 weeks, how often have you been bothered by the following problems?   · Taking little interest or pleasure in doing things: 1 - Not at All  · Feeling down, depressed, or hopeless: 1 - Not at All  · Rapid heartbeat with epinephrine:  No    · FEMALES ONLY:    · Are you pregnant or planning to become pregnant? N/A  · Are you currently or planning to be nursing or breast feeding? N/A    · Any known allergies? · No Known Allergies      Physical Exam:     Was a chaperone (Derm Clinical Assistant) present throughout the entire Physical Exam? Yes     Did the Dermatology Team specifically  the patient on the importance of a Full Skin Exam to be sure that nothing is missed clinically? Yes}  o Did the patient ultimately request or accept a Full Skin Exam?  NO  o Did the patient specifically refuse to have the areas "under-the-bra" examined by the Dermatologist? No  o Did the patient specifically refuse to have the areas "under-the-underwear" examined by the Dermatologist? No    CONSTITUTIONAL:   Vitals:    02/03/22 0942   Temp: (!) 97 3 °F (36 3 °C)   TempSrc: Tympanic   Weight: 81 6 kg (180 lb)   Height: 5' 1" (1 549 m)       PSYCH: Normal mood and affect  EYES: Normal conjunctiva  ENT: Normal lips and oral mucosa  CARDIOVASCULAR: No edema  RESPIRATORY: Normal respirations  HEME/LYMPH/IMMUNO:  No regional lymphadenopathy except as noted below in "ASSESSMENT AND PLAN BY DIAGNOSIS"    SKIN:  FULL ORGAN SYSTEM EXAM   Hair, Scalp, Ears, Face Normal except as noted below in Assessment   Neck, Cervical Chain Nodes Normal except as noted below in Assessment    ]       Assessment and Plan by Diagnosis:    History of Present Condition:     Duration:  How long has this been an issue for you?    o  Years    Location Affected:  Where on the body is this affecting you?    o  Right cheek    Quality:  Is there any bleeding, pain, itch, burning/irritation, or redness associated with the skin lesion?    o  Scabs    Severity:  Describe any bleeding, pain, itch, burning/irritation, or redness on a scale of 1 to 10 (with 10 being the worst)    o  5   Timing:  Does this condition seem to be there pretty constantly or do you notice it more at specific times throughout the day?    o  Constantly    Context:  Have you ever noticed that this condition seems to be associated with specific activities you do?    o  Denies    Modifying Factors:    o Anything that seems to make the condition worse?    -  Denies   o What have you tried to do to make the condition better? -  Denies    Associated Signs and Symptoms:  Does this skin lesion seem to be associated with any of the following:  o Scabs       1  SEBORRHEIC KERATOSIS; INFLAMED, CRUSTED WITH HISTORY OF ITCHING    Physical Exam:   Anatomic Location Affected:  Right cheek    Morphological Description:  1 0 cm oval light brown plaque   Pertinent Positives:   Pertinent Negatives: n/a    Additional History of Present Condition:  Patient reports new bumps on the skin  Denies itch, burn, pain, bleeding or ulceration  Present constantly; nothing seems to make it worse or better  No prior treatment  Assessment and Plan:  Based on a thorough discussion of this condition and the management approach to it (including a comprehensive discussion of the known risks, side effects and potential benefits of treatment), the patient (family) agrees to implement the following specific plan:   Liquid nitrogen was applied for 10-12 seconds to the skin lesion and the expected blistering or scabbing reaction explained  Do not pick at the area  Patient reminded to expect hypopigmented scars from the procedure  Return if lesion fails to fully resolve  Seborrheic Keratosis  A seborrheic keratosis is a harmless warty spot that appears during adult life as a common sign of skin aging  Seborrheic keratoses can arise on any area of skin, covered or uncovered, with the usual exception of the palms and soles  They do not arise from mucous membranes  Seborrheic keratoses can have highly variable appearance  Seborrheic keratoses are extremely common   It has been estimated that over 90% of adults over the age of 61 years have one or more of them  They occur in males and females of all races, typically beginning to erupt in the 35s or 45s  They are uncommon under the age of 21 years  The precise cause of seborrhoeic keratoses is not known  Seborrhoeic keratoses are considered degenerative in nature  As time goes by, seborrheic keratoses tend to become more numerous  Some people inherit a tendency to develop a very large number of them; some people may have hundreds of them  The name "seborrheic keratosis" is misleading, because these lesions are not limited to a seborrhoeic distribution (scalp, mid-face, chest, upper back), nor are they formed from sebaceous glands, nor are they associated with sebum -- which is greasy  Seborrheic keratosis may also be called "SK," "Seb K," "basal cell papilloma," "senile wart," or "barnacle "      Researchers have noted:   Eruptive seborrhoeic keratoses can follow sunburn or dermatitis   Skin friction may be the reason they appear in body folds   Viral cause (e g , human papillomavirus) seems unlikely   Stable and clonal mutations or activation of FRFR3, PIK3CA, IVON, AKT1 and EGFR genes are found in seborrhoeic keratoses   Seborrhoeic keratosis can arise from solar lentigo   FRFR3 mutations also arise in solar lentigines  These mutations are associated with increased age and location on the head and neck, suggesting a role of ultraviolet radiation in these lesions   Seborrheic keratoses do not harbour tumour suppressor gene mutations   Epidermal growth factor receptor inhibitors, which are used to treat some cancers, often result in an increase in verrucal (warty) keratoses  There is no easy way to remove multiple lesions on a single occasion  Unless a specific lesion is "inflamed" and is causing pain or stinging/burning or is bleeding, most insurance companies do not authorize treatment      PROCEDURE:  DESTRUCTION OF BENIGN LESIONS  After a thorough discussion of treatment options and risk/benefits/alternatives (including but not limited to local pain, scarring, dyspigmentation, blistering, and possible superinfection), verbal and written consent were obtained and the aforementioned lesions were treated on with cryotherapy using liquid nitrogen x 1 cycle for 5-10 seconds   TOTAL NUMBER of 1 lesions were treated today on the ANATOMIC LOCATION: right cheek  The patient tolerated the procedure well, and after-care instructions were provided        Scribe Attestation    I,:  Sonny Crooks MA am acting as a scribe while in the presence of the attending physician :       I,:  Julissa Gan MD personally performed the services described in this documentation    as scribed in my presence :

## 2022-02-04 DIAGNOSIS — M25.551 CHRONIC RIGHT HIP PAIN: ICD-10-CM

## 2022-02-04 DIAGNOSIS — G89.29 CHRONIC RIGHT HIP PAIN: ICD-10-CM

## 2022-02-07 RX ORDER — MELOXICAM 7.5 MG/1
TABLET ORAL
Qty: 180 TABLET | Refills: 0 | Status: SHIPPED | OUTPATIENT
Start: 2022-02-07 | End: 2022-04-11

## 2022-02-09 ENCOUNTER — OFFICE VISIT (OUTPATIENT)
Dept: SLEEP CENTER | Facility: CLINIC | Age: 72
End: 2022-02-09
Payer: MEDICARE

## 2022-02-09 VITALS
BODY MASS INDEX: 35.38 KG/M2 | HEIGHT: 61 IN | WEIGHT: 187.4 LBS | DIASTOLIC BLOOD PRESSURE: 70 MMHG | SYSTOLIC BLOOD PRESSURE: 150 MMHG

## 2022-02-09 DIAGNOSIS — G47.33 OSA (OBSTRUCTIVE SLEEP APNEA): Primary | ICD-10-CM

## 2022-02-09 PROCEDURE — 99213 OFFICE O/P EST LOW 20 MIN: CPT | Performed by: INTERNAL MEDICINE

## 2022-02-09 NOTE — PROGRESS NOTES
Progress Note - [facility] Center   Vera Mckeon YZJ:[id number] MRN: [id number]      Reason for Visit:  66 y  o female here for annual follow-up    Assessment:  Doing well on current therapy of CPAP 18 cm for very severe FRANK (AHI = 117)  Plan:  Continue same    Follow up: One year    History of Present Illness:  History of FRANK on PAP therapy  Fully compliant and deriving benefit  Review of Systems      Genitourinary none   Cardiology none   Gastrointestinal none   Neurology none   Constitutional none   Integumentary none   Psychiatry none   Musculoskeletal back pain   Pulmonary shortness of breath with activity   ENT none   Endocrine none   Hematological none         I have reviewed and updated the review of systems as necessary      Historical Information    Past Medical History:   Past Medical History:   Diagnosis Date    Asthma     Chronic obstructive lung disease ([facility] [place] [facility] )     Community acquired pneumonia     LAST ASSESSED: [id number]    COPD (chronic obstructive pulmonary disease) ([facility] [place] [facility] )     Diabetes mellitus ([facility] [place] [facility] )     History of MRSA infection     2008 liver sx    Liver disease     Sleep apnea     Viral warts     LAST ASSESSED: [id number]         Past Surgical History:   Past Surgical History:   Procedure Laterality Date    ABDOMINAL SURGERY      ABDOMINOPLASTY      CHOLECYSTECTOMY      COSMETIC SURGERY      EYE SURGERY      Bilateral cataracts 2015 Dr Nataliya Costello       FINGER SURGERY      HAND SURGERY      HYSTERECTOMY      LIVER SURGERY      MS INCISE FINGER TENDON SHEATH Right 7/23/2019    Procedure: RELEASE TRIGGER FINGER RIGHT LONG;  Surgeon: China Kebede MD;  [facility];  Service: Orthopedics    MS INCISE FINGER TENDON SHEATH Left 8/6/2019    Procedure: RELEASE TRIGGER FINGER LEFT INDEX;  Surgeon: China Kebede MD;  [facility];  Service: Orthopedics    MS INCISE FINGER TENDON SHEATH Right 6/16/2020    Procedure: RELEASE TRIGGER FINGER, right ring;  Surgeon: Solo Montilla MD;  [facility];  Service: Orthopedics    AR REPAIR INTERCARP/CARP-METACARP JT Right 9/9/2016    Procedure: THUMB TRAPEZIECTOMY; BASAL JOINT ARTHROPLASTY WITH APL AUTOGRAFT;  Surgeon: Wen Sorto MD;  [facility];  Service: Orthopedics    AR REPAIR INTERCARP/CARP-METACARP JT Left 5/29/2018    Procedure: Inga Callow;  Surgeon: Solo Montilla MD;  [facility];  Service: Orthopedics    AR TRANSPLANT HAND TENDON Left 5/29/2018    Procedure: TRANSFER TENDON HAND/WRIST FCR from forearm to wrist;  Surgeon: Solo Montilla MD;  [facility];  Service: Orthopedics       Social History:   Social History     Socioeconomic History    Marital status: /Civil Union     Spouse name: Not on file    Number of children: Not on file    Years of education: Not on file    Highest education level: Not on file   Occupational History    Not on file   Tobacco Use    Smoking status: Current Every Day Smoker     Packs/day: 0 20    Smokeless tobacco: Never Used   Vaping Use    Vaping Use: Never used   Substance and Sexual Activity    Alcohol use: Yes     Comment: very seldom    Drug use: No    Sexual activity: Not on file   Other Topics Concern    Not on file   Social History Narrative    Not on file     Social Determinants of Health     Financial Resource Strain: Not on file   Food Insecurity: Not on file   Transportation Needs: Not on file   Physical Activity: Not on file   Stress: Not on file   Social Connections: Not on file   Intimate Partner Violence: Not on file   Housing Stability: Not on file       Family History:   Family History   Problem Relation Age of Onset    Heart attack Father     Heart disease Family     Diabetes Family     Thyroid disease Family     Ovarian cancer Family     Heart attack Mother     Ovarian cancer Sister     Heart disease Other         CARDIAC DISORDER    Diabetes Other     Liver cancer Other     Breast cancer Other     Stomach cancer Other     Basal cell carcinoma Brother        Medications/Allergies:      Current Outpatient Medications:     cetirizine (ZyrTEC) 10 mg tablet, Take 10 mg by mouth daily, Disp: , Rfl:     glipiZIDE (GLUCOTROL XL) 5 mg 24 hr tablet, TAKE 1 TABLET EVERY DAY  30  MINUTES BEFORE BREAKFAST, Disp: 90 tablet, Rfl: 0    glucose blood (ACCU-CHEK JESS PLUS) test strip, Test BS 3 times daily  DX E11 9, Disp: 100 each, Rfl: 2    hydrochlorothiazide (HYDRODIURIL) 25 mg tablet, TAKE 1 TABLET EVERY DAY, Disp: 90 tablet, Rfl: 1    lisinopril (ZESTRIL) 20 mg tablet, TAKE 1 TABLET EVERY DAY, Disp: 90 tablet, Rfl: 1    meloxicam (MOBIC) 7 5 mg tablet, TAKE 1 TABLET TWICE DAILY, Disp: 180 tablet, Rfl: 0    metFORMIN (GLUCOPHAGE) 1000 MG tablet, TAKE 1 TABLET TWICE DAILY, Disp: 180 tablet, Rfl: 0    Multiple Vitamins-Minerals (OCUVITE ADULT FORMULA PO), Take by mouth, Disp: , Rfl:     rosuvastatin (CRESTOR) 20 MG tablet, TAKE 1 TABLET EVERY DAY, Disp: 90 tablet, Rfl: 1    diphenhydrAMINE (BENADRYL) 25 mg capsule, Take 25 mg by mouth every 6 (six) hours as needed for itching (Patient not taking: Reported on 12/23/2021 ), Disp: , Rfl:     pseudoephedrine-guaifenesin (MUCINEX D)  MG per tablet, Take 1 tablet by mouth every 12 (twelve) hours (Patient not taking: Reported on 12/23/2021 ), Disp: , Rfl:     traMADol (ULTRAM) 50 mg tablet, Take 1 tablet (50 mg total) by mouth 2 (two) times a day as needed for severe pain, Disp: 60 tablet, Rfl: 2          Objective      Vital Signs:   Vitals:    02/09/22 1056   BP: 150/70     [place] Sleepiness Scale:  Total score: 4        Physical Exam:    General: Alert, appropriate, cooperative, overweight    Head: NC/AT    Skin: Warm, dry    Neuro: No motor abnormalities, cranial nerves appear intact    Extremity: No clubbing, cyanosis      DME Provider: &M        Counseling / Coordination of Care   I have spent 10 minutes with the patient today in which greater than 50% of this time was spent in counseling/coordination of care regarding: equipment and compliance  Board Certified Sleep Specialist    Portions of the record may have been created with voice recognition software  Occasional wrong word or "sound a like" substitutions may have occurred due to the inherent limitations of voice recognition software  Read the chart carefully and recognize, using context, where substitutions have occurred

## 2022-02-10 ENCOUNTER — TELEPHONE (OUTPATIENT)
Dept: SLEEP CENTER | Facility: CLINIC | Age: 72
End: 2022-02-10

## 2022-03-02 DIAGNOSIS — E11.9 TYPE 2 DIABETES MELLITUS WITHOUT COMPLICATION, WITHOUT LONG-TERM CURRENT USE OF INSULIN (HCC): ICD-10-CM

## 2022-03-03 RX ORDER — GLIPIZIDE 5 MG/1
TABLET, FILM COATED, EXTENDED RELEASE ORAL
Qty: 90 TABLET | Refills: 0 | Status: SHIPPED | OUTPATIENT
Start: 2022-03-03 | End: 2022-05-09

## 2022-04-11 DIAGNOSIS — M25.551 CHRONIC RIGHT HIP PAIN: ICD-10-CM

## 2022-04-11 DIAGNOSIS — G89.29 CHRONIC RIGHT HIP PAIN: ICD-10-CM

## 2022-04-11 RX ORDER — MELOXICAM 7.5 MG/1
TABLET ORAL
Qty: 180 TABLET | Refills: 0 | Status: SHIPPED | OUTPATIENT
Start: 2022-04-11 | End: 2022-06-24 | Stop reason: SDUPTHER

## 2022-04-20 DIAGNOSIS — E11.65 TYPE 2 DIABETES MELLITUS WITH HYPERGLYCEMIA, WITHOUT LONG-TERM CURRENT USE OF INSULIN (HCC): ICD-10-CM

## 2022-04-21 ENCOUNTER — OFFICE VISIT (OUTPATIENT)
Dept: PAIN MEDICINE | Facility: CLINIC | Age: 72
End: 2022-04-21
Payer: MEDICARE

## 2022-04-21 VITALS
WEIGHT: 194 LBS | SYSTOLIC BLOOD PRESSURE: 177 MMHG | DIASTOLIC BLOOD PRESSURE: 72 MMHG | HEART RATE: 76 BPM | HEIGHT: 61 IN | BODY MASS INDEX: 36.63 KG/M2

## 2022-04-21 DIAGNOSIS — M47.816 LUMBAR SPONDYLOSIS: ICD-10-CM

## 2022-04-21 DIAGNOSIS — M51.36 DDD (DEGENERATIVE DISC DISEASE), LUMBAR: ICD-10-CM

## 2022-04-21 DIAGNOSIS — M48.062 SPINAL STENOSIS OF LUMBAR REGION WITH NEUROGENIC CLAUDICATION: ICD-10-CM

## 2022-04-21 DIAGNOSIS — M54.16 LUMBAR RADICULOPATHY: ICD-10-CM

## 2022-04-21 DIAGNOSIS — G89.4 CHRONIC PAIN SYNDROME: Primary | ICD-10-CM

## 2022-04-21 PROCEDURE — 99214 OFFICE O/P EST MOD 30 MIN: CPT | Performed by: ANESTHESIOLOGY

## 2022-04-21 RX ORDER — ROSUVASTATIN CALCIUM 20 MG/1
TABLET, COATED ORAL
Qty: 90 TABLET | Refills: 1 | Status: SHIPPED | OUTPATIENT
Start: 2022-04-21

## 2022-04-21 NOTE — PROGRESS NOTES
Assessment  1  Chronic pain syndrome    2  Lumbar spondylosis    3  Lumbar radiculopathy    4  DDD (degenerative disc disease), lumbar    5  Spinal stenosis of lumbar region with neurogenic claudication        Plan  20-year-old female with a history of lumbar degenerative disc disease, spondylosis, lumbar radiculopathy, and chronic pain syndrome returning for follow-up  The patient presents with complaints of lumbosacral back pain that radiated into the anterior aspect of the legs to the knees with associated subjective weakness  She denies any interval trauma  The patient is no longer taking tramadol, she is only taking meloxicam p r n  Zulma Arbour She has had relief with lumbar RFA in the past of her low back pain and she has also had relief with L4 and L5 TFESI in the past for her lower extremity radicular symptoms  The patient's low back pain seems to be multifactorial including myofascial and facet mediated components  No evidence of sacroiliitis  Lower extremity symptoms appear to be radicular in nature/neurogenic claudication stemming from stenosis  1  I did offer the patient an L4-5 LESI, however the patient does not feel that her symptoms are bad enough to warrant interventional therapy at this time  She may call at any time to schedule procedure if needed  2  Patient will continue with meloxicam as prescribed  3  Patient will continue with her home exercise program  4  I will follow up the patient p r n  My impressions and treatment recommendations were discussed in detail with the patient who verbalized understanding and had no further questions  Discharge instructions were provided  I personally saw and examined the patient and I agree with the above discussed plan of care  No orders of the defined types were placed in this encounter  No orders of the defined types were placed in this encounter        History of Present Illness    Aryan Lewis is a 74 y o  female with a history of lumbar degenerative disc disease, spondylosis, lumbar radiculopathy, and chronic pain syndrome returning for follow-up  The patient presents with complaints of lumbosacral back pain that radiated into the anterior aspect of the legs to the knees with associated subjective weakness  She denies any interval trauma  She denies any numbness or tingling in the legs, bladder bowel incontinence, or saddle anesthesia  The patient is no longer taking tramadol, she is only taking meloxicam p r n  Francella Crooked She has had relief with lumbar RFA in the past of her low back pain and she has also had relief with L4 and L5 TFESI in the past for her lower extremity radicular symptoms  The patient rates her pain moderate to severe and worse in the morning and evenings  The pain is occasional and described as dull and aching  The pain is worse with standing, walking, and transitional movements  The pain is alleviated with leaning forward, sitting, short periods of exercise, and relaxation  Other than as stated above, the patient denies any interval changes in medications, medical condition, mental condition, symptoms, or allergies since the last office visit  I have personally reviewed and/or updated the patient's past medical history, past surgical history, family history, social history, current medications, allergies, and vital signs today  Review of Systems   Constitutional: Negative for fever and unexpected weight change  HENT: Negative for trouble swallowing  Eyes: Negative for visual disturbance  Respiratory: Negative for shortness of breath and wheezing  Cardiovascular: Negative for chest pain and palpitations  Gastrointestinal: Negative for constipation, diarrhea, nausea and vomiting  Endocrine: Negative for cold intolerance, heat intolerance and polydipsia  Genitourinary: Negative for difficulty urinating and frequency  Musculoskeletal: Positive for gait problem   Negative for arthralgias, joint swelling and myalgias  Pain in extremity   Skin: Negative for rash  Neurological: Negative for dizziness, seizures, syncope, weakness and headaches  Hematological: Does not bruise/bleed easily  Psychiatric/Behavioral: Negative for dysphoric mood  All other systems reviewed and are negative  Patient Active Problem List   Diagnosis    Lumbar radiculopathy    Spinal stenosis of lumbar region with neurogenic claudication    Lumbar spondylosis    Chronic right hip pain    Type 2 diabetes mellitus with hyperglycemia, without long-term current use of insulin (HCC)    Primary osteoarthritis of first carpometacarpal joint of left hand    Arthritis of carpometacarpal (CMC) joint of left thumb    Cataract of both eyes    DDD (degenerative disc disease), lumbar    Facet arthropathy, lumbar    Keratosis    Obesity (BMI 30-39  9)    Obstructive sleep apnea of adult    Secondary polycythemia    Chronic pain syndrome    Current smoker on some days    Essential hypertension    Uncomplicated opioid dependence ([facility] )    Medicare annual wellness visit, subsequent    Trigger finger, left index finger    Trigger ring finger of right hand    Sacroiliitis ([facility] )    Encounter for long-term opiate analgesic use    Vertigo    COPD [facility]chronic obstructive pulmonary disease) ([facility]    Non-seasonal allergic rhinitis    Heart murmur, systolic       Past Medical History:   Diagnosis Date    Asthma     Chronic obstructive lung disease ([facility] )     Community acquired pneumonia     LAST ASSESSED: [id number]    [facility]chronic [facility] disease) ([facility] )     Diabetes mellitus ([facility] )     History of MRSA infection     2008 liver sx    Liver disease     Sleep apnea     Viral warts     LAST ASSESSED: [id number]       Past Surgical History:   Procedure Laterality Date    ABDOMINAL SURGERY      ABDOMINOPLASTY      CHOLECYSTECTOMY      COSMETIC SURGERY      EYE SURGERY      Bilateral cataracts 2015   Eli Purple       FINGER SURGERY      HAND SURGERY      HYSTERECTOMY      LIVER SURGERY      WY INCISE FINGER TENDON SHEATH Right 7/23/2019    Procedure: RELEASE TRIGGER FINGER RIGHT LONG;  Surgeon: Juliane Trevino MD;  [facility];  Service: Orthopedics    W[place] INCISE FINGER TENDON SHEATH Left 8/6/2019    Procedure: RELEASE TRIGGER FINGER LEFT INDEX;  Surgeon: Juliane Trevino MD;  [facility];  Service: Orthopedics    W[place] INCISE FINGER TENDON SHEATH Right 6/16/2020    Procedure: RELEASE TRIGGER FINGER, right ring;  Surgeon: Juliane Trevino MD;  [facility];  Service: Orthopedics    WY REPAIR INTERCARP/CARP-METACARP JT Right 9/9/2016    Procedure: THUMB TRAPEZIECTOMY; BASAL JOINT ARTHROPLASTY WITH APL AUTOGRAFT;  Surgeon: Dorcas Power MD;  [facility];  Service: Orthopedics    W[place] REPAIR INTERCARP/CARP-METACARP JT Left 5/29/2018    Procedure: Anurag Perks;  Surgeon: Juliane Trevino MD;  [facility];  Service: Orthopedics    W[place] TRANSPLANT HAND TENDON Left 5/29/2018    Procedure: TRANSFER TENDON HAND/WRIST FCR from forearm to wrist;  Surgeon: Juliane Trevino MD;  [facility];  Service: Orthopedics       Family History   Problem Relation Age of Onset    Heart attack Father     Heart disease Family     Diabetes Family     Thyroid disease Family     Ovarian cancer Family     Heart attack Mother     Ovarian cancer Sister     Heart disease Other         CARDIAC DISORDER    Diabetes Other     Liver cancer Other     Breast cancer Other     Stomach cancer Other     Basal cell carcinoma Brother        Social History     Occupational History    Not on file   Tobacco Use    Smoking status: Current Every Day Smoker     Packs/day: 0 20    Smokeless tobacco: Never Used   Vaping Use    Vaping Use: Never used   Substance and Sexual Activity    Alcohol use: Yes     Comment: very seldom    Drug use: No    Sexual activity: Not on file       Current Outpatient Medications on File Prior to Visit   Medication Sig    cetirizine (ZyrTEC) 10 mg tablet Take 10 mg by mouth daily    glipiZIDE (GLUCOTROL XL) 5 mg 24 hr tablet TAKE 1 TABLET EVERY DAY  30  MINUTES BEFORE BREAKFAST    glucose blood (ACCU-CHEK JESS PLUS) test strip Test BS 3 times daily  DX E11 9    hydrochlorothiazide (HYDRODIURIL) 25 mg tablet TAKE 1 TABLET EVERY DAY    lisinopril (ZESTRIL) 20 mg tablet TAKE 1 TABLET EVERY DAY    meloxicam (MOBIC) 7 5 mg tablet TAKE 1 TABLET TWICE DAILY    metFORMIN (GLUCOPHAGE) 1000 MG tablet TAKE 1 TABLET TWICE DAILY    Multiple Vitamins-Minerals (OCUVITE ADULT FORMULA PO) Take by mouth    rosuvastatin (CRESTOR) 20 MG tablet TAKE 1 TABLET EVERY DAY    diphenhydrAMINE (BENADRYL) 25 mg capsule Take 25 mg by mouth every 6 (six) hours as needed for itching (Patient not taking: Reported on 12/23/2021 )    pseudoephedrine-guaifenesin (MUCINEX D)  MG per tablet Take 1 tablet by mouth every 12 (twelve) hours (Patient not taking: Reported on 12/23/2021 )    traMADol (ULTRAM) 50 mg tablet Take 1 tablet (50 mg total) by mouth 2 (two) times a day as needed for severe pain    [DISCONTINUED] rosuvastatin (CRESTOR) 20 MG tablet TAKE 1 TABLET EVERY DAY     No current facility-administered medications on file prior to visit  No Known Allergies    Physical Exam    BP (!) 177/72   Pulse 76   Ht 5' 1" (1 549 m)   Wt 88 kg (194 lb)   BMI 36 66 kg/m²     Constitutional: normal, well developed, well nourished, alert, in no distress and non-toxic and no overt pain behavior  Eyes: anicteric  HEENT: grossly intact  Neck: supple, symmetric, trachea midline and no masses   Pulmonary:even and unlabored  Cardiovascular:No edema or pitting edema present  Skin:Normal without rashes or lesions and well hydrated  Psychiatric:Mood and affect appropriate  Neurologic:Cranial Nerves II-XII grossly intact  Musculoskeletal:normal gait    Bilateral lumbar paraspinals tender to palpation from L2-L5  Bilateral lower extremity strength 5/5 in all muscle groups  Sensation intact to light touch in L3 through S1 dermatomes bilaterally  Negative seated straight leg raise bilaterally  Imaging  MRI LUMBAR SPINE WITHOUT CONTRAST     INDICATION: M54 [id number]: Radiculopathy, lumbar region  M48 062: Spinal stenosis, lumbar region with neurogenic claudication      COMPARISON:  MRI from 8/13/2014     TECHNIQUE:  Sagittal T1, sagittal T2, sagittal inversion recovery, axial T1 and axial T2, coronal T2     IMAGE QUALITY:  Diagnostic     FINDINGS:     VERTEBRAL BODIES:  There is a heterogeneous appearance of the visualized osseous structures without focal suspicious lesion  Vertebral body heights are maintained  There is Modic type I endplate degenerative change at L4-L5 and L5-S1      SACRUM:  Normal signal within the sacrum  No evidence of insufficiency or stress fracture      DISTAL CORD AND CONUS:  Normal size and signal within the distal cord and conus        PARASPINAL SOFT TISSUES:  Paraspinal soft tissues are unremarkable      LOWER THORACIC DISC SPACES:  There is disc space narrowing within the lower thoracic spine      LUMBAR DISC SPACES:  There is multilevel disc space degeneration      L1-L2:  No significant canal stenosis or foraminal narrowing      L2-L3:  There is a minimal bulge  There is facet arthrosis  There is no significant canal stenosis  There is no significant foraminal narrowing      L3-L4: There is disc space narrowing  There is a bulging annulus  There is facet arthrosis with ligamentum flavum thickening  There is overall mild mass effect on thecal sac  Findings are overall progressed  There is mild foraminal narrowing      L4-L5:  There is disc space narrowing  There is vacuum disc phenomenon  There is a bulging annulus  There is dorsal osteophytosis  There is facet arthrosis  There is mild mass effect on the thecal sac    There is endplate spurring  There is mild to   moderate left and mild right foraminal narrowing      L5-S1:  There is vacuum disc phenomenon  There is a bulging annulus  There is facet arthrosis  There is endplate spurring  There is moderate to severe bilateral foraminal narrowing with contact of the exiting nerve roots  Findings are grossly   stable      IMPRESSION:     Multilevel degenerative changes of the lumbar spine, as described above      Multifactorial disease results in overall mild mass affect on the thecal sac at L3-L4, slightly progressed      Multilevel degenerative changes in the remainder of the spine are otherwise not significantly changed

## 2022-05-09 DIAGNOSIS — E11.9 TYPE 2 DIABETES MELLITUS WITHOUT COMPLICATION, WITHOUT LONG-TERM CURRENT USE OF INSULIN (HCC): ICD-10-CM

## 2022-05-09 RX ORDER — GLIPIZIDE 5 MG/1
TABLET, FILM COATED, EXTENDED RELEASE ORAL
Qty: 90 TABLET | Refills: 0 | Status: SHIPPED | OUTPATIENT
Start: 2022-05-09

## 2022-05-16 ENCOUNTER — RA CDI HCC (OUTPATIENT)
Dept: OTHER | Facility: HOSPITAL | Age: 72
End: 2022-05-16

## 2022-05-16 NOTE — PROGRESS NOTES
[facility] [address]  coding opportunities       Chart reviewed, no opportunity found: CHART REVIEWED, NO OPPORTUNITY FOUND        Patients Insurance     Medicare Insurance: Medicare

## 2022-05-23 ENCOUNTER — OFFICE VISIT (OUTPATIENT)
Dept: FAMILY MEDICINE CLINIC | Facility: CLINIC | Age: 72
End: 2022-05-23
Payer: MEDICARE

## 2022-05-23 VITALS
WEIGHT: 188.5 LBS | DIASTOLIC BLOOD PRESSURE: 70 MMHG | BODY MASS INDEX: 35.59 KG/M2 | HEIGHT: 61 IN | SYSTOLIC BLOOD PRESSURE: 122 MMHG

## 2022-05-23 DIAGNOSIS — Z78.0 ASYMPTOMATIC POSTMENOPAUSAL STATE: ICD-10-CM

## 2022-05-23 DIAGNOSIS — G47.33 OBSTRUCTIVE SLEEP APNEA OF ADULT: ICD-10-CM

## 2022-05-23 DIAGNOSIS — E66.9 OBESITY (BMI 30-39.9): ICD-10-CM

## 2022-05-23 DIAGNOSIS — J44.9 CHRONIC OBSTRUCTIVE PULMONARY DISEASE, UNSPECIFIED COPD TYPE (HCC): ICD-10-CM

## 2022-05-23 DIAGNOSIS — M54.16 LUMBAR RADICULOPATHY: ICD-10-CM

## 2022-05-23 DIAGNOSIS — R01.1 HEART MURMUR, SYSTOLIC: ICD-10-CM

## 2022-05-23 DIAGNOSIS — I10 ESSENTIAL HYPERTENSION: ICD-10-CM

## 2022-05-23 DIAGNOSIS — Z00.00 MEDICARE ANNUAL WELLNESS VISIT, SUBSEQUENT: ICD-10-CM

## 2022-05-23 DIAGNOSIS — E11.65 TYPE 2 DIABETES MELLITUS WITH HYPERGLYCEMIA, WITHOUT LONG-TERM CURRENT USE OF INSULIN (HCC): Primary | ICD-10-CM

## 2022-05-23 DIAGNOSIS — J30.89 NON-SEASONAL ALLERGIC RHINITIS, UNSPECIFIED TRIGGER: ICD-10-CM

## 2022-05-23 DIAGNOSIS — Z12.31 ENCOUNTER FOR SCREENING MAMMOGRAM FOR BREAST CANCER: ICD-10-CM

## 2022-05-23 PROBLEM — Z79.891 ENCOUNTER FOR LONG-TERM OPIATE ANALGESIC USE: Status: RESOLVED | Noted: 2019-07-18 | Resolved: 2022-05-23

## 2022-05-23 PROBLEM — F11.20 UNCOMPLICATED OPIOID DEPENDENCE (HCC): Status: RESOLVED | Noted: 2019-04-09 | Resolved: 2022-05-23

## 2022-05-23 LAB — SL AMB POCT HEMOGLOBIN AIC: 7.1 (ref ?–6.5)

## 2022-05-23 PROCEDURE — 83036 HEMOGLOBIN GLYCOSYLATED A1C: CPT | Performed by: FAMILY MEDICINE

## 2022-05-23 PROCEDURE — 99214 OFFICE O/P EST MOD 30 MIN: CPT | Performed by: FAMILY MEDICINE

## 2022-05-23 PROCEDURE — 36415 COLL VENOUS BLD VENIPUNCTURE: CPT | Performed by: FAMILY MEDICINE

## 2022-05-23 PROCEDURE — G0439 PPPS, SUBSEQ VISIT: HCPCS | Performed by: FAMILY MEDICINE

## 2022-05-23 NOTE — PATIENT INSTRUCTIONS
Medicare Preventive Visit Patient Instructions  Thank you for completing your Welcome to Medicare Visit or Medicare Annual Wellness Visit today  Your next wellness visit will be due in one year (5/24/2023)  The screening/preventive services that you may require over the next 5-10 years are detailed below  Some tests may not apply to you based off risk factors and/or age  Screening tests ordered at today's visit but not completed yet may show as past due  Also, please note that scanned in results may not display below  Preventive Screenings:  Service Recommendations Previous Testing/Comments   Colorectal Cancer Screening  * Colonoscopy    * Fecal Occult Blood Test (FOBT)/Fecal Immunochemical Test (FIT)  * Fecal DNA/Cologuard Test  * Flexible Sigmoidoscopy Age: 54-65 years old   Colonoscopy: every 10 years (may be performed more frequently if at higher risk)  OR  FOBT/FIT: every 1 year  OR  Cologuard: every 3 years  OR  Sigmoidoscopy: every 5 years  Screening may be recommended earlier than age 48 if at higher risk for colorectal cancer  Also, an individualized decision between you and your healthcare provider will decide whether screening between the ages of 74-80 would be appropriate  Colonoscopy: 08/02/2021  FOBT/FIT: Not on file  Cologuard: Not on file  Sigmoidoscopy: Not on file    Screening Current     Breast Cancer Screening Age: 36 years old  Frequency: every 1-2 years  Not required if history of left and right mastectomy Mammogram: 07/30/2014        Cervical Cancer Screening Between the ages of 21-29, pap smear recommended once every 3 years  Between the ages of 33-67, can perform pap smear with HPV co-testing every 5 years     Recommendations may differ for women with a history of total hysterectomy, cervical cancer, or abnormal pap smears in past  Pap Smear: Not on file    Screening Not Indicated   Hepatitis C Screening Once for adults born between 1945 and 1965  More frequently in patients at high risk for Hepatitis C Hep C Antibody: 01/29/2019    Screening Current   Diabetes Screening 1-2 times per year if you're at risk for diabetes or have pre-diabetes Fasting glucose: 135 mg/dL   A1C: 7 0    Screening Not Indicated  History Diabetes   Cholesterol Screening Once every 5 years if you don't have a lipid disorder  May order more often based on risk factors  Lipid panel: 12/23/2021    Screening Current     Other Preventive Screenings Covered by Medicare:  1  Abdominal Aortic Aneurysm (AAA) Screening: covered once if your at risk  You're considered to be at risk if you have a family history of AAA  2  Lung Cancer Screening: covers low dose CT scan once per year if you meet all of the following conditions: (1) Age 50-69; (2) No signs or symptoms of lung cancer; (3) Current smoker or have quit smoking within the last 15 years; (4) You have a tobacco smoking history of at least 30 pack years (packs per day multiplied by number of years you smoked); (5) You get a written order from a healthcare provider  3  Glaucoma Screening: covered annually if you're considered high risk: (1) You have diabetes OR (2) Family history of glaucoma OR (3)  aged 48 and older OR (3)  American aged 72 and older  3  Osteoporosis Screening: covered every 2 years if you meet one of the following conditions: (1) You're estrogen deficient and at risk for osteoporosis based off medical history and other findings; (2) Have a vertebral abnormality; (3) On glucocorticoid therapy for more than 3 months; (4) Have primary hyperparathyroidism; (5) On osteoporosis medications and need to assess response to drug therapy  · Last bone density test (DXA Scan): Not on file  5  HIV Screening: covered annually if you're between the age of 12-76  Also covered annually if you are younger than 13 and older than 72 with risk factors for HIV infection   For pregnant patients, it is covered up to 3 times per pregnancy  Immunizations:  Immunization Recommendations   Influenza Vaccine Annual influenza vaccination during flu season is recommended for all persons aged >= 6 months who do not have contraindications   Pneumococcal Vaccine (Prevnar and Pneumovax)  * Prevnar = PCV13  * Pneumovax = PPSV23   Adults 25-60 years old: 1-3 doses may be recommended based on certain risk factors  Adults 72 years old: Prevnar (PCV13) vaccine recommended followed by Pneumovax (PPSV23) vaccine  If already received PPSV23 since turning 65, then PCV13 recommended at least one year after PPSV23 dose  Hepatitis B Vaccine 3 dose series if at intermediate or high risk (ex: diabetes, end stage renal disease, liver disease)   Tetanus (Td) Vaccine - COST NOT COVERED BY MEDICARE PART B Following completion of primary series, a booster dose should be given every 10 years to maintain immunity against tetanus  Td may also be given as tetanus wound prophylaxis  Tdap Vaccine - COST NOT COVERED BY MEDICARE PART B Recommended at least once for all adults  For pregnant patients, recommended with each pregnancy  Shingles Vaccine (Shingrix) - COST NOT COVERED BY MEDICARE PART B  2 shot series recommended in those aged 48 and above     Health Maintenance Due:      Topic Date Due    Breast Cancer Screening: Mammogram  07/30/2015    Colorectal Cancer Screening  08/01/2024    Hepatitis C Screening  Completed     Immunizations Due:  There are no preventive care reminders to display for this patient  Advance Directives   What are advance directives? Advance directives are legal documents that state your wishes and plans for medical care  These plans are made ahead of time in case you lose your ability to make decisions for yourself  Advance directives can apply to any medical decision, such as the treatments you want, and if you want to donate organs  What are the types of advance directives?   There are many types of advance directives, and each state has rules about how to use them  You may choose a combination of any of the following:  · Living will: This is a written record of the treatment you want  You can also choose which treatments you do not want, which to limit, and which to stop at a certain time  This includes surgery, medicine, IV fluid, and tube feedings  · Durable power of  for healthcare [facility]): This is a written record that states who you want to make healthcare choices for you when you are unable to make them for yourself  This person, called a proxy, is usually a family member or a friend  You may choose more than 1 proxy  · Do not resuscitate (DNR) order:  A DNR order is used in case your heart stops beating or you stop breathing  It is a request not to have certain forms of treatment, such as CPR  A DNR order may be included in other types of advance directives  · Medical directive: This covers the care that you want if you are in a coma, near death, or unable to make decisions for yourself  You can list the treatments you want for each condition  Treatment may include pain medicine, surgery, blood transfusions, dialysis, IV or tube feedings, and a ventilator (breathing machine)  · Values history: This document has questions about your views, beliefs, and how you feel and think about life  This information can help others choose the care that you would choose  Why are advance directives important? An advance directive helps you control your care  Although spoken wishes may be used, it is better to have your wishes written down  Spoken wishes can be misunderstood, or not followed  Treatments may be given even if you do not want them  An advance directive may make it easier for your family to make difficult choices about your care  Cigarette Smoking and Your Health   Risks to your health if you smoke:  Nicotine and other chemicals found in tobacco damage every cell in your body   Even if you are a light smoker, you have an increased risk for cancer, heart disease, and lung disease  If you are pregnant or have diabetes, smoking increases your risk for complications  Benefits to your health if you stop smoking:   · You decrease respiratory symptoms such as coughing, wheezing, and shortness of breath  · You reduce your risk for cancers of the lung, mouth, throat, kidney, bladder, pancreas, stomach, and cervix  If you already have cancer, you increase the benefits of chemotherapy  You also reduce your risk for cancer returning or a second cancer from developing  · You reduce your risk for heart disease, blood clots, heart attack, and stroke  · You reduce your risk for lung infections, and diseases such as pneumonia, asthma, chronic bronchitis, and emphysema  · Your circulation improves  More oxygen can be delivered to your body  If you have diabetes, you lower your risk for complications, such as kidney, artery, and eye diseases  You also lower your risk for nerve damage  Nerve damage can lead to amputations, poor vision, and blindness  · You improve your body's ability to heal and to fight infections  For more information and support to stop smoking:   · [employer]  Phone: 7- 300 - 024-8132  Web Address: www [employer]  Weight Management   Why it is important to manage your weight:  Being overweight increases your risk of health conditions such as heart disease, high blood pressure, type 2 diabetes, and certain types of cancer  It can also increase your risk for osteoarthritis, sleep apnea, and other respiratory problems  Aim for a slow, steady weight loss  Even a small amount of weight loss can lower your risk of health problems  How to lose weight safely:  A safe and healthy way to lose weight is to eat fewer calories and get regular exercise  You can lose up about 1 pound a week by decreasing the number of calories you eat by 500 calories each day     Healthy meal plan for weight management:  A healthy meal plan includes a variety of foods, contains fewer calories, and helps you stay healthy  A healthy meal plan includes the following:  · Eat whole-grain foods more often  A healthy meal plan should contain fiber  Fiber is the part of grains, fruits, and vegetables that is not broken down by your body  Whole-grain foods are healthy and provide extra fiber in your diet  Some examples of whole-grain foods are whole-wheat breads and pastas, oatmeal, brown rice, and bulgur  · Eat a variety of vegetables every day  Include dark, leafy greens such as spinach, kale, jose greens, and mustard greens  Eat yellow and orange vegetables such as carrots, sweet potatoes, and winter squash  · Eat a variety of fruits every day  Choose fresh or canned fruit (canned in its own juice or light syrup) instead of juice  Fruit juice has very little or no fiber  · Eat low-fat dairy foods  Drink fat-free (skim) milk or 1% milk  Eat fat-free yogurt and low-fat cottage cheese  Try low-fat cheeses such as mozzarella and other reduced-fat cheeses  · Choose meat and other protein foods that are low in fat  Choose beans or other legumes such as split peas or lentils  Choose fish, skinless poultry (chicken or turkey), or lean cuts of red meat (beef or pork)  Before you cook meat or poultry, cut off any visible fat  · Use less fat and oil  Try baking foods instead of frying them  Add less fat, such as margarine, sour cream, regular salad dressing and mayonnaise to foods  Eat fewer high-fat foods  Some examples of high-fat foods include french fries, doughnuts, ice cream, and cakes  · Eat fewer sweets  Limit foods and drinks that are high in sugar  This includes candy, cookies, regular soda, and sweetened drinks  Exercise:  Exercise at least 30 minutes per day on most days of the week  Some examples of exercise include walking, biking, dancing, and swimming   You can also fit in more physical activity by taking the stairs instead of the elevator or parking farther away from stores  Ask your healthcare provider about the best exercise plan for you  © Copyright [employer] 2018 Information is for End User's use only and may not be sold, redistributed or otherwise used for commercial purposes  All illustrations and images included in CareNotes® are the copyrighted property of [employer]  or [facility] Preventive Visit Patient Instructions  Thank you for completing your Welcome to Medicare Visit or Medicare Annual Wellness Visit today  Your next wellness visit will be due in one year (5/24/2023)  The screening/preventive services that you may require over the next 5-10 years are detailed below  Some tests may not apply to you based off risk factors and/or age  Screening tests ordered at today's visit but not completed yet may show as past due  Also, please note that scanned in results may not display below  Preventive Screenings:  Service Recommendations Previous Testing/Comments   Colorectal Cancer Screening  * Colonoscopy    * Fecal Occult Blood Test (FOBT)/Fecal Immunochemical Test (FIT)  * Fecal DNA/Cologuard Test  * Flexible Sigmoidoscopy Age: 54-65 years old   Colonoscopy: every 10 years (may be performed more frequently if at higher risk)  OR  FOBT/FIT: every 1 year  OR  Cologuard: every 3 years  OR  Sigmoidoscopy: every 5 years  Screening may be recommended earlier than age 48 if at higher risk for colorectal cancer  Also, an individualized decision between you and your healthcare provider will decide whether screening between the ages of 74-80 would be appropriate   Colonoscopy: 08/02/2021  FOBT/FIT: Not on file  Cologuard: Not on file  Sigmoidoscopy: Not on file    Screening Current     Breast Cancer Screening Age: 34 years old  Frequency: every 1-2 years  Not required if history of left and right mastectomy Mammogram: 07/30/2014    Risks and Benefits Discussed  Due for Mammogram   Cervical Cancer Screening Between the ages of 21-29, pap smear recommended once every 3 years  Between the ages of 33-67, can perform pap smear with HPV co-testing every 5 years  Recommendations may differ for women with a history of total hysterectomy, cervical cancer, or abnormal pap smears in past  Pap Smear: Not on file    Screening Not Indicated   Hepatitis C Screening Once for adults born between 1945 and 1965  More frequently in patients at high risk for Hepatitis C Hep C Antibody: 01/29/2019    Screening Current   Diabetes Screening 1-2 times per year if you're at risk for diabetes or have pre-diabetes Fasting glucose: 135 mg/dL   A1C: 7 1    Screening Not Indicated  History Diabetes   Cholesterol Screening Once every 5 years if you don't have a lipid disorder  May order more often based on risk factors  Lipid panel: 12/23/2021    Screening Current     Other Preventive Screenings Covered by Medicare:  6  Abdominal Aortic Aneurysm (AAA) Screening: covered once if your at risk  You're considered to be at risk if you have a family history of AAA  7  Lung Cancer Screening: covers low dose CT scan once per year if you meet all of the following conditions: (1) Age 50-69; (2) No signs or symptoms of lung cancer; (3) Current smoker or have quit smoking within the last 15 years; (4) You have a tobacco smoking history of at least 30 pack years (packs per day multiplied by number of years you smoked); (5) You get a written order from a healthcare provider  8  Glaucoma Screening: covered annually if you're considered high risk: (1) You have diabetes OR (2) Family history of glaucoma OR (3)  aged 48 and older OR (3)  American aged 72 and older  5   Osteoporosis Screening: covered every 2 years if you meet one of the following conditions: (1) You're estrogen deficient and at risk for osteoporosis based off medical history and other findings; (2) Have a vertebral abnormality; (3) On glucocorticoid therapy for more than 3 months; (4) Have primary hyperparathyroidism; (5) On osteoporosis medications and need to assess response to drug therapy  · Last bone density test (DXA Scan): Not on file  10  HIV Screening: covered annually if you're between the age of 12-76  Also covered annually if you are younger than 13 and older than 72 with risk factors for HIV infection  For pregnant patients, it is covered up to 3 times per pregnancy  Immunizations:  Immunization Recommendations   Influenza Vaccine Annual influenza vaccination during flu season is recommended for all persons aged >= 6 months who do not have contraindications   Pneumococcal Vaccine (Prevnar and Pneumovax)  * Prevnar = PCV13  * Pneumovax = PPSV23   Adults 25-60 years old: 1-3 doses may be recommended based on certain risk factors  Adults 72 years old: Prevnar (PCV13) vaccine recommended followed by Pneumovax (PPSV23) vaccine  If already received PPSV23 since turning 65, then PCV13 recommended at least one year after PPSV23 dose  Hepatitis B Vaccine 3 dose series if at intermediate or high risk (ex: diabetes, end stage renal disease, liver disease)   Tetanus (Td) Vaccine - COST NOT COVERED BY MEDICARE PART B Following completion of primary series, a booster dose should be given every 10 years to maintain immunity against tetanus  Td may also be given as tetanus wound prophylaxis  Tdap Vaccine - COST NOT COVERED BY MEDICARE PART B Recommended at least once for all adults  For pregnant patients, recommended with each pregnancy  Shingles Vaccine (Shingrix) - COST NOT COVERED BY MEDICARE PART B  2 shot series recommended in those aged 48 and above     Health Maintenance Due:      Topic Date Due    Breast Cancer Screening: Mammogram  07/30/2015    Colorectal Cancer Screening  08/01/2024    Hepatitis C Screening  Completed     Immunizations Due:  There are no preventive care reminders to display for this patient  Advance Directives   What are advance directives? Advance directives are legal documents that state your wishes and plans for medical care  These plans are made ahead of time in case you lose your ability to make decisions for yourself  Advance directives can apply to any medical decision, such as the treatments you want, and if you want to donate organs  What are the types of advance directives? There are many types of advance directives, and each state has rules about how to use them  You may choose a combination of any of the following:  · Living will: This is a written record of the treatment you want  You can also choose which treatments you do not want, which to limit, and which to stop at a certain time  This includes surgery, medicine, IV fluid, and tube feedings  · Durable power of  for healthcare [facility]): This is a written record that states who you want to make healthcare choices for you when you are unable to make them for yourself  This person, called a proxy, is usually a family member or a friend  You may choose more than 1 proxy  · Do not resuscitate (DNR) order:  A DNR order is used in case your heart stops beating or you stop breathing  It is a request not to have certain forms of treatment, such as CPR  A DNR order may be included in other types of advance directives  · Medical directive: This covers the care that you want if you are in a coma, near death, or unable to make decisions for yourself  You can list the treatments you want for each condition  Treatment may include pain medicine, surgery, blood transfusions, dialysis, IV or tube feedings, and a ventilator (breathing machine)  · Values history: This document has questions about your views, beliefs, and how you feel and think about life  This information can help others choose the care that you would choose  Why are advance directives important? An advance directive helps you control your care   Although spoken wishes may be used, it is better to have your wishes written down  Spoken wishes can be misunderstood, or not followed  Treatments may be given even if you do not want them  An advance directive may make it easier for your family to make difficult choices about your care  Cigarette Smoking and Your Health   Risks to your health if you smoke:  Nicotine and other chemicals found in tobacco damage every cell in your body  Even if you are a light smoker, you have an increased risk for cancer, heart disease, and lung disease  If you are pregnant or have diabetes, smoking increases your risk for complications  Benefits to your health if you stop smoking:   · You decrease respiratory symptoms such as coughing, wheezing, and shortness of breath  · You reduce your risk for cancers of the lung, mouth, throat, kidney, bladder, pancreas, stomach, and cervix  If you already have cancer, you increase the benefits of chemotherapy  You also reduce your risk for cancer returning or a second cancer from developing  · You reduce your risk for heart disease, blood clots, heart attack, and stroke  · You reduce your risk for lung infections, and diseases such as pneumonia, asthma, chronic bronchitis, and emphysema  · Your circulation improves  More oxygen can be delivered to your body  If you have diabetes, you lower your risk for complications, such as kidney, artery, and eye diseases  You also lower your risk for nerve damage  Nerve damage can lead to amputations, poor vision, and blindness  · You improve your body's ability to heal and to fight infections  For more information and support to stop smoking:   · [employer]  Phone: 4- 696 - 609-4051  Web Address: [employer]  Weight Management   Why it is important to manage your weight:  Being overweight increases your risk of health conditions such as heart disease, high blood pressure, type 2 diabetes, and certain types of cancer   It can also increase your risk for osteoarthritis, sleep apnea, and other respiratory problems  Aim for a slow, steady weight loss  Even a small amount of weight loss can lower your risk of health problems  How to lose weight safely:  A safe and healthy way to lose weight is to eat fewer calories and get regular exercise  You can lose up about 1 pound a week by decreasing the number of calories you eat by 500 calories each day  Healthy meal plan for weight management:  A healthy meal plan includes a variety of foods, contains fewer calories, and helps you stay healthy  A healthy meal plan includes the following:  · Eat whole-grain foods more often  A healthy meal plan should contain fiber  Fiber is the part of grains, fruits, and vegetables that is not broken down by your body  Whole-grain foods are healthy and provide extra fiber in your diet  Some examples of whole-grain foods are whole-wheat breads and pastas, oatmeal, brown rice, and bulgur  · Eat a variety of vegetables every day  Include dark, leafy greens such as spinach, kale, jose greens, and mustard greens  Eat yellow and orange vegetables such as carrots, sweet potatoes, and winter squash  · Eat a variety of fruits every day  Choose fresh or canned fruit (canned in its own juice or light syrup) instead of juice  Fruit juice has very little or no fiber  · Eat low-fat dairy foods  Drink fat-free (skim) milk or 1% milk  Eat fat-free yogurt and low-fat cottage cheese  Try low-fat cheeses such as mozzarella and other reduced-fat cheeses  · Choose meat and other protein foods that are low in fat  Choose beans or other legumes such as split peas or lentils  Choose fish, skinless poultry (chicken or turkey), or lean cuts of red meat (beef or pork)  Before you cook meat or poultry, cut off any visible fat  · Use less fat and oil  Try baking foods instead of frying them  Add less fat, such as margarine, sour cream, regular salad dressing and mayonnaise to foods  Eat fewer high-fat foods   Some examples of high-fat foods include french fries, doughnuts, ice cream, and cakes  · Eat fewer sweets  Limit foods and drinks that are high in sugar  This includes candy, cookies, regular soda, and sweetened drinks  Exercise:  Exercise at least 30 minutes per day on most days of the week  Some examples of exercise include walking, biking, dancing, and swimming  You can also fit in more physical activity by taking the stairs instead of the elevator or parking farther away from stores  Ask your healthcare provider about the best exercise plan for you  © Copyright [address] 2018 Information is for End User's use only and may not be sold, redistributed or otherwise used for commercial purposes   All illustrations and images included in CareNotes® are the copyrighted property of [employer] , [employer]  or [address] [place] [address]

## 2022-05-23 NOTE — PROGRESS NOTES
Assessment and Plan:     Problem List Items Addressed This Visit        Endocrine    Type 2 diabetes mellitus with hyperglycemia, without long-term current use of insulin ([facility] [address] ) - Primary    Relevant Orders    CBC    Comprehensive metabolic panel    Lipid panel    POCT hemoglobin A1c (Completed)       Respiratory    Obstructive sleep apnea of adult     C/w CPAP and feels that it is effective           Non-seasonal allergic rhinitis     Continue with loratadine           COPD (chronic obstructive pulmonary disease) (HCC)     Has albuterol, no recent use              Other    Medicare annual wellness visit, subsequent     The patient presents for a Medicare subsequent wellness visit  She did have her colonoscopy last summer  She has not had mammogram, DEXA scan or CT lung screen  She does agree to mammogram and DEXA scan but declines CT lung screen after informed discussion  Other Visit Diagnoses     Encounter for screening mammogram for breast cancer        Relevant Orders    Mammo screening bilateral w 3d & cad    Asymptomatic postmenopausal state        Relevant Orders    DXA bone density spine hip and pelvis        BMI Counseling: There is no height or weight on file to calculate BMI  The BMI is above normal  Nutrition recommendations include decreasing portion sizes, encouraging healthy choices of fruits and vegetables, limiting drinks that contain sugar and moderation in carbohydrate intake  Exercise recommendations include moderate physical activity 150 minutes/week and exercising 3-5 times per week  No pharmacotherapy was ordered  Rationale for BMI follow-up plan is due to patient being overweight or obese  ADA diet    Depression Screening and Follow-up Plan: Patient was screened for depression during today's encounter  They screened negative with a PHQ-2 score of 0        Preventive health issues were discussed with patient, and age appropriate screening tests were ordered as noted in patient's After Visit Summary  Personalized health advice and appropriate referrals for health education or preventive services given if needed, as noted in patient's After Visit Summary  History of Present Illness:     Patient presents for Medicare Annual Wellness visit    Patient Care Team:  Florencio Barber MD as PCP - General  MD Chico Cole MD Enola Ma, DO     Problem List:     Patient Active Problem List   Diagnosis    Lumbar radiculopathy    Spinal stenosis of lumbar region with neurogenic claudication    Lumbar spondylosis    Chronic right hip pain    Type 2 diabetes mellitus with hyperglycemia, without long-term current use of insulin ([facility] )    Primary osteoarthritis of first carpometacarpal joint of left hand    Arthritis of carpometacarpal (CMC) joint of left thumb    Cataract of both eyes    DDD (degenerative disc disease), lumbar    Facet arthropathy, lumbar    Keratosis    Obesity (BMI 30-39  9)    Obstructive sleep apnea of adult    Secondary polycythemia    Chronic pain syndrome    Current smoker on some days    Essential hypertension    Medicare annual wellness visit, subsequent    Trigger finger, left index finger    Trigger ring finger of right hand    Sacroiliitis (HCC)    Vertigo    COPD (chronic obstructive pulmonary disease) (HCC)    Non-seasonal allergic rhinitis    Heart murmur, systolic      Past Medical and Surgical History:     Past Medical History:   Diagnosis Date    Asthma     Chronic obstructive lung disease ([facility] )     Community acquired pneumonia     LAST ASSESSED: [id number]    COPD (chronic obstructive pulmonary disease) ([facility] )     Diabetes mellitus ([facility] )     History of MRSA infection     2008 liver sx    Liver disease     Sleep apnea     Viral warts     LAST ASSESSED: [id number]     Past Surgical History:   Procedure Laterality Date    ABDOMINAL SURGERY      ABDOMINOPLASTY      CHOLECYSTECTOMY      COSMETIC SURGERY      EYE SURGERY Bilateral cataracts 2015 Dr Trevon Hernandez       FINGER SURGERY      HAND SURGERY      HYSTERECTOMY      LIVER SURGERY      [place] INCISE FINGER TENDON SHEATH Right 7/23/2019    Procedure: RELEASE TRIGGER FINGER RIGHT LONG;  Surgeon: Shani Green MD;  [facility];  Service: Orthopedics    [place] INCISE FINGER TENDON SHEATH Left 8/6/2019    Procedure: RELEASE TRIGGER FINGER LEFT INDEX;  Surgeon: Shani Green MD;  [facility];  Service: Orthopedics    [place] INCISE FINGER TENDON SHEATH Right 6/16/2020    Procedure: RELEASE TRIGGER FINGER, right ring;  Surgeon: Shani Green MD;  [facility];  Service: Orthopedics    [place] REPAIR INTERCARP/CARP-METACARP JT Right 9/9/2016    Procedure: THUMB TRAPEZIECTOMY; BASAL JOINT ARTHROPLASTY WITH APL AUTOGRAFT;  Surgeon: Sylvia Peña MD;  [facility];  Service: Orthopedics    [place] REPAIR INTERCARP/CARP-METACARP JT Left 5/29/2018    Procedure: Thena Bending;  Surgeon: Shani Green MD;  [facility];  Service: Orthopedics    [place] TRANSPLANT HAND TENDON Left 5/29/2018    Procedure: TRANSFER TENDON HAND/WRIST FCR from forearm to wrist;  Surgeon: Shani Green MD;  [facility];  Service: Orthopedics      Family History:     Family History   Problem Relation Age of Onset    Heart attack Father     Heart disease Family     Diabetes Family     Thyroid disease Family     Ovarian cancer Family     Heart attack Mother     Ovarian cancer Sister     Heart disease Other         CARDIAC DISORDER    Diabetes Other     Liver cancer Other     Breast cancer Other     Stomach cancer Other     Basal cell carcinoma Brother       Social History:     Social History     Socioeconomic History    Marital status: /Civil Union     Spouse name: Not on file    Number of children: Not on file    Years of education: Not on file    Highest education level: Not on file   Occupational History    Not on file   Tobacco Use    Smoking status: Current Every Day Smoker     Packs/day: 0 20    Smokeless tobacco: Never Used   Vaping Use    Vaping Use: Never used   Substance and Sexual Activity    Alcohol use: Yes     Comment: very seldom    Drug use: No    Sexual activity: Not on file   Other Topics Concern    Not on file   Social History Narrative    Not on file     Social Determinants of Health     Financial Resource Strain: Not on file   Food Insecurity: Not on file   Transportation Needs: Not on file   Physical Activity: Not on file   Stress: Not on file   Social Connections: Not on file   Intimate Partner Violence: Not on file   Housing Stability: Not on file      Medications and Allergies:     Current Outpatient Medications   Medication Sig Dispense Refill    glipiZIDE (GLUCOTROL XL) 5 mg 24 hr tablet TAKE 1 TABLET EVERY DAY  30  MINUTES BEFORE BREAKFAST 90 tablet 0    glucose blood (ACCU-CHEK JESS PLUS) test strip Test BS 3 times daily  DX E11 9 100 each 2    hydrochlorothiazide (HYDRODIURIL) 25 mg tablet TAKE 1 TABLET EVERY DAY 90 tablet 1    lisinopril (ZESTRIL) 20 mg tablet TAKE 1 TABLET EVERY DAY 90 tablet 1    meloxicam (MOBIC) 7 5 mg tablet TAKE 1 TABLET TWICE DAILY 180 tablet 0    metFORMIN (GLUCOPHAGE) 1000 MG tablet TAKE 1 TABLET TWICE DAILY 180 tablet 0    Multiple Vitamins-Minerals (OCUVITE ADULT FORMULA PO) Take by mouth      rosuvastatin (CRESTOR) 20 MG tablet TAKE 1 TABLET EVERY DAY 90 tablet 1     No current facility-administered medications for this visit       No Known Allergies   Immunizations:     Immunization History   Administered Date(s) Administered    COVID-19 PFIZER VACCINE 0 3 ML IM 02/19/2021, 03/11/2021, 10/09/2021, 05/13/2022    INFLUENZA 10/02/2013, 01/07/2016, 01/14/2017, 09/05/2017, 08/31/2021    Influenza Split High Dose Preservative Free IM 09/05/2017    Influenza, high dose seasonal 0 7 mL 09/10/2020    Influenza, seasonal, injectable 10/02/2013, 01/14/2017  Influenza, seasonal, injectable, preservative free 09/13/2018    Pneumococcal Conjugate 13-Valent 08/24/2017    Pneumococcal Conjugate PCV 7 09/06/2017    Pneumococcal Polysaccharide PPV23 08/15/2018    Tdap 09/04/2014    Zoster 10/02/2013    influenza, trivalent, adjuvanted 09/11/2019      Health Maintenance:         Topic Date Due    Breast Cancer Screening: Mammogram  07/30/2015    Colorectal Cancer Screening  08/01/2024    Hepatitis C Screening  Completed     There are no preventive care reminders to display for this patient  Medicare Health Risk Assessment:     /70 (BP Location: Left arm)   Ht 5' 1" (1 549 m)   Wt 85 5 kg (188 lb 8 oz)   BMI 35 62 kg/m²      Becka Garcia is here for her Subsequent Wellness visit  Health Risk Assessment:   Patient rates overall health as fair  Patient feels that their physical health rating is slightly worse  Patient is satisfied with their life  Eyesight was rated as same  Hearing was rated as same  Patient feels that their emotional and mental health rating is same  Patients states they are never, rarely angry  Patient states they are never, rarely unusually tired/fatigued  Pain experienced in the last 7 days has been some  Patient's pain rating has been 4/10  Patient states that she has experienced weight loss or gain in last 6 months  Lumbago    Depression Screening:   PHQ-2 Score: 0      Fall Risk Screening: In the past year, patient has experienced: no history of falling in past year      Urinary Incontinence Screening:   Patient has not leaked urine accidently in the last six months  Home Safety:  Patient has trouble with stairs inside or outside of their home  Patient has working smoke alarms and has working carbon monoxide detector  Home safety hazards include: none  Nutrition:   Current diet is Diabetic  Medications:   Patient is currently taking over-the-counter supplements   OTC medications include: see medication list  Patient is able to manage medications  Activities of Daily Living (ADLs)/Instrumental Activities of Daily Living (IADLs):   Walk and transfer into and out of bed and chair?: Yes  Dress and groom yourself?: Yes    Bathe or shower yourself?: Yes    Feed yourself? Yes  Do your laundry/housekeeping?: Yes  Manage your money, pay your bills and track your expenses?: Yes  Make your own meals?: Yes    Do your own shopping?: Yes    Durable Medical Equipment Suppliers  [employer]    Previous Hospitalizations:   Any hospitalizations or ED visits within the last 12 months?: No      Advance Care Planning:   Living will: Yes    Durable POA for healthcare: Yes    Advanced directive: Yes      Comments: Active    Cognitive Screening:   Provider or family/friend/caregiver concerned regarding cognition?: No    PREVENTIVE SCREENINGS      Cardiovascular Screening:    General: Screening Current      Diabetes Screening:     General: Screening Not Indicated and History Diabetes      Colorectal Cancer Screening:     General: Screening Current      Breast Cancer Screening:     General: Risks and Benefits Discussed    Due for: Mammogram        Cervical Cancer Screening:    General: Screening Not Indicated      Osteoporosis Screening:    General: Risks and Benefits Discussed    Due for: DXA Axial      Abdominal Aortic Aneurysm (AAA) Screening:        General: Screening Not Indicated      Lung Cancer Screening:     General: Risks and Benefits Discussed and Patient Declines      Hepatitis C Screening:    General: Screening Current    Screening, Brief Intervention, and Referral to Treatment (SBIRT)    Screening  Typical number of drinks in a day: 0  Typical number of drinks in a week: 0  Interpretation: Low risk drinking behavior      Single Item Drug Screening:  How often have you used an illegal drug (including marijuana) or a prescription medication for non-medical reasons in the past year? never    Single Item Drug Screen Score: 0  Interpretation: Negative screen for possible drug use disorder      Luis Alberto Moore MD

## 2022-05-23 NOTE — PROGRESS NOTES
Assessment/Plan:  Obstructive sleep apnea of adult  C/w CPAP and feels that it is effective    Non-seasonal allergic rhinitis  Continue with loratadine    COPD (chronic obstructive pulmonary disease) ([facility])  Has albuterol, no recent use    Medicare annual wellness visit, subsequent  The patient presents for a Medicare subsequent wellness visit  She did have her colonoscopy last summer  She has not had mammogram, DEXA scan or CT lung screen  She does agree to mammogram and DEXA scan but declines CT lung screen after informed discussion  Essential hypertension  Blood pressure well controlled  Continue hydrochlorothiazide and lisinopril  CBC and CMP today  Type 2 diabetes mellitus with hyperglycemia, without long-term current use of insulin ([facility])  Hemoglobin A1c is 7 1 nearly at goal   She is encouraged to continue with her metformin and glipizide as well as continued efforts at improvement in diet and exercise when lumbago allows  Recheck in 6 months  Lab Results   Component Value Date    HGBA1C 7 1 (A) 05/23/2022       Lumbar radiculopathy  Continued follow-up pain management  Obesity (BMI 30-39  9)  Continued efforts at improvement in diet and exercise for weight loss    Heart murmur, systolic  Continues to remain asymptomatic    Followed through due to concerns related pandemic         Diagnoses and all orders for this visit:    Type 2 diabetes mellitus with hyperglycemia, without long-term current use of insulin ([facility])  -     CBC  -     Comprehensive metabolic panel  -     Lipid panel  -     POCT hemoglobin A1c    Chronic obstructive pulmonary disease, unspecified COPD type ([facility])    Obstructive sleep apnea of adult    Non-seasonal allergic rhinitis, unspecified trigger    Medicare annual wellness visit, subsequent    Encounter for screening mammogram for breast cancer  -     Mammo screening bilateral w 3d & cad; Future    Asymptomatic postmenopausal state  -     DXA bone density spine hip and pelvis; Future    Essential hypertension    Lumbar radiculopathy    Obesity (BMI 30-39  9)    Heart murmur, systolic          Subjective:   Chief Complaint   Patient presents with   [facility] Wellness Visit     Subsequent AWV    Follow-up     Med check        Patient ID: Donald Hathaway is a 74 y o  female  More lumbago and Dr Michelle Mccauley is recommending ELLEN which she is going to schedule  Quit tobacco  No CP or worsening SOB  HPI  The patient is a 68-year-old female who presents today for routine follow-up type 2 diabetes, struck to sleep apnea, allergic rhinitis, COPD, essential hypertension, lumbar radiculopathy/spinal stenosis in addition to obesity, current tobacco use among other  She states that her only current issue really is worsening lumbago and spinal stenosis symptoms  She is seeing Pain Management who recommended epidural steroid and she is going to schedule this  She was able to quit tobacco last year  She has a rare cigarette  She has no chest pain or worsening shortness of breath  No hemoptysis, unexpected weight loss night sweats X cetera  She had a colonoscopy last summer  Has not had mammography, DEXA scan or CT lung screen  The following portions of the patient's history were reviewed and updated as appropriate: allergies, current medications, past family history, past medical history, past social history, past surgical history and problem list     ROS    Per the HPI  Objective:    Physical Exam  Vitals and nursing note reviewed  Constitutional:       Appearance: She is obese  She is not ill-appearing  Eyes:      General: No scleral icterus  Neck:      Comments: No JVD  Cardiovascular:      Rate and Rhythm: Normal rate and regular rhythm  Pulses: no weak pulses          Dorsalis pedis pulses are 1+ on the right side and 1+ on the left side  Heart sounds: Murmur heard     Pulmonary:      Effort: Pulmonary effort is normal       Comments: Somewhat distant breath sounds without crackle rhonchi or wheeze present  Musculoskeletal:      Right lower leg: No edema  Left lower leg: No edema  Feet:      Right foot:      Skin integrity: No ulcer, skin breakdown, erythema, warmth, callus or dry skin  Left foot:      Skin integrity: No ulcer, skin breakdown, erythema, warmth, callus or dry skin  Neurological:      Mental Status: She is alert and oriented to person, place, and time  Psychiatric:         Mood and Affect: Mood normal          Thought Content: Thought content normal          Judgment: Judgment normal          Wt Readings from Last 12 Encounters:   05/23/22 85 5 kg (188 lb 8 oz)   04/21/22 88 kg (194 lb)   02/09/22 85 kg (187 lb 6 4 oz)   02/03/22 81 6 kg (180 lb)   12/23/21 81 6 kg (180 lb)   08/24/21 78 9 kg (174 lb)   08/02/21 79 kg (174 lb 2 6 oz)   07/28/21 84 1 kg (185 lb 6 4 oz)   06/01/21 83 kg (183 lb)   05/19/21 83 kg (183 lb)   04/05/21 86 2 kg (190 lb)   10/20/20 85 1 kg (187 lb 9 6 oz)   ]  Patient's shoes and socks removed  Right Foot/Ankle   Right Foot Inspection  Skin Exam: skin normal and skin intact  No dry skin, no warmth, no callus, no erythema, no maceration, no abnormal color, no pre-ulcer, no ulcer and no callus  Sensory   Monofilament testing: intact    Vascular  Capillary refills: < 3 seconds  The right DP pulse is 1+  Left Foot/Ankle  Left Foot Inspection  Skin Exam: skin normal and skin intact  No dry skin, no warmth, no erythema, no maceration, normal color, no pre-ulcer, no ulcer and no callus  Sensory   Monofilament testing: intact    Vascular  Capillary refills: < 3 seconds  The left DP pulse is 1+       Assign Risk Category  No deformity present  No loss of protective sensation  No weak pulses  Risk: 0

## 2022-05-23 NOTE — ASSESSMENT & PLAN NOTE
The patient presents for a Medicare subsequent wellness visit  She did have her colonoscopy last summer  She has not had mammogram, DEXA scan or CT lung screen  She does agree to mammogram and DEXA scan but declines CT lung screen after informed discussion

## 2022-05-23 NOTE — ASSESSMENT & PLAN NOTE
Hemoglobin A1c is 7 1 nearly at goal   She is encouraged to continue with her metformin and glipizide as well as continued efforts at improvement in diet and exercise when saloni allows  Recheck in 6 months    Lab Results   Component Value Date    HGBA1C 7 1 (A) 05/23/2022

## 2022-05-24 ENCOUNTER — TELEPHONE (OUTPATIENT)
Dept: PAIN MEDICINE | Facility: CLINIC | Age: 72
End: 2022-05-24

## 2022-05-24 LAB
ALBUMIN SERPL-MCNC: 4 G/DL (ref 3.6–5.1)
ALBUMIN/GLOB SERPL: 1.5 (CALC) (ref 1–2.5)
ALP SERPL-CCNC: 66 U/L (ref 37–153)
ALT SERPL-CCNC: 13 U/L (ref 6–29)
AST SERPL-CCNC: 14 U/L (ref 10–35)
BILIRUB SERPL-MCNC: 0.3 MG/DL (ref 0.2–1.2)
BUN SERPL-MCNC: 27 MG/DL (ref 7–25)
BUN/CREAT SERPL: 30 (CALC) (ref 6–22)
CALCIUM SERPL-MCNC: 10 MG/DL (ref 8.6–10.4)
CHLORIDE SERPL-SCNC: 104 MMOL/L (ref 98–110)
CHOLEST SERPL-MCNC: 126 MG/DL
CHOLEST/HDLC SERPL: 2.7 (CALC)
CO2 SERPL-SCNC: 28 MMOL/L (ref 20–32)
CREAT SERPL-MCNC: 0.89 MG/DL (ref 0.6–0.93)
ERYTHROCYTE [DISTWIDTH] IN BLOOD BY AUTOMATED COUNT: 12.2 % (ref 11–15)
GLOBULIN SER CALC-MCNC: 2.6 G/DL (CALC) (ref 1.9–3.7)
GLUCOSE SERPL-MCNC: 172 MG/DL (ref 65–99)
HCT VFR BLD AUTO: 43.5 % (ref 35–45)
HDLC SERPL-MCNC: 47 MG/DL
HGB BLD-MCNC: 14.2 G/DL (ref 11.7–15.5)
LDLC SERPL CALC-MCNC: 54 MG/DL (CALC)
MCH RBC QN AUTO: 31.3 PG (ref 27–33)
MCHC RBC AUTO-ENTMCNC: 32.6 G/DL (ref 32–36)
MCV RBC AUTO: 96 FL (ref 80–100)
NONHDLC SERPL-MCNC: 79 MG/DL (CALC)
PLATELET # BLD AUTO: 150 THOUSAND/UL (ref 140–400)
PMV BLD REES-ECKER: 11.5 FL (ref 7.5–12.5)
POTASSIUM SERPL-SCNC: 4.7 MMOL/L (ref 3.5–5.3)
PROT SERPL-MCNC: 6.6 G/DL (ref 6.1–8.1)
RBC # BLD AUTO: 4.53 MILLION/UL (ref 3.8–5.1)
SL AMB EGFR AFRICAN AMERICAN: 74 ML/MIN/1.73M2
SL AMB EGFR NON AFRICAN AMERICAN: 64 ML/MIN/1.73M2
SODIUM SERPL-SCNC: 140 MMOL/L (ref 135–146)
TRIGL SERPL-MCNC: 177 MG/DL
WBC # BLD AUTO: 6.7 THOUSAND/UL (ref 3.8–10.8)

## 2022-05-25 DIAGNOSIS — M54.16 LUMBAR RADICULOPATHY: Primary | ICD-10-CM

## 2022-06-13 DIAGNOSIS — I10 ESSENTIAL HYPERTENSION: ICD-10-CM

## 2022-06-13 RX ORDER — LISINOPRIL 20 MG/1
TABLET ORAL
Qty: 90 TABLET | Refills: 1 | Status: SHIPPED | OUTPATIENT
Start: 2022-06-13

## 2022-06-13 RX ORDER — HYDROCHLOROTHIAZIDE 25 MG/1
TABLET ORAL
Qty: 90 TABLET | Refills: 1 | Status: SHIPPED | OUTPATIENT
Start: 2022-06-13

## 2022-06-24 DIAGNOSIS — M25.551 CHRONIC RIGHT HIP PAIN: ICD-10-CM

## 2022-06-24 DIAGNOSIS — G89.29 CHRONIC RIGHT HIP PAIN: ICD-10-CM

## 2022-06-24 RX ORDER — MELOXICAM 7.5 MG/1
7.5 TABLET ORAL 2 TIMES DAILY
Qty: 180 TABLET | Refills: 1 | Status: SHIPPED | OUTPATIENT
Start: 2022-06-24

## 2022-06-27 ENCOUNTER — HOSPITAL ENCOUNTER (OUTPATIENT)
Dept: RADIOLOGY | Facility: CLINIC | Age: 72
Discharge: HOME | End: 2022-06-27
Admitting: ANESTHESIOLOGY
Payer: MEDICARE

## 2022-06-27 VITALS
SYSTOLIC BLOOD PRESSURE: 154 MMHG | OXYGEN SATURATION: 93 % | HEART RATE: 57 BPM | TEMPERATURE: 98 F | RESPIRATION RATE: 18 BRPM | DIASTOLIC BLOOD PRESSURE: 50 MMHG

## 2022-06-27 DIAGNOSIS — M54.16 LUMBAR RADICULOPATHY: ICD-10-CM

## 2022-06-27 PROCEDURE — 62323 NJX INTERLAMINAR LMBR/SAC: CPT | Performed by: ANESTHESIOLOGY

## 2022-06-27 RX ORDER — PAPAVERINE HCL 150 MG
10 CAPSULE, EXTENDED RELEASE ORAL ONCE
Status: COMPLETED | OUTPATIENT
Start: 2022-06-27 | End: 2022-06-27

## 2022-06-27 RX ADMIN — DEXAMETHASONE SODIUM PHOSPHATE 10 MG: 10 INJECTION, SOLUTION INTRAMUSCULAR; INTRAVENOUS at 13:14

## 2022-06-27 NOTE — H&P
History of Present Illness: The patient is a 74 y o  female who presents with complaints of low back and leg pain  Patient Active Problem List   Diagnosis    Lumbar radiculopathy    Spinal stenosis of lumbar region with neurogenic claudication    Lumbar spondylosis    Chronic right hip pain    Type 2 diabetes mellitus with hyperglycemia, without long-term current use of insulin (HCC)    Primary osteoarthritis of first carpometacarpal joint of left hand    Arthritis of carpometacarpal (CMC) joint of left thumb    Cataract of both eyes    DDD (degenerative disc disease), lumbar    Facet arthropathy, lumbar    Keratosis    Obesity (BMI 30-39  9)    Obstructive sleep apnea of adult    Secondary polycythemia    Chronic pain syndrome    Current smoker on some days    Essential hypertension    Medicare annual wellness visit, subsequent    Trigger finger, left index finger    Trigger ring finger of right hand    Sacroiliitis (HCC)    Vertigo    [facility]    Non-seasonal allergic rhinitis    Heart murmur, systolic       Past Medical History:   Diagnosis Date    Asthma     [facility] [address] [facility]     Community acquired pneumonia     LAST ASSESSED: [id number]    COPD (chronic obstructive pulmonary disease) ([facility] [facility]     Diabetes mellitus ([facility] )     History of MRSA infection     2008 liver sx    Liver disease     Sleep apnea     Viral warts     LAST ASSESSED: [id number]       Past Surgical History:   Procedure Laterality Date    ABDOMINAL SURGERY      ABDOMINOPLASTY      CHOLECYSTECTOMY      COSMETIC SURGERY      EYE SURGERY      Bilateral cataracts 2015 Dr Derek Wall       FINGER SURGERY      HAND SURGERY      HYSTERECTOMY      LIVER SURGERY      VANESSA HAGAN FINGER TENDON SHEATH Right 7/23/2019    Procedure: RELEASE TRIGGER FINGER RIGHT LONG;  Surgeon: Emily Rocha MD;  [facility];  Service: Orthopedics    VANESSA HAGAN FINGER TENDON SHEATH Left 8/6/2019    Procedure: RELEASE TRIGGER FINGER LEFT INDEX;  Surgeon: Troy Fritz MD;  [facility];  Service: Orthopedics    [place] INCISE FINGER TENDON SHEATH Right 6/16/2020    Procedure: RELEASE TRIGGER FINGER, right ring;  Surgeon: Troy Fritz MD;  [facility];  Service: Orthopedics    [place] REPAIR INTERCARP/CARP-METACARP JT Right 9/9/2016    Procedure: THUMB TRAPEZIECTOMY; BASAL JOINT ARTHROPLASTY WITH APL AUTOGRAFT;  Surgeon: Tesha Siddiqi MD;  [facility];  Service: Orthopedics    [place] REPAIR INTERCARP/CARP-METACARP JT Left 5/29/2018    Procedure: Antonio Tacho;  Surgeon: Troy Fritz MD;  [facility];  Service: Orthopedics    [place] TRANSPLANT HAND TENDON Left 5/29/2018    Procedure: TRANSFER TENDON HAND/WRIST FCR from forearm to wrist;  Surgeon: Troy Fritz MD;  [facility];  Service: Orthopedics         Current Outpatient Medications:     glipiZIDE (GLUCOTROL XL) 5 mg 24 hr tablet, TAKE 1 TABLET EVERY DAY  30  MINUTES BEFORE BREAKFAST, Disp: 90 tablet, Rfl: 0    glucose blood (ACCU-CHEK JESS PLUS) test strip, Test BS 3 times daily    DX E11 9, Disp: 100 each, Rfl: 2    hydrochlorothiazide (HYDRODIURIL) 25 mg tablet, TAKE 1 TABLET EVERY DAY, Disp: 90 tablet, Rfl: 1    lisinopril (ZESTRIL) 20 mg tablet, TAKE 1 TABLET EVERY DAY, Disp: 90 tablet, Rfl: 1    meloxicam (MOBIC) 7 5 mg tablet, Take 1 tablet (7 5 mg total) by mouth 2 (two) times a day, Disp: 180 tablet, Rfl: 1    metFORMIN (GLUCOPHAGE) 1000 MG tablet, TAKE 1 TABLET TWICE DAILY, Disp: 180 tablet, Rfl: 0    Multiple Vitamins-Minerals (OCUVITE ADULT FORMULA PO), Take by mouth, Disp: , Rfl:     rosuvastatin (CRESTOR) 20 MG tablet, TAKE 1 TABLET EVERY DAY, Disp: 90 tablet, Rfl: 1    No Known Allergies    Physical Exam:   Vitals:    06/27/22 1302   BP: 165/59   Pulse: 59   Resp: 18   Temp: 98 °F (36 7 °C)   SpO2: 97%     General: Awake, Alert, Oriented x 3, Mood and affect appropriate  Respiratory: Respirations even and unlabored  Cardiovascular: Peripheral pulses intact; no edema  Musculoskeletal Exam:  Bilateral lumbar paraspinals tender to palpation    ASA Score: 3         Assessment:   1   Lumbar radiculopathy        Plan: L4-L5 LESI

## 2022-06-27 NOTE — DISCHARGE INSTRUCTIONS
Epidural Steroid Injection   WHAT YOU NEED TO KNOW:   An epidural steroid injection (ELLEN) is a procedure to inject steroid medicine into the epidural space  The epidural space is between your spinal cord and vertebrae  Steroids reduce inflammation and fluid buildup in your spine that may be causing pain  You may be given pain medicine along with the steroids  ACTIVITY  Do not drive or operate machinery today  No strenuous activity today - bending, lifting, etc   You may resume normal activites starting tomorrow - start slowly and as tolerated  You may shower today, but no tub baths or hot tubs  You may have numbness for several hours from the local anesthetic  Please use caution and common sense, especially with weight-bearing activities  CARE OF THE INJECTION SITE  If you have soreness or pain, apply ice to the area today (20 minutes on/20 minutes off)  Starting tomorrow, you may use warm, moist heat or ice if needed  You may have an increase or change in your discomfort for 36-48 hours after your treatment  Apply ice and continue with any pain medication you have been prescribed  Notify the [facility] if you have any of the following: redness, drainage, swelling, headache, stiff neck or fever above 100°F     SPECIAL INSTRUCTIONS  Our office will contact you in approximately 7 days for a progress report  MEDICATIONS  Continue to take all routine medications  Our office may have instructed you to hold some medications  As no general anesthesia was used in today's procedure, you should not experience any side effects related to anesthesia  If you have a problem specifically related to your procedure, please call our office at [phone]  Problems not related to your procedure should be directed to your primary care physician

## 2022-07-05 ENCOUNTER — TELEPHONE (OUTPATIENT)
Dept: OBGYN CLINIC | Facility: CLINIC | Age: 72
End: 2022-07-05

## 2022-07-29 ENCOUNTER — OFFICE VISIT (OUTPATIENT)
Dept: PAIN MEDICINE | Facility: CLINIC | Age: 72
End: 2022-07-29
Payer: MEDICARE

## 2022-07-29 VITALS
HEART RATE: 79 BPM | DIASTOLIC BLOOD PRESSURE: 66 MMHG | BODY MASS INDEX: 35.68 KG/M2 | HEIGHT: 61 IN | SYSTOLIC BLOOD PRESSURE: 154 MMHG | WEIGHT: 189 LBS

## 2022-07-29 DIAGNOSIS — M48.062 SPINAL STENOSIS OF LUMBAR REGION WITH NEUROGENIC CLAUDICATION: ICD-10-CM

## 2022-07-29 DIAGNOSIS — M51.36 DDD (DEGENERATIVE DISC DISEASE), LUMBAR: ICD-10-CM

## 2022-07-29 DIAGNOSIS — M54.16 LUMBAR RADICULOPATHY: Primary | ICD-10-CM

## 2022-07-29 DIAGNOSIS — G89.4 CHRONIC PAIN SYNDROME: ICD-10-CM

## 2022-07-29 DIAGNOSIS — M47.816 LUMBAR SPONDYLOSIS: ICD-10-CM

## 2022-07-29 PROCEDURE — 99214 OFFICE O/P EST MOD 30 MIN: CPT | Performed by: ANESTHESIOLOGY

## 2022-07-29 NOTE — PROGRESS NOTES
Assessment  1  Lumbar radiculopathy    2  Chronic pain syndrome    3  DDD (degenerative disc disease), lumbar    4  Lumbar spondylosis    5  Spinal stenosis of lumbar region with neurogenic claudication        Plan  49-year-old female with a history of lumbar degenerative disc disease, spondylosis, lumbar radiculopathy, and chronic pain syndrome returning for follow-up  The patient presents with complaints of lumbosacral back pain that radiated into the posterolateral aspect of the legs to the knees with associated subjective weakness  Patient recently had an L4-5 LESI which unfortunately did not provide any relief  She has previously had excellent relief of her radicular symptoms with L4 and L5 TFESI  The patient is no longer taking tramadol, she is only taking meloxicam p r n  which provides minimal relief  She has had relief with lumbar RFA in the past of her low back pain  The patient's low back pain seems to be multifactorial including myofascial and facet mediated components  Lower extremity symptoms appear to be radicular in nature/neurogenic claudication stemming from stenosis      1  I will schedule the patient for bilateral L5 TFESI  2  Patient will continue with meloxicam as prescribed  3  Patient will continue with her home exercise program  4  I will follow up the patient p r n  Complete risks and benefits including bleeding, infection, tissue reaction, nerve injury and allergic reaction were discussed  The approach was demonstrated using models and literature was provided  Verbal and written consent was obtained  My impressions and treatment recommendations were discussed in detail with the patient who verbalized understanding and had no further questions  Discharge instructions were provided  I personally saw and examined the patient and I agree with the above discussed plan of care  No orders of the defined types were placed in this encounter      No orders of the defined types were placed in this encounter  History of Present Illness    Evangelina Hernandez is a 74 y o  female with a history of lumbar degenerative disc disease, spondylosis, lumbar radiculopathy, and chronic pain syndrome returning for follow-up  The patient presents with complaints of lumbosacral back pain that radiated into the posterolateral aspect of the legs to the knees with associated subjective weakness  She denies any bladder or bowel incontinence or saddle anesthesia  Patient recently had an L4-5 LESI which unfortunately did not provide any relief  She has previously had excellent relief of her radicular symptoms with L4 and L5 TFESI  The patient is no longer taking tramadol, she is only taking meloxicam p r n  which provides minimal relief  She has had relief with lumbar RFA in the past of her low back pain  The patient rates her pain an 8/10 on the pain is worse in the morning  The pain is intermittent and described as dull, aching, and pins and needles  The pain is worse with standing, walking, and exercise  The pain is alleviated with sitting, relaxation, and lying down  Other than as stated above, the patient denies any interval changes in medications, medical condition, mental condition, symptoms, or allergies since the last office visit  I have personally reviewed and/or updated the patient's past medical history, past surgical history, family history, social history, current medications, allergies, and vital signs today  Review of Systems   Constitutional: Negative for fever and unexpected weight change  HENT: Negative for trouble swallowing  Eyes: Negative for visual disturbance  Respiratory: Negative for shortness of breath and wheezing  Cardiovascular: Negative for chest pain and palpitations  Gastrointestinal: Negative for constipation, diarrhea, nausea and vomiting  Endocrine: Negative for cold intolerance, heat intolerance and polydipsia     Genitourinary: Negative for difficulty urinating and frequency  Musculoskeletal: Positive for gait problem  Negative for arthralgias, joint swelling and myalgias  Pain in extremity, decreased ROM   Skin: Negative for rash  Neurological: Negative for dizziness, seizures, syncope, weakness and headaches  Hematological: Does not bruise/bleed easily  Psychiatric/Behavioral: Negative for dysphoric mood  All other systems reviewed and are negative  Patient Active Problem List   Diagnosis    Lumbar radiculopathy    Spinal stenosis of lumbar region with neurogenic claudication    Lumbar spondylosis    Chronic right hip pain    Type 2 diabetes mellitus with hyperglycemia, without long-term current use of insulin (HCC)    Primary osteoarthritis of first carpometacarpal joint of left hand    Arthritis of carpometacarpal (CMC) joint of left thumb    Cataract of both eyes    DDD (degenerative disc disease), lumbar    Facet arthropathy, lumbar    Keratosis    Obesity (BMI 30-39  9)    Obstructive sleep apnea of adult    Secondary polycythemia    Chronic pain syndrome    Current smoker on some days    Essential hypertension    Medicare annual wellness visit, subsequent    Trigger finger, left index finger    Trigger ring finger of right hand    Sacroiliitis (HCC)    Vertigo    [facility]    Non-seasonal allergic rhinitis    Heart murmur, systolic       Past Medical History:   Diagnosis Date    Asthma     [facility] disease ([facility] )     Community acquired pneumonia     LAST ASSESSED: [id number]    [facility]     Diabetes mellitus ([facility] )     History of MRSA infection     2008 liver sx    Liver disease     Sleep apnea     Viral warts     LAST ASSESSED: [id number]       Past Surgical History:   Procedure Laterality Date    ABDOMINAL SURGERY      ABDOMINOPLASTY      CHOLECYSTECTOMY      COSMETIC SURGERY  EYE SURGERY      Bilateral cataracts 2015 Dr Obey Kumar       FINGER SURGERY      HAND SURGERY      HYSTERECTOMY      LIVER SURGERY      W[facility] INCISE FINGER TENDON SHEATH Right 7/23/2019    Procedure: RELEASE TRIGGER FINGER RIGHT LONG;  Surgeon: Troy Fritz MD;  [facility];  Service: Orthopedics    W[facility] INCISE FINGER TENDON SHEATH Left 8/6/2019    Procedure: RELEASE TRIGGER FINGER LEFT INDEX;  Surgeon: Troy Fritz MD;  [facility];  Service: Orthopedics    W[place] INCISE FINGER TENDON SHEATH Right 6/16/2020    Procedure: RELEASE TRIGGER FINGER, right ring;  Surgeon: Troy Fritz MD;  [facility];  Service: Orthopedics    [facility] REPAIR INTERCARP/CARP-METACARP JT Right 9/9/2016    Procedure: THUMB TRAPEZIECTOMY; BASAL JOINT ARTHROPLASTY WITH APL AUTOGRAFT;  Surgeon: Tesha Siddiqi MD;  [facility];  Service: Orthopedics    W[place] REPAIR INTERCARP/CARP-METACARP JT Left 5/29/2018    Procedure: Antonio Tacho;  Surgeon: Troy Fritz MD;  [facility];  Service: Orthopedics    WV TRANSPLANT HAND TENDON Left 5/29/2018    Procedure: TRANSFER TENDON HAND/WRIST FCR from forearm to wrist;  Surgeon: Troy Fritz MD;  [facility];  Service: Orthopedics       Family History   Problem Relation Age of Onset    Heart attack Father     Heart disease Family     Diabetes Family     Thyroid disease Family     Ovarian cancer Family     Heart attack Mother     Ovarian cancer Sister     Heart disease Other         CARDIAC DISORDER    Diabetes Other     Liver cancer Other     Breast cancer Other     Stomach cancer Other     Basal cell carcinoma Brother        Social History     Occupational History    Not on file   Tobacco Use    Smoking status: Current Every Day Smoker     Packs/day: 0 20    Smokeless tobacco: Never Used   Vaping Use    Vaping Use: Never used   Substance and Sexual Activity    Alcohol use: Yes     Comment: very seldom    Drug use: No    Sexual activity: Not on file       Current Outpatient Medications on File Prior to Visit   Medication Sig    glipiZIDE (GLUCOTROL XL) 5 mg 24 hr tablet TAKE 1 TABLET EVERY DAY  30  MINUTES BEFORE BREAKFAST    glucose blood (ACCU-CHEK JESS PLUS) test strip Test BS 3 times daily  DX E11 9    hydrochlorothiazide (HYDRODIURIL) 25 mg tablet TAKE 1 TABLET EVERY DAY    lisinopril (ZESTRIL) 20 mg tablet TAKE 1 TABLET EVERY DAY    meloxicam (MOBIC) 7 5 mg tablet Take 1 tablet (7 5 mg total) by mouth 2 (two) times a day    metFORMIN (GLUCOPHAGE) 1000 MG tablet TAKE 1 TABLET TWICE DAILY    Multiple Vitamins-Minerals (OCUVITE ADULT FORMULA PO) Take by mouth    rosuvastatin (CRESTOR) 20 MG tablet TAKE 1 TABLET EVERY DAY     No current facility-administered medications on file prior to visit  No Known Allergies    Physical Exam    /66   Pulse 79   Ht 5' 1" (1 549 m)   Wt 85 7 kg (189 lb)   BMI 35 71 kg/m²     Constitutional: normal, well developed, well nourished, alert, in no distress and non-toxic and no overt pain behavior  Eyes: anicteric  HEENT: grossly intact  Neck: supple, symmetric, trachea midline and no masses   Pulmonary:even and unlabored  Cardiovascular:No edema or pitting edema present  Skin:Normal without rashes or lesions and well hydrated  Psychiatric:Mood and affect appropriate  Neurologic:Cranial Nerves II-XII grossly intact  Musculoskeletal:antalgic gait  Bilateral lumbar paraspinals tender to palpation from L3-L5  Bilateral SI joints nontender to palpation  Bilateral lower extremity strength 5/5 in all muscle groups  Sensation intact to light touch in L3-S1 dermatomes bilaterally  Negative seated straight leg raise bilaterally      Imaging  MRI LUMBAR SPINE WITHOUT CONTRAST     INDICATION: M54 [id number]: Radiculopathy, lumbar region  M48 062: Spinal stenosis, lumbar region with neurogenic claudication      COMPARISON:  MRI from 8/13/2014     TECHNIQUE: Sagittal T1, sagittal T2, sagittal inversion recovery, axial T1 and axial T2, coronal T2     IMAGE QUALITY:  Diagnostic     FINDINGS:     VERTEBRAL BODIES:  There is a heterogeneous appearance of the visualized osseous structures without focal suspicious lesion  Vertebral body heights are maintained  There is Modic type I endplate degenerative change at L4-L5 and L5-S1      SACRUM:  Normal signal within the sacrum  No evidence of insufficiency or stress fracture      DISTAL CORD AND CONUS:  Normal size and signal within the distal cord and conus        PARASPINAL SOFT TISSUES:  Paraspinal soft tissues are unremarkable      LOWER THORACIC DISC SPACES:  There is disc space narrowing within the lower thoracic spine      LUMBAR DISC SPACES:  There is multilevel disc space degeneration      L1-L2:  No significant canal stenosis or foraminal narrowing      L2-L3:  There is a minimal bulge  There is facet arthrosis  There is no significant canal stenosis  There is no significant foraminal narrowing      L3-L4: There is disc space narrowing  There is a bulging annulus  There is facet arthrosis with ligamentum flavum thickening  There is overall mild mass effect on thecal sac  Findings are overall progressed  There is mild foraminal narrowing      L4-L5:  There is disc space narrowing  There is vacuum disc phenomenon  There is a bulging annulus  There is dorsal osteophytosis  There is facet arthrosis  There is mild mass effect on the thecal sac  There is endplate spurring  There is mild to   moderate left and mild right foraminal narrowing      L5-S1:  There is vacuum disc phenomenon  There is a bulging annulus  There is facet arthrosis  There is endplate spurring  There is moderate to severe bilateral foraminal narrowing with contact of the exiting nerve roots    Findings are grossly   stable      IMPRESSION:     Multilevel degenerative changes of the lumbar spine, as described above      Multifactorial disease results in overall mild mass affect on the thecal sac at L3-L4, slightly progressed      Multilevel degenerative changes in the remainder of the spine are otherwise not significantly changed

## 2022-08-17 ENCOUNTER — HOSPITAL ENCOUNTER (OUTPATIENT)
Dept: RADIOLOGY | Facility: CLINIC | Age: 72
Discharge: HOME | End: 2022-08-17
Payer: MEDICARE

## 2022-08-17 VITALS
DIASTOLIC BLOOD PRESSURE: 78 MMHG | OXYGEN SATURATION: 92 % | HEART RATE: 75 BPM | RESPIRATION RATE: 20 BRPM | SYSTOLIC BLOOD PRESSURE: 189 MMHG | TEMPERATURE: 97.4 F

## 2022-08-17 DIAGNOSIS — M54.16 LUMBAR RADICULOPATHY: ICD-10-CM

## 2022-08-17 PROCEDURE — 64483 NJX AA&/STRD TFRM EPI L/S 1: CPT | Performed by: ANESTHESIOLOGY

## 2022-08-17 RX ORDER — PAPAVERINE HCL 150 MG
20 CAPSULE, EXTENDED RELEASE ORAL ONCE
Status: COMPLETED | OUTPATIENT
Start: 2022-08-17 | End: 2022-08-17

## 2022-08-17 RX ADMIN — DEXAMETHASONE SODIUM PHOSPHATE 15 MG: 10 INJECTION, SOLUTION INTRAMUSCULAR; INTRAVENOUS at 08:16

## 2022-08-17 RX ADMIN — LIDOCAINE HYDROCHLORIDE 2 ML: 20 INJECTION, SOLUTION EPIDURAL; INFILTRATION; INTRACAUDAL; PERINEURAL at 08:17

## 2022-08-17 RX ADMIN — IOHEXOL 1 ML: 300 INJECTION, SOLUTION INTRAVENOUS at 08:16

## 2022-08-17 NOTE — H&P
History of Present Illness: The patient is a 74 y o  female who presents with complaints of low back and leg pain  Patient Active Problem List   Diagnosis    Lumbar radiculopathy    Spinal stenosis of lumbar region with neurogenic claudication    Lumbar spondylosis    Chronic right hip pain    Type 2 diabetes mellitus with hyperglycemia, without long-term current use of insulin (HCC)    Primary osteoarthritis of first carpometacarpal joint of left hand    Arthritis of carpometacarpal (CMC) joint of left thumb    Cataract of both eyes    DDD (degenerative disc disease), lumbar    Facet arthropathy, lumbar    Keratosis    Obesity (BMI 30-39  9)    Obstructive sleep apnea of adult    Secondary polycythemia    Chronic pain syndrome    Current smoker on some days    Essential hypertension    Medicare annual wellness visit, subsequent    Trigger finger, left index finger    Trigger ring finger of right hand    Sacroiliitis (HCC)    Vertigo    [facility]    Non-seasonal allergic rhinitis    Heart murmur, systolic       Past Medical History:   Diagnosis Date    Asthma     [facility]     Community acquired pneumonia     LAST ASSESSED: [id number]    [facility]     [facility]     History of MRSA infection     2008 liver sx    Liver disease     Sleep apnea     Viral warts     LAST ASSESSED: [id number]       Past Surgical History:   Procedure Laterality Date    ABDOMINAL SURGERY      ABDOMINOPLASTY      CHOLECYSTECTOMY      COSMETIC SURGERY      EYE SURGERY      Bilateral cataracts 2015 Dr Dara Prieto       FINGER SURGERY      HAND SURGERY      HYSTERECTOMY      LIVER SURGERY      VANESSA HAGAN FINGER TENDON SHEATH Right 7/23/2019    Procedure: RELEASE TRIGGER FINGER RIGHT LONG;  Surgeon: Jewel Bailey MD;  [facility];  Service: Orthopedics    VANESSA HAGAN FINGER TENDON SHEATH Left 8/6/2019    Procedure: RELEASE TRIGGER FINGER LEFT INDEX;  Surgeon: Shana Diaz MD;  [facility];  Service: Orthopedics    [facility] INCISE FINGER TENDON SHEATH Right 6/16/2020    Procedure: RELEASE TRIGGER FINGER, right ring;  Surgeon: Shana Diaz MD;  [facility];  Service: Orthopedics    [facility] REPAIR INTERCARP/CARP-METACARP JT Right 9/9/2016    Procedure: THUMB TRAPEZIECTOMY; BASAL JOINT ARTHROPLASTY WITH APL AUTOGRAFT;  Surgeon: Briscoe Osgood, MD;  [facility];  Service: Orthopedics    VA REPAIR INTERCARP/CARP-METACARP JT Left 5/29/2018    Procedure: Alfrieda Krause;  Surgeon: Shana Diaz MD;  [facility];  Service: Orthopedics    [facility] TRANSPLANT HAND TENDON Left 5/29/2018    Procedure: TRANSFER TENDON HAND/WRIST FCR from forearm to wrist;  Surgeon: Shana Diaz MD;  [facility];  Service: Orthopedics         Current Outpatient Medications:     glipiZIDE (GLUCOTROL XL) 5 mg 24 hr tablet, TAKE 1 TABLET EVERY DAY  30  MINUTES BEFORE BREAKFAST, Disp: 90 tablet, Rfl: 0    glucose blood (ACCU-CHEK JESS PLUS) test strip, Test BS 3 times daily    DX E11 9, Disp: 100 each, Rfl: 2    hydrochlorothiazide (HYDRODIURIL) 25 mg tablet, TAKE 1 TABLET EVERY DAY, Disp: 90 tablet, Rfl: 1    lisinopril (ZESTRIL) 20 mg tablet, TAKE 1 TABLET EVERY DAY, Disp: 90 tablet, Rfl: 1    meloxicam (MOBIC) 7 5 mg tablet, Take 1 tablet (7 5 mg total) by mouth 2 (two) times a day, Disp: 180 tablet, Rfl: 1    metFORMIN (GLUCOPHAGE) 1000 MG tablet, TAKE 1 TABLET TWICE DAILY, Disp: 180 tablet, Rfl: 0    Multiple Vitamins-Minerals (OCUVITE ADULT FORMULA PO), Take by mouth, Disp: , Rfl:     rosuvastatin (CRESTOR) 20 MG tablet, TAKE 1 TABLET EVERY DAY, Disp: 90 tablet, Rfl: 1    Current Facility-Administered Medications:     dexamethasone (PF) (DECADRON) injection 20 mg, 20 mg, Epidural, Once, Nahun Henry, DO    iohexol (OMNIPAQUE) 300 mg/mL injection 50 mL, 50 mL, Epidural, Once, Nahun Henry, DO    lidocaine (PF) (XYLOCAINE-MPF) 2 % injection 2 mL, 2 mL, Epidural, Once, Delfino Henry, DO    No Known Allergies    Physical Exam:   Vitals:    08/17/22 0755   BP: (!) 173/77   Pulse: 76   Resp: 20   Temp: (!) 97 4 °F (36 3 °C)   SpO2: 96%     General: Awake, Alert, Oriented x 3, Mood and affect appropriate  Respiratory: Respirations even and unlabored  Cardiovascular: Peripheral pulses intact; no edema  Musculoskeletal Exam:  Bilateral lumbar paraspinals tender to palpation    ASA Score: 3    Patient/Chart Verification  Patient ID Verified: Verbal  ID Band Applied: No  Consents Confirmed: Procedural, To be obtained in the Pre-Procedure area  Interval H&P(within 24 hr) Complete (required for Outpatients and Surgery Admit only): To be obtained in the Pre-Procedure area  Allergies Reviewed: Yes  Anticoag/NSAID held?: NA  Currently on antibiotics?: No    Assessment:   1   Lumbar radiculopathy        Plan: Bilateral L5 TFESI

## 2022-08-17 NOTE — DISCHARGE INSTRUCTIONS
Epidural Steroid Injection   WHAT YOU NEED TO KNOW:   An epidural steroid injection (ELLEN) is a procedure to inject steroid medicine into the epidural space  The epidural space is between your spinal cord and vertebrae  Steroids reduce inflammation and fluid buildup in your spine that may be causing pain  You may be given pain medicine along with the steroids  ACTIVITY  Do not drive or operate machinery today  No strenuous activity today - bending, lifting, etc   You may resume normal activites starting tomorrow - start slowly and as tolerated  You may shower today, but no tub baths or hot tubs  You may have numbness for several hours from the local anesthetic  Please use caution and common sense, especially with weight-bearing activities  CARE OF THE INJECTION SITE  If you have soreness or pain, apply ice to the area today (20 minutes on/20 minutes off)  Starting tomorrow, you may use warm, moist heat or ice if needed  You may have an increase or change in your discomfort for 36-48 hours after your treatment  Apply ice and continue with any pain medication you have been prescribed  Notify the [facility] if you have any of the following: redness, drainage, swelling, headache, stiff neck or fever above 100°F     SPECIAL INSTRUCTIONS  Our office will contact you in approximately 7 days for a progress report  MEDICATIONS  Continue to take all routine medications  Our office may have instructed you to hold some medications  As no general anesthesia was used in today's procedure, you should not experience any side effects related to anesthesia  If you are diabetic, the steroids used in today's injection may temporarily increase your blood sugar levels after the first few days after your injection  Please keep a close eye on your sugars and alert the doctor who manages your diabetes if your sugars are significantly high from your baseline or you are symptomatic       If you have a problem specifically related to your procedure, please call our office at [phone]  Problems not related to your procedure should be directed to your primary care physician

## 2022-08-19 ENCOUNTER — TELEPHONE (OUTPATIENT)
Dept: SLEEP CENTER | Facility: CLINIC | Age: 72
End: 2022-08-19

## 2022-08-19 NOTE — TELEPHONE ENCOUNTER
Patient reports that he machine is broken beyond repair wants new resmed machine patient wears full facemask,  Please write order for replacement machine  RX to say broken beyond repair

## 2022-08-24 ENCOUNTER — TELEPHONE (OUTPATIENT)
Dept: PAIN MEDICINE | Facility: CLINIC | Age: 72
End: 2022-08-24

## 2022-08-30 ENCOUNTER — TELEPHONE (OUTPATIENT)
Dept: PAIN MEDICINE | Facility: CLINIC | Age: 72
End: 2022-08-30

## 2022-08-30 LAB

## 2022-09-02 NOTE — TELEPHONE ENCOUNTER
S/w pt  Pt had shonda L5 TFESI inj with minimal relief on 8/17  Pt scheduled for a f/u ov on 10/6 with an 8:15 arrival time  Pt was offered a sooner appt but declined, prefers to see Lyndsey Erwin

## 2022-09-19 LAB
DME PARACHUTE DELIVERY DATE ACTUAL: NORMAL
DME PARACHUTE DELIVERY DATE EXPECTED: NORMAL
DME PARACHUTE DELIVERY DATE REQUESTED: NORMAL
DME PARACHUTE ITEM DESCRIPTION: NORMAL
DME PARACHUTE ORDER STATUS: NORMAL
DME PARACHUTE SUPPLIER NAME: NORMAL
DME PARACHUTE SUPPLIER PHONE: NORMAL

## 2022-10-06 ENCOUNTER — OFFICE VISIT (OUTPATIENT)
Dept: PAIN MEDICINE | Facility: CLINIC | Age: 72
End: 2022-10-06
Payer: MEDICARE

## 2022-10-06 VITALS
BODY MASS INDEX: 36.63 KG/M2 | SYSTOLIC BLOOD PRESSURE: 157 MMHG | HEIGHT: 61 IN | WEIGHT: 194 LBS | HEART RATE: 87 BPM | DIASTOLIC BLOOD PRESSURE: 80 MMHG

## 2022-10-06 DIAGNOSIS — M51.36 DDD (DEGENERATIVE DISC DISEASE), LUMBAR: ICD-10-CM

## 2022-10-06 DIAGNOSIS — G89.4 CHRONIC PAIN SYNDROME: ICD-10-CM

## 2022-10-06 DIAGNOSIS — M54.16 LUMBAR RADICULOPATHY: Primary | ICD-10-CM

## 2022-10-06 DIAGNOSIS — M47.816 LUMBAR SPONDYLOSIS: ICD-10-CM

## 2022-10-06 DIAGNOSIS — M48.061 SPINAL STENOSIS OF LUMBAR REGION, UNSPECIFIED WHETHER NEUROGENIC CLAUDICATION PRESENT: ICD-10-CM

## 2022-10-06 PROCEDURE — 99214 OFFICE O/P EST MOD 30 MIN: CPT | Performed by: ANESTHESIOLOGY

## 2022-10-06 RX ORDER — GABAPENTIN 100 MG/1
300 CAPSULE ORAL
Qty: 90 CAPSULE | Refills: 1 | Status: SHIPPED | OUTPATIENT
Start: 2022-10-06

## 2022-10-06 NOTE — PROGRESS NOTES
Assessment  1  Lumbar radiculopathy    2  Lumbar spondylosis    3  DDD (degenerative disc disease), lumbar    4  Chronic pain syndrome    5  Spinal stenosis of lumbar region, unspecified whether neurogenic claudication present        Plan  42-year-old female with a history of lumbar degenerative disc disease, NSAIDs, spondylosis, lumbar radiculopathy, and chronic pain syndrome returning for follow-up   The patient presents with complaints of lumbosacral back pain that radiates into the posterolateral aspect of the legs to the knees with associated subjective weakness  The patient recently underwent bilateral L5 TFESI August 17, 2022 without any significant relief  She previously underwent   an L4-5 LESI which unfortunately did not provide any relief  She has previously had excellent relief of her radicular symptoms with L4 and L5 TFESI in 2018  The patient is no longer taking tramadol, she is only taking meloxicam p r n  which provides minimal relief  She has found some relief with gabapentin in the past  She has had relief with lumbar RFA in the past of her low back pain  Her leg symptoms are her major complaint  The patient's low back pain seems to be multifactorial including myofascial and facet mediated components  Lower extremity symptoms appear to be radicular in nature/neurogenic claudication stemming from stenosis  Considering the patient has tried and failed numerous interventions for her radicular symptoms without improvement  I do feel an updated MRI of the lumbar spine is warranted  We did discuss that if there is no progression of disease, we could consider spinal cord stimulation verses neurosurgical consultation for surgical intervention  Literature regarding spinal cord stimulation was provided to the patient today      1  I will order an updated MRI of the lumbar spine without contrast  2  Patient will continue with meloxicam as prescribed  3   I will prescribe a trial of gabapentin 100 mg q h s  and she will titrate up to 300 mg q h s  for neuropathic complaints  The patient was apprised of the most common side effects of gabapentin and she was given a titration schedule at today's office visit   4  Patient will continue with her home exercise program  5  I will follow up the patient in 4-6 weeks         My impressions and treatment recommendations were discussed in detail with the patient who verbalized understanding and had no further questions  Discharge instructions were provided  I personally saw and examined the patient and I agree with the above discussed plan of care  No orders of the defined types were placed in this encounter  No orders of the defined types were placed in this encounter  History of Present Illness    Kristen Tracey is a 74 y o  female with a history of lumbar degenerative disc disease, NSAIDs, spondylosis, lumbar radiculopathy, and chronic pain syndrome returning for follow-up   The patient presents with complaints of lumbosacral back pain that radiates into the posterolateral aspect of the legs to the knees with associated subjective weakness  she denies any numbness or tingling in the legs  She denies any bladder or bowel incontinence or saddle anesthesia  The patient recently underwent bilateral L5 TFESI August 17, 2022 without any significant relief  She previously underwent   an L4-5 LESI which unfortunately did not provide any relief  She has previously had excellent relief of her radicular symptoms with L4 and L5 TFESI in 2018  The patient is no longer taking tramadol, she is only taking meloxicam p r n  which provides minimal relief  She has found some relief with gabapentin in the past  She has had relief with lumbar RFA in the past of her low back pain  Her leg symptoms are her major complaint  The patient rates her pain as 6/10 on the pain is worse in the morning  The pain is intermittent and described as dull and aching    The pain is worse with standing, walking, exercise, and transitional positions  The pain is alleviated with sitting and relaxation  Other than as stated above, the patient denies any interval changes in medications, medical condition, mental condition, symptoms, or allergies since the last office visit  I have personally reviewed and/or updated the patient's past medical history, past surgical history, family history, social history, current medications, allergies, and vital signs today  Review of Systems   Constitutional: Negative for fever and unexpected weight change  HENT: Negative for trouble swallowing  Eyes: Negative for visual disturbance  Respiratory: Positive for shortness of breath  Negative for wheezing  Cardiovascular: Negative for chest pain and palpitations  Gastrointestinal: Negative for constipation, diarrhea, nausea and vomiting  Endocrine: Negative for cold intolerance, heat intolerance and polydipsia  Genitourinary: Negative for difficulty urinating and frequency  Musculoskeletal: Positive for gait problem  Negative for arthralgias, joint swelling and myalgias  Pain in extremity   Skin: Negative for rash  Neurological: Negative for dizziness, seizures, syncope, weakness and headaches  Hematological: Does not bruise/bleed easily  Psychiatric/Behavioral: Negative for dysphoric mood  All other systems reviewed and are negative        Patient Active Problem List   Diagnosis    Lumbar radiculopathy    Spinal stenosis of lumbar region with neurogenic claudication    Lumbar spondylosis    Chronic right hip pain    Type 2 diabetes mellitus with hyperglycemia, without long-term current use of insulin (HCC)    Primary osteoarthritis of first carpometacarpal joint of left hand    Arthritis of carpometacarpal (CMC) joint of left thumb    Cataract of both eyes    DDD (degenerative disc disease), lumbar    Facet arthropathy, lumbar    Keratosis    Obesity (BMI 30-39  9)    Obstructive sleep apnea of adult    Secondary polycythemia    Chronic pain syndrome    Current smoker on some days    Essential hypertension    Medicare annual wellness visit, subsequent    Trigger finger, left index finger    Trigger ring finger of right hand    Sacroiliitis (HCC)    Vertigo    COPD (chronic obstructive pulmonary disease) (HCC)    Non-seasonal allergic rhinitis    Heart murmur, systolic       Past Medical History:   Diagnosis Date    Asthma     Chronic obstructive lung disease ([facility] )     Community acquired pneumonia     LAST ASSESSED: [id number]    COPD (chronic obstructive pulmonary disease) ([facility] )     Diabetes mellitus ([facility] )     History of MRSA infection     2008 liver sx    Liver disease     Sleep apnea     Viral warts     LAST ASSESSED: [id number]       Past Surgical History:   Procedure Laterality Date    ABDOMINAL SURGERY      ABDOMINOPLASTY      CHOLECYSTECTOMY      COSMETIC SURGERY      EYE SURGERY      Bilateral cataracts 2015 Dr Monster Conner       FINGER SURGERY      HAND SURGERY      HYSTERECTOMY      LIVER SURGERY      CA INCISE FINGER TENDON SHEATH Right 7/23/2019    Procedure: RELEASE TRIGGER FINGER RIGHT LONG;  Surgeon: Felton Ling MD;  [facility];  Service: Orthopedics    CA INCISE FINGER TENDON SHEATH Left 8/6/2019    Procedure: RELEASE TRIGGER FINGER LEFT INDEX;  Surgeon: Felton Ling MD;  [facility];  Service: Orthopedics    CA INCISE FINGER TENDON SHEATH Right 6/16/2020    Procedure: RELEASE TRIGGER FINGER, right ring;  Surgeon: Felton Ling MD;  [facility];  Service: Orthopedics    CA REPAIR INTERCARP/CARP-METACARP JT Right 9/9/2016    Procedure: THUMB TRAPEZIECTOMY; BASAL JOINT ARTHROPLASTY WITH APL AUTOGRAFT;  Surgeon: Bohdan Rinne, MD;  [facility];  Service: Orthopedics    CA REPAIR INTERCARP/CARP-METACARP JT Left 5/29/2018    Procedure: ARTHROPLASTY Esa Buck;  Surgeon: Maria E Trevino MD;  [facility];  Service: Orthopedics    [place] TRANSPLANT HAND TENDON Left 5/29/2018    Procedure: TRANSFER TENDON HAND/WRIST FCR from forearm to wrist;  Surgeon: Maria E Trevino MD;  [facility];  Service: Orthopedics       Family History   Problem Relation Age of Onset    Heart attack Father     Heart disease Family     Diabetes Family     Thyroid disease Family     Ovarian cancer Family     Heart attack Mother     Ovarian cancer Sister     Heart disease Other         CARDIAC DISORDER    Diabetes Other     Liver cancer Other     Breast cancer Other     Stomach cancer Other     Basal cell carcinoma Brother        Social History     Occupational History    Not on file   Tobacco Use    Smoking status: Current Every Day Smoker     Packs/day: 0 20    Smokeless tobacco: Never Used   Vaping Use    Vaping Use: Never used   Substance and Sexual Activity    Alcohol use: Yes     Comment: very seldom    Drug use: No    Sexual activity: Not on file       Current Outpatient Medications on File Prior to Visit   Medication Sig    glipiZIDE (GLUCOTROL XL) 5 mg 24 hr tablet TAKE 1 TABLET EVERY DAY  30  MINUTES BEFORE BREAKFAST    glucose blood (ACCU-CHEK JESS PLUS) test strip Test BS 3 times daily  DX E11 9    hydrochlorothiazide (HYDRODIURIL) 25 mg tablet TAKE 1 TABLET EVERY DAY    lisinopril (ZESTRIL) 20 mg tablet TAKE 1 TABLET EVERY DAY    meloxicam (MOBIC) 7 5 mg tablet Take 1 tablet (7 5 mg total) by mouth 2 (two) times a day    metFORMIN (GLUCOPHAGE) 1000 MG tablet TAKE 1 TABLET TWICE DAILY    Multiple Vitamins-Minerals (OCUVITE ADULT FORMULA PO) Take by mouth    rosuvastatin (CRESTOR) 20 MG tablet TAKE 1 TABLET EVERY DAY     No current facility-administered medications on file prior to visit         No Known Allergies    Physical Exam    Ht 5' 1" (1 549 m)   Wt 88 kg (194 lb)   BMI 36 66 kg/m²     Constitutional: normal, well developed, well nourished, alert, in no distress and non-toxic and no overt pain behavior  Eyes: anicteric  HEENT: grossly intact  Neck: supple, symmetric, trachea midline and no masses   Pulmonary:even and unlabored  Cardiovascular:No edema or pitting edema present  Skin:Normal without rashes or lesions and well hydrated  Psychiatric:Mood and affect appropriate  Neurologic:Cranial Nerves II-XII grossly intact  Musculoskeletal:antalgic gait  Bilateral lumbar paraspinals mildly tender to palpation from L3-L5  Bilateral lower extremity strength 5/5 in all muscle groups  Sensation intact to light touch in L3 through S1 dermatomes bilaterally  Positive seated straight leg raise on the left and negative on the right  Imaging  MRI LUMBAR SPINE WITHOUT CONTRAST     INDICATION: [id number]: Radiculopathy, lumbar region  [id number]: Spinal stenosis, lumbar region with neurogenic claudication      COMPARISON:  MRI from 8/13/2014     TECHNIQUE:  Sagittal T1, sagittal T2, sagittal inversion recovery, axial T1 and axial T2, coronal T2     IMAGE QUALITY:  Diagnostic     FINDINGS:     VERTEBRAL BODIES:  There is a heterogeneous appearance of the visualized osseous structures without focal suspicious lesion  Vertebral body heights are maintained  There is Modic type I endplate degenerative change at L4-L5 and L5-S1      SACRUM:  Normal signal within the sacrum  No evidence of insufficiency or stress fracture      DISTAL CORD AND CONUS:  Normal size and signal within the distal cord and conus        PARASPINAL SOFT TISSUES:  Paraspinal soft tissues are unremarkable      LOWER THORACIC DISC SPACES:  There is disc space narrowing within the lower thoracic spine      LUMBAR DISC SPACES:  There is multilevel disc space degeneration      L1-L2:  No significant canal stenosis or foraminal narrowing      L2-L3:  There is a minimal bulge  There is facet arthrosis  There is no significant canal stenosis    There is no significant foraminal narrowing      L3-L4: There is disc space narrowing  There is a bulging annulus  There is facet arthrosis with ligamentum flavum thickening  There is overall mild mass effect on thecal sac  Findings are overall progressed  There is mild foraminal narrowing      L4-L5:  There is disc space narrowing  There is vacuum disc phenomenon  There is a bulging annulus  There is dorsal osteophytosis  There is facet arthrosis  There is mild mass effect on the thecal sac  There is endplate spurring  There is mild to   moderate left and mild right foraminal narrowing      L5-S1:  There is vacuum disc phenomenon  There is a bulging annulus  There is facet arthrosis  There is endplate spurring  There is moderate to severe bilateral foraminal narrowing with contact of the exiting nerve roots  Findings are grossly   stable      IMPRESSION:     Multilevel degenerative changes of the lumbar spine, as described above      Multifactorial disease results in overall mild mass affect on the thecal sac at L3-L4, slightly progressed      Multilevel degenerative changes in the remainder of the spine are otherwise not significantly changed

## 2022-10-11 ENCOUNTER — OFFICE VISIT (OUTPATIENT)
Dept: FAMILY MEDICINE CLINIC | Facility: CLINIC | Age: 72
End: 2022-10-11
Payer: MEDICARE

## 2022-10-11 VITALS
WEIGHT: 196.25 LBS | SYSTOLIC BLOOD PRESSURE: 138 MMHG | HEART RATE: 72 BPM | TEMPERATURE: 97.8 F | OXYGEN SATURATION: 91 % | BODY MASS INDEX: 38.53 KG/M2 | DIASTOLIC BLOOD PRESSURE: 70 MMHG | HEIGHT: 60 IN | RESPIRATION RATE: 17 BRPM

## 2022-10-11 DIAGNOSIS — M54.16 LUMBAR RADICULOPATHY: ICD-10-CM

## 2022-10-11 DIAGNOSIS — I10 ESSENTIAL HYPERTENSION: ICD-10-CM

## 2022-10-11 DIAGNOSIS — E11.65 TYPE 2 DIABETES MELLITUS WITH HYPERGLYCEMIA, WITHOUT LONG-TERM CURRENT USE OF INSULIN (HCC): Primary | ICD-10-CM

## 2022-10-11 PROBLEM — E66.01 OBESITY, MORBID (HCC): Status: ACTIVE | Noted: 2022-10-11

## 2022-10-11 LAB — SL AMB POCT HEMOGLOBIN AIC: 7.7 (ref ?–6.5)

## 2022-10-11 PROCEDURE — 83036 HEMOGLOBIN GLYCOSYLATED A1C: CPT | Performed by: FAMILY MEDICINE

## 2022-10-11 PROCEDURE — 99214 OFFICE O/P EST MOD 30 MIN: CPT | Performed by: FAMILY MEDICINE

## 2022-10-11 RX ORDER — GLIPIZIDE 5 MG/1
5 TABLET ORAL
Qty: 180 TABLET | Refills: 1 | Status: SHIPPED | OUTPATIENT
Start: 2022-10-11

## 2022-10-11 NOTE — PROGRESS NOTES
FAMILY PRACTICE OFFICE VISIT       NAME: Mandi Delaney  AGE: 74 y o  SEX: female       : 1950        MRN: [id number]    DATE: 10/11/2022  TIME: 1:32 PM    Assessment and Plan     Problem List Items Addressed This Visit        Endocrine    Type 2 diabetes mellitus with hyperglycemia, without long-term current use of insulin ([facility] [address] ) - Primary     Diabetes  A1c has risen to 7 7  She was given literature on better dietary choices  She was also given prescription for glipizide 5 mg to use 1 b i d  along with her metformin to try and improve her A1c  Lab Results   Component Value Date    HGBA1C 7 7 (A) 10/11/2022            Relevant Medications    glipiZIDE (GLUCOTROL) 5 mg tablet    Other Relevant Orders    POCT hemoglobin A1c (Completed)       Cardiovascular and Mediastinum    Essential hypertension     Hypertension  Patient blood pressure is stable at this time she will continue current regimen of medication            Nervous and Auditory    Lumbar radiculopathy     Lumbar radiculopathy  Patient will continue current regimen of medications and follow up with her pain management office  Chief Complaint     Chief Complaint   Patient presents with   • [address] [facility] pt        History of Present Illness     This is the 1st office visit for patient to establish new PCP  She had previously been a patient of Jennifer Yusuf  She denies any recent illness  She does see her pain management doctor on a regular basis for ongoing bilateral upper thigh pain related to spinal stenosis  A therapeutic injections have been ineffective at alleviating discomfort  She recently started gabapentin  There is discussion of her being a possible candidate for spinal stimulator  Patient does smoke 8 cigarettes per day  She admits to poor dietary choices for her diabetes  A1c in the office has risen to 7 7    She is only able to walk short distances due to her spinal stenosis and therefore does not obtain a regular form of exercise      Review of Systems   Review of Systems   Constitutional: Negative  HENT: Negative  Eyes: Negative  Respiratory: Negative  Cardiovascular: Negative  Gastrointestinal: Negative  Genitourinary: Negative  Musculoskeletal: Positive for arthralgias, back pain and gait problem  Skin: Negative  Psychiatric/Behavioral: Negative  Active Problem List     Patient Active Problem List   Diagnosis   • Lumbar radiculopathy   • Spinal stenosis of lumbar region   • Lumbar spondylosis   • Chronic right hip pain   • Type 2 diabetes mellitus with hyperglycemia, without long-term current use of insulin ([facility])   • Primary osteoarthritis of first carpometacarpal joint of left hand   • Arthritis of carpometacarpal (CMC) joint of left thumb   • Cataract of both eyes   • DDD (degenerative disc disease), lumbar   • Facet arthropathy, lumbar   • Keratosis   • Obesity (BMI 30-39  9)   • Obstructive sleep apnea of adult   • Secondary polycythemia   • Chronic pain syndrome   • Current smoker on some days   • Essential hypertension   • Medicare annual wellness visit, subsequent   • Trigger finger, left index finger   • Trigger ring finger of right hand   • Sacroiliitis ([facility])   • Vertigo   • COPD (chronic obstructive pulmonary disease) ([facility])   • Non-seasonal allergic rhinitis   • Heart murmur, systolic   • Obesity, morbid ([facility])       Past Medical History:  Past Medical History:   Diagnosis Date   • Asthma    • Chronic obstructive lung disease ([facility] )    • Community acquired pneumonia     LAST ASSESSED: [id number]   • COPD (chronic obstructive pulmonary disease) ([facility] )    • Diabetes mellitus ([facility] )    • History of MRSA infection     2008 liver sx   • Liver disease    • Sleep apnea    • Viral warts     LAST ASSESSED: [id number]       Past Surgical History:  Past Surgical History:   Procedure Laterality Date   • ABDOMINAL SURGERY     • ABDOMINOPLASTY     • CHOLECYSTECTOMY     • COSMETIC SURGERY     • EYE SURGERY      Bilateral cataracts 2015 Dr Tyesha Mercado      • FINGER SURGERY     • HAND SURGERY     • HYSTERECTOMY     • LIVER SURGERY     • LA INCISE FINGER TENDON SHEATH Right 7/23/2019    Procedure: RELEASE TRIGGER FINGER RIGHT LONG;  Surgeon: Daysi Harden MD;  [facility];  Service: Orthopedics   • LA INCISE FINGER TENDON SHEATH Left 8/6/2019    Procedure: RELEASE TRIGGER FINGER LEFT INDEX;  Surgeon: Daysi Harden MD;  [facility];  Service: Orthopedics   • LA INCISE FINGER TENDON SHEATH Right 6/16/2020    Procedure: RELEASE TRIGGER FINGER, right ring;  Surgeon: Daysi Harden MD;  [facility];  Service: Orthopedics   • LA REPAIR INTERCARP/CARP-METACARP JT Right 9/9/2016    Procedure: THUMB TRAPEZIECTOMY; BASAL JOINT ARTHROPLASTY WITH APL AUTOGRAFT;  Surgeon: Inez Jasso MD;  [facility];  Service: Orthopedics   • LA REPAIR INTERCARP/CARP-METACARP JT Left 5/29/2018    Procedure: Julita Pillow;  Surgeon: Daysi Harden MD;  [facility];  Service: Orthopedics   • LA TRANSPLANT HAND TENDON Left 5/29/2018    Procedure: TRANSFER TENDON HAND/WRIST FCR from forearm to wrist;  Surgeon: Daysi Harden MD;  [facility];  Service: Orthopedics       Family History:  Family History   Problem Relation Age of Onset   • Heart attack Father    • Heart disease Family    • Diabetes Family    • Thyroid disease Family    • Ovarian cancer Family    • Heart attack Mother    • Ovarian cancer Sister    • Heart disease Other         CARDIAC DISORDER   • Diabetes Other    • Liver cancer Other    • Breast cancer Other    • Stomach cancer Other    • Basal cell carcinoma Brother        Social History:  Social History     Socioeconomic History   • Marital status: /Civil Union     Spouse name: Not on file   • Number of children: Not on file   • Years of education: Not on file   • Highest education level: Not on file   Occupational History   • Not on file   Tobacco Use   • Smoking status: Current Every Day Smoker     Packs/day: 0 20   • Smokeless tobacco: Never Used   Vaping Use   • Vaping Use: Never used   Substance and Sexual Activity   • Alcohol use: Yes     Comment: very seldom   • Drug use: No   • Sexual activity: Not on file   Other Topics Concern   • Not on file   Social History Narrative   • Not on file     Social Determinants of Health     Financial Resource Strain: Not on file   Food Insecurity: Not on file   Transportation Needs: Not on file   Physical Activity: Not on file   Stress: Not on file   Social Connections: Not on file   Intimate Partner Violence: Not on file   Housing Stability: Not on file       Objective     Vitals:    10/11/22 1331   BP: 138/70   Pulse:    Resp:    Temp:    SpO2:      Wt Readings from Last 3 Encounters:   10/11/22 89 kg (196 lb 4 oz)   10/06/22 88 kg (194 lb)   07/29/22 85 7 kg (189 lb)       Physical Exam  Constitutional:       General: She is not in acute distress  Appearance: Normal appearance  She is not ill-appearing  HENT:      Head: Normocephalic and atraumatic  Eyes:      General:         Right eye: No discharge  Left eye: No discharge  Extraocular Movements: Extraocular movements intact  Conjunctiva/sclera: Conjunctivae normal       Pupils: Pupils are equal, round, and reactive to light  Neck:      Vascular: No carotid bruit  Cardiovascular:      Rate and Rhythm: Normal rate and regular rhythm  Heart sounds: Normal heart sounds  No murmur heard  Pulmonary:      Effort: Pulmonary effort is normal       Breath sounds: Normal breath sounds  No wheezing, rhonchi or rales  Abdominal:      General: Abdomen is flat  Bowel sounds are normal  There is no distension  Palpations: Abdomen is soft  Tenderness: There is no abdominal tenderness  There is no guarding or rebound  Musculoskeletal:      Right lower leg: No edema  Left lower leg: No edema  Comments: Patient ambulates slowly with the use of walking cane   Lymphadenopathy:      Cervical: No cervical adenopathy  Skin:     Findings: No rash  Neurological:      General: No focal deficit present  Mental Status: She is alert and oriented to person, place, and time  Cranial Nerves: No cranial nerve deficit  Psychiatric:         Mood and Affect: Mood normal          Behavior: Behavior normal          Thought Content:  Thought content normal          Judgment: Judgment normal          Pertinent Laboratory/Diagnostic Studies:  Lab Results   Component Value Date    BUN 27 (H) 05/23/2022    CREATININE 0 89 05/23/2022    CALCIUM 10 0 05/23/2022     10/23/2017    K 4 7 05/23/2022    CO2 28 05/23/2022     05/23/2022     Lab Results   Component Value Date    ALT 13 05/23/2022    AST 14 05/23/2022    ALKPHOS 66 05/23/2022    BILITOT 0 3 10/23/2017       Lab Results   Component Value Date    WBC 6 7 05/23/2022    HGB 14 2 05/23/2022    HCT 43 5 05/23/2022    MCV 96 0 05/23/2022     05/23/2022       No results found for: TSH    No results found for: CHOL  Lab Results   Component Value Date    TRIG 177 (H) 05/23/2022     Lab Results   Component Value Date    HDL 47 (L) 05/23/2022     Lab Results   Component Value Date    LDLCALC 54 05/23/2022     Lab Results   Component Value Date    HGBA1C 7 7 (A) 10/11/2022       Results for orders placed or performed in visit on 10/11/22   POCT hemoglobin A1c   Result Value Ref Range    Hemoglobin A1C 7 7 (A) 6 5       Orders Placed This Encounter   Procedures   • POCT hemoglobin A1c       ALLERGIES:  No Known Allergies    Current Medications     Current Outpatient Medications   Medication Sig Dispense Refill   • gabapentin (NEURONTIN) 100 mg capsule Take 3 capsules (300 mg total) by mouth daily at bedtime 90 capsule 1   • glipiZIDE (GLUCOTROL XL) 5 mg 24 hr tablet TAKE 1 TABLET EVERY DAY  30  MINUTES BEFORE BREAKFAST 90 tablet 0   • glipiZIDE (GLUCOTROL) 5 mg tablet Take 1 tablet (5 mg total) by mouth 2 (two) times a day before meals 180 tablet 1   • glucose blood (ACCU-CHEK JESS PLUS) test strip Test BS 3 times daily  DX E11 9 100 each 2   • hydrochlorothiazide (HYDRODIURIL) 25 mg tablet TAKE 1 TABLET EVERY DAY 90 tablet 1   • lisinopril (ZESTRIL) 20 mg tablet TAKE 1 TABLET EVERY DAY 90 tablet 1   • meloxicam (MOBIC) 7 5 mg tablet Take 1 tablet (7 5 mg total) by mouth 2 (two) times a day 180 tablet 1   • metFORMIN (GLUCOPHAGE) 1000 MG tablet TAKE 1 TABLET TWICE DAILY 180 tablet 0   • Multiple Vitamins-Minerals (OCUVITE ADULT FORMULA PO) Take by mouth     • rosuvastatin (CRESTOR) 20 MG tablet TAKE 1 TABLET EVERY DAY 90 tablet 1     No current facility-administered medications for this visit           Health Maintenance     Health Maintenance   Topic Date Due   • Osteoporosis Screening  Never done   • Breast Cancer Screening: Mammogram  07/30/2015   • OT PLAN OF CARE  09/12/2018   • HEMOGLOBIN A1C  04/11/2023   • Fall Risk  05/23/2023   • Depression Screening  05/23/2023   • Urinary Incontinence Screening  05/23/2023   • Medicare Annual Wellness Visit (AWV)  05/23/2023   • BMI: Followup Plan  05/23/2023   • Diabetic Foot Exam  05/23/2023   • DM Eye Exam  07/15/2023   • BMI: Adult  10/11/2023   • Colorectal Cancer Screening  08/01/2024   • Hepatitis C Screening  Completed   • Pneumococcal Vaccine: 65+ Years  Completed   • Influenza Vaccine  Completed   • COVID-19 Vaccine  Completed   • HIB Vaccine  Aged Out   • Hepatitis B Vaccine  Aged Out   • IPV Vaccine  Aged Out   • Hepatitis A Vaccine  Aged Out   • Meningococcal ACWY Vaccine  Aged Out   • HPV Vaccine  Aged Out     Immunization History   Administered Date(s) Administered   • COVID-19 PFIZER VACCINE 0 3 ML IM 02/19/2021, 03/11/2021, 10/09/2021, 05/13/2022   • COVID-19 Pfizer Vac BIVALENT Reji-sucrose 12 Yr+ IM (BOOSTER ONLY) 09/09/2022   • COVID-19 Pfizer vac (Reji-sucrose, gray cap) 12 yr+ IM 05/13/2022   • INFLUENZA 10/02/2013, 01/07/2016, 01/14/2017, 09/05/2017, 08/31/2021, 09/09/2022   • Influenza Split High Dose Preservative Free IM 09/05/2017   • Influenza, high dose seasonal 0 7 mL 09/10/2020   • Influenza, seasonal, injectable 10/02/2013, 01/14/2017   • Influenza, seasonal, injectable, preservative free 09/13/2018   • Pneumococcal Conjugate 13-Valent 08/24/2017   • Pneumococcal Conjugate PCV 7 09/06/2017   • Pneumococcal Polysaccharide PPV23 08/15/2018   • Tdap 09/04/2014   • Zoster 10/02/2013   • influenza, trivalent, adjuvanted 18/74/5057       Annie Kraft I spent 30 minutes with this patient which greater than 50% was spent counseling or reviewing chart

## 2022-10-11 NOTE — ASSESSMENT & PLAN NOTE
Diabetes  A1c has risen to 7 7  She was given literature on better dietary choices  She was also given prescription for glipizide 5 mg to use 1 b i d  along with her metformin to try and improve her A1c    Lab Results   Component Value Date    HGBA1C 7 7 (A) 10/11/2022

## 2022-10-11 NOTE — ASSESSMENT & PLAN NOTE
Hypertension    Patient blood pressure is stable at this time she will continue current regimen of medication

## 2022-10-11 NOTE — ASSESSMENT & PLAN NOTE
Lumbar radiculopathy  Patient will continue current regimen of medications and follow up with her pain management office

## 2022-10-12 DIAGNOSIS — E11.65 TYPE 2 DIABETES MELLITUS WITH HYPERGLYCEMIA, WITHOUT LONG-TERM CURRENT USE OF INSULIN (HCC): ICD-10-CM

## 2022-10-12 PROBLEM — Z00.00 MEDICARE ANNUAL WELLNESS VISIT, SUBSEQUENT: Status: RESOLVED | Noted: 2019-05-08 | Resolved: 2022-10-12

## 2022-10-12 RX ORDER — ROSUVASTATIN CALCIUM 20 MG/1
TABLET, COATED ORAL
Qty: 90 TABLET | Refills: 1 | Status: SHIPPED | OUTPATIENT
Start: 2022-10-12

## 2022-10-13 DIAGNOSIS — I10 ESSENTIAL HYPERTENSION: ICD-10-CM

## 2022-10-13 RX ORDER — LISINOPRIL 20 MG/1
20 TABLET ORAL DAILY
Qty: 90 TABLET | Refills: 1 | Status: SHIPPED | OUTPATIENT
Start: 2022-10-13

## 2022-10-14 DIAGNOSIS — E11.9 TYPE 2 DIABETES MELLITUS WITHOUT COMPLICATION, WITHOUT LONG-TERM CURRENT USE OF INSULIN (HCC): ICD-10-CM

## 2022-10-31 ENCOUNTER — TELEPHONE (OUTPATIENT)
Dept: PAIN MEDICINE | Facility: CLINIC | Age: 72
End: 2022-10-31

## 2022-10-31 DIAGNOSIS — M54.16 LUMBAR RADICULOPATHY: ICD-10-CM

## 2022-10-31 RX ORDER — GABAPENTIN 100 MG/1
300 CAPSULE ORAL 2 TIMES DAILY
Qty: 180 CAPSULE | Refills: 1 | Status: SHIPPED | OUTPATIENT
Start: 2022-10-31

## 2022-10-31 NOTE — TELEPHONE ENCOUNTER
S/w the patient and she stated she would to increase the gabapentin and she is currently taking it 300mg at HS  Please advise  She stated if she is increased then she would probably need a refill sent to the pharmacy  Please advise   Thanks

## 2022-10-31 NOTE — TELEPHONE ENCOUNTER
Patient may increase gabapentin to 100 mg in the morning and 300 mg at bedtime x3 days  If she tolerates this she may increase to 200 mg in the morning and 300 mg at bedtime  If she tolerates this x3 days she may increase to 300 mg b i d  and stay on this dose until seen at the next office visit    Refill of gabapentin reflecting changes made was sent to pharmacy

## 2022-10-31 NOTE — TELEPHONE ENCOUNTER
Caller: Linus Nuñez     Doctor: Tameka Rios    Reason for call: patient would like to speak to a nurse regarding increasing her Gabapetin please    Call back#: [phone]

## 2022-11-09 DIAGNOSIS — M25.551 CHRONIC RIGHT HIP PAIN: ICD-10-CM

## 2022-11-09 DIAGNOSIS — G89.29 CHRONIC RIGHT HIP PAIN: ICD-10-CM

## 2022-11-09 RX ORDER — MELOXICAM 7.5 MG/1
TABLET ORAL
Qty: 180 TABLET | Refills: 1 | Status: SHIPPED | OUTPATIENT
Start: 2022-11-09

## 2022-11-18 ENCOUNTER — OFFICE VISIT (OUTPATIENT)
Dept: PAIN MEDICINE | Facility: CLINIC | Age: 72
End: 2022-11-18

## 2022-11-18 VITALS
SYSTOLIC BLOOD PRESSURE: 181 MMHG | HEIGHT: 60 IN | HEART RATE: 91 BPM | DIASTOLIC BLOOD PRESSURE: 77 MMHG | BODY MASS INDEX: 38.68 KG/M2 | WEIGHT: 197 LBS

## 2022-11-18 DIAGNOSIS — G89.4 CHRONIC PAIN SYNDROME: Primary | ICD-10-CM

## 2022-11-18 DIAGNOSIS — M47.816 LUMBAR SPONDYLOSIS: ICD-10-CM

## 2022-11-18 DIAGNOSIS — M48.061 SPINAL STENOSIS OF LUMBAR REGION, UNSPECIFIED WHETHER NEUROGENIC CLAUDICATION PRESENT: ICD-10-CM

## 2022-11-18 DIAGNOSIS — M51.36 DDD (DEGENERATIVE DISC DISEASE), LUMBAR: ICD-10-CM

## 2022-11-18 DIAGNOSIS — M54.16 LUMBAR RADICULOPATHY: ICD-10-CM

## 2022-11-18 NOTE — PROGRESS NOTES
Assessment  1  Chronic pain syndrome    2  DDD (degenerative disc disease), lumbar    3  Lumbar spondylosis    4  Lumbar radiculopathy    5  Spinal stenosis of lumbar region, unspecified whether neurogenic claudication present        Plan  68-year-old female with a history of lumbar degenerative disc disease, stenosis, spondylosis, lumbar radiculopathy, and chronic pain syndrome returning for follow-up  The patient presents with complaints of lumbosacral back pain that radiates into the posterolateral aspect of the legs to the knees with associated subjective weakness  An MRI of the lumbar spine was ordered at last office visit and is scheduled for November 21, 2022  The patient recently underwent bilateral L5 TFESI August 17, 2022 and She previously an L4-5 LESI prior to that which unfortunately did not provide any relief  She has previously had excellent relief of her radicular symptoms with L4 and L5 TFESI in 2018  No longer taking tramadol, she is taking meloxicam p r n  which provides minimal relief  She has found good relief with gabapentin and recently titrated up to 300 mg b i d  for days ago  She has had relief with lumbar RFA in the past of her low back pain  Her leg symptoms are her major complaint  The patient's low back pain seems to be multifactorial including myofascial and facet mediated components  Lower extremity symptoms appear to be radicular in nature/neurogenic claudication stemming from stenosis  1  He denies any side effects  Patient will proceed with MRI as scheduled  2  Patient will continue with meloxicam as prescribed  3  Patient will continue with gabapentin 300 mg b i d     Will hold off on titrating up for now  She does not need any refills at this time  4  Patient will continue with her home exercise program  5   I will follow up the patient in 4-6 weeks       My impressions and treatment recommendations were discussed in detail with the patient who verbalized understanding and had no further questions  Discharge instructions were provided  I personally saw and examined the patient and I agree with the above discussed plan of care  No orders of the defined types were placed in this encounter  No orders of the defined types were placed in this encounter  History of Present Illness    Cristóbal Ariza is a 74 y o  female  with a history of lumbar degenerative disc disease, stenosis, spondylosis, lumbar radiculopathy, and chronic pain syndrome returning for follow-up  The patient presents with complaints of lumbosacral back pain that radiates into the posterolateral aspect of the legs to the knees with associated subjective weakness  She denies any bladder or bowel incontinence or saddle anesthesia  An MRI of the lumbar spine was ordered at last office visit and is scheduled for November 21, 2022  The patient recently underwent bilateral L5 TFESI August 17, 2022 and She previously an L4-5 LESI prior to that which unfortunately did not provide any relief  She has previously had excellent relief of her radicular symptoms with L4 and L5 TFESI in 2018  No longer taking tramadol, she is taking meloxicam p r n  which provides minimal relief  She has found good relief with gabapentin and recently titrated up to 300 mg b i d  for days ago  She has had relief with lumbar RFA in the past of her low back pain  Her leg symptoms are her major complaint  The patient rates her pain a 3/10 on the pain is worse in the morning  The pain is intermittent and described as dull and aching  The pain is worse with lying for prolonged periods of time, transitional movements, and prolonged standing and walking  The pain is alleviated with gabapentin and relaxation  Other than as stated above, the patient denies any interval changes in medications, medical condition, mental condition, symptoms, or allergies since the last office visit            I have personally reviewed and/or updated the patient's past medical history, past surgical history, family history, social history, current medications, allergies, and vital signs today  Review of Systems    Patient Active Problem List   Diagnosis   • Lumbar radiculopathy   • Spinal stenosis of lumbar region   • Lumbar spondylosis   • Chronic right hip pain   • Type 2 diabetes mellitus with hyperglycemia, without long-term current use of insulin (HCC)   • Primary osteoarthritis of first carpometacarpal joint of left hand   • Arthritis of carpometacarpal (CMC) joint of left thumb   • Cataract of both eyes   • DDD (degenerative disc disease), lumbar   • Facet arthropathy, lumbar   • Keratosis   • Obesity (BMI 30-39  9)   • Obstructive sleep apnea of adult   • Secondary polycythemia   • Chronic pain syndrome   • Current smoker on some days   • Essential hypertension   • Trigger finger, left index finger   • Trigger ring finger of right hand   • Sacroiliitis ([facility])   • Vertigo   • COPD (chronic obstructive pulmonary disease) ([facility])   • Non-seasonal allergic rhinitis   • Heart murmur, systolic   • Obesity, morbid ([facility] [address] )       Past Medical History:   Diagnosis Date   • Asthma    • Chronic obstructive lung disease ([facility] [address] )    • Community acquired pneumonia     LAST ASSESSED: [id number]   • COPD (chronic obstructive pulmonary disease) ([facility] [address] )    • Diabetes mellitus ([facility] [address] )    • History of MRSA infection     2008 liver sx   • Liver disease    • Sleep apnea    • Viral warts     LAST ASSESSED: [id number]       Past Surgical History:   Procedure Laterality Date   • ABDOMINAL SURGERY     • ABDOMINOPLASTY     • CHOLECYSTECTOMY     • COSMETIC SURGERY     • EYE SURGERY      Bilateral cataracts 2015 Dr Severo Rick      • FINGER SURGERY     • HAND SURGERY     • HYSTERECTOMY     • LIVER SURGERY     • IA INCISE FINGER TENDON SHEATH Right 7/23/2019    Procedure: RELEASE TRIGGER FINGER RIGHT LONG;  Surgeon: Maria E Trevino MD;  [facility];  Service: Orthopedics   • IA GRETCHENE FINGER TENDON SHEATH Left 8/6/2019    Procedure: RELEASE TRIGGER FINGER LEFT INDEX;  Surgeon: Surinder Hills MD;  [facility];  Service: Orthopedics   • TN INCISE FINGER TENDON SHEATH Right 6/16/2020    Procedure: RELEASE TRIGGER FINGER, right ring;  Surgeon: Surinder Hills MD;  [facility];  Service: Orthopedics   • TN REPAIR INTERCARP/CARP-METACARP JT Right 9/9/2016    Procedure: THUMB TRAPEZIECTOMY; BASAL JOINT ARTHROPLASTY WITH APL AUTOGRAFT;  Surgeon: Bhavana Casas MD;  [facility];  Service: Orthopedics   • TN REPAIR INTERCARP/CARP-METACARP JT Left 5/29/2018    Procedure: Joe Louie;  Surgeon: Surinder Hills MD;  [facility];  Service: Orthopedics   • TN TRANSPLANT HAND TENDON Left 5/29/2018    Procedure: TRANSFER TENDON HAND/WRIST FCR from forearm to wrist;  Surgeon: Surinder Hills MD;  [facility];  Service: Orthopedics       Family History   Problem Relation Age of Onset   • Heart attack Father    • Heart disease Family    • Diabetes Family    • Thyroid disease Family    • Ovarian cancer Family    • Heart attack Mother    • Ovarian cancer Sister    • Heart disease Other         CARDIAC DISORDER   • Diabetes Other    • Liver cancer Other    • Breast cancer Other    • Stomach cancer Other    • Basal cell carcinoma Brother        Social History     Occupational History   • Not on file   Tobacco Use   • Smoking status: Every Day     Packs/day: 0 20     Types: Cigarettes   • Smokeless tobacco: Never   Vaping Use   • Vaping Use: Never used   Substance and Sexual Activity   • Alcohol use: Yes     Comment: very seldom   • Drug use: No   • Sexual activity: Not on file       Current Outpatient Medications on File Prior to Visit   Medication Sig   • gabapentin (NEURONTIN) 100 mg capsule Take 3 capsules (300 mg total) by mouth 2 (two) times a day   • glipiZIDE (GLUCOTROL XL) 5 mg 24 hr tablet TAKE 1 TABLET EVERY DAY  30  MINUTES BEFORE BREAKFAST   • glipiZIDE (GLUCOTROL) 5 mg tablet Take 1 tablet (5 mg total) by mouth 2 (two) times a day before meals   • glucose blood (ACCU-CHEK JESS PLUS) test strip Test BS 3 times daily  DX E11 9   • hydrochlorothiazide (HYDRODIURIL) 25 mg tablet TAKE 1 TABLET EVERY DAY   • lisinopril (ZESTRIL) 20 mg tablet Take 1 tablet (20 mg total) by mouth daily   • meloxicam (MOBIC) 7 5 mg tablet TAKE 1 TABLET TWICE DAILY   • metFORMIN (GLUCOPHAGE) 1000 MG tablet Take 1 tablet (1,000 mg total) by mouth 2 (two) times a day   • Multiple Vitamins-Minerals (OCUVITE ADULT FORMULA PO) Take by mouth   • rosuvastatin (CRESTOR) 20 MG tablet TAKE 1 TABLET EVERY DAY     No current facility-administered medications on file prior to visit  No Known Allergies    Physical Exam    BP (!) 181/77   Pulse 91   Ht 5' (1 524 m)   Wt 89 4 kg (197 lb)   BMI 38 47 kg/m²     Constitutional: normal, well developed, well nourished, alert, in no distress and non-toxic and no overt pain behavior  Eyes: anicteric  HEENT: grossly intact  Neck: supple, symmetric, trachea midline and no masses   Pulmonary:even and unlabored  Cardiovascular:No edema or pitting edema present  Skin:Normal without rashes or lesions and well hydrated  Psychiatric:Mood and affect appropriate  Neurologic:Cranial Nerves II-XII grossly intact  Musculoskeletal:normal gait  Bilateral lumbar paraspinals mildly tender to palpation from L2-5  Bilateral lower extremity strength 5/5 in all muscle groups  Sensation intact to light touch in L3 through S1 dermatomes bilaterally  Negative seated straight leg raise bilaterally      Imaging  MRI LUMBAR SPINE WITHOUT CONTRAST     INDICATION: M54 [id number]: Radiculopathy, lumbar region  M48 [id number]: Spinal stenosis, lumbar region with neurogenic claudication      COMPARISON:  MRI from 8/13/2014     TECHNIQUE:  Sagittal T1, sagittal T2, sagittal inversion recovery, axial T1 and axial T2, coronal T2     IMAGE QUALITY: Diagnostic     FINDINGS:     VERTEBRAL BODIES:  There is a heterogeneous appearance of the visualized osseous structures without focal suspicious lesion  Vertebral body heights are maintained  There is Modic type I endplate degenerative change at L4-L5 and L5-S1      SACRUM:  Normal signal within the sacrum  No evidence of insufficiency or stress fracture      DISTAL CORD AND CONUS:  Normal size and signal within the distal cord and conus        PARASPINAL SOFT TISSUES:  Paraspinal soft tissues are unremarkable      LOWER THORACIC DISC SPACES:  There is disc space narrowing within the lower thoracic spine      LUMBAR DISC SPACES:  There is multilevel disc space degeneration      L1-L2:  No significant canal stenosis or foraminal narrowing      L2-L3:  There is a minimal bulge  There is facet arthrosis  There is no significant canal stenosis  There is no significant foraminal narrowing      L3-L4: There is disc space narrowing  There is a bulging annulus  There is facet arthrosis with ligamentum flavum thickening  There is overall mild mass effect on thecal sac  Findings are overall progressed  There is mild foraminal narrowing      L4-L5:  There is disc space narrowing  There is vacuum disc phenomenon  There is a bulging annulus  There is dorsal osteophytosis  There is facet arthrosis  There is mild mass effect on the thecal sac  There is endplate spurring  There is mild to   moderate left and mild right foraminal narrowing      L5-S1:  There is vacuum disc phenomenon  There is a bulging annulus  There is facet arthrosis  There is endplate spurring  There is moderate to severe bilateral foraminal narrowing with contact of the exiting nerve roots    Findings are grossly   stable      IMPRESSION:     Multilevel degenerative changes of the lumbar spine, as described above      Multifactorial disease results in overall mild mass affect on the thecal sac at L3-L4, slightly progressed      Multilevel degenerative changes in the remainder of the spine are otherwise not significantly changed

## 2022-11-21 ENCOUNTER — HOSPITAL ENCOUNTER (OUTPATIENT)
Dept: MRI IMAGING | Facility: HOSPITAL | Age: 72
Discharge: HOME | End: 2022-11-21
Attending: ANESTHESIOLOGY

## 2022-11-21 DIAGNOSIS — M54.16 LUMBAR RADICULOPATHY: ICD-10-CM

## 2022-11-25 ENCOUNTER — TELEPHONE (OUTPATIENT)
Dept: PAIN MEDICINE | Facility: CLINIC | Age: 72
End: 2022-11-25

## 2022-11-25 NOTE — TELEPHONE ENCOUNTER
Please notify the patient on MRI of her lumbar spine there is no significant advancement of previously noted degenerative changes  She does have degenerative disc disease and arthritis with varying degrees of central and foraminal stenosis (narrowing where nerves exist) most pronounced at L5-S1    Essentially stable since last MRI

## 2022-11-28 NOTE — TELEPHONE ENCOUNTER
S/w pt  Advised of same  Pt verbalized understanding and appreciation  F/u [facility] scheduled  Pt only wants to see JW

## 2022-12-13 ENCOUNTER — TELEPHONE (OUTPATIENT)
Dept: HEMATOLOGY ONCOLOGY | Facility: CLINIC | Age: 72
End: 2022-12-13

## 2022-12-13 NOTE — TELEPHONE ENCOUNTER
Received a phone call from pt to report that she found a lump in her breast  Pt has a Mammogram scheduled in March at [facility] [place]  Pt was provided with the phone number to call and see if she can get in sooner  Pt states she had something removed from breast y ears ago by Dr Cholo Borrero but there was no reason to F/U with Medical Oncology

## 2023-01-01 DIAGNOSIS — M54.16 LUMBAR RADICULOPATHY: ICD-10-CM

## 2023-01-03 RX ORDER — GABAPENTIN 100 MG/1
CAPSULE ORAL
Qty: 180 CAPSULE | Refills: 0 | Status: SHIPPED | OUTPATIENT
Start: 2023-01-03 | End: 2023-01-09 | Stop reason: SDUPTHER

## 2023-01-03 NOTE — TELEPHONE ENCOUNTER
Pt contacted Call Center requested refill of their medication  Medication Name: gabapentin (NEURONTIN) 100 mg capsule       Frequency of Med: Take 3 capsules (300 mg total) by mouth 2 (two) times a day      Remaining Medication: 2 days left       Pharmacy and Location: [address], [address]   [address] [address] [address] [address]   Phone:  [phone]  Fax:  [phone]         Pt  Preferred Callback Phone Number: [phone]      Thank you

## 2023-01-03 NOTE — TELEPHONE ENCOUNTER
Looks like last ordered on 10/31/22 c/ 1 refill  Next [facility] scheduled for 1/12/23  Please advise   thanks

## 2023-01-09 RX ORDER — GABAPENTIN 300 MG/1
300 CAPSULE ORAL 2 TIMES DAILY
Qty: 60 CAPSULE | Refills: 2 | Status: SHIPPED | OUTPATIENT
Start: 2023-01-09 | End: 2023-01-12 | Stop reason: SDUPTHER

## 2023-01-09 NOTE — TELEPHONE ENCOUNTER
Prescription for gabapentin 300 mg twice daily sent to pharmacy    Please notify the patient these are 300 mg capsules rather than 100 mg capsules which will hopefully be easier for her to take

## 2023-01-12 ENCOUNTER — OFFICE VISIT (OUTPATIENT)
Dept: PAIN MEDICINE | Facility: CLINIC | Age: 73
End: 2023-01-12

## 2023-01-12 VITALS
HEIGHT: 60 IN | DIASTOLIC BLOOD PRESSURE: 75 MMHG | BODY MASS INDEX: 39.07 KG/M2 | SYSTOLIC BLOOD PRESSURE: 137 MMHG | WEIGHT: 199 LBS | HEART RATE: 84 BPM

## 2023-01-12 DIAGNOSIS — M48.061 SPINAL STENOSIS OF LUMBAR REGION, UNSPECIFIED WHETHER NEUROGENIC CLAUDICATION PRESENT: ICD-10-CM

## 2023-01-12 DIAGNOSIS — G89.4 CHRONIC PAIN SYNDROME: ICD-10-CM

## 2023-01-12 DIAGNOSIS — M47.816 LUMBAR SPONDYLOSIS: ICD-10-CM

## 2023-01-12 DIAGNOSIS — M54.16 LUMBAR RADICULOPATHY: Primary | ICD-10-CM

## 2023-01-12 DIAGNOSIS — M51.36 DDD (DEGENERATIVE DISC DISEASE), LUMBAR: ICD-10-CM

## 2023-01-12 RX ORDER — GABAPENTIN 300 MG/1
300 CAPSULE ORAL 2 TIMES DAILY
Qty: 60 CAPSULE | Refills: 2 | Status: SHIPPED | OUTPATIENT
Start: 2023-01-12

## 2023-01-12 NOTE — PROGRESS NOTES
Assessment  1  Lumbar radiculopathy    2  Lumbar spondylosis    3  Chronic pain syndrome    4  Spinal stenosis of lumbar region, unspecified whether neurogenic claudication present    5  DDD (degenerative disc disease), lumbar        Plan  76-year-old female with a history of lumbar degenerative disc disease, stenosis, spondylosis, lumbar radiculopathy, and chronic pain syndrome returning for follow-up   The patient presents with complaints of lumbosacral back pain that radiates into the lateral aspect of bilateral lower extremities to the knees with associated subjective weakness    Most recent MRI of the lumbar spine demonstrates degenerative disc disease and spondylosis with moderate to severe bilateral foraminal stenosis at L5-S1  Mild central and bilateral foraminal stenosis at L3-4 and L4-5 as well  The patient recently underwent bilateral L5 TFESI August 17, 2022 and she previously underwent an L4-5 LESI prior to that which unfortunately did not provide any relief  Zoltan Aguirre has previously had excellent relief of her radicular symptoms with L4 and L5 TFESI in 2018  She has found good relief with gabapentin and is currently taking 300 mg b i d  Nathan Meyers has had relief with lumbar RFA in the past of her low back pain  1  A referral to neurosurgery to discuss surgical options including spinal cord stimulation was provided  2  Patient will continue with gabapentin 300 mg twice daily  I did explain to the patient that she may increase to 300 mg in the morning and 600 mg at bedtime if her pain worsens  The patient would like to hold off on this for now  3   Patient will continue with meloxicam as prescribed  4  Patient will continue with her home exercise program  5  I will follow-up with the patient in 6-8 weeks        My impressions and treatment recommendations were discussed in detail with the patient who verbalized understanding and had no further questions  Discharge instructions were provided   I personally saw and examined the patient and I agree with the above discussed plan of care  No orders of the defined types were placed in this encounter  No orders of the defined types were placed in this encounter  History of Present Illness    Parveen Jones is a 74 y o  female with a history of lumbar degenerative disc disease, stenosis, spondylosis, lumbar radiculopathy, and chronic pain syndrome returning for follow-up   The patient presents with complaints of lumbosacral back pain that radiates into the lateral aspect of bilateral lower extremities to the knees with associated subjective weakness   Any bladder or bowel incontinence or saddle anesthesia  Most recent MRI of the lumbar spine demonstrates degenerative disc disease and spondylosis with moderate to severe bilateral foraminal stenosis at L5-S1  Mild central and bilateral foraminal stenosis at L3-4 and L4-5 as well  The patient recently underwent bilateral L5 TFESI August 17, 2022 and She previously underwent an L4-5 LESI prior to that which unfortunately did not provide any relief  Chris Kai has previously had excellent relief of her radicular symptoms with L4 and L5 TFESI in 2018  She has found good relief with gabapentin and is currently taking 300 mg b i d  Gerardo Rough has had relief with lumbar RFA in the past of her low back pain  The patient rates her pain a 6 out of 10 and the pain is worse in the morning  The pain is intermittent and described as dull and aching  The pain is worse with standing, walking, and exercise  The pain is alleviated with sitting and lying down  Other than as stated above, the patient denies any interval changes in medications, medical condition, mental condition, symptoms, or allergies since the last office visit  I have personally reviewed and/or updated the patient's past medical history, past surgical history, family history, social history, current medications, allergies, and vital signs today  Review of Systems   Constitutional: Negative for fever and unexpected weight change  HENT: Negative for trouble swallowing  Eyes: Negative for visual disturbance  Respiratory: Positive for shortness of breath  Negative for wheezing  Cardiovascular: Negative for chest pain and palpitations  Gastrointestinal: Negative for constipation, diarrhea, nausea and vomiting  Endocrine: Negative for cold intolerance, heat intolerance and polydipsia  Genitourinary: Negative for difficulty urinating and frequency  Musculoskeletal: Positive for gait problem and joint swelling  Negative for arthralgias and myalgias  Decreased ROM, pain in extremity   Skin: Negative for rash  Neurological: Negative for dizziness, seizures, syncope, weakness and headaches  Hematological: Does not bruise/bleed easily  Psychiatric/Behavioral: Negative for dysphoric mood  All other systems reviewed and are negative  Patient Active Problem List   Diagnosis   • Lumbar radiculopathy   • Spinal stenosis of lumbar region   • Lumbar spondylosis   • Chronic right hip pain   • Type 2 diabetes mellitus with hyperglycemia, without long-term current use of insulin ([facility])   • Primary osteoarthritis of first carpometacarpal joint of left hand   • Arthritis of carpometacarpal (CMC) joint of left thumb   • Cataract of both eyes   • DDD (degenerative disc disease), lumbar   • Facet arthropathy, lumbar   • Keratosis   • Obesity (BMI 30-39  9)   • Obstructive sleep apnea of adult   • Secondary polycythemia   • Chronic pain syndrome   • Current smoker on some days   • Essential hypertension   • Trigger finger, left index finger   • Trigger ring finger of right hand   • Sacroiliitis ([facility])   • Vertigo   • COPD (chronic obstructive pulmonary disease) ([facility])   • Non-seasonal allergic rhinitis   • Heart murmur, systolic   • Obesity, morbid ([facility])       Past Medical History:   Diagnosis Date   • Asthma    • Chronic obstructive lung disease [facility])    • Community acquired pneumonia     LAST ASSESSED: [id number]   • COPD (chronic obstructive pulmonary disease) ([facility] [address] )    • Diabetes mellitus ([facility] [address] )    • History of MRSA infection     2008 liver sx   • Liver disease    • Sleep apnea    • Viral warts     LAST ASSESSED: [id number]       Past Surgical History:   Procedure Laterality Date   • ABDOMINAL SURGERY     • ABDOMINOPLASTY     • CHOLECYSTECTOMY     • COSMETIC SURGERY     • EYE SURGERY      Bilateral cataracts 2015 Dr Yani Lauren      • FINGER SURGERY     • HAND SURGERY     • HYSTERECTOMY     • LIVER SURGERY     • CA INCISE FINGER TENDON SHEATH Right 7/23/2019    Procedure: RELEASE TRIGGER FINGER RIGHT LONG;  Surgeon: Clinton Giraldo MD;  [facility];  Service: Orthopedics   • CA INCISE FINGER TENDON SHEATH Left 8/6/2019    Procedure: RELEASE TRIGGER FINGER LEFT INDEX;  Surgeon: Clinton Giraldo MD;  [facility];  Service: Orthopedics   • CA INCISE FINGER TENDON SHEATH Right 6/16/2020    Procedure: RELEASE TRIGGER FINGER, right ring;  Surgeon: Clinton Giraldo MD;  [facility];  Service: Orthopedics   • CA REPAIR INTERCARP/CARP-METACARP JT Right 9/9/2016    Procedure: THUMB TRAPEZIECTOMY; BASAL JOINT ARTHROPLASTY WITH APL AUTOGRAFT;  Surgeon: Leonidas Park MD;  [facility];  Service: Orthopedics   • CA REPAIR INTERCARP/CARP-METACARP JT Left 5/29/2018    Procedure: Mavis White;  Surgeon: Clinton Giraldo MD;  [facility];  Service: Orthopedics   • CA TRANSPLANT HAND TENDON Left 5/29/2018    Procedure: TRANSFER TENDON HAND/WRIST FCR from forearm to wrist;  Surgeon: Clinton Giraldo MD;  [facility];  Service: Orthopedics       Family History   Problem Relation Age of Onset   • Heart attack Father    • Heart disease Family    • Diabetes Family    • Thyroid disease Family    • Ovarian cancer Family    • Heart attack Mother    • Ovarian cancer Sister    • Heart disease Other CARDIAC DISORDER   • Diabetes Other    • Liver cancer Other    • Breast cancer Other    • Stomach cancer Other    • Basal cell carcinoma Brother        Social History     Occupational History   • Not on file   Tobacco Use   • Smoking status: Every Day     Packs/day: 0 20     Types: Cigarettes   • Smokeless tobacco: Never   Vaping Use   • Vaping Use: Never used   Substance and Sexual Activity   • Alcohol use: Yes     Comment: very seldom   • Drug use: No   • Sexual activity: Not on file       Current Outpatient Medications on File Prior to Visit   Medication Sig   • gabapentin (NEURONTIN) 300 mg capsule Take 1 capsule (300 mg total) by mouth 2 (two) times a day   • glipiZIDE (GLUCOTROL XL) 5 mg 24 hr tablet TAKE 1 TABLET EVERY DAY  30  MINUTES BEFORE BREAKFAST   • glipiZIDE (GLUCOTROL) 5 mg tablet Take 1 tablet (5 mg total) by mouth 2 (two) times a day before meals   • glucose blood (ACCU-CHEK JESS PLUS) test strip Test BS 3 times daily  DX E11 9   • hydrochlorothiazide (HYDRODIURIL) 25 mg tablet TAKE 1 TABLET EVERY DAY   • lisinopril (ZESTRIL) 20 mg tablet Take 1 tablet (20 mg total) by mouth daily   • meloxicam (MOBIC) 7 5 mg tablet TAKE 1 TABLET TWICE DAILY   • metFORMIN (GLUCOPHAGE) 1000 MG tablet Take 1 tablet (1,000 mg total) by mouth 2 (two) times a day   • Multiple Vitamins-Minerals (OCUVITE ADULT FORMULA PO) Take by mouth   • rosuvastatin (CRESTOR) 20 MG tablet TAKE 1 TABLET EVERY DAY     No current facility-administered medications on file prior to visit  No Known Allergies    Physical Exam    /75   Pulse 84   Ht 5' (1 524 m)   Wt 90 3 kg (199 lb)   BMI 38 86 kg/m²     Constitutional: normal, well developed, well nourished, alert, in no distress and non-toxic and no overt pain behavior    Eyes: anicteric  HEENT: grossly intact  Neck: supple, symmetric, trachea midline and no masses   Pulmonary:even and unlabored  Cardiovascular:No edema or pitting edema present  Skin:Normal without rashes or lesions and well hydrated  Psychiatric:Mood and affect appropriate  Neurologic:Cranial Nerves II-XII grossly intact  Musculoskeletal:antalgic gait  Bilateral lumbar paraspinals mildly tender to palpation from L3-L5  Bilateral lower extremity strength 5 out of 5 in all muscular's  Sensation intact to light touch in L3-S1 dermatomes bilaterally  Negative seated straight leg raise bilaterally    Imaging    PACS Images     Show images for MRI lumbar spine wo contrast  Study Result    Narrative & Impression   MRI LUMBAR SPINE WITHOUT CONTRAST     INDICATION: [id number]: Radiculopathy, lumbar region      COMPARISON:  MRI dated 7/11/2019      TECHNIQUE:  Multiplanar, multisequence imaging of the lumbar spine was performed             FINDINGS:     VERTEBRAL BODIES:  There are 5 lumbar type vertebral bodies  Normal alignment of the lumbar spine  No spondylolysis or spondylolisthesis  No scoliosis  No compression fracture  Stable heterogeneous marrow signal   Multilevel mostly Modic type II   endplate degenerative changes  Minimal type I changes at L4-5 are decreased from prior  No suspicious marrow signal abnormality      SACRUM:  Normal signal within the sacrum  No evidence of insufficiency or stress fracture      DISTAL CORD AND CONUS:  Normal size and signal within the distal cord and conus      PARASPINAL SOFT TISSUES:  Paraspinal soft tissues are unremarkable      LOWER THORACIC DISC SPACES:  Mild noncompressive lower thoracic degenerative change      LUMBAR DISC SPACES:  Multilevel disc desiccation  Disc space narrowing most pronounced at L4-5      L1-L2:  No disc herniation, canal or foraminal stenosis      L2-L3:  Minimal disc bulge and mild facet arthropathy  No significant canal or foraminal stenosis      L3-L4:  Disc bulge, facet arthropathy and ligamentum flavum hypertrophy  Mild canal stenosis and mild foraminal stenosis    No significant change      L4-L5:  Disc bulge and marginal osteophyte with facet arthropathy  Mild canal stenosis and mild foraminal stenosis  No significant change      L5-S1: Disc bulge, marginal osteophyte and facet arthropathy  No significant canal stenosis  Moderate severe foraminal stenosis contacting the exiting nerve roots, right worse than left  No significant change      IMPRESSION:     Multilevel degenerative spondylosis without significant change  Mild canal and mild foraminal stenosis at L3-4 and L4-5    Moderate to severe bilateral foraminal stenosis at L5-S1, right worse than left      Workstation performed: [id number]

## 2023-01-13 ENCOUNTER — TELEPHONE (OUTPATIENT)
Dept: OTHER | Facility: OTHER | Age: 73
End: 2023-01-13

## 2023-01-23 ENCOUNTER — HOSPITAL ENCOUNTER (OUTPATIENT)
Dept: MAMMOGRAPHY | Facility: CLINIC | Age: 73
Discharge: HOME | End: 2023-01-23

## 2023-01-23 ENCOUNTER — HOSPITAL ENCOUNTER (OUTPATIENT)
Dept: ULTRASOUND IMAGING | Facility: CLINIC | Age: 73
Discharge: HOME | End: 2023-01-23

## 2023-01-23 DIAGNOSIS — N63.10 MASS OF RIGHT BREAST, UNSPECIFIED QUADRANT: Primary | ICD-10-CM

## 2023-01-23 DIAGNOSIS — N63.0 BREAST LUMP: ICD-10-CM

## 2023-01-23 DIAGNOSIS — N63.0 LUMP IN FEMALE BREAST: ICD-10-CM

## 2023-01-23 DIAGNOSIS — R92.8 OTHER ABNORMAL AND INCONCLUSIVE FINDINGS ON DIAGNOSTIC IMAGING OF BREAST: ICD-10-CM

## 2023-01-26 ENCOUNTER — OFFICE VISIT (OUTPATIENT)
Dept: NEUROSURGERY | Facility: CLINIC | Age: 73
End: 2023-01-26

## 2023-01-26 ENCOUNTER — TELEPHONE (OUTPATIENT)
Dept: FAMILY MEDICINE CLINIC | Facility: CLINIC | Age: 73
End: 2023-01-26

## 2023-01-26 VITALS
HEART RATE: 62 BPM | DIASTOLIC BLOOD PRESSURE: 80 MMHG | RESPIRATION RATE: 18 BRPM | BODY MASS INDEX: 37.19 KG/M2 | SYSTOLIC BLOOD PRESSURE: 150 MMHG | TEMPERATURE: 97.2 F | HEIGHT: 61 IN | WEIGHT: 197 LBS | OXYGEN SATURATION: 85 %

## 2023-01-26 DIAGNOSIS — E66.01 OBESITY, MORBID (HCC): ICD-10-CM

## 2023-01-26 DIAGNOSIS — I10 ESSENTIAL HYPERTENSION: ICD-10-CM

## 2023-01-26 DIAGNOSIS — J44.9 CHRONIC OBSTRUCTIVE PULMONARY DISEASE, UNSPECIFIED COPD TYPE (HCC): ICD-10-CM

## 2023-01-26 DIAGNOSIS — E11.9 TYPE 2 DIABETES MELLITUS WITHOUT COMPLICATION, WITHOUT LONG-TERM CURRENT USE OF INSULIN (HCC): Primary | ICD-10-CM

## 2023-01-26 DIAGNOSIS — M54.16 LUMBAR RADICULOPATHY: ICD-10-CM

## 2023-01-26 NOTE — ASSESSMENT & PLAN NOTE
History of COPD and FRANK  O2 sat at visit noted 85-87% at rest   Denies any SOB or DSOUZA  Phoned PCP's office (Dr Rizwana Barreto) to recommend referral to pulmonology  Declined referral to smoking cessation clinic

## 2023-01-26 NOTE — TELEPHONE ENCOUNTER
Arina Leahy from [facility] called today to state that Anita Calhoun was hypoxic today and may need a referral to pulmonology  Please advise

## 2023-01-26 NOTE — PROGRESS NOTES
Neurosurgery Office Note  Arlet Williamson 74 y o  female MRN: [id number]      Assessment/Plan     Lumbar radiculopathy  Presents as referral from Dr Lenka Tran for evaluation of lumbar radiculopathy  · Ongoing x 10 years  · S/p L4-5 ILESI 6/27/2022, and bilateral L5 TFESI 8/17/2022 with Dr Lenka Tran  S/p RFA in 2019  · Under care of pain management for back pain for 10-15 years  · On gabapentin 300 mg BID with moderate relief  · Describes pain across low back, down bilateral buttocks and posterior and lateral legs that stops at knees  · On exam, some hip flexor weakness that appears antalgic  No paresthesias  Endorsing some urinary incontinence x 1 month  Imaging:  · MRI lumbar spine wo, 11/21/2022: Multilevel degenerative spondylosis without significant change  Mild canal and mild foraminal stenosis at L3-4 and L4-5  Moderate to severe bilateral foraminal stenosis at L5-S1, right worse than left  Plan:  · Reviewed imaging extensively with patient and her , Jairo Galeano  · Discussed that surgical intervention could involve lumbar disc replacement at L4-5, with foraminotomies at L5-S1, with or without fusion  · Patient is however a poor surgical candidate in setting of active tobacco use, diabetes (A1c 7 7), COPD  She expresses she is not interested in extensive surgery  · Additionally discussed role of spinal cord stimulator and recommend patient pursue trial       COPD (chronic obstructive pulmonary disease) (HCC)  History of COPD and FRANK  O2 sat at visit noted 85-87% at rest   Denies any SOB or DSOUZA  Phoned PCP's office (Dr Vikki Vasquez) to recommend referral to pulmonology  Declined referral to smoking cessation clinic  Diagnoses and all orders for this visit:    Lumbar radiculopathy  -     Ambulatory referral to Neurosurgery  -     Ambulatory Referral to Physical Therapy; Future    Obesity, morbid ([address] )    Other orders  -     diphenhydrAMINE HCl (BENADRYL PO);  Take by mouth daily at bedtime  - IBUPROFEN PO; Take by mouth if needed          I spent 30 minutes with the patient today in which >50% of the time was spent counseling/coordination of care regarding diagnosis, imaging review, symptoms and treatment plan  CHIEF COMPLAINT    Chief Complaint   Patient presents with   • Consult     Lumbar spine evalution       HISTORY    History of Present Illness     74y o  year old female     With past medical history of diabetes, FRANK, COPD, chronic lumbar pain, tobacco use(smokes half a pack per day), who presents as referral from Dr Yanira Perez for evaluation of chronic lumbar back pain  She states this has been ongoing for about 10 to 15 years  She has been seeing pain management for at least that long and has undergone RFA in 2019 and most recently in June and August lumbar ELLEN  She states her last 2 injections only provided relief of her pain for a couple of days  She describes that the pain is constantly across her back and radiating down into her buttocks and posterior lateral thighs  The pain stops at her knees  There is no associated numbness or tingling  She describes that her legs often feel weak and she ambulates with a walker or quad cane  She can only ambulate for about 10 feet before she has to stop due to the pain  She has resorted to walking in a bent over position secondary to the pain  She states for the past month she has been experiencing urinary incontinence that she describes as being spontaneous  She notices that she has had an accident only after the fact  She presents today with her  Yessi Coreas  She is not interested in major surgery with hardware  She has discussed spinal cord stimulator trial with Dr Yanira Perez  See Discussion    REVIEW OF SYSTEMS    Review of Systems   HENT: Positive for postnasal drip and rhinorrhea  Respiratory: Positive for apnea  Uses CPAP    Sleep apnea,COPD   Gastrointestinal: Negative      Genitourinary:        Some incontinence Musculoskeletal: Positive for back pain (across low back radiates to B/L buttocks and posterior / outer legs to just above knees) and gait problem (presents with cane, uses walker at home, last fall about 1 month ago)  Skin: Negative  Allergic/Immunologic: Positive for environmental allergies  Neurological: Positive for weakness (legs)  Negative for numbness  ROS obtained by MA  Reviewed  See HPI  Meds/Allergies     Current Outpatient Medications   Medication Sig Dispense Refill   • diphenhydrAMINE HCl (BENADRYL PO) Take by mouth daily at bedtime     • gabapentin (NEURONTIN) 300 mg capsule Take 1 capsule (300 mg total) by mouth 2 (two) times a day 60 capsule 2   • glipiZIDE (GLUCOTROL) 5 mg tablet Take 1 tablet (5 mg total) by mouth 2 (two) times a day before meals 180 tablet 1   • hydrochlorothiazide (HYDRODIURIL) 25 mg tablet TAKE 1 TABLET EVERY DAY 90 tablet 1   • IBUPROFEN PO Take by mouth if needed     • lisinopril (ZESTRIL) 20 mg tablet Take 1 tablet (20 mg total) by mouth daily 90 tablet 1   • meloxicam (MOBIC) 7 5 mg tablet TAKE 1 TABLET TWICE DAILY 180 tablet 1   • metFORMIN (GLUCOPHAGE) 1000 MG tablet Take 1 tablet (1,000 mg total) by mouth 2 (two) times a day 180 tablet 1   • Multiple Vitamins-Minerals (OCUVITE ADULT FORMULA PO) Take by mouth     • rosuvastatin (CRESTOR) 20 MG tablet TAKE 1 TABLET EVERY DAY 90 tablet 1   • glipiZIDE (GLUCOTROL XL) 5 mg 24 hr tablet TAKE 1 TABLET EVERY DAY  30  MINUTES BEFORE BREAKFAST 90 tablet 0   • glucose blood (ACCU-CHEK JESS PLUS) test strip Test BS 3 times daily  DX E11 9 100 each 2     No current facility-administered medications for this visit         No Known Allergies    PAST HISTORY    Past Medical History:   Diagnosis Date   • Asthma    • Chronic obstructive lung disease ([facility] )    • Community acquired pneumonia     LAST ASSESSED: [id number]   • COPD (chronic obstructive pulmonary disease) ([facility] )    • Diabetes mellitus ([facility] )    • History of MRSA infection     2008 liver sx   • Liver disease    • Sleep apnea    • Viral warts     LAST ASSESSED: [id number]       Past Surgical History:   Procedure Laterality Date   • ABDOMINAL SURGERY     • ABDOMINOPLASTY     • CHOLECYSTECTOMY     • COSMETIC SURGERY     • EYE SURGERY      Bilateral cataracts 2015 Dr Leopoldo Malm      • FINGER SURGERY     • HAND SURGERY     • HYSTERECTOMY     • LIVER SURGERY     • MS ARTHRP INTERPOS INTERCARPAL/METACARPAL JOINTS Right 9/9/2016    Procedure: THUMB TRAPEZIECTOMY; BASAL JOINT ARTHROPLASTY WITH APL AUTOGRAFT;  Surgeon: Mumtaz Adame MD;  Location[facility];  Service: Orthopedics   • MS ARTHRP INTERPOS INTERCARPAL/METACARPAL JOINTS Left 5/29/2018    Procedure: Sabi Mathews;  Surgeon: Quinn Lwa MD;  [facility];  Service: Orthopedics   • MS TENDON SHEATH INCISION Right 7/23/2019    Procedure: RELEASE TRIGGER FINGER RIGHT LONG;  Surgeon: Quinn Law MD;  [facility];  Service: Orthopedics   • MS TENDON SHEATH INCISION Left 8/6/2019    Procedure: RELEASE TRIGGER FINGER LEFT INDEX;  Surgeon: Quinn Law MD;  [facility];  Service: Orthopedics   • MS TENDON SHEATH INCISION Right 6/16/2020    Procedure: RELEASE TRIGGER FINGER, right ring;  Surgeon: Quinn Law MD;  [facility];  Service: Orthopedics   • MS TR/TRNSPL TDN CARP/MTCRPL HAND W/O FR GRF EA TDN Left 5/29/2018    Procedure: TRANSFER TENDON HAND/WRIST FCR from forearm to wrist;  Surgeon: Quinn Law MD;  [facility];  Service: Orthopedics       Social History     Tobacco Use   • Smoking status: Every Day     Packs/day: 0 50     Types: Cigarettes   • Smokeless tobacco: Never   Vaping Use   • Vaping Use: Never used   Substance Use Topics   • Alcohol use: Yes     Comment: very seldom   • Drug use: No       Family History   Problem Relation Age of Onset   • Heart attack Mother    • Heart attack Father    • Ovarian cancer Sister    • Basal cell carcinoma Brother    • Heart disease Other         CARDIAC DISORDER   • Diabetes Other    • Liver cancer Other    • Breast cancer Other    • Stomach cancer Other    • Heart disease Family    • Diabetes Family    • Thyroid disease Family    • Ovarian cancer Family          Above history personally reviewed  EXAM    Vitals:Blood pressure 150/80, pulse 62, temperature (!) 97 2 °F (36 2 °C), temperature source Tympanic, resp  rate 18, height 5' 0 5" (1 537 m), weight 89 4 kg (197 lb), SpO2 (!) 85 %  ,Body mass index is 37 84 kg/m²  Physical Exam  Constitutional:       Appearance: She is well-developed  She is obese  HENT:      Head: Normocephalic and atraumatic  Eyes:      Extraocular Movements: EOM normal       Pupils: Pupils are equal, round, and reactive to light  Pulmonary:      Effort: Pulmonary effort is normal    Abdominal:      Palpations: Abdomen is soft  Musculoskeletal:         General: Normal range of motion  Cervical back: Normal range of motion and neck supple  Skin:     General: Skin is warm and dry  Neurological:      General: No focal deficit present  Mental Status: She is alert and oriented to person, place, and time  Mental status is at baseline  Sensory: No sensory deficit  Motor: Weakness present  Coordination: Coordination normal  Finger-Nose-Finger Test normal       Deep Tendon Reflexes:      Reflex Scores:       Patellar reflexes are 2+ on the right side and 2+ on the left side  Achilles reflexes are 2+ on the right side and 2+ on the left side  Psychiatric:         Speech: Speech normal          Neurologic Exam     Mental Status   Oriented to person, place, and time  Oriented to person  Oriented to place  Oriented to time  Oriented to year, month and date  Registration: recalls 3 of 3 objects     Attention: normal  Concentration: normal    Speech: speech is normal   Level of consciousness: alert  Knowledge: good and consistent with education  Able to name object  Cranial Nerves     CN III, IV, VI   Pupils are equal, round, and reactive to light  Extraocular motions are normal    Right pupil: Size: 3 mm  Shape: regular  Reactivity: brisk  Consensual response: intact  Accommodation: intact  Left pupil: Size: 3 mm  Shape: regular  Reactivity: brisk  Consensual response: intact  Accommodation: intact  Nystagmus: none   Diplopia: none  Conjugate gaze: present    CN V   Right facial sensation deficit: none  Left facial sensation deficit: none    CN VII   Facial expression full, symmetric       CN VIII   Hearing: intact    CN IX, X   Palate: symmetric    CN XI   Right sternocleidomastoid strength: normal  Left sternocleidomastoid strength: normal  Right trapezius strength: normal  Left trapezius strength: normal    CN XII   Tongue: not atrophic  Fasciculations: absent  Tongue deviation: none    Motor Exam   Muscle bulk: normal  Overall muscle tone: normal  Right arm pronator drift: absent  Left arm pronator drift: absent    Strength   Right deltoid: 5/5  Left deltoid: 5/5  Right biceps: 5/5  Left biceps: 5/5  Right triceps: 5/5  Left triceps: 5/5  Right iliopsoas: 4/5  Left iliopsoas: 4/5  Right quadriceps: 5/5  Left quadriceps: 5/5  Right hamstrin/5  Left hamstrin/5  Right glutei: 5/5  Left glutei: 5/5  Right anterior tibial: 5/5  Left anterior tibial: 5/5  Right posterior tibial: 5/5  Left posterior tibial: 5/5  Right peroneal: 5/5  Left peroneal: 5/5  Right gastroc: 5/5  Left gastroc: 5/5    Sensory Exam   Light touch normal    Proprioception normal      Gait, Coordination, and Reflexes     Coordination   Finger to nose coordination: normal    Tremor   Resting tremor: absent  Intention tremor: absent  Action tremor: absent    Reflexes   Right patellar: 2+  Left patellar: 2+  Right achilles: 2+  Left achilles: 2+  Right Mcconnell: absent  Left Mcconnell: absent  Right ankle clonus: absent  Left ankle clonus: absent        MEDICAL DECISION MAKING    Imaging Studies:     Mammo diagnostic bilateral w 3d & cad, US breast bilateral limited (diagnostic)    Result Date: 1/23/2023  Narrative: DIAGNOSIS: Breast lump TECHNIQUE: Digital diagnostic mammography was performed  Computer Aided Detection (CAD) analyzed all applicable images  Ultrasound of the bilateral breast(s) was performed  COMPARISONS: None available  RELEVANT HISTORY: Family Breast Cancer History: History of breast cancer in Other  Family Medical History: Family medical history includes breast cancer in other and ovarian cancer in 2 relatives (family, sister)  Personal History: No known relevant hormone history  Surgical history includes hysterectomy  No known relevant medical history  RISK ASSESSMENT: 5 Year Tyrer-Cuzick: 1 9 % 10 Year Tyrer-Cuzick: 4 01 % Lifetime Tyrer-Cuzick: 4 45 % TISSUE DENSITY: There are scattered areas of fibroglandular density  INDICATION: Fanny Suazo is a 74 y o  female presenting for Breast lump  FINDINGS: LEFT A) MASS Mammo diagnostic bilateral w 3d & cad: There are no corresponding masses seen on this modality  US breast bilateral limited (diagnostic): There is a 19 mm x 5 mm x 21 mm oval, parallel, isoechoic mass with circumscribed margins seen in the left breast at 12 o'clock, 7 cm from the nipple  The mass correlates with the palpable mass reported by the patient  What the patient feels is a fatty mass which is just beneath the skin  Within this area there is also a very small anechoic cyst measuring about 2 x 4 mm  RIGHT B) FOCAL ASYMMETRY Mammo diagnostic bilateral w 3d & cad: There is a 12 mm focal asymmetry seen in the right breast at 7 o'clock in the posterior depth, 9 cm from the nipple  Ultrasound of this area from 6-8 o'clock near the inframammary fold shows no evidence of solid or cystic mass  Elsewhere, both breasts are essentially unremarkable  A few scattered benign-appearing calcifications are noted  Impression: The palpable lump on the left is a benign appearing fatty mass  Clinical management advised  Focal asymmetry of the posterior lower outer right breast without any worrisome ultrasound finding in the area  As a precaution, recommend 6-month follow-up diagnostic right mammogram for surveillance  ASSESSMENT/BI-RADS CATEGORY: Left: 2 - Benign Right: 3 - Probably Benign Overall: 3 - Probably Benign RECOMMENDATION:      - Clinical management for the left breast       - Routine screening mammogram in 1 year for the left breast       - Diagnostic mammogram in 6 months for the right breast  Workstation ID: [id number]       I have personally reviewed pertinent reports     and I have personally reviewed pertinent films in PACS

## 2023-01-26 NOTE — ASSESSMENT & PLAN NOTE
Presents as referral from Dr Shelly Stevenson for evaluation of lumbar radiculopathy  · Ongoing x 10 years  · S/p L4-5 ILESI 6/27/2022, and bilateral L5 TFESI 8/17/2022 with Dr Shelly Stevenson  S/p RFA in 2019  · Under care of pain management for back pain for 10-15 years  · On gabapentin 300 mg BID with moderate relief  · Describes pain across low back, down bilateral buttocks and posterior and lateral legs that stops at knees  · On exam, some hip flexor weakness that appears antalgic  No paresthesias  Endorsing some urinary incontinence x 1 month  Imaging:  · MRI lumbar spine wo, 11/21/2022: Multilevel degenerative spondylosis without significant change  Mild canal and mild foraminal stenosis at L3-4 and L4-5  Moderate to severe bilateral foraminal stenosis at L5-S1, right worse than left  Plan:  · Reviewed imaging extensively with patient and her , Arsenio Berman  · Discussed that surgical intervention could involve lumbar disc replacement at L4-5, with foraminotomies at L5-S1, with or without fusion  · Patient is however a poor surgical candidate in setting of active tobacco use, diabetes (A1c 7 7), COPD  She expresses she is not interested in extensive surgery    · Additionally discussed role of spinal cord stimulator and recommend patient pursue trial

## 2023-01-26 NOTE — TELEPHONE ENCOUNTER
Please speak to patient directly  We received a message from her neurology office that her oxygen level was low  If patient is feeling any shortness of breath or chest tightness she should be evaluated in the emergency room  Otherwise she would need an office evaluation in our office or she may contact her pulmonary office, Dr Gomez

## 2023-01-26 NOTE — TELEPHONE ENCOUNTER
Spoke with patient and provided information  She is not experiencing any symptoms or shortness of breath or chest tightness  If she does she will go to the Er  She does have an appointment on 2/6/23 and would like to know if she needs labs prior or not?   Please call to advise

## 2023-01-26 NOTE — PATIENT INSTRUCTIONS
Please continue to follow-up with your pain management doctor in regard to further injection therapy and spinal cord stimulator trial       Please call and schedule physical therapy to be completed  Please attend your scheduled follow-up visit with your primary care doctor, Dr Benita Nettles  His office is aware you were seen today in the neurosurgery clinic and need follow-up in regard to blood work and your hypoxia/chronic lung condition

## 2023-01-30 ENCOUNTER — EVALUATION (OUTPATIENT)
Dept: PHYSICAL THERAPY | Facility: REHABILITATION | Age: 73
End: 2023-01-30

## 2023-01-30 ENCOUNTER — RA CDI HCC (OUTPATIENT)
Dept: OTHER | Facility: HOSPITAL | Age: 73
End: 2023-01-30

## 2023-01-30 DIAGNOSIS — M54.16 LUMBAR RADICULOPATHY: ICD-10-CM

## 2023-01-30 NOTE — PROGRESS NOTES
[facility]  coding opportunities          Chart Reviewed number of suggestions sent to Provider: 1     Patients Insurance     Medicare Insurance: Medicare        [id number]

## 2023-01-30 NOTE — PROGRESS NOTES
PT Evaluation     Today's date: 2023  Patient name: Mike Martines  : 1950  MRN: [id number]  Referring provider: Gemma Dodge  Dx:   Encounter Diagnosis     ICD-10-CM    1  Lumbar radiculopathy  M54 16 Ambulatory Referral to Physical Therapy          Start Time: 1000  Stop Time: 1050  Total time in clinic (min): 50 minutes    Assessment  Assessment details: Problem List:  1) lumbar hypomobility  2) LE weakness    Mike Martines is a pleasant 74 y o  female who presents with low back pain that has been bothering her for about 15 years  She is pain dominant with most of her pain coming from being in extended activities such as standing or walking  She has lumbar hypomobility, lower extremity weakness, and decreased ambulatory tolerance resulting in the pain she is experiencing, worry over not knowing what's wrong, concern at no signs of improvement and fear of not being able to keep active  No further referral appears necessary at this time based upon examination results  I expect she will display minor improvements in 12 weeks  Positive prognostic indicators include positive attitude toward recovery and good understanding of diagnosis and treatment plan options  Negative prognostic indicators include chronicity of symptoms, depression, hypertension, high symptom irritability, minimal changes in pain with movement, multiple concurrent orthopedic problems, multiple prior failed treatments, poor surgical outcomes, obesity and dependency on medications  Sylvie Kramer would benefit from skilled physical therapy to address her limitations and allow to have less difficulty with everyday tasks      Impairments: abnormal or restricted ROM, activity intolerance, impaired physical strength, lacks appropriate home exercise program, pain with function, weight-bearing intolerance and poor posture   Understanding of Dx/Px/POC: good   Prognosis: poor    Goals  ST-4 weeks  Patient will be independent with home exercise program    Patient will be able to manage symptoms independently  Patient will decrease pain by 25-50%    LTG: by discharge  Patient will improve FOTO to goal  Patient will report minimal (1-2/10) pain with aggravating activities to display improvements in overall functional status    Plan  Patient would benefit from: skilled physical therapy  Planned modality interventions: cryotherapy, thermotherapy: hydrocollator packs and unattended electrical stimulation  Planned therapy interventions: IADL retraining, joint mobilization, manual therapy, massage, ADL training, activity modification, abdominal trunk stabilization, ADL retraining, balance, balance/weight bearing training, neuromuscular re-education, body mechanics training, behavior modification, strengthening, stretching, therapeutic activities, therapeutic exercise, therapeutic training, transfer training, graded exercise, graded motor, home exercise program, graded activity, gait training, functional ROM exercises, patient education, postural training and flexibility  Frequency: 2x week  Duration in visits: 16  Duration in weeks: 8  Treatment plan discussed with: patient        Subjective Evaluation    History of Present Illness  Mechanism of injury: Dario Sweet is a 74 y o  female presenting to physical therapy on 01/30/23 with referral from MD for low back pain that bothers her mostly when walking or standing  Feels better when sitting  Pain has been bothering her for about 15 years  Reports some pain down the legs in the front and back of the thighs  Pt is primary caregiver for her  who has cognitive and physical limitations due to Covid infection 3 years ago  Has had multiple steroid injections in her back  Reports the most recent injections have not relieved her pain much  Within the last 4 months pain has increased  Is currently taking meloxicam to help with swelling  Has had recent bowel/bladder changes starting about a month ago  Reports she is going to see her primary care physician on Friday as she told him about the incontinence  Quality of life: fair    Pain  Current pain ratin  At best pain ratin  At worst pain ratin  Location: both sides of low back  Quality: sharp and dull ache  Relieving factors: change in position  Aggravating factors: walking and standing  Progression: worsening      Diagnostic Tests  MRI studies: abnormal  Treatments  Previous treatment: injection treatment  Patient Goals  Patient goals for therapy: increased strength, independence with ADLs/IADLs, return to sport/leisure activities, decreased pain and increased motion  Patient goal: would like to walk and stand better, would like some exercises to do at home        Objective  Posture:  Palpation:   Myotomes: all intact b/l  Dermatome: all intact b/l     Reflexes:  L4: 2+ b/l       S1: 2+ b/l           Babinski=      Clonus=     GAIT:  Squat assess: decreased depth, forward trunk lean    Lumbar  % of normal   Flex  100%   Extn  25%   SB Left 50%   SB Right 50%   ROT Left 75%   ROT Right 75%   Repetitive testing: extension= no change    Flexion= no change        MMT         AROM          PROM    Hip       L       R        L           R      L     R   Flex  [facility] WFL   Extn  WFL WFL   Abd  [facility] WFL   Add  WFL WFL   IR  [facility]   ER  WFL WFL            G  Max         G  Med  Iliop              Segmental mobility:   LS= hypomobile               Precautions: DMII, HTN, COPD    Manuals             Lumbar rotation PPVIM QD 2x30 GrII            Lumbar UPA L5 2x30 GrII ea                                      Neuro Re-Ed                                                                                                        Ther Ex             VG             Clamshells             bridges             LTRs             Sit to stands             [employer] walks             Step ups Ther Activity                                                                                           Gait Training                                       Modalities

## 2023-02-02 ENCOUNTER — APPOINTMENT (OUTPATIENT)
Dept: LAB | Facility: CLINIC | Age: 73
End: 2023-02-02

## 2023-02-02 DIAGNOSIS — E11.9 TYPE 2 DIABETES MELLITUS WITHOUT COMPLICATION, WITHOUT LONG-TERM CURRENT USE OF INSULIN (HCC): ICD-10-CM

## 2023-02-02 DIAGNOSIS — J44.9 CHRONIC OBSTRUCTIVE PULMONARY DISEASE, UNSPECIFIED COPD TYPE (HCC): ICD-10-CM

## 2023-02-02 DIAGNOSIS — I10 ESSENTIAL HYPERTENSION: ICD-10-CM

## 2023-02-02 LAB
ALBUMIN SERPL BCP-MCNC: 3.2 G/DL (ref 3.5–5)
ALP SERPL-CCNC: 85 U/L (ref 46–116)
ALT SERPL W P-5'-P-CCNC: 16 U/L (ref 12–78)
ANION GAP SERPL CALCULATED.3IONS-SCNC: 4 MMOL/L (ref 4–13)
AST SERPL W P-5'-P-CCNC: 14 U/L (ref 5–45)
BILIRUB SERPL-MCNC: 0.32 MG/DL (ref 0.2–1)
BUN SERPL-MCNC: 30 MG/DL (ref 5–25)
CALCIUM ALBUM COR SERPL-MCNC: 9.8 MG/DL (ref 8.3–10.1)
CALCIUM SERPL-MCNC: 9.2 MG/DL (ref 8.3–10.1)
CHLORIDE SERPL-SCNC: 105 MMOL/L (ref 96–108)
CHOLEST SERPL-MCNC: 94 MG/DL
CO2 SERPL-SCNC: 31 MMOL/L (ref 21–32)
CREAT SERPL-MCNC: 1.08 MG/DL (ref 0.6–1.3)
ERYTHROCYTE [DISTWIDTH] IN BLOOD BY AUTOMATED COUNT: 14 % (ref 11.6–15.1)
EST. AVERAGE GLUCOSE BLD GHB EST-MCNC: 134 MG/DL
GFR SERPL CREATININE-BSD FRML MDRD: 50 ML/MIN/1.73SQ M
GLUCOSE P FAST SERPL-MCNC: 150 MG/DL (ref 65–99)
HBA1C MFR BLD: 6.3 %
HCT VFR BLD AUTO: 46.7 % (ref 34.8–46.1)
HDLC SERPL-MCNC: 45 MG/DL
HGB BLD-MCNC: 13.3 G/DL (ref 11.5–15.4)
LDLC SERPL CALC-MCNC: 21 MG/DL (ref 0–100)
MCH RBC QN AUTO: 28.2 PG (ref 26.8–34.3)
MCHC RBC AUTO-ENTMCNC: 28.5 G/DL (ref 31.4–37.4)
MCV RBC AUTO: 99 FL (ref 82–98)
NONHDLC SERPL-MCNC: 49 MG/DL
PLATELET # BLD AUTO: 183 THOUSANDS/UL (ref 149–390)
PMV BLD AUTO: 11.8 FL (ref 8.9–12.7)
POTASSIUM SERPL-SCNC: 4.6 MMOL/L (ref 3.5–5.3)
PROT SERPL-MCNC: 6.9 G/DL (ref 6.4–8.4)
RBC # BLD AUTO: 4.71 MILLION/UL (ref 3.81–5.12)
SODIUM SERPL-SCNC: 140 MMOL/L (ref 135–147)
TRIGL SERPL-MCNC: 138 MG/DL
TSH SERPL DL<=0.05 MIU/L-ACNC: 1.21 UIU/ML (ref 0.45–4.5)
WBC # BLD AUTO: 10.34 THOUSAND/UL (ref 4.31–10.16)

## 2023-02-03 DIAGNOSIS — I10 ESSENTIAL HYPERTENSION: ICD-10-CM

## 2023-02-03 RX ORDER — HYDROCHLOROTHIAZIDE 25 MG/1
25 TABLET ORAL DAILY
Qty: 90 TABLET | Refills: 1 | Status: SHIPPED | OUTPATIENT
Start: 2023-02-03

## 2023-02-06 ENCOUNTER — OFFICE VISIT (OUTPATIENT)
Dept: FAMILY MEDICINE CLINIC | Facility: CLINIC | Age: 73
End: 2023-02-06

## 2023-02-06 VITALS
WEIGHT: 201.13 LBS | HEIGHT: 60 IN | BODY MASS INDEX: 39.49 KG/M2 | OXYGEN SATURATION: 95 % | HEART RATE: 63 BPM | RESPIRATION RATE: 18 BRPM | SYSTOLIC BLOOD PRESSURE: 150 MMHG | DIASTOLIC BLOOD PRESSURE: 70 MMHG | TEMPERATURE: 97.8 F

## 2023-02-06 DIAGNOSIS — M46.1 SACROILIITIS (HCC): ICD-10-CM

## 2023-02-06 DIAGNOSIS — E11.65 TYPE 2 DIABETES MELLITUS WITH HYPERGLYCEMIA, WITHOUT LONG-TERM CURRENT USE OF INSULIN (HCC): ICD-10-CM

## 2023-02-06 DIAGNOSIS — J44.9 CHRONIC OBSTRUCTIVE PULMONARY DISEASE, UNSPECIFIED COPD TYPE (HCC): ICD-10-CM

## 2023-02-06 DIAGNOSIS — L98.9 SKIN LESION: Primary | ICD-10-CM

## 2023-02-06 LAB
CREAT UR-MCNC: 96.8 MG/DL
MICROALBUMIN UR-MCNC: 1750 MG/L (ref 0–20)
MICROALBUMIN/CREAT 24H UR: 1808 MG/G CREATININE (ref 0–30)

## 2023-02-06 NOTE — ASSESSMENT & PLAN NOTE
Skin lesions  Using sterile technique and equipment for lesions were shaved off her abdomen, right arm, right hand as described  Silver nitrate used for coagulation  Neosporin and Band-Aid placed on lesions  Patient will keep the area clean and dry

## 2023-02-06 NOTE — PROGRESS NOTES
FAMILY PRACTICE OFFICE VISIT       NAME: Rachel Alberto  AGE: 74 y o  SEX: female       : 1950        MRN: [id number]    DATE: 2023  TIME: 12:29 PM    Assessment and Plan     Problem List Items Addressed This Visit        Musculoskeletal and Integument    Skin lesion - Primary     Skin lesions  Using sterile technique and equipment for lesions were shaved off her abdomen, right arm, right hand as described  Silver nitrate used for coagulation  Neosporin and Band-Aid placed on lesions  Patient will keep the area clean and dry  Chief Complaint     Chief Complaint   Patient presents with   • 3 mole removel      1 on R shoulder , 2 in ABD        History of Present Illness     Patient states she has stopped smoking 2 weeks ago  Her A1c is down to 6 3  Patient is in the office to have benign skin lesions removed on abdomen, right arm, and right hand      Review of Systems   Review of Systems   Constitutional: Negative  Skin:        As per HPI       Active Problem List     Patient Active Problem List   Diagnosis   • Lumbar radiculopathy   • Spinal stenosis of lumbar region   • Lumbar spondylosis   • Chronic right hip pain   • Type 2 diabetes mellitus with hyperglycemia, without long-term current use of insulin ([facility])   • Primary osteoarthritis of first carpometacarpal joint of left hand   • Arthritis of carpometacarpal (CMC) joint of left thumb   • Cataract of both eyes   • DDD (degenerative disc disease), lumbar   • Facet arthropathy, lumbar   • Keratosis   • Obesity (BMI 30-39  9)   • Obstructive sleep apnea of adult   • Secondary polycythemia   • Chronic pain syndrome   • Current smoker on some days   • Essential hypertension   • Trigger finger, left index finger   • Trigger ring finger of right hand   • Sacroiliitis ([facility])   • Vertigo   • COPD (chronic obstructive pulmonary disease) ([facility])   • Non-seasonal allergic rhinitis   • Heart murmur, systolic   • Obesity, morbid ([facility] )   • Skin lesion       Past Medical History:  Past Medical History:   Diagnosis Date   • Asthma    • Chronic obstructive lung disease ([facility] )    • Community acquired pneumonia     LAST ASSESSED: [id number]   • COPD (chronic obstructive pulmonary disease) ([facility] )    • Diabetes mellitus ([facility] )    • History of MRSA infection     2008 liver sx   • Liver disease    • Sleep apnea    • Viral warts     LAST ASSESSED: [id number]       Past Surgical History:  Past Surgical History:   Procedure Laterality Date   • ABDOMINAL SURGERY     • ABDOMINOPLASTY     • CHOLECYSTECTOMY     • COSMETIC SURGERY     • EYE SURGERY      Bilateral cataracts 2015 Dr Danni Roe      • FINGER SURGERY     • HAND SURGERY     • HYSTERECTOMY     • LIVER SURGERY     • NJ ARTHRP INTERPOS INTERCARPAL/METACARPAL JOINTS Right 9/9/2016    Procedure: THUMB TRAPEZIECTOMY; BASAL JOINT ARTHROPLASTY WITH APL AUTOGRAFT;  Surgeon: Eugene Hadley MD;  [facility];  Service: Orthopedics   • NJ ARTHRP INTERPOS INTERCARPAL/METACARPAL JOINTS Left 5/29/2018    Procedure: Bret Muñoz;  Surgeon: Ninfa Moffett MD;  [facility];  Service: Orthopedics   • NJ TENDON SHEATH INCISION Right 7/23/2019    Procedure: RELEASE TRIGGER FINGER RIGHT LONG;  Surgeon: Ninfa Moffett MD;  [facility];  Service: Orthopedics   • NJ TENDON SHEATH INCISION Left 8/6/2019    Procedure: RELEASE TRIGGER FINGER LEFT INDEX;  Surgeon: Ninfa Moffett MD;  [facility];  Service: Orthopedics   • NJ TENDON SHEATH INCISION Right 6/16/2020    Procedure: RELEASE TRIGGER FINGER, right ring;  Surgeon: Ninfa Moffett MD;  [facility];  Service: Orthopedics   • NJ TR/TRNSPL TDN CARP/MTCRPL HAND W/O FR GRF EA TDN Left 5/29/2018    Procedure: TRANSFER TENDON HAND/WRIST FCR from forearm to wrist;  Surgeon: Ninfa Moffett MD;  [facility];  Service: Orthopedics       Family History:  Family History   Problem Relation Age of Onset   • Heart attack Mother    • Heart attack Father    • Ovarian cancer Sister    • Basal cell carcinoma Brother    • Heart disease Other         CARDIAC DISORDER   • Diabetes Other    • Liver cancer Other    • Breast cancer Other    • Stomach cancer Other    • Heart disease Family    • Diabetes Family    • Thyroid disease Family    • Ovarian cancer Family        Social History:  Social History     Socioeconomic History   • Marital status: /Civil Union     Spouse name: Not on file   • Number of children: Not on file   • Years of education: Not on file   • Highest education level: Not on file   Occupational History   • Not on file   Tobacco Use   • Smoking status: Every Day     Packs/day: 0 50     Types: Cigarettes   • Smokeless tobacco: Never   Vaping Use   • Vaping Use: Never used   Substance and Sexual Activity   • Alcohol use: Yes     Comment: very seldom   • Drug use: No   • Sexual activity: Not Currently   Other Topics Concern   • Not on file   Social History Narrative   • Not on file     Social Determinants of Health     Financial Resource Strain: Not on file   Food Insecurity: Not on file   Transportation Needs: Not on file   Physical Activity: Not on file   Stress: Not on file   Social Connections: Not on file   Intimate Partner Violence: Not on file   Housing Stability: Not on file       Objective     Vitals:    02/06/23 0910   BP: 150/70   Pulse: 63   Resp: 18   Temp: 97 8 °F (36 6 °C)   SpO2: 95%     Wt Readings from Last 3 Encounters:   02/06/23 91 2 kg (201 lb 2 oz)   01/26/23 89 4 kg (197 lb)   01/12/23 90 3 kg (199 lb)         Physical Exam  Constitutional:       Appearance: Normal appearance  Skin:     Findings: Lesion present  Comments: Patient with 2 oval brown seborrheic keratoses on left and right abdomen  Patient has a silvery wartlike lesion on right upper arm approximately 3 mm in diameter    Patient has similar wartlike lesion on dorsal surface of her right hand approximately 1 mm in diameter   Neurological:      Mental Status: She is alert  Shave lesion    Date/Time: 2/6/2023 12:26 PM  Performed by: Rhea De Luna MD  Authorized by: Rhea De Luna MD   Universal Protocol:  Consent: Verbal consent obtained  Consent given by: patient  Patient understanding: patient states understanding of the procedure being performed      Number of Lesions: 4  Lesion 1:     Body area: trunk    Trunk location: abdomen    Skin lesion 1 size (mm): 5X10  Malignancy: benign lesion      Destruction method: shave removal    Lesion 2:     Body area: trunk    Trunk location: abdomen    Skin lesion 2 size (mm): 5X10  Malignancy: benign lesion      Destruction method: shave removal    Lesion 3:     Body area: upper extremity    Upper extremity location: R upper arm    Skin lesion 3 size (mm): 3X3      Malignancy: benign lesion      Destruction method: shave removal    Lesion 4:     Body area: upper extremity    Upper extremity location: R hand    Malignancy: benign lesion      Destruction method: shave removal        Pertinent Laboratory/Diagnostic Studies:  Lab Results   Component Value Date    BUN 30 (H) 02/02/2023    CREATININE 1 08 02/02/2023    CALCIUM 9 2 02/02/2023     10/23/2017    K 4 6 02/02/2023    CO2 31 02/02/2023     02/02/2023     Lab Results   Component Value Date    ALT 16 02/02/2023    AST 14 02/02/2023    ALKPHOS 85 02/02/2023    BILITOT 0 3 10/23/2017       Lab Results   Component Value Date    WBC 10 34 (H) 02/02/2023    HGB 13 3 02/02/2023    HCT 46 7 (H) 02/02/2023    MCV 99 (H) 02/02/2023     02/02/2023       No results found for: TSH    No results found for: CHOL  Lab Results   Component Value Date    TRIG 138 02/02/2023     Lab Results   Component Value Date    HDL 45 (L) 02/02/2023     Lab Results   Component Value Date    LDLCALC 21 02/02/2023     Lab Results   Component Value Date    HGBA1C 6 3 (H) 02/02/2023       Results for orders placed or performed in visit on 02/02/23   Hemoglobin A1C   Result Value Ref Range    Hemoglobin A1C 6 3 (H) Normal 3 8-5 6%; PreDiabetic 5 7-6 4%;  Diabetic >=6 5%; Glycemic control for adults with diabetes <7 0% %     mg/dl   CBC   Result Value Ref Range    WBC 10 34 (H) 4 31 - 10 16 Thousand/uL    RBC 4 71 3 81 - 5 12 Million/uL    Hemoglobin 13 3 11 5 - 15 4 g/dL    Hematocrit 46 7 (H) 34 8 - 46 1 %    MCV 99 (H) 82 - 98 fL    MCH 28 2 26 8 - 34 3 pg    MCHC 28 5 (L) 31 4 - 37 4 g/dL    RDW 14 0 11 6 - 15 1 %    Platelets 598 459 - 271 Thousands/uL    MPV 11 8 8 9 - 12 7 fL   Comprehensive metabolic panel   Result Value Ref Range    Sodium 140 135 - 147 mmol/L    Potassium 4 6 3 5 - 5 3 mmol/L    Chloride 105 96 - 108 mmol/L    CO2 31 21 - 32 mmol/L    ANION GAP 4 4 - 13 mmol/L    BUN 30 (H) 5 - 25 mg/dL    Creatinine 1 08 0 60 - 1 30 mg/dL    Glucose, Fasting 150 (H) 65 - 99 mg/dL    Calcium 9 2 8 3 - 10 1 mg/dL    Corrected Calcium 9 8 8 3 - 10 1 mg/dL    AST 14 5 - 45 U/L    ALT 16 12 - 78 U/L    Alkaline Phosphatase 85 46 - 116 U/L    Total Protein 6 9 6 4 - 8 4 g/dL    Albumin 3 2 (L) 3 5 - 5 0 g/dL    Total Bilirubin 0 32 0 20 - 1 00 mg/dL    eGFR 50 ml/min/1 73sq m   Lipid panel   Result Value Ref Range    Cholesterol 94 See Comment mg/dL    Triglycerides 138 See Comment mg/dL    HDL, Direct 45 (L) >=50 mg/dL    LDL Calculated 21 0 - 100 mg/dL    Non-HDL-Chol (CHOL-HDL) 49 mg/dl   TSH, 3rd generation   Result Value Ref Range    TSH 3RD GENERATON 1 210 0 450 - 4 500 uIU/mL       Orders Placed This Encounter   Procedures   • Shave lesion       ALLERGIES:  No Known Allergies    Current Medications     Current Outpatient Medications   Medication Sig Dispense Refill   • diphenhydrAMINE HCl (BENADRYL PO) Take 25 mg by mouth daily at bedtime And 50 mg prn     • gabapentin (NEURONTIN) 300 mg capsule Take 1 capsule (300 mg total) by mouth 2 (two) times a day 60 capsule 2   • glipiZIDE (GLUCOTROL) 5 mg tablet Take 1 tablet (5 mg total) by mouth 2 (two) times a day before meals 180 tablet 1   • glucose blood (ACCU-CHEK JESS PLUS) test strip Test BS 3 times daily  DX E11 9 100 each 2   • hydrochlorothiazide (HYDRODIURIL) 25 mg tablet Take 1 tablet (25 mg total) by mouth daily 90 tablet 1   • IBUPROFEN PO Take by mouth if needed     • lisinopril (ZESTRIL) 20 mg tablet Take 1 tablet (20 mg total) by mouth daily 90 tablet 1   • meloxicam (MOBIC) 7 5 mg tablet TAKE 1 TABLET TWICE DAILY 180 tablet 1   • metFORMIN (GLUCOPHAGE) 1000 MG tablet Take 1 tablet (1,000 mg total) by mouth 2 (two) times a day 180 tablet 1   • Multiple Vitamins-Minerals (OCUVITE ADULT FORMULA PO) Take by mouth     • rosuvastatin (CRESTOR) 20 MG tablet TAKE 1 TABLET EVERY DAY 90 tablet 1   • glipiZIDE (GLUCOTROL XL) 5 mg 24 hr tablet TAKE 1 TABLET EVERY DAY  30  MINUTES BEFORE BREAKFAST (Patient not taking: Reported on 2/6/2023) 90 tablet 0     No current facility-administered medications for this visit           Health Maintenance     Health Maintenance   Topic Date Due   • Osteoporosis Screening  Never done   • Falls: Plan of Care  Never done   • OT PLAN OF CARE  09/12/2018   • Kidney Health Evaluation: Microalbumin/Creatinine Ratio  01/29/2020   • COVID-19 Vaccine (5 - Booster for Pfizer series) 11/04/2022   • BMI: Followup Plan  05/23/2023   • PT PLAN OF CARE  03/01/2023   • Urinary Incontinence Screening  05/23/2023   • Medicare Annual Wellness Visit (AWV)  05/23/2023   • Diabetic Foot Exam  05/23/2023   • DM Eye Exam  07/15/2023   • HEMOGLOBIN A1C  08/02/2023   • Breast Cancer Screening: Mammogram  01/23/2024   • Fall Risk  01/30/2024   • Kidney Health Evaluation: GFR  02/02/2024   • Depression Screening  02/06/2024   • BMI: Adult  02/06/2024   • Colorectal Cancer Screening  08/01/2024   • Hepatitis C Screening  Completed   • Pneumococcal Vaccine: 65+ Years  Completed   • Influenza Vaccine  Completed   • HIB Vaccine  Aged Out   • IPV Vaccine  Aged Out   • Hepatitis A Vaccine Aged Out   • Meningococcal ACWY Vaccine  Aged Out   • HPV Vaccine  Aged Out     Immunization History   Administered Date(s) Administered   • COVID-19 PFIZER VACCINE 0 3 ML IM 02/19/2021, 03/11/2021, 10/09/2021, 05/13/2022   • COVID-19 Pfizer Vac BIVALENT Reji-sucrose 12 Yr+ IM (BOOSTER ONLY) 09/09/2022   • COVID-19 Pfizer vac (Reji-sucrose, gray cap) 12 yr+ IM 05/13/2022   • INFLUENZA 10/02/2013, 01/07/2016, 01/14/2017, 09/05/2017, 08/31/2021, 09/09/2022   • Influenza Split High Dose Preservative Free IM 09/05/2017   • Influenza, high dose seasonal 0 7 mL 09/10/2020   • Influenza, seasonal, injectable 10/02/2013, 01/14/2017   • Influenza, seasonal, injectable, preservative free 09/13/2018   • Pneumococcal Conjugate 13-Valent 08/24/2017   • Pneumococcal Conjugate PCV 7 09/06/2017   • Pneumococcal Polysaccharide PPV23 08/15/2018   • Tdap 09/04/2014   • Zoster 10/02/2013   • influenza, trivalent, adjuvanted 68/57/3413       Manish Vitale MD

## 2023-02-07 ENCOUNTER — TELEPHONE (OUTPATIENT)
Dept: FAMILY MEDICINE CLINIC | Facility: CLINIC | Age: 73
End: 2023-02-07

## 2023-02-07 ENCOUNTER — TELEPHONE (OUTPATIENT)
Dept: NEPHROLOGY | Facility: CLINIC | Age: 73
End: 2023-02-07

## 2023-02-07 DIAGNOSIS — R80.9 PROTEINURIA, UNSPECIFIED TYPE: Primary | ICD-10-CM

## 2023-02-07 NOTE — TELEPHONE ENCOUNTER
----- Message from Vasile eNttles MD sent at 1/4/2133  7:16 AM EST -----  Recent urine test shows patient spilling a large amount of protein into her urine indicating her kidneys are not filtering her blood properly  I would recommend patient have consultation with [facility]  nephrology office for evaluation since this may cause future kidney damage  I will place referral in [facility] and patient may call for appointment    Please provide phone number

## 2023-02-07 NOTE — TELEPHONE ENCOUNTER
New Patient Intake Form   Patient Details   Liana Rodriguez     1950     [id number]     Insurance Information   Name of [employer] a and b    Does the patient need an insurance referral? no   [facility], please ask if they will be using their [facility]  Appointment Information   Who is calling to schedule? If not patient, what is callers name? Patient    Referring Provider Dr Rizwana Barreto    Reason for Appt (Diagnosis) R80 9 (ICD-10-CM) - Proteinuria, unspecified type   Does Patient have labs/urine done at [place] [address]? If not, where do they go? List the date of last lab / urine yes   Has patient been hospitalized recently? If yes, list name and location of hospital they were In no   Has patient been seen by a Nephrologist before? If yes, list name, location and phone number no   Has the patient had renal imaging done? If so, list the most recent date and type of imagineg no   Does patient have a history of Kidney Stones? -   Appointment Details   Is there a referral on file?  yes   Appointment Date 03/08/23   Location [facility]    Miscellaneous

## 2023-02-08 ENCOUNTER — OFFICE VISIT (OUTPATIENT)
Dept: PHYSICAL THERAPY | Facility: REHABILITATION | Age: 73
End: 2023-02-08

## 2023-02-08 DIAGNOSIS — M54.16 LUMBAR RADICULOPATHY: Primary | ICD-10-CM

## 2023-02-08 NOTE — PROGRESS NOTES
Daily Note     Today's date: 2023  Patient name: Yamini Chaves  : 1950  MRN: [id number]  Referring provider: Reba Wills*  Dx:   Encounter Diagnosis     ICD-10-CM    1  Lumbar radiculopathy  M54 16           Start Time: 1100  Stop Time: 1140  Total time in clinic (min): 40 minutes    Subjective: Reports she can stand up taller  Still having some pain, but it is not excruciating  Objective: See treatment diary below     Assessment: Tolerated treatment fair  Patient demonstrated fatigue post treatment, exhibited good technique with therapeutic exercises and would benefit from continued PT    Plan: Continue per plan of care        Precautions: DMII, HTN, COPD    Manuals            Lumbar rotation PPVIM QD 2x30 GrII QD GrIII 2x30 ea side           Lumbar UPA L5 2x30 GrII ea            Hip PROM  QD           Assessment             Neuro Re-Ed             Suitcase carry             Seated on   3x10 Green [facility] bar walkouts                                                                 Ther Ex             VG  2' L5           Clamshells  2x10 ea YHB           bridges  No lift YHB 2x10x3"           LTRs             Sit to stands  2x10 with airex           OMAR             Egan walks  2 laps 40ft 4lb/6lb           Step ups                                                                              Ther Activity                                                                                           Gait Training                                       Modalities

## 2023-02-09 ENCOUNTER — OFFICE VISIT (OUTPATIENT)
Dept: SLEEP CENTER | Facility: CLINIC | Age: 73
End: 2023-02-09

## 2023-02-09 VITALS
OXYGEN SATURATION: 92 % | BODY MASS INDEX: 39.27 KG/M2 | DIASTOLIC BLOOD PRESSURE: 68 MMHG | WEIGHT: 200 LBS | HEIGHT: 60 IN | SYSTOLIC BLOOD PRESSURE: 122 MMHG | HEART RATE: 78 BPM

## 2023-02-09 DIAGNOSIS — G47.33 OSA (OBSTRUCTIVE SLEEP APNEA): Primary | ICD-10-CM

## 2023-02-09 NOTE — PROGRESS NOTES
Progress Note - Sleep Center   Harrison Double OZZ:[birth year] MRN: [id number]      Reason for Visit:  74 y  o female here for annual follow-up    Assessment:  Doing well on current therapy of CPAP 18 cm for severe FRANK (AHI = 117)  Using a fullface mask  Plan:  Continue same    Follow up: One year    History of Present Illness:  History of FRANK on PAP therapy  Fully compliant and deriving benefit  Historical Information    Past Medical History:   Past Medical History:   Diagnosis Date   • Asthma    • Chronic obstructive lung disease ([facility] )    • Community acquired pneumonia     LAST ASSESSED: [id number]   • COPD (chronic obstructive pulmonary disease) ([facility] )    • Diabetes mellitus ([facility] )    • History of MRSA infection     2008 liver sx   • Liver disease    • Sleep apnea    • Viral warts     LAST ASSESSED: [id number]         Past Surgical History:   Past Surgical History:   Procedure Laterality Date   • ABDOMINAL SURGERY     • ABDOMINOPLASTY     • CHOLECYSTECTOMY     • COSMETIC SURGERY     • EYE SURGERY      Bilateral cataracts 2015 Dr Alexandra Mcadams      • FINGER SURGERY     • HAND SURGERY     • HYSTERECTOMY     • LIVER SURGERY     • [place] ARTHRP INTERPOS INTERCARPAL/METACARPAL JOINTS Right 9/9/2016    Procedure: THUMB TRAPEZIECTOMY; BASAL JOINT ARTHROPLASTY WITH APL AUTOGRAFT;  Surgeon: Trell Hernandez MD;  [facility];  Service: Orthopedics   • [place] ARTHRP INTERPOS INTERCARPAL/METACARPAL JOINTS Left 5/29/2018    Procedure: Cherri Mendosa;  Surgeon: Wanda Harvey MD;  [facility];  Service: Orthopedics   • [place] TENDON SHEATH INCISION Right 7/23/2019    Procedure: RELEASE TRIGGER FINGER RIGHT LONG;  Surgeon: Wanda Harvey MD;  [facility];  Service: Orthopedics   • OK TENDON SHEATH INCISION Left 8/6/2019    Procedure: RELEASE TRIGGER FINGER LEFT INDEX;  Surgeon: Wanda Harvey MD;  [facility];  Service: Orthopedics   • OK TENDON SHEATH INCISION Right 6/16/2020 Procedure: RELEASE TRIGGER FINGER, right ring;  Surgeon: Flores Long MD;  [facility];  Service: Orthopedics   • DE TR/TRNSPL TDN CARP/MTCRPL HAND W/O FR GRF EA TDN Left 5/29/2018    Procedure: TRANSFER TENDON HAND/WRIST FCR from forearm to wrist;  Surgeon: Flores Long MD;  [facility];  Service: Orthopedics       Social History:   Social History     Socioeconomic History   • Marital status: /Civil Union     Spouse name: Not on file   • Number of children: Not on file   • Years of education: Not on file   • Highest education level: Not on file   Occupational History   • Not on file   Tobacco Use   • Smoking status: Every Day     Packs/day: 0 50     Types: Cigarettes   • Smokeless tobacco: Never   Vaping Use   • Vaping Use: Never used   Substance and Sexual Activity   • Alcohol use: Yes     Comment: very seldom   • Drug use: No   • Sexual activity: Not Currently   Other Topics Concern   • Not on file   Social History Narrative   • Not on file     Social Determinants of Health     Financial Resource Strain: Not on file   Food Insecurity: Not on file   Transportation Needs: Not on file   Physical Activity: Not on file   Stress: Not on file   Social Connections: Not on file   Intimate Partner Violence: Not on file   Housing Stability: Not on file       Family History:   Family History   Problem Relation Age of Onset   • Heart attack Mother    • Heart attack Father    • Ovarian cancer Sister    • Basal cell carcinoma Brother    • Heart disease Other         CARDIAC DISORDER   • Diabetes Other    • Liver cancer Other    • Breast cancer Other    • Stomach cancer Other    • Heart disease Family    • Diabetes Family    • Thyroid disease Family    • Ovarian cancer Family        Medications/Allergies:      Current Outpatient Medications:   •  diphenhydrAMINE HCl (BENADRYL PO), Take 25 mg by mouth daily at bedtime And 50 mg prn, Disp: , Rfl:   •  gabapentin (NEURONTIN) 300 mg capsule, Take 1 capsule (300 mg total) by mouth 2 (two) times a day, Disp: 60 capsule, Rfl: 2  •  glipiZIDE (GLUCOTROL) 5 mg tablet, Take 1 tablet (5 mg total) by mouth 2 (two) times a day before meals, Disp: 180 tablet, Rfl: 1  •  glucose blood (ACCU-CHEK JSES PLUS) test strip, Test BS 3 times daily  DX E11 9, Disp: 100 each, Rfl: 2  •  hydrochlorothiazide (HYDRODIURIL) 25 mg tablet, Take 1 tablet (25 mg total) by mouth daily, Disp: 90 tablet, Rfl: 1  •  IBUPROFEN PO, Take by mouth if needed, Disp: , Rfl:   •  lisinopril (ZESTRIL) 20 mg tablet, Take 1 tablet (20 mg total) by mouth daily, Disp: 90 tablet, Rfl: 1  •  meloxicam (MOBIC) 7 5 mg tablet, TAKE 1 TABLET TWICE DAILY, Disp: 180 tablet, Rfl: 1  •  metFORMIN (GLUCOPHAGE) 1000 MG tablet, Take 1 tablet (1,000 mg total) by mouth 2 (two) times a day, Disp: 180 tablet, Rfl: 1  •  Multiple Vitamins-Minerals (OCUVITE ADULT FORMULA PO), Take by mouth, Disp: , Rfl:   •  rosuvastatin (CRESTOR) 20 MG tablet, TAKE 1 TABLET EVERY DAY, Disp: 90 tablet, Rfl: 1  •  glipiZIDE (GLUCOTROL XL) 5 mg 24 hr tablet, TAKE 1 TABLET EVERY DAY  30  MINUTES BEFORE BREAKFAST (Patient not taking: Reported on 2/6/2023), Disp: 90 tablet, Rfl: 0          Objective      Vital Signs:   Vitals:    02/09/23 1306   BP: 122/68   Pulse: 78   SpO2: 92%     [place] Sleepiness Scale: Total score: 1        Physical Exam:    General: Alert, appropriate, cooperative, overweight    Head: NC/AT    Skin: Warm, dry    Neuro: No motor abnormalities, cranial nerves appear intact    Extremity: No clubbing, cyanosis      DME Provider: [employer]        Counseling / Coordination of Care   I have spent 15 minutes with the patient today in which greater than 50% of this time was spent in counseling/coordination of care regarding: equipment and compliance  Board Certified Sleep Specialist    Portions of the record may have been created with voice recognition software    Occasional wrong word or "sound a like" substitutions may have occurred due to the inherent limitations of voice recognition software  Read the chart carefully and recognize, using context, where substitutions have occurred

## 2023-02-13 ENCOUNTER — TELEPHONE (OUTPATIENT)
Dept: SLEEP CENTER | Facility: CLINIC | Age: 73
End: 2023-02-13

## 2023-02-14 LAB
DME PARACHUTE DELIVERY DATE ACTUAL: NORMAL
DME PARACHUTE DELIVERY DATE REQUESTED: NORMAL
DME PARACHUTE ITEM DESCRIPTION: NORMAL
DME PARACHUTE ORDER STATUS: NORMAL
DME PARACHUTE SUPPLIER NAME: NORMAL
DME PARACHUTE SUPPLIER PHONE: NORMAL

## 2023-02-15 ENCOUNTER — APPOINTMENT (OUTPATIENT)
Dept: PHYSICAL THERAPY | Facility: REHABILITATION | Age: 73
End: 2023-02-15

## 2023-02-22 ENCOUNTER — OFFICE VISIT (OUTPATIENT)
Dept: PHYSICAL THERAPY | Facility: REHABILITATION | Age: 73
End: 2023-02-22

## 2023-02-22 DIAGNOSIS — M54.16 LUMBAR RADICULOPATHY: Primary | ICD-10-CM

## 2023-02-22 NOTE — PROGRESS NOTES
Daily Note     Today's date: 2023  Patient name: Peggy Yeung  : 1950  MRN: [id number]  Referring provider: Art Darling*  Dx:   Encounter Diagnosis     ICD-10-CM    1  Lumbar radiculopathy  M54 16           Start Time: 53  Stop Time: 103  Total time in clinic (min): 41 minutes    Subjective: Reports she is hurting today  She has been cleaning up her aunt's house in [place] as she passed away last week  Objective: See treatment diary below     Assessment: Tolerated treatment fair  Challenged by current exercises  More difficulty with standing exercises verse sitting or laying down  Patient demonstrated fatigue post treatment, exhibited good technique with therapeutic exercises and would benefit from continued PT    Plan: Continue per plan of care        Precautions: DMII, HTN, COPD    Manuals           Lumbar rotation PPVIM QD 2x30 GrII QD GrIII 2x30 ea side QD GrIII 3x30 ea side          Lumbar UPA L5 2x30 GrII ea            Hip PROM  QD           Assessment   QD 5'          Neuro Re-Ed            Suitcase carry             Seated on pball march  3x10 Green pball           jenaro bar walkouts                                                                 Ther Ex            VG  2' L5 4' L5          Clamshells  2x10 ea YHB 2x10 ea YHB          bridges  No lift YHB 2x10x3" No lift YHB 2x10x3"          LTRs             Sit to stands  2x10 with airex 2xF          OMAR             Egan walks  2 laps 40ft 4lb/6lb nv          Step ups             Standing marching   15x ea          Sidestepping at bar   5 laps at bar                                                 Ther Activity                                                                                           Gait Training                                       Modalities

## 2023-03-02 ENCOUNTER — OFFICE VISIT (OUTPATIENT)
Dept: PAIN MEDICINE | Facility: CLINIC | Age: 73
End: 2023-03-02

## 2023-03-02 VITALS
HEART RATE: 76 BPM | WEIGHT: 198 LBS | DIASTOLIC BLOOD PRESSURE: 69 MMHG | SYSTOLIC BLOOD PRESSURE: 146 MMHG | BODY MASS INDEX: 38.87 KG/M2 | HEIGHT: 60 IN

## 2023-03-02 DIAGNOSIS — G89.4 CHRONIC PAIN SYNDROME: ICD-10-CM

## 2023-03-02 DIAGNOSIS — M47.816 LUMBAR SPONDYLOSIS: Primary | ICD-10-CM

## 2023-03-02 DIAGNOSIS — M54.16 LUMBAR RADICULOPATHY: ICD-10-CM

## 2023-03-02 DIAGNOSIS — M48.061 SPINAL STENOSIS OF LUMBAR REGION, UNSPECIFIED WHETHER NEUROGENIC CLAUDICATION PRESENT: ICD-10-CM

## 2023-03-02 DIAGNOSIS — M51.36 DDD (DEGENERATIVE DISC DISEASE), LUMBAR: ICD-10-CM

## 2023-03-02 RX ORDER — GABAPENTIN 300 MG/1
CAPSULE ORAL
Qty: 270 CAPSULE | Refills: 1 | Status: SHIPPED | OUTPATIENT
Start: 2023-03-02

## 2023-03-02 NOTE — PROGRESS NOTES
Assessment  1  Lumbar spondylosis    2  Lumbar radiculopathy    3  DDD (degenerative disc disease), lumbar    4  Chronic pain syndrome    5  Spinal stenosis of lumbar region, unspecified whether neurogenic claudication present        Plan  12-year-old female with a history of lumbar degenerative disc disease, stenosis, spondylosis, lumbar radiculopathy, and chronic pain syndrome returning for follow-up   The patient complains of lumbosacral back pain that radiates into the anterior and posterior aspect of bilateral lower extremities to the knees with associated subjective weakness  She is currently taking gabapentin 300 mg twice daily with moderate relief  She denies any side effects with the medication  She initially had relief with epidural steroid injections in the past, however unfortunately they lost their effectiveness over time  She does find some improvement of function with physical therapy which she is currently engaged in  She has had relief of her low back pain with lumbar RFA which is still ongoing  She did recently have a neurosurgical consultation with Dr Mare Ahmadi who did not feel that the patient was a optimal candidate for surgical intervention although spinal cord stimulation could be considered         1  I will increase the patient's gabapentin to 300 mg in the morning and 600 mg at bedtime for her neuropathic complaints  Refill was provided today  3   Patient will continue with meloxicam as prescribed  Refill was not needed  4   Patient will continue with her home exercise program  5  I will follow-up with the patient in 3 months or sooner if needed  6  the patient will continue with physical therapy and her home exercise program  7  The patient is not interested in spinal cord stimulation at this time as she feels her symptoms are fairly well managed with gabapentin          My impressions and treatment recommendations were discussed in detail with the patient who verbalized understanding and had no further questions  Discharge instructions were provided  I personally saw and examined the patient and I agree with the above discussed plan of care  No orders of the defined types were placed in this encounter  No orders of the defined types were placed in this encounter  History of Present Illness    Drake Pizarro is a 74 y o  female with a history of lumbar degenerative disc disease, stenosis, spondylosis, lumbar radiculopathy, and chronic pain syndrome returning for follow-up   The patient complains of lumbosacral back pain that radiates into the anterior and posterior aspect of bilateral lower extremities to the knees with associated subjective weakness  She denies any numbness or tingling in the legs  She does have some bladder incontinence, but denies any bowel incontinence or saddle anesthesia  She is currently taking gabapentin 300 mg twice daily with moderate relief  She denies any side effects with the medication  She initially had relief with epidural steroid injections in the past, however unfortunately they lost their effectiveness over time  She has had relief of her low back pain with lumbar RFA which is still ongoing  She is engaged in physical therapy currently which she does find gives her some improvement in function, but not much improvement in pain  The patient rates her pain a 3 out of 10 and the pain is worse in the morning  The pain is intermittent and described as dull and aching  The pain is worse with standing, walking, transitional positions, and exercise  The pain is alleviated with medication, sitting, relaxation, and lying down  Other than as stated above, the patient denies any interval changes in medications, medical condition, mental condition, symptoms, or allergies since the last office visit      I have personally reviewed and/or updated the patient's past medical history, past surgical history, family history, social history, current medications, allergies, and vital signs today  Review of Systems   Constitutional: Negative for fever and unexpected weight change  HENT: Negative for trouble swallowing  Eyes: Negative for visual disturbance  Respiratory: Positive for shortness of breath  Negative for wheezing  Cardiovascular: Negative for chest pain and palpitations  Gastrointestinal: Negative for constipation, diarrhea, nausea and vomiting  Endocrine: Negative for cold intolerance, heat intolerance and polydipsia  Genitourinary: Negative for difficulty urinating and frequency  Musculoskeletal: Positive for gait problem and joint swelling  Negative for arthralgias and myalgias  Decreased ROM   Skin: Negative for rash  Neurological: Negative for dizziness, seizures, syncope, weakness and headaches  Hematological: Does not bruise/bleed easily  Psychiatric/Behavioral: Negative for dysphoric mood  All other systems reviewed and are negative  Patient Active Problem List   Diagnosis   • Lumbar radiculopathy   • Spinal stenosis of lumbar region   • Lumbar spondylosis   • Chronic right hip pain   • Type 2 diabetes mellitus with hyperglycemia, without long-term current use of insulin ([facility])   • Primary osteoarthritis of first carpometacarpal joint of left hand   • Arthritis of carpometacarpal (CMC) joint of left thumb   • Cataract of both eyes   • DDD (degenerative disc disease), lumbar   • Facet arthropathy, lumbar   • Keratosis   • Obesity (BMI 30-39  9)   • Obstructive sleep apnea of adult   • Secondary polycythemia   • Chronic pain syndrome   • Current smoker on some days   • Essential hypertension   • Trigger finger, left index finger   • Trigger ring finger of right hand   • Sacroiliitis ([facility])   • Vertigo   • COPD (chronic obstructive pulmonary disease) ([facility])   • Non-seasonal allergic rhinitis   • Heart murmur, systolic   • Obesity, morbid ([address] )   • Skin lesion       Past Medical History:   Diagnosis Date   • Asthma    • Chronic obstructive lung disease ([facility] )    • Community acquired pneumonia     LAST ASSESSED: [id number]   • COPD (chronic obstructive pulmonary disease) ([facility] )    • Diabetes mellitus ([facility] )    • History of MRSA infection     2008 liver sx   • Liver disease    • Sleep apnea    • Viral warts     LAST ASSESSED: [id number]       Past Surgical History:   Procedure Laterality Date   • ABDOMINAL SURGERY     • ABDOMINOPLASTY     • CHOLECYSTECTOMY     • COSMETIC SURGERY     • EYE SURGERY      Bilateral cataracts 2015 Dr Valery Smalls      • FINGER SURGERY     • HAND SURGERY     • HYSTERECTOMY     • LIVER SURGERY     • ID ARTHRP INTERPOS INTERCARPAL/METACARPAL JOINTS Right 9/9/2016    Procedure: THUMB TRAPEZIECTOMY; BASAL JOINT ARTHROPLASTY WITH APL AUTOGRAFT;  Surgeon: Oleg Beal MD;  Location[facility];  Service: Orthopedics   • ID ARTHRP INTERPOS INTERCARPAL/METACARPAL JOINTS Left 5/29/2018    Procedure: Cristina Fly;  Surgeon: Uzma Timmons MD;  [facility];  Service: Orthopedics   • ID TENDON SHEATH INCISION Right 7/23/2019    Procedure: RELEASE TRIGGER FINGER RIGHT LONG;  Surgeon: Uzma Timmons MD;  [facility];  Service: Orthopedics   • ID TENDON SHEATH INCISION Left 8/6/2019    Procedure: RELEASE TRIGGER FINGER LEFT INDEX;  Surgeon: Uzma Timmons MD;  [facility];  Service: Orthopedics   • ID TENDON SHEATH INCISION Right 6/16/2020    Procedure: RELEASE TRIGGER FINGER, right ring;  Surgeon: Uzma Timmons MD;  [facility];  Service: Orthopedics   • ID TR/TRNSPL TDN CARP/MTCRPL HAND W/O FR GRF EA TDN Left 5/29/2018    Procedure: TRANSFER TENDON HAND/WRIST FCR from forearm to wrist;  Surgeon: Uzma Timmons MD;  [facility];  Service: Orthopedics       Family History   Problem Relation Age of Onset   • Heart attack Mother    • Heart attack Father    • Ovarian cancer Sister    • Basal cell carcinoma Brother    • Heart disease Other         CARDIAC DISORDER   • Diabetes Other    • Liver cancer Other    • Breast cancer Other    • Stomach cancer Other    • Heart disease Family    • Diabetes Family    • Thyroid disease Family    • Ovarian cancer Family        Social History     Occupational History   • Not on file   Tobacco Use   • Smoking status: Every Day     Packs/day: 0 50     Types: Cigarettes   • Smokeless tobacco: Never   Vaping Use   • Vaping Use: Never used   Substance and Sexual Activity   • Alcohol use: Yes     Comment: very seldom   • Drug use: No   • Sexual activity: Not Currently       Current Outpatient Medications on File Prior to Visit   Medication Sig   • diphenhydrAMINE HCl (BENADRYL PO) Take 25 mg by mouth daily at bedtime And 50 mg prn   • gabapentin (NEURONTIN) 300 mg capsule Take 1 capsule (300 mg total) by mouth 2 (two) times a day   • glipiZIDE (GLUCOTROL XL) 5 mg 24 hr tablet TAKE 1 TABLET EVERY DAY  30  MINUTES BEFORE BREAKFAST (Patient not taking: Reported on 2/6/2023)   • glipiZIDE (GLUCOTROL) 5 mg tablet Take 1 tablet (5 mg total) by mouth 2 (two) times a day before meals   • glucose blood (ACCU-CHEK JESS PLUS) test strip Test BS 3 times daily  DX E11 9   • hydrochlorothiazide (HYDRODIURIL) 25 mg tablet Take 1 tablet (25 mg total) by mouth daily   • IBUPROFEN PO Take by mouth if needed   • lisinopril (ZESTRIL) 20 mg tablet Take 1 tablet (20 mg total) by mouth daily   • meloxicam (MOBIC) 7 5 mg tablet TAKE 1 TABLET TWICE DAILY   • metFORMIN (GLUCOPHAGE) 1000 MG tablet Take 1 tablet (1,000 mg total) by mouth 2 (two) times a day   • Multiple Vitamins-Minerals (OCUVITE ADULT FORMULA PO) Take by mouth   • rosuvastatin (CRESTOR) 20 MG tablet TAKE 1 TABLET EVERY DAY     No current facility-administered medications on file prior to visit         No Known Allergies    Physical Exam    /69   Pulse 76   Ht 5' (1 524 m)   Wt 89 8 kg (198 lb)   BMI 38 67 kg/m²     Constitutional: normal, well developed, well nourished, alert, in no distress and non-toxic and no overt pain behavior  Eyes: anicteric  HEENT: grossly intact  Neck: supple, symmetric, trachea midline and no masses   Pulmonary:even and unlabored  Cardiovascular:No edema or pitting edema present  Skin:Normal without rashes or lesions and well hydrated  Psychiatric:Mood and affect appropriate  Neurologic:Cranial Nerves II-XII grossly intact  Musculoskeletal:normal gait  Bilateral lumbar paraspinals mildly tender to palpation from L3-L5  Bilateral SI joints nontender to palpation  Bilateral lower extremity strength 5 out of 5 in all muscular's  Sensation intact to light touch in L3-S1 dermatomes bilaterally  Negative seated straight leg raise bilaterally  Imaging  MRI LUMBAR SPINE WITHOUT CONTRAST     INDICATION: [id number]: Radiculopathy, lumbar region      COMPARISON:  MRI dated 7/11/2019      TECHNIQUE:  Multiplanar, multisequence imaging of the lumbar spine was performed             FINDINGS:     VERTEBRAL BODIES:  There are 5 lumbar type vertebral bodies  Normal alignment of the lumbar spine  No spondylolysis or spondylolisthesis  No scoliosis  No compression fracture  Stable heterogeneous marrow signal   Multilevel mostly Modic type II   endplate degenerative changes  Minimal type I changes at L4-5 are decreased from prior  No suspicious marrow signal abnormality      SACRUM:  Normal signal within the sacrum  No evidence of insufficiency or stress fracture      DISTAL CORD AND CONUS:  Normal size and signal within the distal cord and conus      PARASPINAL SOFT TISSUES:  Paraspinal soft tissues are unremarkable      LOWER THORACIC DISC SPACES:  Mild noncompressive lower thoracic degenerative change      LUMBAR DISC SPACES:  Multilevel disc desiccation  Disc space narrowing most pronounced at L4-5      L1-L2:  No disc herniation, canal or foraminal stenosis      L2-L3:  Minimal disc bulge and mild facet arthropathy    No significant canal or foraminal stenosis      L3-L4:  Disc bulge, facet arthropathy and ligamentum flavum hypertrophy  Mild canal stenosis and mild foraminal stenosis  No significant change      L4-L5:  Disc bulge and marginal osteophyte with facet arthropathy  Mild canal stenosis and mild foraminal stenosis  No significant change      L5-S1: Disc bulge, marginal osteophyte and facet arthropathy  No significant canal stenosis  Moderate severe foraminal stenosis contacting the exiting nerve roots, right worse than left  No significant change      IMPRESSION:     Multilevel degenerative spondylosis without significant change  Mild canal and mild foraminal stenosis at L3-4 and L4-5  Moderate to severe bilateral foraminal stenosis at L5-S1, right worse than left

## 2023-03-09 ENCOUNTER — CONSULT (OUTPATIENT)
Dept: NEPHROLOGY | Facility: CLINIC | Age: 73
End: 2023-03-09

## 2023-03-09 VITALS
BODY MASS INDEX: 39.07 KG/M2 | WEIGHT: 199 LBS | HEIGHT: 60 IN | DIASTOLIC BLOOD PRESSURE: 80 MMHG | HEART RATE: 84 BPM | SYSTOLIC BLOOD PRESSURE: 166 MMHG

## 2023-03-09 DIAGNOSIS — G47.33 OBSTRUCTIVE SLEEP APNEA OF ADULT: ICD-10-CM

## 2023-03-09 DIAGNOSIS — R80.9 PROTEINURIA, UNSPECIFIED TYPE: ICD-10-CM

## 2023-03-09 DIAGNOSIS — R80.1 PERSISTENT PROTEINURIA: ICD-10-CM

## 2023-03-09 DIAGNOSIS — E66.01 OBESITY, MORBID (HCC): ICD-10-CM

## 2023-03-09 DIAGNOSIS — I10 ESSENTIAL HYPERTENSION: ICD-10-CM

## 2023-03-09 DIAGNOSIS — E11.65 TYPE 2 DIABETES MELLITUS WITH HYPERGLYCEMIA, WITHOUT LONG-TERM CURRENT USE OF INSULIN (HCC): Primary | ICD-10-CM

## 2023-03-09 LAB
SL AMB  POCT GLUCOSE, UA: NORMAL
SL AMB LEUKOCYTE ESTERASE,UA: NORMAL
SL AMB POCT BILIRUBIN,UA: NORMAL
SL AMB POCT BLOOD,UA: NORMAL
SL AMB POCT KETONES,UA: NORMAL
SL AMB POCT NITRITE,UA: NORMAL
SL AMB POCT PH,UA: 5
SL AMB POCT SPECIFIC GRAVITY,UA: 1.01
SL AMB POCT URINE PROTEIN: NORMAL
SL AMB POCT UROBILINOGEN: 0.2

## 2023-03-09 RX ORDER — LISINOPRIL 40 MG/1
40 TABLET ORAL DAILY
Qty: 90 TABLET | Refills: 3 | Status: SHIPPED | OUTPATIENT
Start: 2023-03-09

## 2023-03-09 NOTE — PATIENT INSTRUCTIONS
1  )  Low 2 g sodium diet    2 )  Monitor weights at home    3 )  Avoid NSAIDs (ibuprofen, Motrin, Advil, Aleve, naproxen)    4 )  Monitor blood pressure at home, call if blood pressure greater than 150/90 persistently    5 ) I will plan to discuss all results including blood work, and/or imaging at our next visit, unless there is an urgent indication, in which case I will call you earlier  If you have any questions or concerns about your results, please feel free to call our office  6 ) Check Kidney US and Doppler    7 ) Increase Lisinopril to 40 mg a day    8 ) Advised to stop or cut back on Meloxicam    9 ) SGLT2 Inhibitors such as Cydney Michael, have been shown and studied to reduce the decline of kidney function and reduce cardiovascular outcomes  We recommend you consider this medication       10 ) Lab work in 3-4 months and 6 months    11 ) See me in 6 months

## 2023-03-09 NOTE — PROGRESS NOTES
NEPHROLOGY OUTPATIENT CONSULTATION   Yamini Chaves 74 y o  female MRN: [id number]  Date: 3/9/2023  Reason for consultation:   Chief Complaint   Patient presents with   • Consult       ASSESSMENT and PLAN:    Thank you for the courtesy of this consultation  I had the pleasure of seeing Kin Sierra today in the renal clinic for the initial management of proteinuria  Proteinuria  -- presumed to be secondary to diabetic kidney disease, obesity related renal dysfunction, chronic smoking, chronic NSAID use  -- 24 hour urine protein or urine protein creatinine ratio: Macroalbuminuria, albumin/creatinine ratio 1 8 g/g  -- Current Blood Pressure: 166/80  -- current anti proteinuric medications: Lisinopril  -- Interventions: Increase lisinopril to 40 mg daily for better blood pressure control and increased antiproteinuric effects  Discussed about potentially starting SGLT2 inhibitor in the future  Weight loss strongly recommended  Low 2 g sodium diet  Recommend stopping NSAIDs altogether but it appears that she is going to continue taking meloxicam but will try to cut it down to once a day  -- General Recommendations:  • RAAS Blockade with high dose ARB or ACEI for target blood pressure < 130/80 as tolerated, with spironolactone as a good adjunct for anti proteinuric effect  Management of hypervolemia for blood pressure control as needed  • Avoid combination ACEI + ARB, due to high adverse effects (ONTARGET study)  • Direct Renin Inhibitor + ACEI or ARB has FDA black box warning for patients with diabetes and GFR < 60 cc/min due to risk for non fatal stroke, renal complications, hyperkalemia and hypotension (ALTITUDE study)  • Moderate dietary protein restriction 0 8 gm/kg  • Recommend statin therapy if no contraindications     • Recommend 1, 25 vitamin-D such as Paricalcitol if appropropriate (VITAL study)    Chronic Kidney Disease Stage II/IIIA  --Imaging: Check renal ultrasound with renal artery Doppler  --Urinalysis: Positive for protein  No significant microscopic hematuria  --Proteinuria: Macroalbuminuria, albumin/creatinine ratio 1 8 g/g  --Baseline Kidney Function: 0 8-1 1 mg/dL, GFR greater than 50 mL/min  --Etiology: Presumed to be secondary to diabetic kidney disease, 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, obesity related renal dysfunction, hypertensive nephrosclerosis, 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  --Biopsy Proven: No  --Serologies: No indication at this time  --RAAS Blockade: Lisinopril  --Reducing Cardiovascular Risk Factors:  Simvastatin+ Ezetimibe reduced atherosclerotic events in CKD (SHARP trial); Low dose aspirin safe (if no contraindications exist); smoking is an independent risk factor for Chronic Kidney Disease and progression, strongly recommend smoking cessation  --Sodium-Glucose Cotransporter-2 (SGLT2) Inhibitors: May see an acute drop in the eGFR initially when starting the medication but then a stabilization of the renal function, with slower loss of renal function as compared to placebo  Relative risk of end-stage renal disease, doubling of serum creatinine or death from renal causes were also found to be lower as compared to placebo  (CREDENCE)  DAPA-CKD study, showed that patients with chronic kidney disease regardless of the presence or absence of diabetes the risk of composite of a sustained decline in the estimated GFR of at least 50%, end-stage renal disease or death from renal or cardiovascular causes was significantly lower with Dapagliflozin than with placebo  EMPEROR-Reduced study also showed beneficial effects  EMPA-CKD, among wide range of patients with CKD, who were at risk for progression, empagliflozin led a lower risk of kidney disease progression or death from cardiovascular causes than placebo  If no contraindications exist I would recommend this medication added to the regiment   If eGFR < 60 cc/min (avoid ertugliflozin); avoid or caution with GFR < 20 mL/min, not an absolute contraindication, likely lower benefits  --Finerenone: In patients with chronic kidney disease with type 2 diabetes, treatment led to lower risks of CKD progression and cardiovascular events than placebo  (FIDELIO-DKD)  --Status: Blood pressure not at target  Renal function stable but worsening proteinuria  --Management/Recommendations: Increase lisinopril to 40 mg  Check renal ultrasound and renal artery Doppler  Recheck BMP and microalbumin/creatinine ratio in 3 to 4 months  Avoid NSAIDs altogether  Patient has stopped smoking already  Smoker/Tobacco Dependence--recently quit  -- Actively smoking: No  -- Smoking quit date: 2023  -- Duration of smokin+ years 1 pack/day  -- Spent approximately: 3 minutes discussing smoking cessation at length, and advised to quitting smoking   -- Smoking can affect medications used to treat high blood pressure  Smoking increases the risk of strokes and heart attacks in people with high blood pressure  Smoking is a well-known risk factor for chronic kidney disease  Active smoking increases development and progression of chronic kidney disease  It can lead to an increased risk of worsening microalbuminuria  Can lead to increased risk of developing some forms of kidney malignancies  Causes damage to the cardiovascular system leading to poor blood flow resulting in worsening kidney function  It can perpetuate worsening diabetic kidney disease  It can also lead to idiopathic nodular glomerulosclerosis  Diabetes mellitus type 2  -hemoglobin A1c: 6 3 (A1C values may be falsely elevated or decreased in those with CKD, assay method should be certified by [employer])  Target A1C between 7-8 5 (will need to be individualized for each patient), and would utilize A1C  in conjunction with continuous glucose monitoring (CGM)    It should also be noted that the A1c with more advanced kidney disease would be inaccurate due to decreased red blood cell life along with anemia   -current medications: Glipizide  -proteinuria: Yes  -retinopathy: No  -neuropathy: No  -please follow with an ophthalmologist and podiatrist every year  -continued self monitoring of blood glucose at home  -Management in CKD Recommendations:   • Metformin:  Avoid if creatinine clearance is less than 30 cc/min (concern for lactic acidosis)  • Sodium-Glucose Cotransporter-2 (SGLT2) Inhibitors: May see an acute drop in the eGFR initially when starting the medication but then a stabilization of the renal function, with slower loss of renal function as compared to placebo  Relative risk of end-stage renal disease, doubling of serum creatinine or death from renal causes were also found to be lower as compared to placebo  (CREDENCE)  DAPA-CKD study, showed that patients with chronic kidney disease regardless of the presence or absence of diabetes the risk of composite of a sustained decline in the estimated GFR of at least 50%, end-stage renal disease or death from renal or cardiovascular causes was significantly lower with Dapagliflozin than with placebo  EMPEROR-Reduced study also showed beneficial effects  EMPA-CKD, among wide range of patients with CKD, who were at risk for progression, empagliflozin led a lower risk of kidney disease progression or death from cardiovascular causes than placebo  If no contraindications exist I would recommend this medication added to the regiment   If eGFR < 60 cc/min (avoid ertugliflozin); avoid or caution with GFR < 20 mL/min, not an absolute contraindication, likely lower benefits   • Sulfonylureas:  Preferred short-acting (glipizide, glimepiride, repaglinide), relatively safe in patients with non dialysis CKD  • Thiazolidinedones/Alpha-Gluosidase inhibitors/Dipeptidyl peptidase-4 inhibitors:  Generally not considered first-line agents in CKD (limited data in long-term safety and efficacy)  • Insulin:  Starting dose may need to be lower than what would ordinarily be used, as there is a decrease metabolism of insulin (no dose adjustment if the GFR > 50 mL/min, dose should be reduced to 75% of baseline if GFR 10-50 mL/min, and 50% baseline if GFR < 10 mL/min)  • Finerenone: Patients with CKD with type 2 diabetes, treatment led to lower risks of CKD progression and cardiovascular events than placebo (FIDELIO-DKD)  Avoid or caution if serum potassium more then 4 8  Hypertension  -- Stage II (American College of Cardiology/American Heart Association)  -- with underlying chronic kidney disease and obesity with proteinuria  -- Body mass index is 38 86 kg/m²    -- Volume status: Euvolemic  -- Etiology: Primary essential hypertension with underlying obstructive sleep apnea and obesity, former smoker  -- Secondary Work-Up: No clinical indication at this time but will check renal artery Doppler  -- Target Goal: < 130/80 (ACC/AHA, CKD with proteinuria, CKD in diabetics) ; if tolerated can target SBP < 120 mmHg in high cardiovascular risk ( Age > 75, CKD, CVD, No Diabetics SPRINT)  -- Lifestyle Modifications: DASH Diet, Weight Loss for ideal body weight, 45 mins of cardiovascular exercise 3 times a week as tolerated, and if no contraindications exist, smoking cessation, limit alcohol use, avoid NSAIDS, monitor blood pressure at home  -- Status: Blood pressure not at target  -- Current antihypertensive regiment: Lisinopril 20 mg, hydrochlorothiazide 25 mg  -- Changes: Increase lisinopril to 40 mg daily    Obstructive sleep apnea  -- Following with sleep medicine  -- Very severe,   -- Continue use of CPAP    PATIENT INSTRUCTIONS:    Patient Instructions   1 )  Low 2 g sodium diet    2 )  Monitor weights at home    3 )  Avoid NSAIDs (ibuprofen, Motrin, Advil, Aleve, naproxen)    4 )  Monitor blood pressure at home, call if blood pressure greater than 150/90 persistently    5 ) I will plan to discuss all results including blood work, and/or imaging at our next visit, unless there is an urgent indication, in which case I will call you earlier  If you have any questions or concerns about your results, please feel free to call our office  6 ) Check Kidney US and Doppler    7 ) Increase Lisinopril to 40 mg a day    8 ) Advised to stop or cut back on Meloxicam    9 ) SGLT2 Inhibitors such as Wing Peel, have been shown and studied to reduce the decline of kidney function and reduce cardiovascular outcomes  We recommend you consider this medication  10 ) Lab work in 3-4 months and 6 months    11 ) See me in 6 months        HISTORY OF PRESENT ILLNESS:  Requesting Physician: Paula Arceo MD    Tamie Bardales is a 74 y o  female who has a history of diabetes, hypertension, obesity, former smoker who presents for an evaluation of proteinuria  She appeared to have proteinuria back in 2019 with a microalbumin/creatinine ratio of 0 6 which is now worsened recently to 1 8 g/g  Her diabetes seem to be well controlled with a hemoglobin A1c less than 7  She is obese with a BMI of 38 86  She quit smoking last month and it was a 1 pack/day smoker for over 30 years  She does take NSAIDs including occasional ibuprofen but with daily meloxicam 7 5 mg twice a day  She has no chest pain or shortness of breath no swelling the legs no gross hematuria      PAST MEDICAL HISTORY:  Past Medical History:   Diagnosis Date   • Asthma    • Chronic obstructive lung disease ([facility] )    • Community acquired pneumonia     LAST ASSESSED: [id number]   • COPD (chronic obstructive pulmonary disease) ([facility] )    • Diabetes mellitus ([facility] )    • History of MRSA infection     2008 liver sx   • Liver disease    • Sleep apnea    • Viral warts     LAST ASSESSED: [id number]       PAST SURGICAL HISTORY:  Past Surgical History:   Procedure Laterality Date   • ABDOMINAL SURGERY     • ABDOMINOPLASTY     • CHOLECYSTECTOMY     • COSMETIC SURGERY • EYE SURGERY      Bilateral cataracts 2015 Dr Brittany Al      • FINGER SURGERY     • HAND SURGERY     • HYSTERECTOMY     • LIVER SURGERY     • VT ARTHRP INTERPOS INTERCARPAL/METACARPAL JOINTS Right 9/9/2016    Procedure: THUMB TRAPEZIECTOMY; BASAL JOINT ARTHROPLASTY WITH APL AUTOGRAFT;  Surgeon: Tracy Juares MD;  Location: [facility];  Service: Orthopedics   • VT ARTHRP INTERPOS INTERCARPAL/METACARPAL JOINTS Left 5/29/2018    Procedure: Myrtle Card;  Surgeon: Saritha Faye MD;  [facility];  Service: Orthopedics   • VT TENDON SHEATH INCISION Right 7/23/2019    Procedure: RELEASE TRIGGER FINGER RIGHT LONG;  Surgeon: Saritha Faye MD;  [facility];  Service: Orthopedics   • VT TENDON SHEATH INCISION Left 8/6/2019    Procedure: RELEASE TRIGGER FINGER LEFT INDEX;  Surgeon: Saritha Faye MD;  [facility];  Service: Orthopedics   • VT TENDON SHEATH INCISION Right 6/16/2020    Procedure: RELEASE TRIGGER FINGER, right ring;  Surgeon: Saritha Faye MD;  [facility];  Service: Orthopedics   • VT TR/TRNSPL TDN CARP/MTCRPL HAND W/O FR GRF EA TDN Left 5/29/2018    Procedure: TRANSFER TENDON HAND/WRIST FCR from forearm to wrist;  Surgeon: Saritha Faye MD;  [facility];  Service: Orthopedics       ALLERGIES:  No Known Allergies    SOCIAL HISTORY:  Social History     Substance and Sexual Activity   Alcohol Use Yes    Comment: very seldom     Social History     Substance and Sexual Activity   Drug Use No     Social History     Tobacco Use   Smoking Status Every Day   • Packs/day: 0 50   • Types: Cigarettes   Smokeless Tobacco Never       FAMILY HISTORY:  Family History   Problem Relation Age of Onset   • Heart attack Mother    • Heart attack Father    • Ovarian cancer Sister    • Basal cell carcinoma Brother    • Heart disease Other         CARDIAC DISORDER   • Diabetes Other    • Liver cancer Other    • Breast cancer Other    • Stomach cancer Other    • Heart disease Family    • Diabetes Family    • Thyroid disease Family    • Ovarian cancer Family        MEDICATIONS:    Current Outpatient Medications:   •  diphenhydrAMINE HCl (BENADRYL PO), Take 25 mg by mouth daily at bedtime And 50 mg prn, Disp: , Rfl:   •  gabapentin (NEURONTIN) 300 mg capsule, Take 1 capsule in the morning and 2 capsules at bedtime, Disp: 270 capsule, Rfl: 1  •  glipiZIDE (GLUCOTROL) 5 mg tablet, Take 1 tablet (5 mg total) by mouth 2 (two) times a day before meals, Disp: 180 tablet, Rfl: 1  •  hydrochlorothiazide (HYDRODIURIL) 25 mg tablet, Take 1 tablet (25 mg total) by mouth daily, Disp: 90 tablet, Rfl: 1  •  IBUPROFEN PO, Take by mouth if needed, Disp: , Rfl:   •  lisinopril (ZESTRIL) 40 mg tablet, Take 1 tablet (40 mg total) by mouth daily, Disp: 90 tablet, Rfl: 3  •  meloxicam (MOBIC) 7 5 mg tablet, TAKE 1 TABLET TWICE DAILY, Disp: 180 tablet, Rfl: 1  •  metFORMIN (GLUCOPHAGE) 1000 MG tablet, Take 1 tablet (1,000 mg total) by mouth 2 (two) times a day, Disp: 180 tablet, Rfl: 1  •  Multiple Vitamins-Minerals (OCUVITE ADULT FORMULA PO), Take by mouth, Disp: , Rfl:   •  rosuvastatin (CRESTOR) 20 MG tablet, TAKE 1 TABLET EVERY DAY, Disp: 90 tablet, Rfl: 1  •  glipiZIDE (GLUCOTROL XL) 5 mg 24 hr tablet, TAKE 1 TABLET EVERY DAY  30  MINUTES BEFORE BREAKFAST (Patient not taking: Reported on 2/6/2023), Disp: 90 tablet, Rfl: 0  •  glucose blood (ACCU-CHEK JESS PLUS) test strip, Test BS 3 times daily  DX E11 9, Disp: 100 each, Rfl: 2    REVIEW OF SYSTEMS:  Review of Systems   Constitutional: Negative for activity change and fever  Respiratory: Negative for cough, chest tightness, shortness of breath and wheezing  Cardiovascular: Negative for chest pain and leg swelling  Gastrointestinal: Negative for abdominal pain, diarrhea, nausea and vomiting  Endocrine: Negative for polyuria     Genitourinary: Negative for difficulty urinating, dysuria, flank pain, frequency and urgency  Skin: Negative for rash  Neurological: Negative for dizziness, syncope, light-headedness and headaches  All the systems were reviewed and were negative except as documented on the HPI  PHYSICAL EXAM:  Current Weight: Body mass index is 38 86 kg/m²  Vitals:    03/09/23 1059 03/09/23 1128   BP: (!) 184/80 166/80   BP Location: Left arm    Patient Position: Sitting    Cuff Size: Standard    Pulse: 84    Weight: 90 3 kg (199 lb)    Height: 5' (1 524 m)        Physical Exam  Vitals and nursing note reviewed  Exam conducted with a chaperone present  Constitutional:       General: She is not in acute distress  Appearance: She is well-developed  She is obese  HENT:      Head: Normocephalic and atraumatic  Eyes:      General: No scleral icterus  Conjunctiva/sclera: Conjunctivae normal       Pupils: Pupils are equal, round, and reactive to light  Cardiovascular:      Rate and Rhythm: Normal rate and regular rhythm  Heart sounds: S1 normal and S2 normal  No murmur heard  No friction rub  No gallop  Pulmonary:      Effort: Pulmonary effort is normal  No respiratory distress  Breath sounds: Normal breath sounds  No wheezing or rales  Abdominal:      General: Bowel sounds are normal       Palpations: Abdomen is soft  Tenderness: There is no abdominal tenderness  There is no rebound  Musculoskeletal:         General: Normal range of motion  Cervical back: Normal range of motion and neck supple  Skin:     Findings: No rash  Neurological:      Mental Status: She is alert and oriented to person, place, and time     Psychiatric:         Behavior: Behavior normal          Laboratory results:   Results for orders placed or performed in visit on 02/13/23   PAP [place] [address]    Supplier Name [facility]/[address]     Supplier Phone Number [phone]     Order Status Completed     Delivery Note      Delivery Request Date 02/13/2023     Date Delivered  02/13/2023     Item Description PAP Accessory     Item Description       PAP Mask, Full Face, Fit Upon Setup, N/A, 1 per 3 months    Item Description Humidifier Water Chamber, 1 per 6 months     Item Description PAP Headgear, 1 per 6 months     Item Description PAP Humidifier, Heated     Item Description Heated PAP Tubing, 1 per 3 months     Item Description Disposable PAP Filter, 2 per 1 month     Item Description Non-Disposable PAP Filter, 1 per 6 months     Item Description       PAP Mask Interface Cushion, Full Face, 1 per 1 month

## 2023-03-13 DIAGNOSIS — E11.9 TYPE 2 DIABETES MELLITUS WITHOUT COMPLICATION, WITHOUT LONG-TERM CURRENT USE OF INSULIN (HCC): ICD-10-CM

## 2023-03-13 DIAGNOSIS — E11.65 TYPE 2 DIABETES MELLITUS WITH HYPERGLYCEMIA, WITHOUT LONG-TERM CURRENT USE OF INSULIN (HCC): ICD-10-CM

## 2023-03-13 RX ORDER — GLIPIZIDE 5 MG/1
TABLET ORAL
Qty: 180 TABLET | Refills: 1 | Status: SHIPPED | OUTPATIENT
Start: 2023-03-13

## 2023-03-31 ENCOUNTER — HOSPITAL ENCOUNTER (OUTPATIENT)
Dept: RADIOLOGY | Age: 73
Discharge: HOME | End: 2023-03-31

## 2023-03-31 ENCOUNTER — APPOINTMENT (OUTPATIENT)
Dept: RADIOLOGY | Age: 73
End: 2023-03-31

## 2023-03-31 DIAGNOSIS — Z78.0 ASYMPTOMATIC POSTMENOPAUSAL STATE: ICD-10-CM

## 2023-04-27 ENCOUNTER — RA CDI HCC (OUTPATIENT)
Dept: OTHER | Facility: HOSPITAL | Age: 73
End: 2023-04-27

## 2023-04-27 NOTE — PROGRESS NOTES
[facility]  coding opportunities          Chart Reviewed number of suggestions sent to Provider: 2     Patients Insurance     Medicare Insurance: Medicare        [id number] [id number]  E11 [id number]

## 2023-05-04 ENCOUNTER — OFFICE VISIT (OUTPATIENT)
Dept: FAMILY MEDICINE CLINIC | Facility: CLINIC | Age: 73
End: 2023-05-04

## 2023-05-04 VITALS
RESPIRATION RATE: 18 BRPM | HEART RATE: 59 BPM | BODY MASS INDEX: 38.87 KG/M2 | HEIGHT: 60 IN | WEIGHT: 198 LBS | OXYGEN SATURATION: 95 % | SYSTOLIC BLOOD PRESSURE: 150 MMHG | DIASTOLIC BLOOD PRESSURE: 70 MMHG | TEMPERATURE: 97.3 F

## 2023-05-04 DIAGNOSIS — G89.4 CHRONIC PAIN SYNDROME: ICD-10-CM

## 2023-05-04 DIAGNOSIS — I10 ESSENTIAL HYPERTENSION: Primary | ICD-10-CM

## 2023-05-04 DIAGNOSIS — M51.36 DDD (DEGENERATIVE DISC DISEASE), LUMBAR: ICD-10-CM

## 2023-05-04 DIAGNOSIS — M81.0 OSTEOPOROSIS, UNSPECIFIED OSTEOPOROSIS TYPE, UNSPECIFIED PATHOLOGICAL FRACTURE PRESENCE: ICD-10-CM

## 2023-05-04 PROBLEM — L98.9 SKIN LESION: Status: RESOLVED | Noted: 2023-02-06 | Resolved: 2023-05-04

## 2023-05-04 NOTE — ASSESSMENT & PLAN NOTE
Osteoporosis    Patient is agreeable to initiating Prolia injections every 6 months and we will repeat DEXA scan in 18 months

## 2023-05-04 NOTE — PROGRESS NOTES
FAMILY PRACTICE OFFICE VISIT       NAME: Doren Carrel  AGE: 74 y o  SEX: female       : 1950        MRN: [id number]    DATE: 2023  TIME: 12:54 PM    Assessment and Plan     Problem List Items Addressed This Visit        Cardiovascular and Mediastinum    Essential hypertension - Primary     Hypertension  The patient's blood pressure is stable at this time and he will continue current regimen of medications            Musculoskeletal and Integument    DDD (degenerative disc disease), lumbar    Osteoporosis     Osteoporosis  Patient is agreeable to initiating Prolia injections every 6 months and we will repeat DEXA scan in 18 months            Other    Chronic pain syndrome     Chronic pain syndrome  Patient was advised to follow-up with her pain management office to titrate gabapentin if needed or to discuss any other treatment options  Patient has had several therapeutic injections to her back which lately have not been very effective at alleviating her pain                Chief Complaint     Chief Complaint   Patient presents with    Follow-up     Dx scan     Back Pain     X getting worse        History of Present Illness     Patient in the office to discuss chronic medical condition  She continues to have low back pain and generalized arthralgias from degenerative joint disease  She is under the care of pain management  She takes gabapentin once in the morning and 2 at bedtime but continues to experience arthralgias  She had a recent DEXA scan that showed she has gone from osteopenia to osteoporosis  Review of Systems   Review of Systems   Constitutional: Positive for fatigue  HENT: Negative  Eyes: Negative  Respiratory: Negative  Cardiovascular: Negative  Gastrointestinal: Negative  Genitourinary: Negative  Musculoskeletal: Positive for arthralgias, back pain and gait problem  Skin: Negative  Psychiatric/Behavioral: Negative          Active Problem List Patient Active Problem List   Diagnosis    Lumbar radiculopathy    Spinal stenosis of lumbar region    Lumbar spondylosis    Chronic right hip pain    Type 2 diabetes mellitus with hyperglycemia, without long-term current use of insulin (HCC)    Primary osteoarthritis of first carpometacarpal joint of left hand    Arthritis of carpometacarpal (CMC) joint of left thumb    Cataract of both eyes    DDD (degenerative disc disease), lumbar    Facet arthropathy, lumbar    Keratosis    Obesity (BMI 30-39  9)    Obstructive sleep apnea of adult    Secondary polycythemia    Chronic pain syndrome    Current smoker on some days    Essential hypertension    Trigger finger, left index finger    Trigger ring finger of right hand    Sacroiliitis (HCC)    Vertigo    [facility]    Non-seasonal allergic rhinitis    Heart murmur, systolic    Obesity, morbid ([facility])    Persistent proteinuria    Osteoporosis       Past Medical History:  Past Medical History:   Diagnosis Date    Asthma     [facility]     Community acquired pneumonia     LAST ASSESSED: [id number]    COPD [facility]chronic obstructive pulmonary disease) [facility]     Diabetes mellitus ([facility] [facility]     History of MRSA infection     2008 liver sx    Liver disease     Sleep apnea     Viral warts     LAST ASSESSED: [id number]       Past Surgical History:  Past Surgical History:   Procedure Laterality Date    ABDOMINAL SURGERY      ABDOMINOPLASTY      CHOLECYSTECTOMY      COSMETIC SURGERY      EYE SURGERY      Bilateral cataracts 2015 Dr Karyle Gauze       FINGER SURGERY      HAND SURGERY      HYSTERECTOMY      LIVER SURGERY      MD ARTHRP INTERPOS INTERCARPAL/METACARPAL JOINTS Right 9/9/2016    Procedure: THUMB TRAPEZIECTOMY; BASAL JOINT ARTHROPLASTY WITH APL AUTOGRAFT;  Surgeon: Malcolm Edmondson MD;  [facility];  Service: Orthopedics    VANESSA Christopher INTERCARPAL/METACARPAL JOINTS Left 5/29/2018    Procedure: Jenaro Naman;  Surgeon: Jaspreet Saldaña MD;  [facility];  Service: Orthopedics    AR TENDON SHEATH INCISION Right 7/23/2019    Procedure: RELEASE TRIGGER FINGER RIGHT LONG;  Surgeon: Jaspreet Saldaña MD;  [facility];  Service: Orthopedics    AR TENDON SHEATH INCISION Left 8/6/2019    Procedure: RELEASE TRIGGER FINGER LEFT INDEX;  Surgeon: Jaspreet Saldaña MD;  [facility];  Service: Orthopedics    AR TENDON SHEATH INCISION Right 6/16/2020    Procedure: RELEASE TRIGGER FINGER, right ring;  Surgeon: Jaspreet Saldaña MD;  [facility];  Service: Orthopedics    AR TR/TRNSPL TDN CARP/MTCRPL HAND W/O FR GRF EA TDN Left 5/29/2018    Procedure: TRANSFER TENDON HAND/WRIST FCR from forearm to wrist;  Surgeon: Jaspreet Saldaña MD;  [facility];  Service: Orthopedics       Family History:  Family History   Problem Relation Age of Onset    Heart attack Mother     Heart attack Father     Ovarian cancer Sister     Basal cell carcinoma Brother     Heart disease Other         CARDIAC DISORDER    Diabetes Other     Liver cancer Other     Breast cancer Other     Stomach cancer Other     Heart disease Family     Diabetes Family     Thyroid disease Family     Ovarian cancer Family        Social History:  Social History     Socioeconomic History    Marital status: /Civil Union     Spouse name: Not on file    Number of children: Not on file    Years of education: Not on file    Highest education level: Not on file   Occupational History    Not on file   Tobacco Use    Smoking status: Every Day     Packs/day: 0 50     Types: Cigarettes    Smokeless tobacco: Never   Vaping Use    Vaping Use: Never used   Substance and Sexual Activity    Alcohol use: Yes     Comment: very seldom    Drug use: No    Sexual activity: Not Currently   Other Topics Concern    Not on file   Social History Narrative    Not on file     Social Determinants of Health     Financial Resource Strain: Not on file   Food Insecurity: Not on file   Transportation Needs: Not on file   Physical Activity: Not on file   Stress: Not on file   Social Connections: Not on file   Intimate Partner Violence: Not on file   Housing Stability: Not on file       Objective     Vitals:    05/04/23 0846   BP: 150/70   Pulse:    Resp:    Temp:    SpO2:      Wt Readings from Last 3 Encounters:   05/04/23 89 8 kg (198 lb)   03/09/23 90 3 kg (199 lb)   03/02/23 89 8 kg (198 lb)       Physical Exam  Constitutional:       General: She is not in acute distress  Appearance: Normal appearance  She is not ill-appearing  HENT:      Head: Normocephalic and atraumatic  Eyes:      General:         Right eye: No discharge  Left eye: No discharge  Conjunctiva/sclera: Conjunctivae normal       Pupils: Pupils are equal, round, and reactive to light  Neck:      Vascular: No carotid bruit  Cardiovascular:      Rate and Rhythm: Normal rate and regular rhythm  Heart sounds: Normal heart sounds  No murmur heard  Pulmonary:      Effort: Pulmonary effort is normal       Breath sounds: Normal breath sounds  No wheezing, rhonchi or rales  Musculoskeletal:      Right lower leg: No edema  Left lower leg: No edema  Comments: Patient ambulates slowly with use of a walking cane  Lymphadenopathy:      Cervical: No cervical adenopathy  Skin:     Findings: No rash  Neurological:      General: No focal deficit present  Mental Status: She is alert and oriented to person, place, and time  Cranial Nerves: No cranial nerve deficit  Psychiatric:         Mood and Affect: Mood normal          Behavior: Behavior normal          Thought Content:  Thought content normal          Judgment: Judgment normal          Pertinent Laboratory/Diagnostic Studies:  Lab Results   Component Value Date    BUN 30 (H) 02/02/2023 CREATININE 1 08 02/02/2023    CALCIUM 9 2 02/02/2023     10/23/2017    K 4 6 02/02/2023    CO2 31 02/02/2023     02/02/2023     Lab Results   Component Value Date    ALT 16 02/02/2023    AST 14 02/02/2023    ALKPHOS 85 02/02/2023    BILITOT 0 3 10/23/2017       Lab Results   Component Value Date    WBC 10 34 (H) 02/02/2023    HGB 13 3 02/02/2023    HCT 46 7 (H) 02/02/2023    MCV 99 (H) 02/02/2023     02/02/2023       No results found for: TSH    No results found for: CHOL  Lab Results   Component Value Date    TRIG 138 02/02/2023     Lab Results   Component Value Date    HDL 45 (L) 02/02/2023     Lab Results   Component Value Date    LDLCALC 21 02/02/2023     Lab Results   Component Value Date    HGBA1C 6 3 (H) 02/02/2023       Results for orders placed or performed in visit on 03/09/23   POCT urine dip   Result Value Ref Range    LEUKOCYTE ESTERASE,UA neg     NITRITE,UA neg     SL AMB POCT UROBILINOGEN 0 2     POCT URINE PROTEIN 300+      PH,UA 5     BLOOD,UA hemolyzed trace     SPECIFIC GRAVITY,UA 1 015     KETONES,UA neg     BILIRUBIN,UA neg     GLUCOSE, UA neg        No orders of the defined types were placed in this encounter  ALLERGIES:  No Known Allergies    Current Medications     Current Outpatient Medications   Medication Sig Dispense Refill    diphenhydrAMINE HCl (BENADRYL PO) Take 25 mg by mouth daily at bedtime And 50 mg prn      gabapentin (NEURONTIN) 400 mg capsule Take 1 PO AM and 2 PO HS 90 capsule 1    glipiZIDE (GLUCOTROL) 5 mg tablet TAKE 1 TABLET TWICE DAILY BEFORE MEALS 180 tablet 1    glucose blood (ACCU-CHEK JESS PLUS) test strip Test BS 3 times daily    DX E11 9 100 each 2    hydrochlorothiazide (HYDRODIURIL) 25 mg tablet Take 1 tablet (25 mg total) by mouth daily 90 tablet 1    lisinopril (ZESTRIL) 40 mg tablet Take 1 tablet (40 mg total) by mouth daily 90 tablet 3    metFORMIN (GLUCOPHAGE) 1000 MG tablet TAKE 1 TABLET TWICE DAILY 180 tablet 1    rosuvastatin (CRESTOR) 20 MG tablet TAKE 1 TABLET EVERY DAY 90 tablet 1    glipiZIDE (GLUCOTROL XL) 5 mg 24 hr tablet TAKE 1 TABLET EVERY DAY  30  MINUTES BEFORE BREAKFAST (Patient not taking: Reported on 2/6/2023) 90 tablet 0    IBUPROFEN PO Take by mouth if needed (Patient not taking: Reported on 5/4/2023)      meloxicam (MOBIC) 7 5 mg tablet TAKE 1 TABLET TWICE DAILY (Patient not taking: Reported on 5/4/2023) 180 tablet 1    Multiple Vitamins-Minerals (OCUVITE ADULT FORMULA PO) Take by mouth (Patient not taking: Reported on 5/4/2023)       No current facility-administered medications for this visit           Health Maintenance     Health Maintenance   Topic Date Due    Falls: Plan of Care  Never done    OT PLAN OF CARE  09/12/2018    PT PLAN OF CARE  03/01/2023    Medicare Annual Wellness Visit (AWV)  05/23/2023    BMI: Followup Plan  05/23/2023    HEMOGLOBIN A1C  08/02/2023    Diabetic Foot Exam  05/04/2024 (Originally 5/23/2023)    DM Eye Exam  05/04/2024 (Originally 7/15/2023)    Urinary Incontinence Screening  05/23/2023    Breast Cancer Screening: Mammogram  01/23/2024    Fall Risk  01/30/2024    Kidney Health Evaluation: GFR  02/02/2024    Depression Screening  02/06/2024    Kidney Health Evaluation: Microalbumin/Creatinine Ratio  02/06/2024    BMI: Adult  05/04/2024    Colorectal Cancer Screening  08/01/2024    Hepatitis C Screening  Completed    Osteoporosis Screening  Completed    Pneumococcal Vaccine: 65+ Years  Completed    Influenza Vaccine  Completed    COVID-19 Vaccine  Completed    HIB Vaccine  Aged Out    IPV Vaccine  Aged Out    Hepatitis A Vaccine  Aged Out    Meningococcal ACWY Vaccine  Aged Out    HPV Vaccine  Aged Out     Immunization History   Administered Date(s) Administered    COVID-19 PFIZER VACCINE 0 3 ML IM 02/19/2021, 03/11/2021, 10/09/2021, 05/13/2022    COVID-19 Pfizer Vac BIVALENT Reji-sucrose 12 Yr+ IM (BOOSTER ONLY) 09/09/2022    COVID-19 Pfizer vac (Reji-sucrose, gray cap) 12 yr+ IM 05/13/2022    INFLUENZA 10/02/2013, 01/07/2016, 01/14/2017, 09/05/2017, 08/31/2021, 09/09/2022    Influenza Split High Dose Preservative Free IM 09/05/2017    Influenza, high dose seasonal 0 7 mL 09/10/2020    Influenza, seasonal, injectable 10/02/2013, 01/14/2017    Influenza, seasonal, injectable, preservative free 09/13/2018    Pneumococcal Conjugate 13-Valent 08/24/2017    Pneumococcal Conjugate PCV 7 09/06/2017    Pneumococcal Polysaccharide PPV23 08/15/2018    Tdap 09/04/2014    Zoster 10/02/2013    influenza, trivalent, adjuvanted 65/53/2808       Jame Davison MD    I spent 30 minutes with this patient of which greater than 50% was spent counseling or reviewing chart

## 2023-05-04 NOTE — ASSESSMENT & PLAN NOTE
Chronic pain syndrome  Patient was advised to follow-up with her pain management office to titrate gabapentin if needed or to discuss any other treatment options    Patient has had several therapeutic injections to her back which lately have not been very effective at alleviating her pain

## 2023-05-26 ENCOUNTER — OFFICE VISIT (OUTPATIENT)
Dept: FAMILY MEDICINE CLINIC | Facility: CLINIC | Age: 73
End: 2023-05-26

## 2023-05-26 ENCOUNTER — TELEPHONE (OUTPATIENT)
Dept: FAMILY MEDICINE CLINIC | Facility: CLINIC | Age: 73
End: 2023-05-26

## 2023-05-26 VITALS
SYSTOLIC BLOOD PRESSURE: 134 MMHG | RESPIRATION RATE: 16 BRPM | TEMPERATURE: 97.8 F | WEIGHT: 199 LBS | HEART RATE: 77 BPM | BODY MASS INDEX: 39.07 KG/M2 | HEIGHT: 60 IN | OXYGEN SATURATION: 92 % | DIASTOLIC BLOOD PRESSURE: 70 MMHG

## 2023-05-26 DIAGNOSIS — L72.3 SEBACEOUS CYST OF EAR: Primary | ICD-10-CM

## 2023-05-26 NOTE — TELEPHONE ENCOUNTER
Gavino Palmer called said that you were calling in Bactrim for her there is no script in the chart please advise negro

## 2023-05-26 NOTE — PROGRESS NOTES
FAMILY PRACTICE OFFICE VISIT       NAME: No Hidalgo  AGE: 74 y o  SEX: female       : 1950        MRN: [id number]    DATE: 2023  TIME: 1:03 PM    Assessment and Plan     Problem List Items Addressed This Visit        Nervous and Auditory    Sebaceous cyst of ear - Primary     Cyst   Patient will continue with warm compresses of the affected area  She was given prescription for Bactrim DS to use 1 twice daily for 7 days  If symptoms persist patient will see Dr Harman Nuñez for dermatologic consultation                Chief Complaint     Chief Complaint   Patient presents with   • Mass     Lump on ear lobe  Right earlobe, on the backside of lobe  History of Present Illness     Patient with 1 week history of small nodule behind her right earlobe that drains foul-smelling fluid  Patient is unable to use her earrings  Denies any documented fevers or sore throat  Review of Systems   Review of Systems   Constitutional: Negative  Skin:        As per HPI       Active Problem List     Patient Active Problem List   Diagnosis   • Lumbar radiculopathy   • Spinal stenosis of lumbar region   • Lumbar spondylosis   • Chronic right hip pain   • Type 2 diabetes mellitus with hyperglycemia, without long-term current use of insulin ([facility])   • Primary osteoarthritis of first carpometacarpal joint of left hand   • Arthritis of carpometacarpal (CMC) joint of left thumb   • Cataract of both eyes   • DDD (degenerative disc disease), lumbar   • Facet arthropathy, lumbar   • Keratosis   • Obesity (BMI 30-39  9)   • Obstructive sleep apnea of adult   • Secondary polycythemia   • Chronic pain syndrome   • Current smoker on some days   • Essential hypertension   • Trigger finger, left index finger   • Trigger ring finger of right hand   • Sacroiliitis ([facility])   • Vertigo   • COPD (chronic obstructive pulmonary disease) ([facility])   • Non-seasonal allergic rhinitis   • Heart murmur, systolic   • Obesity, morbid [facility])   • Persistent proteinuria   • Osteoporosis   • Sebaceous cyst of ear       Past Medical History:  Past Medical History:   Diagnosis Date   • Asthma    • Chronic obstructive lung disease ([facility] [address] )    • Community acquired pneumonia     LAST ASSESSED: [id number]   • COPD (chronic obstructive pulmonary disease) ([facility] [address] )    • [facility]    • History of MRSA infection     2008 liver sx   • Liver disease    • Sleep apnea    • Viral warts     LAST ASSESSED: [id number]       Past Surgical History:  Past Surgical History:   Procedure Laterality Date   • ABDOMINAL SURGERY     • ABDOMINOPLASTY     • CHOLECYSTECTOMY     • COSMETIC SURGERY     • EYE SURGERY      Bilateral cataracts 2015 Dr Angela Washington      • FINGER SURGERY     • HAND SURGERY     • HYSTERECTOMY     • LIVER SURGERY     • CA ARTHRP INTERPOS INTERCARPAL/METACARPAL JOINTS Right 9/9/2016    Procedure: THUMB TRAPEZIECTOMY; BASAL JOINT ARTHROPLASTY WITH APL AUTOGRAFT;  Surgeon: Sheila Austin MD;  [facility];  Service: Orthopedics   • CA ARTHRP INTERPOS INTERCARPAL/METACARPAL JOINTS Left 5/29/2018    Procedure: Chandni Breech;  Surgeon: Tawanda Jackson MD;  [facility];  Service: Orthopedics   • CA TENDON SHEATH INCISION Right 7/23/2019    Procedure: RELEASE TRIGGER FINGER RIGHT LONG;  Surgeon: Tawanda Jackson MD;  [facility];  Service: Orthopedics   • CA TENDON SHEATH INCISION Left 8/6/2019    Procedure: RELEASE TRIGGER FINGER LEFT INDEX;  Surgeon: Tawanda Jackson MD;  [facility];  Service: Orthopedics   • CA TENDON SHEATH INCISION Right 6/16/2020    Procedure: RELEASE TRIGGER FINGER, right ring;  Surgeon: Tawanda Jackson MD;  [facility];  Service: Orthopedics   • CA TR/TRNSPL TDN CARP/MTCRPL HAND W/O FR GRF EA TDN Left 5/29/2018    Procedure: TRANSFER TENDON HAND/WRIST FCR from forearm to wrist;  Surgeon: Tawanda Jackson MD;  [facility];  Service: Orthopedics       Family History:  Family History   Problem Relation Age of Onset   • Heart attack Mother    • Heart attack Father    • Ovarian cancer Sister    • Basal cell carcinoma Brother    • Heart disease Other         CARDIAC DISORDER   • Diabetes Other    • Liver cancer Other    • Breast cancer Other    • Stomach cancer Other    • Heart disease Family    • Diabetes Family    • Thyroid disease Family    • Ovarian cancer Family        Social History:  Social History     Socioeconomic History   • Marital status: /Civil Union     Spouse name: Not on file   • Number of children: Not on file   • Years of education: Not on file   • Highest education level: Not on file   Occupational History   • Not on file   Tobacco Use   • Smoking status: Every Day     Packs/day: 0 50     Types: Cigarettes   • Smokeless tobacco: Never   Vaping Use   • Vaping Use: Never used   Substance and Sexual Activity   • Alcohol use: Yes     Comment: very seldom   • Drug use: No   • Sexual activity: Not Currently   Other Topics Concern   • Not on file   Social History Narrative   • Not on file     Social Determinants of Health     Financial Resource Strain: Not on file   Food Insecurity: Not on file   Transportation Needs: Not on file   Physical Activity: Not on file   Stress: Not on file   Social Connections: Not on file   Intimate Partner Violence: Not on file   Housing Stability: Not on file       Objective     Vitals:    05/26/23 1128   BP: 134/70   Pulse: 77   Resp: 16   Temp: 97 8 °F (36 6 °C)   SpO2: 92%     Wt Readings from Last 3 Encounters:   05/26/23 90 3 kg (199 lb)   05/04/23 89 8 kg (198 lb)   03/09/23 90 3 kg (199 lb)       Physical Exam  Constitutional:       General: She is not in acute distress  Appearance: Normal appearance  She is not ill-appearing  HENT:      Right Ear: Tympanic membrane, ear canal and external ear normal  There is no impacted cerumen  Lymphadenopathy:      Cervical: No cervical adenopathy     Skin:     Findings: "Lesion present  Comments: Patient with small 2 mm skin colored nodule behind right earlobe  She has another small opening that appears to have drained some fluid close to lesion  No tenderness or redness of earlobe  Neurological:      Mental Status: She is alert  Pertinent Laboratory/Diagnostic Studies:  Lab Results   Component Value Date    BUN 30 (H) 02/02/2023    CALCIUM 9 2 02/02/2023     02/02/2023    CO2 31 02/02/2023    CREATININE 1 08 02/02/2023    K 4 6 02/02/2023     10/23/2017     Lab Results   Component Value Date    ALKPHOS 85 02/02/2023    ALT 16 02/02/2023    AST 14 02/02/2023    BILITOT 0 3 10/23/2017       Lab Results   Component Value Date    HCT 46 7 (H) 02/02/2023    HGB 13 3 02/02/2023    MCV 99 (H) 02/02/2023     02/02/2023    WBC 10 34 (H) 02/02/2023       No results found for: \"TSH\"    No results found for: \"CHOL\"  Lab Results   Component Value Date    TRIG 138 02/02/2023     Lab Results   Component Value Date    HDL 45 (L) 02/02/2023     Lab Results   Component Value Date    LDLCALC 21 02/02/2023     Lab Results   Component Value Date    HGBA1C 6 3 (H) 02/02/2023       Results for orders placed or performed in visit on 03/09/23   POCT urine dip   Result Value Ref Range    LEUKOCYTE ESTERASE,UA neg     NITRITE,UA neg     SL AMB POCT UROBILINOGEN 0 2     POCT URINE PROTEIN 300+      PH,UA 5     BLOOD,UA hemolyzed trace     SPECIFIC GRAVITY,UA 1 015     KETONES,UA neg     BILIRUBIN,UA neg     GLUCOSE, UA neg        No orders of the defined types were placed in this encounter        ALLERGIES:  No Known Allergies    Current Medications     Current Outpatient Medications   Medication Sig Dispense Refill   • diphenhydrAMINE HCl (BENADRYL PO) Take 25 mg by mouth daily at bedtime And 50 mg prn     • gabapentin (NEURONTIN) 400 mg capsule Take 1 PO AM and 2 PO HS 90 capsule 1   • glipiZIDE (GLUCOTROL XL) 5 mg 24 hr tablet TAKE 1 TABLET EVERY DAY  30  MINUTES BEFORE " BREAKFAST 90 tablet 0   • glipiZIDE (GLUCOTROL) 5 mg tablet TAKE 1 TABLET TWICE DAILY BEFORE MEALS 180 tablet 1   • glucose blood (ACCU-CHEK JESS PLUS) test strip Test BS 3 times daily  DX E11 9 100 each 2   • hydrochlorothiazide (HYDRODIURIL) 25 mg tablet Take 1 tablet (25 mg total) by mouth daily 90 tablet 1   • IBUPROFEN PO Take by mouth if needed     • lisinopril (ZESTRIL) 40 mg tablet Take 1 tablet (40 mg total) by mouth daily 90 tablet 3   • metFORMIN (GLUCOPHAGE) 1000 MG tablet TAKE 1 TABLET TWICE DAILY 180 tablet 1   • Multiple Vitamins-Minerals (OCUVITE ADULT FORMULA PO) Take by mouth     • rosuvastatin (CRESTOR) 20 MG tablet TAKE 1 TABLET EVERY DAY 90 tablet 1     No current facility-administered medications for this visit           Health Maintenance     Health Maintenance   Topic Date Due   • Falls: Plan of Care  Never done   • OT PLAN OF CARE  09/12/2018   • COVID-19 Vaccine (7 - Pfizer series) 01/09/2023   • PT PLAN OF CARE  03/01/2023   • Urinary Incontinence Screening  05/23/2023   • BMI: Followup Plan  05/23/2023   • Medicare Annual Wellness Visit (AWV)  05/23/2023   • HEMOGLOBIN A1C  08/02/2023   • Diabetic Foot Exam  05/04/2024 (Originally 5/23/2023)   • DM Eye Exam  05/04/2024 (Originally 7/15/2023)   • Breast Cancer Screening: Mammogram  01/23/2024   • Fall Risk  01/30/2024   • Kidney Health Evaluation: GFR  02/02/2024   • Depression Screening  02/06/2024   • Kidney Health Evaluation: Albumin/Creatinine Ratio  02/06/2024   • BMI: Adult  05/26/2024   • Colorectal Cancer Screening  08/01/2024   • Hepatitis C Screening  Completed   • Osteoporosis Screening  Completed   • Pneumococcal Vaccine: 65+ Years  Completed   • Influenza Vaccine  Completed   • HIB Vaccine  Aged Out   • IPV Vaccine  Aged Out   • Hepatitis A Vaccine  Aged Out   • Meningococcal ACWY Vaccine  Aged Out   • HPV Vaccine  Aged Out     Immunization History   Administered Date(s) Administered   • COVID-19 PFIZER VACCINE 0 3 ML IM 02/19/2021, 03/11/2021, 10/09/2021, 05/13/2022   • COVID-19 Pfizer Vac BIVALENT Reji-sucrose 12 Yr+ IM (BOOSTER ONLY) 09/09/2022   • COVID-19 Pfizer vac (Reji-sucrose, gray cap) 12 yr+ IM 05/13/2022   • INFLUENZA 10/02/2013, 01/07/2016, 01/14/2017, 09/05/2017, 08/31/2021, 09/09/2022   • Influenza Split High Dose Preservative Free IM 09/05/2017   • Influenza, high dose seasonal 0 7 mL 09/10/2020   • Influenza, seasonal, injectable 10/02/2013, 01/14/2017   • Influenza, seasonal, injectable, preservative free 09/13/2018   • Pneumococcal Conjugate 13-Valent 08/24/2017   • Pneumococcal Conjugate PCV 7 09/06/2017   • Pneumococcal Polysaccharide PPV23 08/15/2018   • Tdap 09/04/2014   • Zoster 10/02/2013   • influenza, trivalent, adjuvanted 87/79/0271       Neal Brambila MD

## 2023-05-26 NOTE — ASSESSMENT & PLAN NOTE
Cyst   Patient will continue with warm compresses of the affected area  She was given prescription for Bactrim DS to use 1 twice daily for 7 days    If symptoms persist patient will see Dr Abraham Montana for dermatologic consultation

## 2023-05-30 DIAGNOSIS — L72.3 SEBACEOUS CYST OF EAR: Primary | ICD-10-CM

## 2023-05-30 RX ORDER — SULFAMETHOXAZOLE AND TRIMETHOPRIM 800; 160 MG/1; MG/1
1 TABLET ORAL EVERY 12 HOURS SCHEDULED
Qty: 20 TABLET | Refills: 0 | Status: SHIPPED | OUTPATIENT
Start: 2023-05-30 | End: 2023-06-09

## 2023-06-12 DIAGNOSIS — E11.65 TYPE 2 DIABETES MELLITUS WITH HYPERGLYCEMIA, WITHOUT LONG-TERM CURRENT USE OF INSULIN (HCC): ICD-10-CM

## 2023-06-15 ENCOUNTER — OFFICE VISIT (OUTPATIENT)
Dept: PAIN MEDICINE | Facility: CLINIC | Age: 73
End: 2023-06-15
Payer: MEDICARE

## 2023-06-15 VITALS
HEIGHT: 60 IN | WEIGHT: 187 LBS | SYSTOLIC BLOOD PRESSURE: 132 MMHG | DIASTOLIC BLOOD PRESSURE: 48 MMHG | BODY MASS INDEX: 36.71 KG/M2 | HEART RATE: 85 BPM

## 2023-06-15 DIAGNOSIS — G89.4 CHRONIC PAIN SYNDROME: ICD-10-CM

## 2023-06-15 DIAGNOSIS — M47.816 LUMBAR SPONDYLOSIS: ICD-10-CM

## 2023-06-15 DIAGNOSIS — E11.65 TYPE 2 DIABETES MELLITUS WITH HYPERGLYCEMIA, WITHOUT LONG-TERM CURRENT USE OF INSULIN (HCC): ICD-10-CM

## 2023-06-15 DIAGNOSIS — M48.061 SPINAL STENOSIS OF LUMBAR REGION, UNSPECIFIED WHETHER NEUROGENIC CLAUDICATION PRESENT: ICD-10-CM

## 2023-06-15 DIAGNOSIS — M54.16 LUMBAR RADICULOPATHY: Primary | ICD-10-CM

## 2023-06-15 PROCEDURE — 99214 OFFICE O/P EST MOD 30 MIN: CPT | Performed by: ANESTHESIOLOGY

## 2023-06-15 RX ORDER — GABAPENTIN 600 MG/1
TABLET ORAL
Qty: 270 TABLET | Refills: 1 | Status: SHIPPED | OUTPATIENT
Start: 2023-06-15

## 2023-06-15 RX ORDER — ROSUVASTATIN CALCIUM 20 MG/1
20 TABLET, COATED ORAL DAILY
Qty: 90 TABLET | Refills: 1 | Status: SHIPPED | OUTPATIENT
Start: 2023-06-15

## 2023-06-15 NOTE — PROGRESS NOTES
Assessment  1  Lumbar radiculopathy    2  Lumbar spondylosis    3  Chronic pain syndrome    4  Spinal stenosis of lumbar region, unspecified whether neurogenic claudication present        Plan  68-year-old female with a history of lumbar degenerative disc disease, stenosis, spondylosis, lumbar radiculopathy, and chronic pain syndrome returning for follow-up   The patient complains of lumbosacral back pain that radiates into the posterior aspect of bilateral lower extremities to the knees with associated subjective weakness  She is currently taking gabapentin 400 mg in the morning and 800 mg at bedtime with mild relief  She denies any side effects with the medication  She initially had relief with epidural steroid injections in the past, however unfortunately they lost their effectiveness over time  She has had relief of her low back pain with lumbar RFA which she feels is wearing off         1  I will increase the patient's gabapentin to 600 mg in the morning and 1200 mg at bedtime for her neuropathic complaints  Refill was provided today  2   Patient will continue with her home exercise program  3   I will follow-up with the patient in 3 months or sooner if needed  4  We will repeat lumbar RFA as needed        My impressions and treatment recommendations were discussed in detail with the patient who verbalized understanding and had no further questions  Discharge instructions were provided  I personally saw and examined the patient and I agree with the above discussed plan of care  No orders of the defined types were placed in this encounter  No orders of the defined types were placed in this encounter        History of Present Illness    Ervin Buckley is a 74 y o  female with a history of lumbar degenerative disc disease, stenosis, spondylosis, lumbar radiculopathy, and chronic pain syndrome returning for follow-up   The patient complains of lumbosacral back pain that radiates into the posterior aspect of bilateral lower extremities to the knees with associated subjective weakness  Have some bladder incontinence, but denies any bowel incontinence or saddle anesthesia  She is currently taking gabapentin 400 mg in the morning and 800 mg at bedtime with mild relief  She denies any side effects with the medication  She initially had relief with epidural steroid injections in the past, however unfortunately they lost their effectiveness over time  She has had relief of her low back pain with lumbar RFA which she feels is wearing off  The patient rates her pain a 10 out of 10 and the pain is now following any particular pattern throughout the day  The pain is intermittent and described as dull, aching, and shooting  The pain is worse with standing, walking, and exercise  The pain is alleviated with sitting and lying down  Other than as stated above, the patient denies any interval changes in medications, medical condition, mental condition, symptoms, or allergies since the last office visit  I have personally reviewed and/or updated the patient's past medical history, past surgical history, family history, social history, current medications, allergies, and vital signs today  Review of Systems   Constitutional: Negative for fever and unexpected weight change  HENT: Negative for trouble swallowing  Eyes: Negative for visual disturbance  Respiratory: Positive for shortness of breath  Negative for wheezing  Cardiovascular: Negative for chest pain and palpitations  Gastrointestinal: Negative for constipation, diarrhea, nausea and vomiting  Endocrine: Negative for cold intolerance, heat intolerance and polydipsia  Genitourinary: Negative for difficulty urinating and frequency  Musculoskeletal: Positive for gait problem and joint swelling  Negative for arthralgias and myalgias  Decreased ROM, pain in extremity   Skin: Negative for rash  Neurological: Positive for weakness  Negative for dizziness, seizures, syncope and headaches  Hematological: Does not bruise/bleed easily  Psychiatric/Behavioral: Negative for dysphoric mood  All other systems reviewed and are negative  Patient Active Problem List   Diagnosis   • Lumbar radiculopathy   • Spinal stenosis of lumbar region   • Lumbar spondylosis   • Chronic right hip pain   • Type 2 diabetes mellitus with hyperglycemia, without long-term current use of insulin ([facility])   • Primary osteoarthritis of first carpometacarpal joint of left hand   • Arthritis of carpometacarpal (CMC) joint of left thumb   • Cataract of both eyes   • DDD (degenerative disc disease), lumbar   • Facet arthropathy, lumbar   • Keratosis   • Obesity (BMI 30-39  9)   • Obstructive sleep apnea of adult   • Secondary polycythemia   • Chronic pain syndrome   • Current smoker on some days   • Essential hypertension   • Trigger finger, left index finger   • Trigger ring finger of right hand   • Sacroiliitis ([facility])   • Vertigo   • COPD (chronic obstructive pulmonary disease) ([facility])   • Non-seasonal allergic rhinitis   • Heart murmur, systolic   • Obesity, morbid ([facility])   • Persistent proteinuria   • Osteoporosis   • Sebaceous cyst of ear       Past Medical History:   Diagnosis Date   • Asthma    • Chronic obstructive lung disease ([facility] [address] )    • Community acquired pneumonia     LAST ASSESSED: [id number]   • COPD (chronic obstructive pulmonary disease) ([facility] [address] )    • Diabetes mellitus ([facility] [address] )    • History of MRSA infection     2008 liver sx   • Liver disease    • Sleep apnea    • Viral warts     LAST ASSESSED: [id number]       Past Surgical History:   Procedure Laterality Date   • ABDOMINAL SURGERY     • ABDOMINOPLASTY     • CHOLECYSTECTOMY     • COSMETIC SURGERY     • EYE SURGERY      Bilateral cataracts 2015 Dr Saloni Lawler      • FINGER SURGERY     • HAND SURGERY     • HYSTERECTOMY     • LIVER SURGERY     • SD ARTHRP INTERPOS INTERCARPAL/METACARPAL JOINTS Right 9/9/2016    Procedure: THUMB TRAPEZIECTOMY; BASAL JOINT ARTHROPLASTY WITH APL AUTOGRAFT;  Surgeon: Brook Almaraz MD;  [facility];  Service: Orthopedics   • ID ARTHRP INTERPOS INTERCARPAL/METACARPAL JOINTS Left 5/29/2018    Procedure: Cindy Apt;  Surgeon: Jason Pedro MD;  [facility];  Service: Orthopedics   • ID TENDON SHEATH INCISION Right 7/23/2019    Procedure: RELEASE TRIGGER FINGER RIGHT LONG;  Surgeon: Jason Pedro MD;  [facility];  Service: Orthopedics   • ID TENDON SHEATH INCISION Left 8/6/2019    Procedure: RELEASE TRIGGER FINGER LEFT INDEX;  Surgeon: Jason Pedro MD;  [facility];  Service: Orthopedics   • ID TENDON SHEATH INCISION Right 6/16/2020    Procedure: RELEASE TRIGGER FINGER, right ring;  Surgeon: Jason Pedro MD;  [facility];  Service: Orthopedics   • ID TR/TRNSPL TDN CARP/MTCRPL HAND W/O FR GRF EA TDN Left 5/29/2018    Procedure: TRANSFER TENDON HAND/WRIST FCR from forearm to wrist;  Surgeon: Jason Pedro MD;  [facility];  Service: Orthopedics       Family History   Problem Relation Age of Onset   • Heart attack Mother    • Heart attack Father    • Ovarian cancer Sister    • Basal cell carcinoma Brother    • Heart disease Other         CARDIAC DISORDER   • Diabetes Other    • Liver cancer Other    • Breast cancer Other    • Stomach cancer Other    • Heart disease Family    • Diabetes Family    • Thyroid disease Family    • Ovarian cancer Family        Social History     Occupational History   • Not on file   Tobacco Use   • Smoking status: Every Day     Packs/day: 0 50     Types: Cigarettes   • Smokeless tobacco: Never   Vaping Use   • Vaping Use: Never used   Substance and Sexual Activity   • Alcohol use: Yes     Comment: very seldom   • Drug use: No   • Sexual activity: Not Currently       Current Outpatient Medications on File Prior to Visit   Medication Sig   • diphenhydrAMINE HCl (BENADRYL PO) Take 25 mg by mouth daily at bedtime And 50 mg prn   • gabapentin (NEURONTIN) 400 mg capsule Take 1 PO AM and 2 PO HS   • glipiZIDE (GLUCOTROL XL) 5 mg 24 hr tablet TAKE 1 TABLET EVERY DAY  30  MINUTES BEFORE BREAKFAST   • glipiZIDE (GLUCOTROL) 5 mg tablet TAKE 1 TABLET TWICE DAILY BEFORE MEALS   • glucose blood (ACCU-CHEK JESS PLUS) test strip Test BS 3 times daily  DX E11 9   • hydrochlorothiazide (HYDRODIURIL) 25 mg tablet Take 1 tablet (25 mg total) by mouth daily   • IBUPROFEN PO Take by mouth if needed   • lisinopril (ZESTRIL) 40 mg tablet Take 1 tablet (40 mg total) by mouth daily   • metFORMIN (GLUCOPHAGE) 1000 MG tablet TAKE 1 TABLET TWICE DAILY   • Multiple Vitamins-Minerals (OCUVITE ADULT FORMULA PO) Take by mouth   • rosuvastatin (CRESTOR) 20 MG tablet TAKE 1 TABLET EVERY DAY     No current facility-administered medications on file prior to visit  No Known Allergies    Physical Exam    Ht 5' (1 524 m)   BMI 38 86 kg/m²     Constitutional: normal, well developed, well nourished, alert, in no distress and non-toxic and no overt pain behavior  Eyes: anicteric  HEENT: grossly intact  Neck: supple, symmetric, trachea midline and no masses   Pulmonary:even and unlabored  Cardiovascular:No edema or pitting edema present  Skin:Normal without rashes or lesions and well hydrated  Psychiatric:Mood and affect appropriate  Neurologic:Cranial Nerves II-XII grossly intact  Musculoskeletal:antalgic gait and ambulates with a walker  Bilateral lumbar paraspinals tender to palpation from L3-S1  Bilateral SI joints nontender to palpation  Bilateral lower extremity strength 5 out of 5 in all muscle groups  Sensation intact to light touch in L3-S1 dermatomes bilaterally  Positive seated straight leg raise bilaterally  Imaging      Study Result    Narrative & Impression   DXA SCAN     CLINICAL HISTORY: 74 years postmenopausal female      OTHER RISK FACTORS:  Prior fracture as a result of minor injury, COPD     PHARMACOLOGIC THERAPY FOR OSTEOPOROSIS:  None      TECHNIQUE: Bone densitometry was performed using a H[employer] Horizon A  bone densitometer  Regions of interest appear properly placed          COMPARISON: 3/30/2005      RESULTS:      LUMBAR SPINE  Level: L1, L3  (L2, L4 vertebrae excluded from analysis due to local structural abnormalities or artifact) :   BMD:  0 790  gm/cm2   T-score: -2 0         LEFT  TOTAL HIP:   BMD:  0 799  gm/cm2    T-score:  -1 2     LEFT  FEMORAL NECK:   BMD:  0 531  gm/cm2   T score: -2 9      LEFT  FOREARM:    33% RADIUS BMD:  0 612  gm/cm2  T-score:  -1 2         IMPRESSION:     1  Osteoporosis  [Based on the left femoral neck]     2   Since a DXA study from 3/30/2005, there has been:  A  STATISTICALLY SIGNIFICANT DECREASE in bone mineral density of  0 108 g/cm2 (12 0%) in the lumbar spine  A  STATISTICALLY SIGNIFICANT DECREASE in bone mineral density of  0 133 g/cm2 (14 3%) in the left total hip         3  The 10 year risk of hip fracture is 7 7% with the 10 year risk of major osteoporotic fracture being 24% as calculated by the [facility] fracture risk assessment tool (FRAX, which is based on data generated by the [facility]   for Metabolic Bone Diseases)      4  The current NOF guidelines recommend treating patients with a T-score of -2 5 or less in the lumbar spine or hips, or in post-menopausal women and men over the age of 48 with low bone mass (osteopenia) and a FRAX 10 year risk score of >3% for hip   fracture and/or >20% for major osteoporotic fracture      5  The NOF recommends follow-up DXA in 1-2 years after initiating therapy for osteoporosis and every 2 years thereafter  More frequent evaluation is appropriate for patients with conditions associated with rapid bone loss, such as glucocorticoid   therapy   The interval between DXA screenings may be longer for individuals without major risk factors and initial T-score in the normal or upper low bone mass range         The FRAX algorithm has certain limitations:  -FRAX has not been validated in patients currently or previously treated with pharmacotherapy for osteoporosis  In such patients, clinical judgment must be exercised in interpreting FRAX scores  -Prior hip, vertebral and humeral fragility fractures appear to confer greater risk of subsequent fracture than fractures at other sites (this is especially true for individuals with severe vertebral fractures), but quantification of this incremental   risk is not possible with FRAX  -FRAX underestimates fracture risk in patients with history of multiple fragility fractures  -FRAX may underestimate fracture risk in patients with history of frequent falls   -It is not appropriate to use FRAX to monitor treatment response         WHO CLASSIFICATION:  Normal (a T-score of -1 0 or higher)  Low bone mineral density (a T-score of less than -1 0 but higher than -2 5)  Osteoporosis (a T-score of -2 5 or less)  Severe osteoporosis (a T-score of -2 5 or less with a fragility fracture)        LEAST SIGNIFICANT CHANGE (AT 95% C  I):  Lumbar spine 0 036 g/cm2; 2 2%  Total hip: 0 022 g/cm2; 2 6%  Forearm: 0 017 g/cm2; 3 0%                            Workstation performed: [id number]

## 2023-07-03 DIAGNOSIS — I10 ESSENTIAL HYPERTENSION: ICD-10-CM

## 2023-07-03 RX ORDER — HYDROCHLOROTHIAZIDE 25 MG/1
TABLET ORAL
Qty: 90 TABLET | Refills: 1 | Status: SHIPPED | OUTPATIENT
Start: 2023-07-03

## 2023-07-07 ENCOUNTER — TELEPHONE (OUTPATIENT)
Dept: FAMILY MEDICINE CLINIC | Facility: CLINIC | Age: 73
End: 2023-07-07

## 2023-07-07 DIAGNOSIS — E11.65 TYPE 2 DIABETES MELLITUS WITH HYPERGLYCEMIA, WITHOUT LONG-TERM CURRENT USE OF INSULIN (HCC): Primary | ICD-10-CM

## 2023-07-07 DIAGNOSIS — I10 ESSENTIAL HYPERTENSION: ICD-10-CM

## 2023-07-07 NOTE — TELEPHONE ENCOUNTER
Orders placed for blood work in epic however the patient's must call [facility]'s lab now to arrange home draw.  [phone]

## 2023-07-07 NOTE — TELEPHONE ENCOUNTER
Pt calling, states she know she is due for A1C soon but that she cannot come into office due to sciatica/back pain she has that makes it hard to walk. She is wondering if a1c could be ordered and lab could be set up to have done at her home?

## 2023-07-11 ENCOUNTER — TELEPHONE (OUTPATIENT)
Dept: LAB | Facility: HOSPITAL | Age: 73
End: 2023-07-11

## 2023-07-17 ENCOUNTER — APPOINTMENT (OUTPATIENT)
Dept: LAB | Facility: HOSPITAL | Age: 73
End: 2023-07-17
Payer: MEDICARE

## 2023-07-17 DIAGNOSIS — I10 ESSENTIAL HYPERTENSION: ICD-10-CM

## 2023-07-17 DIAGNOSIS — E11.65 TYPE 2 DIABETES MELLITUS WITH HYPERGLYCEMIA, WITHOUT LONG-TERM CURRENT USE OF INSULIN (HCC): ICD-10-CM

## 2023-07-17 LAB
ALBUMIN SERPL BCP-MCNC: 3.2 G/DL (ref 3.5–5)
ALP SERPL-CCNC: 64 U/L (ref 46–116)
ALT SERPL W P-5'-P-CCNC: 21 U/L (ref 12–78)
ANION GAP SERPL CALCULATED.3IONS-SCNC: 1 MMOL/L
AST SERPL W P-5'-P-CCNC: 13 U/L (ref 5–45)
BILIRUB SERPL-MCNC: 0.22 MG/DL (ref 0.2–1)
BUN SERPL-MCNC: 19 MG/DL (ref 5–25)
CALCIUM ALBUM COR SERPL-MCNC: 10 MG/DL (ref 8.3–10.1)
CALCIUM SERPL-MCNC: 9.4 MG/DL (ref 8.3–10.1)
CHLORIDE SERPL-SCNC: 112 MMOL/L (ref 96–108)
CHOLEST SERPL-MCNC: 118 MG/DL
CO2 SERPL-SCNC: 28 MMOL/L (ref 21–32)
CREAT SERPL-MCNC: 0.88 MG/DL (ref 0.6–1.3)
ERYTHROCYTE [DISTWIDTH] IN BLOOD BY AUTOMATED COUNT: 14.2 % (ref 11.6–15.1)
EST. AVERAGE GLUCOSE BLD GHB EST-MCNC: 148 MG/DL
GFR SERPL CREATININE-BSD FRML MDRD: 64 ML/MIN/1.73SQ M
GLUCOSE P FAST SERPL-MCNC: 138 MG/DL (ref 65–99)
HBA1C MFR BLD: 6.8 %
HCT VFR BLD AUTO: 43.8 % (ref 34.8–46.1)
HDLC SERPL-MCNC: 50 MG/DL
HGB BLD-MCNC: 12.9 G/DL (ref 11.5–15.4)
LDLC SERPL CALC-MCNC: 39 MG/DL (ref 0–100)
MCH RBC QN AUTO: 29.1 PG (ref 26.8–34.3)
MCHC RBC AUTO-ENTMCNC: 29.5 G/DL (ref 31.4–37.4)
MCV RBC AUTO: 99 FL (ref 82–98)
NONHDLC SERPL-MCNC: 68 MG/DL
PLATELET # BLD AUTO: 136 THOUSANDS/UL (ref 149–390)
PMV BLD AUTO: 12.5 FL (ref 8.9–12.7)
POTASSIUM SERPL-SCNC: 4.6 MMOL/L (ref 3.5–5.3)
PROT SERPL-MCNC: 6.8 G/DL (ref 6.4–8.4)
RBC # BLD AUTO: 4.44 MILLION/UL (ref 3.81–5.12)
SODIUM SERPL-SCNC: 141 MMOL/L (ref 135–147)
TRIGL SERPL-MCNC: 146 MG/DL
TSH SERPL DL<=0.05 MIU/L-ACNC: 1.64 UIU/ML (ref 0.45–4.5)
WBC # BLD AUTO: 7.06 THOUSAND/UL (ref 4.31–10.16)

## 2023-07-17 PROCEDURE — 84443 ASSAY THYROID STIM HORMONE: CPT

## 2023-07-17 PROCEDURE — 85027 COMPLETE CBC AUTOMATED: CPT

## 2023-07-17 PROCEDURE — 36415 COLL VENOUS BLD VENIPUNCTURE: CPT

## 2023-07-17 PROCEDURE — 80061 LIPID PANEL: CPT

## 2023-07-17 PROCEDURE — 83036 HEMOGLOBIN GLYCOSYLATED A1C: CPT

## 2023-07-17 PROCEDURE — 80053 COMPREHEN METABOLIC PANEL: CPT

## 2023-07-24 ENCOUNTER — TELEPHONE (OUTPATIENT)
Dept: FAMILY MEDICINE CLINIC | Facility: CLINIC | Age: 73
End: 2023-07-24

## 2023-07-24 ENCOUNTER — HOSPITAL ENCOUNTER (OUTPATIENT)
Dept: MAMMOGRAPHY | Facility: CLINIC | Age: 73
Discharge: HOME | End: 2023-07-24
Payer: MEDICARE

## 2023-07-24 VITALS — BODY MASS INDEX: 34.4 KG/M2 | HEIGHT: 62 IN | WEIGHT: 186.95 LBS

## 2023-07-24 DIAGNOSIS — R92.8 OTHER ABNORMAL AND INCONCLUSIVE FINDINGS ON DIAGNOSTIC IMAGING OF BREAST: ICD-10-CM

## 2023-07-24 DIAGNOSIS — N63.10 MASS OF RIGHT BREAST, UNSPECIFIED QUADRANT: ICD-10-CM

## 2023-07-24 PROCEDURE — 77065 DX MAMMO INCL CAD UNI: CPT

## 2023-07-24 NOTE — TELEPHONE ENCOUNTER
----- Message from Celia Parks MD sent at [phone]/78/7337  3:59 PM EDT -----  Most recent mammogram of right breast did not show any significant findings.

## 2023-07-26 ENCOUNTER — TELEPHONE (OUTPATIENT)
Dept: ENDOCRINOLOGY | Facility: CLINIC | Age: 73
End: 2023-07-26

## 2023-07-26 NOTE — TELEPHONE ENCOUNTER
Called patient due to cancellation on my chart. Left voicemail to call [facility] at [facility] to reschedule. He was scheduled with DR. Peña in [address] [place] [address] on 9-12-23 @ 11:00.

## 2023-08-23 ENCOUNTER — RA CDI HCC (OUTPATIENT)
Dept: OTHER | Facility: HOSPITAL | Age: 73
End: 2023-08-23

## 2023-08-23 NOTE — PROGRESS NOTES
[address] [facility] coding opportunities          Chart Reviewed number of suggestions sent to Provider: 2  E11.22  E11.36     Patients Insurance     Medicare Insurance: [facility]

## 2023-08-29 ENCOUNTER — OFFICE VISIT (OUTPATIENT)
Dept: FAMILY MEDICINE CLINIC | Facility: CLINIC | Age: 73
End: 2023-08-29
Payer: MEDICARE

## 2023-08-29 VITALS
HEART RATE: 99 BPM | RESPIRATION RATE: 16 BRPM | WEIGHT: 202.5 LBS | SYSTOLIC BLOOD PRESSURE: 166 MMHG | OXYGEN SATURATION: 98 % | DIASTOLIC BLOOD PRESSURE: 71 MMHG | BODY MASS INDEX: 38.23 KG/M2 | TEMPERATURE: 97.8 F | HEIGHT: 61 IN

## 2023-08-29 DIAGNOSIS — I10 ESSENTIAL HYPERTENSION: ICD-10-CM

## 2023-08-29 DIAGNOSIS — Z00.00 MEDICARE ANNUAL WELLNESS VISIT, SUBSEQUENT: Primary | ICD-10-CM

## 2023-08-29 DIAGNOSIS — N32.81 OAB (OVERACTIVE BLADDER): ICD-10-CM

## 2023-08-29 DIAGNOSIS — E11.65 TYPE 2 DIABETES MELLITUS WITH HYPERGLYCEMIA, WITHOUT LONG-TERM CURRENT USE OF INSULIN (HCC): ICD-10-CM

## 2023-08-29 PROBLEM — D69.6 PLATELETS DECREASED (HCC): Status: ACTIVE | Noted: 2023-08-29

## 2023-08-29 PROCEDURE — 99214 OFFICE O/P EST MOD 30 MIN: CPT | Performed by: FAMILY MEDICINE

## 2023-08-29 PROCEDURE — G0439 PPPS, SUBSEQ VISIT: HCPCS | Performed by: FAMILY MEDICINE

## 2023-08-29 RX ORDER — TOLTERODINE 2 MG/1
2 CAPSULE, EXTENDED RELEASE ORAL DAILY
Qty: 90 CAPSULE | Refills: 1 | Status: SHIPPED | OUTPATIENT
Start: 2023-08-29

## 2023-08-29 NOTE — ASSESSMENT & PLAN NOTE
Overactive bladder. Patient was given a trial of Detrol LA 2 mg to take once daily. Patient will call if symptoms do not improve within 4 weeks. We will titrate medication accordingly.

## 2023-08-29 NOTE — PATIENT INSTRUCTIONS
Medicare Preventive Visit Patient Instructions  Thank you for completing your Welcome to Medicare Visit or Medicare Annual Wellness Visit today. Your next wellness visit will be due in one year (8/29/2024). The screening/preventive services that you may require over the next 5-10 years are detailed below. Some tests may not apply to you based off risk factors and/or age. Screening tests ordered at today's visit but not completed yet may show as past due. Also, please note that scanned in results may not display below. Preventive Screenings:  Service Recommendations Previous Testing/Comments   Colorectal Cancer Screening  * Colonoscopy    * Fecal Occult Blood Test (FOBT)/Fecal Immunochemical Test (FIT)  * Fecal DNA/Cologuard Test  * Flexible Sigmoidoscopy Age: 43-73 years old   Colonoscopy: every 10 years (may be performed more frequently if at higher risk)  OR  FOBT/FIT: every 1 year  OR  Cologuard: every 3 years  OR  Sigmoidoscopy: every 5 years  Screening may be recommended earlier than age 39 if at higher risk for colorectal cancer. Also, an individualized decision between you and your healthcare provider will decide whether screening between the ages of 77-80 would be appropriate. Colonoscopy: 08/02/2021  FOBT/FIT: Not on file  Cologuard: Not on file  Sigmoidoscopy: Not on file    Screening Current     Breast Cancer Screening Age: 36 years old  Frequency: every 1-2 years  Not required if history of left and right mastectomy Mammogram: 01/23/2023    Screening Current   Cervical Cancer Screening Between the ages of 21-29, pap smear recommended once every 3 years. Between the ages of 32-69, can perform pap smear with HPV co-testing every 5 years.    Recommendations may differ for women with a history of total hysterectomy, cervical cancer, or abnormal pap smears in past. Pap Smear: Not on file    Screening Not Indicated   Hepatitis C Screening Once for adults born between 1945 and 1965  More frequently in patients at high risk for Hepatitis C Hep C Antibody: 01/29/2019    Screening Current   Diabetes Screening 1-2 times per year if you're at risk for diabetes or have pre-diabetes Fasting glucose: 138 mg/dL (7/17/2023)  A1C: 6.8 % (7/17/2023)  Screening Not Indicated  History Diabetes   Cholesterol Screening Once every 5 years if you don't have a lipid disorder. May order more often based on risk factors. Lipid panel: 07/17/2023    Screening Current     Other Preventive Screenings Covered by Medicare:  1. Abdominal Aortic Aneurysm (AAA) Screening: covered once if your at risk. You're considered to be at risk if you have a family history of AAA. 2. Lung Cancer Screening: covers low dose CT scan once per year if you meet all of the following conditions: (1) Age 48-67; (2) No signs or symptoms of lung cancer; (3) Current smoker or have quit smoking within the last 15 years; (4) You have a tobacco smoking history of at least 20 pack years (packs per day multiplied by number of years you smoked); (5) You get a written order from a healthcare provider. 3. Glaucoma Screening: covered annually if you're considered high risk: (1) You have diabetes OR (2) Family history of glaucoma OR (3)  aged 48 and older OR (3)  American aged 72 and older  3. Osteoporosis Screening: covered every 2 years if you meet one of the following conditions: (1) You're estrogen deficient and at risk for osteoporosis based off medical history and other findings; (2) Have a vertebral abnormality; (3) On glucocorticoid therapy for more than 3 months; (4) Have primary hyperparathyroidism; (5) On osteoporosis medications and need to assess response to drug therapy. · Last bone density test (DXA Scan): 03/31/2023.  5. HIV Screening: covered annually if you're between the age of 15-65. Also covered annually if you are younger than 13 and older than 72 with risk factors for HIV infection.  For pregnant patients, it is covered up to 3 times per pregnancy. Immunizations:  Immunization Recommendations   Influenza Vaccine Annual influenza vaccination during flu season is recommended for all persons aged >= 6 months who do not have contraindications   Pneumococcal Vaccine   * Pneumococcal conjugate vaccine = PCV13 (Prevnar 13), PCV15 (Vaxneuvance), PCV20 (Prevnar 20)  * Pneumococcal polysaccharide vaccine = PPSV23 (Pneumovax) Adults 20-63 years old: 1-3 doses may be recommended based on certain risk factors  Adults 72 years old: 1-2 doses may be recommended based off what pneumonia vaccine you previously received   Hepatitis B Vaccine 3 dose series if at intermediate or high risk (ex: diabetes, end stage renal disease, liver disease)   Tetanus (Td) Vaccine - COST NOT COVERED BY MEDICARE PART B Following completion of primary series, a booster dose should be given every 10 years to maintain immunity against tetanus. Td may also be given as tetanus wound prophylaxis. Tdap Vaccine - COST NOT COVERED BY MEDICARE PART B Recommended at least once for all adults. For pregnant patients, recommended with each pregnancy. Shingles Vaccine (Shingrix) - COST NOT COVERED BY MEDICARE PART B  2 shot series recommended in those aged 48 and above     Health Maintenance Due:      Topic Date Due   • Breast Cancer Screening: Mammogram  01/23/2024   • Colorectal Cancer Screening  08/01/2024   • Hepatitis C Screening  Completed     Immunizations Due:      Topic Date Due   • COVID-19 Vaccine (7 - Pfizer series) 01/09/2023   • Influenza Vaccine (1) 09/01/2023     Advance Directives   What are advance directives? Advance directives are legal documents that state your wishes and plans for medical care. These plans are made ahead of time in case you lose your ability to make decisions for yourself. Advance directives can apply to any medical decision, such as the treatments you want, and if you want to donate organs. What are the types of advance directives?   There are many types of advance directives, and each state has rules about how to use them. You may choose a combination of any of the following:  · Living will: This is a written record of the treatment you want. You can also choose which treatments you do not want, which to limit, and which to stop at a certain time. This includes surgery, medicine, IV fluid, and tube feedings. · Durable power of  for healthcare [facility]): This is a written record that states who you want to make healthcare choices for you when you are unable to make them for yourself. This person, called a proxy, is usually a family member or a friend. You may choose more than 1 proxy. · Do not resuscitate (DNR) order:  A DNR order is used in case your heart stops beating or you stop breathing. It is a request not to have certain forms of treatment, such as CPR. A DNR order may be included in other types of advance directives. · Medical directive: This covers the care that you want if you are in a coma, near death, or unable to make decisions for yourself. You can list the treatments you want for each condition. Treatment may include pain medicine, surgery, blood transfusions, dialysis, IV or tube feedings, and a ventilator (breathing machine). · Values history: This document has questions about your views, beliefs, and how you feel and think about life. This information can help others choose the care that you would choose. Why are advance directives important? An advance directive helps you control your care. Although spoken wishes may be used, it is better to have your wishes written down. Spoken wishes can be misunderstood, or not followed. Treatments may be given even if you do not want them. An advance directive may make it easier for your family to make difficult choices about your care. Fall Prevention    Fall prevention  includes ways to make your home and other areas safer.  It also includes ways you can move more carefully to prevent a fall. Health conditions that cause changes in your blood pressure, vision, or muscle strength and coordination may increase your risk for falls. Medicines may also increase your risk for falls if they make you dizzy, weak, or sleepy. Fall prevention tips:   · Stand or sit up slowly. · Use assistive devices as directed. · Wear shoes that fit well and have soles that . · Wear a personal alarm. · Stay active. · Manage your medical conditions. Home Safety Tips:  · Add items to prevent falls in the bathroom. · Keep paths clear. · Install bright lights in your home. · Keep items you use often on shelves within reach. · Paint or place reflective tape on the edges of your stairs. Urinary Incontinence   Urinary incontinence (UI)  is when you lose control of your bladder. UI develops because your bladder cannot store or empty urine properly. The 3 most common types of UI are stress incontinence, urge incontinence, or both. Medicines:   · May be given to help strengthen your bladder control. Report any side effects of medication to your healthcare provider. Do pelvic muscle exercises often:  Your pelvic muscles help you stop urinating. Squeeze these muscles tight for 5 seconds, then relax for 5 seconds. Gradually work up to squeezing for 10 seconds. Do 3 sets of 15 repetitions a day, or as directed. This will help strengthen your pelvic muscles and improve bladder control. Train your bladder:  Go to the bathroom at set times, such as every 2 hours, even if you do not feel the urge to go. You can also try to hold your urine when you feel the urge to go. For example, hold your urine for 5 minutes when you feel the urge to go. As that becomes easier, hold your urine for 10 minutes. Self-care:   · Keep a UI record. Write down how often you leak urine and how much you leak. Make a note of what you were doing when you leaked urine. · Drink liquids as directed.  You may need to limit the amount of liquid you drink to help control your urine leakage. Do not drink any liquid right before you go to bed. Limit or do not have drinks that contain caffeine or alcohol. · Prevent constipation. Eat a variety of high-fiber foods. Good examples are high-fiber cereals, beans, vegetables, and whole-grain breads. Walking is the best way to trigger your intestines to have a bowel movement. · Exercise regularly and maintain a healthy weight. Weight loss and exercise will decrease pressure on your bladder and help you control your leakage. · Use a catheter as directed  to help empty your bladder. A catheter is a tiny, plastic tube that is put into your bladder to drain your urine. · Go to behavior therapy as directed. Behavior therapy may be used to help you learn to control your urge to urinate. Cigarette Smoking and Your Health   Risks to your health if you smoke:  Nicotine and other chemicals found in tobacco damage every cell in your body. Even if you are a light smoker, you have an increased risk for cancer, heart disease, and lung disease. If you are pregnant or have diabetes, smoking increases your risk for complications. Benefits to your health if you stop smoking:   · You decrease respiratory symptoms such as coughing, wheezing, and shortness of breath. · You reduce your risk for cancers of the lung, mouth, throat, kidney, bladder, pancreas, stomach, and cervix. If you already have cancer, you increase the benefits of chemotherapy. You also reduce your risk for cancer returning or a second cancer from developing. · You reduce your risk for heart disease, blood clots, heart attack, and stroke. · You reduce your risk for lung infections, and diseases such as pneumonia, asthma, chronic bronchitis, and emphysema. · Your circulation improves. More oxygen can be delivered to your body.  If you have diabetes, you lower your risk for complications, such as kidney, artery, and eye diseases. You also lower your risk for nerve damage. Nerve damage can lead to amputations, poor vision, and blindness. · You improve your body's ability to heal and to fight infections. For more information and support to stop smoking:   · [employer] [address] [facility] [employer]  Phone: [phone]  Web Address: www.[employer]  Weight Management   Why it is important to manage your weight:  Being overweight increases your risk of health conditions such as heart disease, high blood pressure, type 2 diabetes, and certain types of cancer. It can also increase your risk for osteoarthritis, sleep apnea, and other respiratory problems. Aim for a slow, steady weight loss. Even a small amount of weight loss can lower your risk of health problems. How to lose weight safely:  A safe and healthy way to lose weight is to eat fewer calories and get regular exercise. You can lose up about 1 pound a week by decreasing the number of calories you eat by 500 calories each day. Healthy meal plan for weight management:  A healthy meal plan includes a variety of foods, contains fewer calories, and helps you stay healthy. A healthy meal plan includes the following:  · Eat whole-grain foods more often. A healthy meal plan should contain fiber. Fiber is the part of grains, fruits, and vegetables that is not broken down by your body. Whole-grain foods are healthy and provide extra fiber in your diet. Some examples of whole-grain foods are whole-wheat breads and pastas, oatmeal, brown rice, and bulgur. · Eat a variety of vegetables every day. Include dark, leafy greens such as spinach, kale, jose greens, and mustard greens. Eat yellow and orange vegetables such as carrots, sweet potatoes, and winter squash. · Eat a variety of fruits every day. Choose fresh or canned fruit (canned in its own juice or light syrup) instead of juice. Fruit juice has very little or no fiber. · Eat low-fat dairy foods. Drink fat-free (skim) milk or 1% milk.  Eat fat-free yogurt and low-fat cottage cheese. Try low-fat cheeses such as mozzarella and other reduced-fat cheeses. · Choose meat and other protein foods that are low in fat. Choose beans or other legumes such as split peas or lentils. Choose fish, skinless poultry (chicken or turkey), or lean cuts of red meat (beef or pork). Before you cook meat or poultry, cut off any visible fat. · Use less fat and oil. Try baking foods instead of frying them. Add less fat, such as margarine, sour cream, regular salad dressing and mayonnaise to foods. Eat fewer high-fat foods. Some examples of high-fat foods include french fries, doughnuts, ice cream, and cakes. · Eat fewer sweets. Limit foods and drinks that are high in sugar. This includes candy, cookies, regular soda, and sweetened drinks. Exercise:  Exercise at least 30 minutes per day on most days of the week. Some examples of exercise include walking, biking, dancing, and swimming. You can also fit in more physical activity by taking the stairs instead of the elevator or parking farther away from stores. Ask your healthcare provider about the best exercise plan for you. © Copyright [employer] 2018 Information is for End User's use only and may not be sold, redistributed or otherwise used for commercial purposes.  All illustrations and images included in CareNotes® are the copyrighted property of [employer] or [address] [place] [address]

## 2023-08-29 NOTE — PROGRESS NOTES
Assessment and Plan:     Problem List Items Addressed This Visit        Endocrine    Type 2 diabetes mellitus with hyperglycemia, without long-term current use of insulin ([address])     Diabetes. A1c stable at 6.8. She will continue current regimen of medications. Her eye and foot exams are up-to-date  Lab Results   Component Value Date    HGBA1C 6.8 (H) 07/17/2023            Relevant Orders    Hemoglobin A1C    CBC    Comprehensive metabolic panel    Lipid panel    TSH, 3rd generation    Albumin / creatinine urine ratio       Cardiovascular and Mediastinum    Essential hypertension     Hypertension. The patient's blood pressure is stable at this time and he will continue current regimen of medications         Relevant Orders    Hemoglobin A1C    CBC    Comprehensive metabolic panel    Lipid panel    TSH, 3rd generation       Genitourinary    OAB (overactive bladder)     Overactive bladder. Patient was given a trial of Detrol LA 2 mg to take once daily. Patient will call if symptoms do not improve within 4 weeks. We will titrate medication accordingly. Relevant Medications    tolterodine (DETROL LA) 2 mg 24 hr capsule       Other    Medicare annual wellness visit, subsequent - Primary   Patient does not wish to see any other specialist due to her difficulties with ambulating and lack of transportation. Preventive health issues were discussed with patient, and age appropriate screening tests were ordered as noted in patient's After Visit Summary. Personalized health advice and appropriate referrals for health education or preventive services given if needed, as noted in patient's After Visit Summary. History of Present Illness:     Patient presents for a Medicare Wellness Visit    Patient in the office to review chronic medical condition. She continues to have chronic back pain for which she sees pain management. She ambulates with her walker with wheels.   Patient is further frustrated and taking care of her  who has mild to moderate dementia. He is ambulatory but does require the use of a rollator walker. He has significant memory lapses with his short-term memory. Patient describes several weeks to months history of urinary incontinence during the day due to urge incontinence. Patient may go to the bathroom easily enough but soon thereafter feels the urge to go once again and cannot make it in time before leaking onto her adult diapers. She denies any dysuria. Patient Care Team:  Aure Moctezuma MD as PCP - General (Family Medicine)  MD Shelly Nina MD Serene Miguel, DO     Review of Systems:     Review of Systems   Constitutional: Positive for fatigue. Respiratory: Negative. Cardiovascular: Negative. Gastrointestinal: Negative. Genitourinary: Positive for urgency. As per HPI     Musculoskeletal: Positive for arthralgias, back pain and gait problem. Problem List:     Patient Active Problem List   Diagnosis   • Lumbar radiculopathy   • Spinal stenosis of lumbar region   • Lumbar spondylosis   • Chronic right hip pain   • Type 2 diabetes mellitus with hyperglycemia, without long-term current use of insulin ([facility])   • Primary osteoarthritis of first carpometacarpal joint of left hand   • Arthritis of carpometacarpal (CMC) joint of left thumb   • Cataract of both eyes   • DDD (degenerative disc disease), lumbar   • Facet arthropathy, lumbar   • Keratosis   • Obesity (BMI 30-39. 9)   • Obstructive sleep apnea of adult   • Secondary polycythemia   • Chronic pain syndrome   • Current smoker on some days   • Essential hypertension   • Trigger finger, left index finger   • Trigger ring finger of right hand   • Sacroiliitis ([facility])   • Vertigo   • COPD (chronic obstructive pulmonary disease) ([facility])   • Non-seasonal allergic rhinitis   • Heart murmur, systolic   • Obesity, morbid ([facility])   • Persistent proteinuria   • Osteoporosis   • Sebaceous cyst of ear • Platelets decreased ([address])   • OAB (overactive bladder)   • Medicare annual wellness visit, subsequent      Past Medical and Surgical History:     Past Medical History:   Diagnosis Date   • Asthma    • Chronic obstructive lung disease ([address])    • Community acquired pneumonia     LAST ASSESSED: [id number]   • COPD (chronic obstructive pulmonary disease) ([address])    • Diabetes mellitus ([address])    • History of MRSA infection     2008 liver sx   • Liver disease    • Sleep apnea    • Viral warts     LAST ASSESSED: [id number]     Past Surgical History:   Procedure Laterality Date   • ABDOMINAL SURGERY     • ABDOMINOPLASTY     • CHOLECYSTECTOMY     • COSMETIC SURGERY     • EYE SURGERY      Bilateral cataracts 2015 Dr. Anupama Ellsworth.     • FINGER SURGERY     • HAND SURGERY     • HYSTERECTOMY     • LIVER SURGERY     • MN ARTHRP INTERPOS INTERCARPAL/METACARPAL JOINTS Right 9/9/2016    Procedure: THUMB TRAPEZIECTOMY; BASAL JOINT ARTHROPLASTY WITH APL AUTOGRAFT;  Surgeon: Bairon Whalen MD;  [facility];  Service: Orthopedics   • MN ARTHRP INTERPOS INTERCARPAL/METACARPAL JOINTS Left 5/29/2018    Procedure: Sabrina Sanchez;  Surgeon: Viri De Leon MD;  [facility];  Service: Orthopedics   • MN TENDON SHEATH INCISION Right 7/23/2019    Procedure: RELEASE TRIGGER FINGER RIGHT LONG;  Surgeon: Viri De Leon MD;  [facility];  Service: Orthopedics   • MN TENDON SHEATH INCISION Left 8/6/2019    Procedure: RELEASE TRIGGER FINGER LEFT INDEX;  Surgeon: Viri De Leon MD;  [facility];  Service: Orthopedics   • MN TENDON SHEATH INCISION Right 6/16/2020    Procedure: RELEASE TRIGGER FINGER, right ring;  Surgeon: Viri De Leon MD;  [facility];  Service: Orthopedics   • MN TR/TRNSPL TDN CARP/MTCRPL HAND W/O FR GRF EA TDN Left 5/29/2018    Procedure: TRANSFER TENDON HAND/WRIST FCR from forearm to wrist;  Surgeon: Viri De Leon MD;  [facility];  Service: Orthopedics      Family History:     Family History   Problem Relation Age of Onset   • Heart attack Mother    • Heart attack Father    • Ovarian cancer Sister    • Basal cell carcinoma Brother    • Heart disease Other         CARDIAC DISORDER   • Diabetes Other    • Liver cancer Other    • Breast cancer Other    • Stomach cancer Other    • Heart disease Family    • Diabetes Family    • Thyroid disease Family    • Ovarian cancer Family       Social History:     Social History     Socioeconomic History   • Marital status: /Civil Union     Spouse name: None   • Number of children: None   • Years of education: None   • Highest education level: None   Occupational History   • None   Tobacco Use   • Smoking status: Every Day     Packs/day: 0.50     Types: Cigarettes   • Smokeless tobacco: Never   Vaping Use   • Vaping Use: Never used   Substance and Sexual Activity   • Alcohol use: Yes     Comment: very seldom   • Drug use: No   • Sexual activity: Not Currently   Other Topics Concern   • None   Social History Narrative   • None     Social Determinants of Health     Financial Resource Strain: Low Risk  (8/29/2023)    Overall Financial Resource Strain (CARDIA)    • Difficulty of Paying Living Expenses: Not very hard   Food Insecurity: Not on file   Transportation Needs: No Transportation Needs (8/29/2023)    PRAPARE - Transportation    • Lack of Transportation (Medical): No    • Lack of Transportation (Non-Medical):  No   Physical Activity: Not on file   Stress: Not on file   Social Connections: Not on file   Intimate Partner Violence: Not on file   Housing Stability: Not on file      Medications and Allergies:     Current Outpatient Medications   Medication Sig Dispense Refill   • diphenhydrAMINE HCl (BENADRYL PO) Take 25 mg by mouth daily at bedtime And 50 mg prn     • gabapentin (Neurontin) 600 MG tablet 1 tab in the morning and 2 tabs at bedtime 270 tablet 1   • glipiZIDE (GLUCOTROL XL) 5 mg 24 hr tablet TAKE 1 TABLET EVERY DAY  30  MINUTES BEFORE BREAKFAST 90 tablet 0   • glipiZIDE (GLUCOTROL) 5 mg tablet TAKE 1 TABLET TWICE DAILY BEFORE MEALS 180 tablet 1   • glucose blood (ACCU-CHEK JESS PLUS) test strip Test BS 3 times daily. DX E11.9 100 each 2   • hydrochlorothiazide (HYDRODIURIL) 25 mg tablet TAKE 1 TABLET EVERY DAY 90 tablet 1   • IBUPROFEN PO Take by mouth if needed     • lisinopril (ZESTRIL) 40 mg tablet Take 1 tablet (40 mg total) by mouth daily 90 tablet 3   • metFORMIN (GLUCOPHAGE) 1000 MG tablet TAKE 1 TABLET TWICE DAILY 180 tablet 1   • Multiple Vitamins-Minerals (OCUVITE ADULT FORMULA PO) Take by mouth     • rosuvastatin (CRESTOR) 20 MG tablet Take 1 tablet (20 mg total) by mouth daily 90 tablet 1   • tolterodine (DETROL LA) 2 mg 24 hr capsule Take 1 capsule (2 mg total) by mouth daily 90 capsule 1     No current facility-administered medications for this visit.      No Known Allergies   Immunizations:     Immunization History   Administered Date(s) Administered   • COVID-19 PFIZER VACCINE 0.3 ML IM 02/19/2021, 03/11/2021, 10/09/2021, 05/13/2022   • COVID-19 Pfizer Vac BIVALENT Reji-sucrose 12 Yr+ IM (BOOSTER ONLY) 09/09/2022   • COVID-19 Pfizer vac (Reji-sucrose, gray cap) 12 yr+ IM 05/13/2022   • INFLUENZA 10/02/2013, 01/07/2016, 01/14/2017, 09/05/2017, 08/31/2021, 09/09/2022   • Influenza Split High Dose Preservative Free IM 09/05/2017   • Influenza, high dose seasonal 0.7 mL 09/10/2020   • Influenza, seasonal, injectable 10/02/2013, 01/14/2017   • Influenza, seasonal, injectable, preservative free 09/13/2018   • Pneumococcal Conjugate 13-Valent 08/24/2017   • Pneumococcal Conjugate PCV 7 09/06/2017   • Pneumococcal Polysaccharide PPV23 08/15/2018   • Tdap 09/04/2014   • Zoster 10/02/2013   • influenza, trivalent, adjuvanted 09/11/2019      Health Maintenance:         Topic Date Due   • Breast Cancer Screening: Mammogram  01/23/2024   • Colorectal Cancer Screening  08/01/2024   • Hepatitis C Screening Completed         Topic Date Due   • COVID-19 Vaccine (7 - Pfizer series) 01/09/2023   • Influenza Vaccine (1) 09/01/2023      Medicare Screening Tests and Risk Assessments:         Health Risk Assessment:   Patient rates overall health as fair. Patient feels that their physical health rating is slightly worse. Patient is satisfied with their life. Eyesight was rated as same. Hearing was rated as same. Patient feels that their emotional and mental health rating is same. Patients states they are never, rarely angry. Patient states they are never, rarely unusually tired/fatigued. Pain experienced in the last 7 days has been some. Patient's pain rating has been 10/10. Patient states that she has experienced no weight loss or gain in last 6 months. Depression Screening:   PHQ-2 Score: 1      Fall Risk Screening: In the past year, patient has experienced: history of falling in past year    Number of falls: 1  Injured during fall?: No    Feels unsteady when standing or walking?: No    Worried about falling?: No      Urinary Incontinence Screening:   Patient has leaked urine accidently in the last six months. Home Safety:  Patient has trouble with stairs inside or outside of their home. Patient has working smoke alarms and has working carbon monoxide detector. Home safety hazards include: none. Nutrition:   Current diet is Regular. Medications:   Patient is currently taking over-the-counter supplements. OTC medications include: see medication list. Patient is able to manage medications. Activities of Daily Living (ADLs)/Instrumental Activities of Daily Living (IADLs):   Walk and transfer into and out of bed and chair?: Yes  Dress and groom yourself?: Yes    Bathe or shower yourself?: Yes    Feed yourself?  Yes  Do your laundry/housekeeping?: Yes  Manage your money, pay your bills and track your expenses?: Yes  Make your own meals?: Yes    Do your own shopping?: Yes    Previous Hospitalizations:   Any hospitalizations or ED visits within the last 12 months?: No      Advance Care Planning:   Living will: Yes    Advanced directive: Yes      PREVENTIVE SCREENINGS      Cardiovascular Screening:    General: Screening Current      Diabetes Screening:     General: History Diabetes and Screening Current      Colorectal Cancer Screening:     General: Screening Current      Breast Cancer Screening:     General: Screening Current      Cervical Cancer Screening:    General: Screening Not Indicated      Osteoporosis Screening:    General: Screening Not Indicated and History Osteoporosis      Lung Cancer Screening:     General: Screening Not Indicated      Hepatitis C Screening:    General: Screening Current    Screening, Brief Intervention, and Referral to Treatment (SBIRT)    Screening      Single Item Drug Screening:  How often have you used an illegal drug (including marijuana) or a prescription medication for non-medical reasons in the past year? never    Single Item Drug Screen Score: 0  Interpretation: Negative screen for possible drug use disorder    No results found. Physical Exam:     /71   Pulse 99   Temp 97.8 °F (36.6 °C)   Resp 16   Ht 5' 1" (1.549 m)   Wt 91.9 kg (202 lb 8 oz)   SpO2 98%   BMI 38.26 kg/m²     Physical Exam  Vitals and nursing note reviewed. Constitutional:       General: She is not in acute distress. Appearance: Normal appearance. She is well-developed. She is not ill-appearing. HENT:      Head: Normocephalic and atraumatic. Eyes:      General:         Right eye: No discharge. Left eye: No discharge. Extraocular Movements: Extraocular movements intact. Conjunctiva/sclera: Conjunctivae normal.      Pupils: Pupils are equal, round, and reactive to light. Neck:      Vascular: No carotid bruit. Cardiovascular:      Rate and Rhythm: Normal rate and regular rhythm.       Pulses: no weak pulses          Dorsalis pedis pulses are 2+ on the right side and 2+ on the left side. Posterior tibial pulses are 2+ on the right side and 2+ on the left side. Heart sounds: No murmur heard. Pulmonary:      Effort: Pulmonary effort is normal. No respiratory distress. Breath sounds: Normal breath sounds. Musculoskeletal:      Right lower leg: No edema. Left lower leg: No edema. Feet:      Right foot:      Skin integrity: No ulcer, skin breakdown, erythema, warmth, callus or dry skin. Left foot:      Skin integrity: No ulcer, skin breakdown, erythema, warmth, callus or dry skin. Lymphadenopathy:      Cervical: No cervical adenopathy. Skin:     General: Skin is warm and dry. Capillary Refill: Capillary refill takes less than 2 seconds. Neurological:      Mental Status: She is alert. Mental status is at baseline. Psychiatric:         Mood and Affect: Mood normal.         Behavior: Behavior normal.         Thought Content: Thought content normal.         Judgment: Judgment normal.      Patient's shoes and socks removed. Right Foot/Ankle   Right Foot Inspection  Skin Exam: skin normal and skin intact. No dry skin, no warmth, no callus, no erythema, no maceration, no abnormal color, no pre-ulcer, no ulcer and no callus. Toe Exam: ROM and strength within normal limits. Sensory   Vibration: intact  Monofilament testing: intact    Vascular  The right DP pulse is 2+. The right PT pulse is 2+. Left Foot/Ankle  Left Foot Inspection  Skin Exam: skin normal and skin intact. No dry skin, no warmth, no erythema, no maceration, normal color, no pre-ulcer, no ulcer and no callus. Toe Exam: ROM and strength within normal limits. Sensory   Vibration: intact  Monofilament testing: intact    Vascular  The left DP pulse is 2+. The left PT pulse is 2+.      Assign Risk Category  No deformity present  No loss of protective sensation  No weak pulses  Risk: 0    I spent 30 minutes with this patient of which greater than 50% was spent counseling or reviewing chart    Sue Ludwig MD

## 2023-08-29 NOTE — ASSESSMENT & PLAN NOTE
Diabetes. A1c stable at 6.8. She will continue current regimen of medications.   Her eye and foot exams are up-to-date  Lab Results   Component Value Date    HGBA1C 6.8 (H) 07/17/2023

## 2023-09-07 ENCOUNTER — OFFICE VISIT (OUTPATIENT)
Dept: PAIN MEDICINE | Facility: CLINIC | Age: 73
End: 2023-09-07
Payer: MEDICARE

## 2023-09-07 VITALS
HEIGHT: 61 IN | SYSTOLIC BLOOD PRESSURE: 136 MMHG | BODY MASS INDEX: 37.57 KG/M2 | WEIGHT: 199 LBS | DIASTOLIC BLOOD PRESSURE: 53 MMHG | HEART RATE: 51 BPM

## 2023-09-07 DIAGNOSIS — M48.062 SPINAL STENOSIS OF LUMBAR REGION WITH NEUROGENIC CLAUDICATION: ICD-10-CM

## 2023-09-07 DIAGNOSIS — M47.816 LUMBAR SPONDYLOSIS: ICD-10-CM

## 2023-09-07 DIAGNOSIS — M54.16 LUMBAR RADICULOPATHY: Primary | ICD-10-CM

## 2023-09-07 DIAGNOSIS — G89.4 CHRONIC PAIN SYNDROME: ICD-10-CM

## 2023-09-07 PROCEDURE — 99214 OFFICE O/P EST MOD 30 MIN: CPT | Performed by: ANESTHESIOLOGY

## 2023-09-07 RX ORDER — GABAPENTIN 600 MG/1
TABLET ORAL
Qty: 270 TABLET | Refills: 1 | Status: SHIPPED | OUTPATIENT
Start: 2023-09-07

## 2023-09-07 NOTE — PROGRESS NOTES
Assessment  1. Lumbar radiculopathy    2. Lumbar spondylosis    3. Chronic pain syndrome    4. Spinal stenosis of lumbar region with neurogenic claudication        Plan  75-year-old female with a history of lumbar degenerative disc disease, stenosis, spondylosis, lumbar radiculopathy, and chronic pain syndrome returning for follow-up.  The patient complains of lumbosacral back pain that radiates into the posterior aspect of bilateral lower extremities to the knees with associated subjective weakness. The patient feels that her symptoms are essentially stable and denies any progression of symptoms. She is currently taking gabapentin 600 mg in the morning and 1200 mg at bedtime with 60% of relief.  She denies any side effects with the medication.  She initially had relief with epidural steroid injections in the past, however unfortunately they lost their effectiveness over time. She has had relief of her low back pain with lumbar RFA which she feels is wearing off, but does not feel the need to repeat it at this time.        1.  I will continue gabapentin to 600 mg in the morning and 1200 mg at bedtime for her neuropathic complaints. Reynaldois Hernandez was provided today. 2.  Patient will continue with her home exercise program  3.  I will follow-up with the patient in 3 months or sooner if needed  4. We will repeat lumbar RFA as needed. My impressions and treatment recommendations were discussed in detail with the patient who verbalized understanding and had no further questions. Discharge instructions were provided. I personally saw and examined the patient and I agree with the above discussed plan of care. No orders of the defined types were placed in this encounter. No orders of the defined types were placed in this encounter.       History of Present Illness    Vaibhav Unger is a 74 y.o. female with a history of lumbar degenerative disc disease, stenosis, spondylosis, lumbar radiculopathy, and chronic pain syndrome returning for follow-up.  The patient complains of lumbosacral back pain that radiates into the posterior aspect of bilateral lower extremities to the knees with associated subjective weakness. The patient feels that her symptoms are essentially stable and denies any progression of symptoms. She does have some bladder incontinence, but denies any bowel incontinence or saddle anesthesia. She is currently taking gabapentin 600 mg in the morning and 1200 mg at bedtime with 60% of relief.  She denies any side effects with the medication.  She initially had relief with epidural steroid injections in the past, however unfortunately they lost their effectiveness over time. She has had relief of her low back pain with lumbar RFA which she feels is wearing off, but does not feel the need to repeat it at this time. The patient rates her pain a 2 out of 10 pain is intermittent. The pain is worse in the morning and evening. The pain is described as dull, aching, and pressure-like. The pain is worse with standing, walking, and exercise. The pain is alleviated with sitting, relaxation, and medication. Other than as stated above, the patient denies any interval changes in medications, medical condition, mental condition, symptoms, or allergies since the last office visit. I have personally reviewed and/or updated the patient's past medical history, past surgical history, family history, social history, current medications, allergies, and vital signs today. Review of Systems   Constitutional: Negative for fever and unexpected weight change. HENT: Negative for trouble swallowing. Eyes: Negative for visual disturbance. Respiratory: Positive for shortness of breath. Negative for wheezing. Cardiovascular: Negative for chest pain and palpitations. Gastrointestinal: Negative for constipation, diarrhea, nausea and vomiting. Endocrine: Negative for cold intolerance, heat intolerance and polydipsia. Genitourinary: Negative for difficulty urinating and frequency. Musculoskeletal: Positive for gait problem. Negative for arthralgias, joint swelling and myalgias. Skin: Negative for rash. Neurological: Positive for weakness. Negative for dizziness, seizures, syncope and headaches. Hematological: Does not bruise/bleed easily. Psychiatric/Behavioral: Negative for dysphoric mood. All other systems reviewed and are negative. Patient Active Problem List   Diagnosis   • Lumbar radiculopathy   • Spinal stenosis of lumbar region   • Lumbar spondylosis   • Chronic right hip pain   • Type 2 diabetes mellitus with hyperglycemia, without long-term current use of insulin ([facility])   • Primary osteoarthritis of first carpometacarpal joint of left hand   • Arthritis of carpometacarpal (CMC) joint of left thumb   • Cataract of both eyes   • DDD (degenerative disc disease), lumbar   • Facet arthropathy, lumbar   • Keratosis   • Obesity (BMI 30-39. 9)   • Obstructive sleep apnea of adult   • Secondary polycythemia   • Chronic pain syndrome   • Current smoker on some days   • Essential hypertension   • Trigger finger, left index finger   • Trigger ring finger of right hand   • Sacroiliitis ([facility])   • Vertigo   • COPD (chronic obstructive pulmonary disease) ([facility])   • Non-seasonal allergic rhinitis   • Heart murmur, systolic   • Obesity, morbid ([facility])   • Persistent proteinuria   • Osteoporosis   • Sebaceous cyst of ear   • Platelets decreased ([facility])   • OAB (overactive bladder)   • Medicare annual wellness visit, subsequent       Past Medical History:   Diagnosis Date   • Asthma    • Chronic obstructive lung disease ([facility])    • Community acquired pneumonia     LAST ASSESSED: [id number]   • COPD (chronic obstructive pulmonary disease) ([facility])    • Diabetes mellitus ([facility])    • History of MRSA infection     2008 liver sx   • Liver disease    • Sleep apnea    • Viral warts     LAST ASSESSED: [id number]       Past Surgical History: Procedure Laterality Date   • ABDOMINAL SURGERY     • ABDOMINOPLASTY     • CHOLECYSTECTOMY     • COSMETIC SURGERY     • EYE SURGERY      Bilateral cataracts 2015 Dr. Nilay Brice.     • FINGER SURGERY     • HAND SURGERY     • HYSTERECTOMY     • LIVER SURGERY     • FL ARTHRP INTERPOS INTERCARPAL/METACARPAL JOINTS Right 9/9/2016    Procedure: THUMB TRAPEZIECTOMY; BASAL JOINT ARTHROPLASTY WITH APL AUTOGRAFT;  Surgeon: Elijah Beavers MD;  [facility];  Service: Orthopedics   • FL ARTHRP INTERPOS INTERCARPAL/METACARPAL JOINTS Left 5/29/2018    Procedure: Elis Bernal;  Surgeon: Nivia Graves MD;  [facility];  Service: Orthopedics   • FL TENDON SHEATH INCISION Right 7/23/2019    Procedure: RELEASE TRIGGER FINGER RIGHT LONG;  Surgeon: Nivia Graves MD;  [facility];  Service: Orthopedics   • FL TENDON SHEATH INCISION Left 8/6/2019    Procedure: RELEASE TRIGGER FINGER LEFT INDEX;  Surgeon: Nivia Graves MD;  [facility];  Service: Orthopedics   • FL TENDON SHEATH INCISION Right 6/16/2020    Procedure: RELEASE TRIGGER FINGER, right ring;  Surgeon: Nivia Graves MD;  [facility];  Service: Orthopedics   • FL TR/TRNSPL TDN CARP/MTCRPL HAND W/O FR GRF EA TDN Left 5/29/2018    Procedure: TRANSFER TENDON HAND/WRIST FCR from forearm to wrist;  Surgeon: Nivia Graves MD;  [facility];  Service: Orthopedics       Family History   Problem Relation Age of Onset   • Heart attack Mother    • Heart attack Father    • Ovarian cancer Sister    • Basal cell carcinoma Brother    • Heart disease Other         CARDIAC DISORDER   • Diabetes Other    • Liver cancer Other    • Breast cancer Other    • Stomach cancer Other    • Heart disease Family    • Diabetes Family    • Thyroid disease Family    • Ovarian cancer Family        Social History     Occupational History   • Not on file   Tobacco Use   • Smoking status: Every Day     Packs/day: 0.50 Types: Cigarettes   • Smokeless tobacco: Never   Vaping Use   • Vaping Use: Never used   Substance and Sexual Activity   • Alcohol use: Yes     Comment: very seldom   • Drug use: No   • Sexual activity: Not Currently       Current Outpatient Medications on File Prior to Visit   Medication Sig   • diphenhydrAMINE HCl (BENADRYL PO) Take 25 mg by mouth daily at bedtime And 50 mg prn   • gabapentin (Neurontin) 600 MG tablet 1 tab in the morning and 2 tabs at bedtime   • glipiZIDE (GLUCOTROL XL) 5 mg 24 hr tablet TAKE 1 TABLET EVERY DAY  30  MINUTES BEFORE BREAKFAST   • glipiZIDE (GLUCOTROL) 5 mg tablet TAKE 1 TABLET TWICE DAILY BEFORE MEALS   • glucose blood (ACCU-CHEK JESS PLUS) test strip Test BS 3 times daily. DX E11.9   • hydrochlorothiazide (HYDRODIURIL) 25 mg tablet TAKE 1 TABLET EVERY DAY   • IBUPROFEN PO Take by mouth if needed   • lisinopril (ZESTRIL) 40 mg tablet Take 1 tablet (40 mg total) by mouth daily   • metFORMIN (GLUCOPHAGE) 1000 MG tablet TAKE 1 TABLET TWICE DAILY   • Multiple Vitamins-Minerals (OCUVITE ADULT FORMULA PO) Take by mouth   • rosuvastatin (CRESTOR) 20 MG tablet Take 1 tablet (20 mg total) by mouth daily   • tolterodine (DETROL LA) 2 mg 24 hr capsule Take 1 capsule (2 mg total) by mouth daily     No current facility-administered medications on file prior to visit. No Known Allergies    Physical Exam    /53   Pulse (!) 51   Ht 5' 1" (1.549 m)   Wt 90.3 kg (199 lb)   BMI 37.60 kg/m²     Constitutional: normal, well developed, well nourished, alert, in no distress and non-toxic and no overt pain behavior.   Eyes: anicteric  HEENT: grossly intact  Neck: supple, symmetric, trachea midline and no masses   Pulmonary:even and unlabored  Cardiovascular:No edema or pitting edema present  Skin:Normal without rashes or lesions and well hydrated  Psychiatric:Mood and affect appropriate  Neurologic:Cranial Nerves II-XII grossly intact  Musculoskeletal:antalgic gait and ambulates with a walker. Bilateral lumbar paraspinals tender to palpation from L3-S1. Bilateral lower extremity strength 5 out of 5 in all muscular's. Sensation intact light touch in L3-S1 dermatomes bilaterally. Negative seated straight leg raise bilaterally. Imaging  MRI LUMBAR SPINE WITHOUT CONTRAST     INDICATION: M54.16: Radiculopathy, lumbar region.     COMPARISON:  MRI dated 7/11/2019.     TECHNIQUE:  Multiplanar, multisequence imaging of the lumbar spine was performed. .          FINDINGS:     VERTEBRAL BODIES:  There are 5 lumbar type vertebral bodies. Normal alignment of the lumbar spine. No spondylolysis or spondylolisthesis. No scoliosis. No compression fracture. Stable heterogeneous marrow signal.  Multilevel mostly Modic type II   endplate degenerative changes. Minimal type I changes at L4-5 are decreased from prior. No suspicious marrow signal abnormality.     SACRUM:  Normal signal within the sacrum. No evidence of insufficiency or stress fracture.     DISTAL CORD AND CONUS:  Normal size and signal within the distal cord and conus.     PARASPINAL SOFT TISSUES:  Paraspinal soft tissues are unremarkable.     LOWER THORACIC DISC SPACES:  Mild noncompressive lower thoracic degenerative change.     LUMBAR DISC SPACES:  Multilevel disc desiccation. Disc space narrowing most pronounced at L4-5.     L1-L2:  No disc herniation, canal or foraminal stenosis.     L2-L3:  Minimal disc bulge and mild facet arthropathy. No significant canal or foraminal stenosis.     L3-L4:  Disc bulge, facet arthropathy and ligamentum flavum hypertrophy. Mild canal stenosis and mild foraminal stenosis. No significant change.     L4-L5:  Disc bulge and marginal osteophyte with facet arthropathy. Mild canal stenosis and mild foraminal stenosis. No significant change.     L5-S1: Disc bulge, marginal osteophyte and facet arthropathy. No significant canal stenosis.   Moderate severe foraminal stenosis contacting the exiting nerve roots, right worse than left. No significant change.     IMPRESSION:     Multilevel degenerative spondylosis without significant change. Mild canal and mild foraminal stenosis at L3-4 and L4-5. Moderate to severe bilateral foraminal stenosis at L5-S1, right worse than left.

## 2023-09-10 RX ORDER — ROSUVASTATIN CALCIUM 20 MG/1
TABLET, COATED ORAL
Qty: 90 TABLET | Refills: 1 | OUTPATIENT
Start: 2023-09-10

## 2023-10-20 ENCOUNTER — TELEPHONE (OUTPATIENT)
Age: 73
End: 2023-10-20

## 2023-10-20 NOTE — TELEPHONE ENCOUNTER
Caller: pt    Doctor: buddy    Reason for call: pt called stating that she lost her bowls last night and could not get up or move. She would like to talk to a rn asap. I did let her know that I thought she should go to the ER. Pt states she will not go to the ER.     Call back#: [phone]

## 2023-10-20 NOTE — TELEPHONE ENCOUNTER
S/w the patient to clarify and she stated she went to the [place] last night and slipped and fell on her way back from the [place]. She stated she did not hit anything but her side when she fell. She does not have any bruising and she did not hit her head. Clarified about [employer] and she stated she did not loose her bowels and she is normally [employer]. She denies taking anticoagulants. She stated she had some questions in regards to the RFA. She wanted to know if it would help her walk? She stated she has trouble with balance and walking. I reviewed that it would not help with her radicular S/S. Inquired about her back pain and she stated she doesn't have any back pain. She stated she really never does. Reviewed that her back pain would have to be above a "5" in order to be effective. She stated she refused to go to the ED because she feels like she did not hurt anything, although she had to call someone to help pick her up and get into bed. I told her that is her prerogative if she does not want to go to the ED. JW to advise, otherwise.

## 2023-10-20 NOTE — TELEPHONE ENCOUNTER
Agree with RN. Lumbar RFA would only help with back pain, not radicular symptoms into her lower extremities. Is incapacitated, loses control of bladder or bowels, or has any saddle anesthesia, the patient should go to the emergency room for evaluation.   Otherwise, she can continue on her current medication regimen

## 2023-10-23 DIAGNOSIS — E11.65 TYPE 2 DIABETES MELLITUS WITH HYPERGLYCEMIA, WITHOUT LONG-TERM CURRENT USE OF INSULIN (HCC): ICD-10-CM

## 2023-10-23 DIAGNOSIS — E11.9 TYPE 2 DIABETES MELLITUS WITHOUT COMPLICATION, WITHOUT LONG-TERM CURRENT USE OF INSULIN (HCC): ICD-10-CM

## 2023-10-23 RX ORDER — GLIPIZIDE 5 MG/1
TABLET ORAL
Qty: 180 TABLET | Refills: 10 | Status: SHIPPED | OUTPATIENT
Start: 2023-10-23

## 2023-10-28 PROBLEM — Z00.00 MEDICARE ANNUAL WELLNESS VISIT, SUBSEQUENT: Status: RESOLVED | Noted: 2023-08-29 | Resolved: 2023-10-28

## 2023-11-11 ENCOUNTER — HOSPITAL ENCOUNTER (INPATIENT)
Facility: HOSPITAL | Age: 73
LOS: 3 days | Discharge: HOME | DRG: 189 | End: 2023-11-14
Attending: EMERGENCY MEDICINE | Admitting: INTERNAL MEDICINE
Payer: MEDICARE

## 2023-11-11 ENCOUNTER — APPOINTMENT (EMERGENCY)
Dept: CT IMAGING | Facility: HOSPITAL | Age: 73
DRG: 189 | End: 2023-11-11
Payer: MEDICARE

## 2023-11-11 DIAGNOSIS — E11.65 TYPE 2 DIABETES MELLITUS WITH HYPERGLYCEMIA, WITHOUT LONG-TERM CURRENT USE OF INSULIN (HCC): ICD-10-CM

## 2023-11-11 DIAGNOSIS — J96.01 ACUTE RESPIRATORY FAILURE WITH HYPOXIA (HCC): ICD-10-CM

## 2023-11-11 DIAGNOSIS — G47.33 OBSTRUCTIVE SLEEP APNEA OF ADULT: ICD-10-CM

## 2023-11-11 DIAGNOSIS — R53.83 FATIGUE: ICD-10-CM

## 2023-11-11 DIAGNOSIS — E66.01 OBESITY, MORBID (HCC): ICD-10-CM

## 2023-11-11 DIAGNOSIS — R80.1 PERSISTENT PROTEINURIA: ICD-10-CM

## 2023-11-11 DIAGNOSIS — R80.9 PROTEINURIA, UNSPECIFIED TYPE: ICD-10-CM

## 2023-11-11 DIAGNOSIS — J44.9 CHRONIC OBSTRUCTIVE PULMONARY DISEASE, UNSPECIFIED COPD TYPE (HCC): ICD-10-CM

## 2023-11-11 DIAGNOSIS — I10 ESSENTIAL HYPERTENSION: ICD-10-CM

## 2023-11-11 DIAGNOSIS — G47.33 OSA (OBSTRUCTIVE SLEEP APNEA): ICD-10-CM

## 2023-11-11 DIAGNOSIS — R09.02 HYPOXIA: Primary | ICD-10-CM

## 2023-11-11 DIAGNOSIS — J98.11 ATELECTASIS: ICD-10-CM

## 2023-11-11 LAB
ALBUMIN SERPL BCP-MCNC: 3.4 G/DL (ref 3.5–5)
ALP SERPL-CCNC: 60 U/L (ref 34–104)
ALT SERPL W P-5'-P-CCNC: 4 U/L (ref 7–52)
ANION GAP SERPL CALCULATED.3IONS-SCNC: 8 MMOL/L
AST SERPL W P-5'-P-CCNC: 11 U/L (ref 13–39)
BASOPHILS # BLD AUTO: 0.04 THOUSANDS/ÂΜL (ref 0–0.1)
BASOPHILS NFR BLD AUTO: 1 % (ref 0–1)
BILIRUB SERPL-MCNC: 0.25 MG/DL (ref 0.2–1)
BUN SERPL-MCNC: 47 MG/DL (ref 5–25)
CALCIUM ALBUM COR SERPL-MCNC: 9.1 MG/DL (ref 8.3–10.1)
CALCIUM SERPL-MCNC: 8.6 MG/DL (ref 8.4–10.2)
CHLORIDE SERPL-SCNC: 109 MMOL/L (ref 96–108)
CO2 SERPL-SCNC: 25 MMOL/L (ref 21–32)
CREAT SERPL-MCNC: 1.37 MG/DL (ref 0.6–1.3)
EOSINOPHIL # BLD AUTO: 0.08 THOUSAND/ÂΜL (ref 0–0.61)
EOSINOPHIL NFR BLD AUTO: 1 % (ref 0–6)
ERYTHROCYTE [DISTWIDTH] IN BLOOD BY AUTOMATED COUNT: 15.7 % (ref 11.6–15.1)
ETHANOL SERPL-MCNC: <10 MG/DL
GFR SERPL CREATININE-BSD FRML MDRD: 37 ML/MIN/1.73SQ M
GLUCOSE SERPL-MCNC: 127 MG/DL (ref 65–140)
HCT VFR BLD AUTO: 45.2 % (ref 34.8–46.1)
HGB BLD-MCNC: 12.7 G/DL (ref 11.5–15.4)
IMM GRANULOCYTES # BLD AUTO: 0.03 THOUSAND/UL (ref 0–0.2)
IMM GRANULOCYTES NFR BLD AUTO: 0 % (ref 0–2)
LYMPHOCYTES # BLD AUTO: 1.11 THOUSANDS/ÂΜL (ref 0.6–4.47)
LYMPHOCYTES NFR BLD AUTO: 16 % (ref 14–44)
MAGNESIUM SERPL-MCNC: 2.8 MG/DL (ref 1.9–2.7)
MCH RBC QN AUTO: 28.9 PG (ref 26.8–34.3)
MCHC RBC AUTO-ENTMCNC: 28.1 G/DL (ref 31.4–37.4)
MCV RBC AUTO: 103 FL (ref 82–98)
MONOCYTES # BLD AUTO: 0.71 THOUSAND/ÂΜL (ref 0.17–1.22)
MONOCYTES NFR BLD AUTO: 10 % (ref 4–12)
NEUTROPHILS # BLD AUTO: 4.93 THOUSANDS/ÂΜL (ref 1.85–7.62)
NEUTS SEG NFR BLD AUTO: 72 % (ref 43–75)
NRBC BLD AUTO-RTO: 0 /100 WBCS
PLATELET # BLD AUTO: 138 THOUSANDS/UL (ref 149–390)
PMV BLD AUTO: 12.4 FL (ref 8.9–12.7)
POTASSIUM SERPL-SCNC: 5.1 MMOL/L (ref 3.5–5.3)
PROT SERPL-MCNC: 6.4 G/DL (ref 6.4–8.4)
RBC # BLD AUTO: 4.39 MILLION/UL (ref 3.81–5.12)
SODIUM SERPL-SCNC: 142 MMOL/L (ref 135–147)
WBC # BLD AUTO: 6.9 THOUSAND/UL (ref 4.31–10.16)

## 2023-11-11 PROCEDURE — 85025 COMPLETE CBC W/AUTO DIFF WBC: CPT | Performed by: EMERGENCY MEDICINE

## 2023-11-11 PROCEDURE — 71260 CT THORAX DX C+: CPT

## 2023-11-11 PROCEDURE — 83735 ASSAY OF MAGNESIUM: CPT | Performed by: EMERGENCY MEDICINE

## 2023-11-11 PROCEDURE — 82077 ASSAY SPEC XCP UR&BREATH IA: CPT | Performed by: EMERGENCY MEDICINE

## 2023-11-11 PROCEDURE — 80053 COMPREHEN METABOLIC PANEL: CPT | Performed by: EMERGENCY MEDICINE

## 2023-11-11 PROCEDURE — G1004 CDSM NDSC: HCPCS

## 2023-11-11 PROCEDURE — 93005 ELECTROCARDIOGRAM TRACING: CPT

## 2023-11-11 PROCEDURE — 99285 EMERGENCY DEPT VISIT HI MDM: CPT

## 2023-11-11 PROCEDURE — 99285 EMERGENCY DEPT VISIT HI MDM: CPT | Performed by: EMERGENCY MEDICINE

## 2023-11-11 PROCEDURE — 36415 COLL VENOUS BLD VENIPUNCTURE: CPT | Performed by: EMERGENCY MEDICINE

## 2023-11-11 RX ADMIN — IOHEXOL 80 ML: 350 INJECTION, SOLUTION INTRAVENOUS at 21:41

## 2023-11-12 PROBLEM — J96.01 ACUTE RESPIRATORY FAILURE WITH HYPOXIA (HCC): Status: ACTIVE | Noted: 2023-11-12

## 2023-11-12 PROBLEM — N18.9 CKD (CHRONIC KIDNEY DISEASE): Status: ACTIVE | Noted: 2023-11-12

## 2023-11-12 PROBLEM — R79.89 ELEVATED TROPONIN: Status: ACTIVE | Noted: 2023-11-12

## 2023-11-12 LAB
2HR DELTA HS TROPONIN: 2 NG/L
ANION GAP SERPL CALCULATED.3IONS-SCNC: 3 MMOL/L
BASOPHILS # BLD AUTO: 0.04 THOUSANDS/ÂΜL (ref 0–0.1)
BASOPHILS NFR BLD AUTO: 1 % (ref 0–1)
BNP SERPL-MCNC: 433 PG/ML (ref 0–100)
BUN SERPL-MCNC: 44 MG/DL (ref 5–25)
CALCIUM SERPL-MCNC: 8.8 MG/DL (ref 8.4–10.2)
CARDIAC TROPONIN I PNL SERPL HS: 62 NG/L
CARDIAC TROPONIN I PNL SERPL HS: 64 NG/L
CHLORIDE SERPL-SCNC: 107 MMOL/L (ref 96–108)
CO2 SERPL-SCNC: 31 MMOL/L (ref 21–32)
CREAT SERPL-MCNC: 1.46 MG/DL (ref 0.6–1.3)
EOSINOPHIL # BLD AUTO: 0.13 THOUSAND/ÂΜL (ref 0–0.61)
EOSINOPHIL NFR BLD AUTO: 2 % (ref 0–6)
ERYTHROCYTE [DISTWIDTH] IN BLOOD BY AUTOMATED COUNT: 15.6 % (ref 11.6–15.1)
FLUAV RNA RESP QL NAA+PROBE: NEGATIVE
FLUBV RNA RESP QL NAA+PROBE: NEGATIVE
GFR SERPL CREATININE-BSD FRML MDRD: 34 ML/MIN/1.73SQ M
GLUCOSE SERPL-MCNC: 141 MG/DL (ref 65–140)
GLUCOSE SERPL-MCNC: 182 MG/DL (ref 65–140)
GLUCOSE SERPL-MCNC: 194 MG/DL (ref 65–140)
GLUCOSE SERPL-MCNC: 220 MG/DL (ref 65–140)
GLUCOSE SERPL-MCNC: 232 MG/DL (ref 65–140)
HCT VFR BLD AUTO: 43.1 % (ref 34.8–46.1)
HGB BLD-MCNC: 12.2 G/DL (ref 11.5–15.4)
IMM GRANULOCYTES # BLD AUTO: 0.03 THOUSAND/UL (ref 0–0.2)
IMM GRANULOCYTES NFR BLD AUTO: 0 % (ref 0–2)
LYMPHOCYTES # BLD AUTO: 1.37 THOUSANDS/ÂΜL (ref 0.6–4.47)
LYMPHOCYTES NFR BLD AUTO: 16 % (ref 14–44)
MCH RBC QN AUTO: 28.9 PG (ref 26.8–34.3)
MCHC RBC AUTO-ENTMCNC: 28.3 G/DL (ref 31.4–37.4)
MCV RBC AUTO: 102 FL (ref 82–98)
MONOCYTES # BLD AUTO: 0.92 THOUSAND/ÂΜL (ref 0.17–1.22)
MONOCYTES NFR BLD AUTO: 11 % (ref 4–12)
NEUTROPHILS # BLD AUTO: 6.29 THOUSANDS/ÂΜL (ref 1.85–7.62)
NEUTS SEG NFR BLD AUTO: 70 % (ref 43–75)
NRBC BLD AUTO-RTO: 0 /100 WBCS
PLATELET # BLD AUTO: 160 THOUSANDS/UL (ref 149–390)
PLATELET # BLD AUTO: 164 THOUSANDS/UL (ref 149–390)
PMV BLD AUTO: 11.6 FL (ref 8.9–12.7)
PMV BLD AUTO: 12 FL (ref 8.9–12.7)
POTASSIUM SERPL-SCNC: 4.8 MMOL/L (ref 3.5–5.3)
RBC # BLD AUTO: 4.22 MILLION/UL (ref 3.81–5.12)
RSV RNA RESP QL NAA+PROBE: NEGATIVE
SARS-COV-2 RNA RESP QL NAA+PROBE: NEGATIVE
SODIUM SERPL-SCNC: 141 MMOL/L (ref 135–147)
WBC # BLD AUTO: 8.78 THOUSAND/UL (ref 4.31–10.16)

## 2023-11-12 PROCEDURE — 99223 1ST HOSP IP/OBS HIGH 75: CPT | Performed by: INTERNAL MEDICINE

## 2023-11-12 PROCEDURE — 82948 REAGENT STRIP/BLOOD GLUCOSE: CPT

## 2023-11-12 PROCEDURE — 94762 N-INVAS EAR/PLS OXIMTRY CONT: CPT

## 2023-11-12 PROCEDURE — 83880 ASSAY OF NATRIURETIC PEPTIDE: CPT | Performed by: NURSE PRACTITIONER

## 2023-11-12 PROCEDURE — 85025 COMPLETE CBC W/AUTO DIFF WBC: CPT | Performed by: PHYSICIAN ASSISTANT

## 2023-11-12 PROCEDURE — 84484 ASSAY OF TROPONIN QUANT: CPT | Performed by: PHYSICIAN ASSISTANT

## 2023-11-12 PROCEDURE — 85049 AUTOMATED PLATELET COUNT: CPT | Performed by: PHYSICIAN ASSISTANT

## 2023-11-12 PROCEDURE — 0241U HB NFCT DS VIR RESP RNA 4 TRGT: CPT | Performed by: NURSE PRACTITIONER

## 2023-11-12 PROCEDURE — 94660 CPAP INITIATION&MGMT: CPT

## 2023-11-12 PROCEDURE — 84484 ASSAY OF TROPONIN QUANT: CPT | Performed by: NURSE PRACTITIONER

## 2023-11-12 PROCEDURE — 94640 AIRWAY INHALATION TREATMENT: CPT

## 2023-11-12 PROCEDURE — 94760 N-INVAS EAR/PLS OXIMETRY 1: CPT

## 2023-11-12 PROCEDURE — 36415 COLL VENOUS BLD VENIPUNCTURE: CPT | Performed by: NURSE PRACTITIONER

## 2023-11-12 PROCEDURE — 80048 BASIC METABOLIC PNL TOTAL CA: CPT | Performed by: PHYSICIAN ASSISTANT

## 2023-11-12 RX ORDER — LEVALBUTEROL INHALATION SOLUTION 1.25 MG/3ML
1.25 SOLUTION RESPIRATORY (INHALATION)
Status: DISCONTINUED | OUTPATIENT
Start: 2023-11-12 | End: 2023-11-14 | Stop reason: HOSPADM

## 2023-11-12 RX ORDER — OXYBUTYNIN CHLORIDE 5 MG/1
5 TABLET, EXTENDED RELEASE ORAL DAILY
Status: DISCONTINUED | OUTPATIENT
Start: 2023-11-12 | End: 2023-11-14 | Stop reason: HOSPADM

## 2023-11-12 RX ORDER — GABAPENTIN 300 MG/1
600 CAPSULE ORAL 2 TIMES DAILY
Status: DISCONTINUED | OUTPATIENT
Start: 2023-11-12 | End: 2023-11-14 | Stop reason: HOSPADM

## 2023-11-12 RX ORDER — PREDNISONE 20 MG/1
40 TABLET ORAL DAILY
Status: DISCONTINUED | OUTPATIENT
Start: 2023-11-12 | End: 2023-11-14 | Stop reason: HOSPADM

## 2023-11-12 RX ORDER — ALBUTEROL SULFATE 90 UG/1
2 AEROSOL, METERED RESPIRATORY (INHALATION) EVERY 4 HOURS PRN
Status: DISCONTINUED | OUTPATIENT
Start: 2023-11-12 | End: 2023-11-14 | Stop reason: HOSPADM

## 2023-11-12 RX ORDER — INSULIN LISPRO 100 [IU]/ML
1-5 INJECTION, SOLUTION INTRAVENOUS; SUBCUTANEOUS
Status: DISCONTINUED | OUTPATIENT
Start: 2023-11-12 | End: 2023-11-14 | Stop reason: HOSPADM

## 2023-11-12 RX ORDER — ATORVASTATIN CALCIUM 40 MG/1
40 TABLET, FILM COATED ORAL
Status: DISCONTINUED | OUTPATIENT
Start: 2023-11-12 | End: 2023-11-14 | Stop reason: HOSPADM

## 2023-11-12 RX ORDER — HEPARIN SODIUM 5000 [USP'U]/ML
5000 INJECTION, SOLUTION INTRAVENOUS; SUBCUTANEOUS EVERY 8 HOURS SCHEDULED
Status: DISCONTINUED | OUTPATIENT
Start: 2023-11-12 | End: 2023-11-14 | Stop reason: HOSPADM

## 2023-11-12 RX ORDER — INSULIN LISPRO 100 [IU]/ML
1-6 INJECTION, SOLUTION INTRAVENOUS; SUBCUTANEOUS
Status: DISCONTINUED | OUTPATIENT
Start: 2023-11-12 | End: 2023-11-14 | Stop reason: HOSPADM

## 2023-11-12 RX ADMIN — GABAPENTIN 600 MG: 300 CAPSULE ORAL at 17:00

## 2023-11-12 RX ADMIN — IPRATROPIUM BROMIDE 0.5 MG: 0.5 SOLUTION RESPIRATORY (INHALATION) at 19:34

## 2023-11-12 RX ADMIN — ATORVASTATIN CALCIUM 40 MG: 40 TABLET, FILM COATED ORAL at 17:00

## 2023-11-12 RX ADMIN — OXYBUTYNIN CHLORIDE 5 MG: 5 TABLET, EXTENDED RELEASE ORAL at 08:15

## 2023-11-12 RX ADMIN — INSULIN LISPRO 2 UNITS: 100 INJECTION, SOLUTION INTRAVENOUS; SUBCUTANEOUS at 21:47

## 2023-11-12 RX ADMIN — PREDNISONE 40 MG: 20 TABLET ORAL at 12:02

## 2023-11-12 RX ADMIN — IPRATROPIUM BROMIDE 0.5 MG: 0.5 SOLUTION RESPIRATORY (INHALATION) at 14:33

## 2023-11-12 RX ADMIN — INSULIN LISPRO 2 UNITS: 100 INJECTION, SOLUTION INTRAVENOUS; SUBCUTANEOUS at 17:00

## 2023-11-12 RX ADMIN — LEVALBUTEROL HYDROCHLORIDE 1.25 MG: 1.25 SOLUTION RESPIRATORY (INHALATION) at 14:33

## 2023-11-12 RX ADMIN — HEPARIN SODIUM 5000 UNITS: 5000 INJECTION INTRAVENOUS; SUBCUTANEOUS at 17:00

## 2023-11-12 RX ADMIN — HEPARIN SODIUM 5000 UNITS: 5000 INJECTION INTRAVENOUS; SUBCUTANEOUS at 05:24

## 2023-11-12 RX ADMIN — GABAPENTIN 600 MG: 300 CAPSULE ORAL at 08:15

## 2023-11-12 RX ADMIN — LEVALBUTEROL HYDROCHLORIDE 1.25 MG: 1.25 SOLUTION RESPIRATORY (INHALATION) at 19:34

## 2023-11-12 NOTE — ASSESSMENT & PLAN NOTE
Initial troponin 0.62. In the setting of hypoxia. EKG: NSR, RBBB. Denies chest pain. Monitor on telemetry. Continue to trend troponin.   Obtain echocardiogram.

## 2023-11-12 NOTE — ED NOTES
Pt still having periods of hypoxia, dropping as low as 80% on nasal cannula. Nurses aide helped patient to bathrrom and when patient returned to room her 02 sat was in the 50's%, patient looked out of breath but was alert and awake. Patient's 02 sat came back up when placed on nasal cannula. Called provider Genesis SANCHEZ regarding patient status.  Pt OK to be transferred to Will Joy RN  11/12/23 [id number]

## 2023-11-12 NOTE — H&P
[address] [facility]  H&P  Name: Nikole Sandoval 74 y.o. female I MRN: [id number]  Unit/Bed#: S -01 I Date of Admission: 11/11/2023   Date of Service: 11/12/2023 I Hospital Day: 1      Assessment/Plan   * Acute respiratory failure with hypoxia [facility])  Assessment & Plan  Presented feeling "off, goofy" and was found to have pulse ox of 75% on RA. Suspect multifactorial in the setting of atelectasis, severe FRANK, COPD (hx of tobacco abuse) and obesity. R/o cardiac etiology. CT chest negative for PE, scattered atelectasis throughout both lungs. .    Flu, COVID and RSV negative. Troponin elevated to 62 and BNP elevated to 433. Patient denies any recent chest pain and does not have a history of CHF, appears euvolemic on exam.   Continue to trend troponin  Obtain echocardiogram.   Pulse ox stable on 4L NC oxygen; does not require oxygen at baseline. Wean as tolerated. Ambulatory pulse ox prior to discharge. Encourage IS. Respiratory protocol. Elevated troponin  Assessment & Plan  Initial troponin 0.62. In the setting of hypoxia. EKG: NSR, RBBB. Denies chest pain. Monitor on telemetry. Continue to trend troponin. Obtain echocardiogram.     COPD (chronic obstructive pulmonary disease) ([address])  Assessment & Plan  COPD without acute exacerbation and history of tobacco abuse. Reports that she does not use any inhaler on a daily basis. Continue albuterol prn. Respiratory protocol. Consider pulmonology consult vs outpatient follow-up. CKD (chronic kidney disease)  Assessment & Plan  Lab Results   Component Value Date    EGFR 37 11/11/2023    EGFR 64 07/17/2023    EGFR 50 02/02/2023    CREATININE 1.37 (H) 11/11/2023    CREATININE 0.88 07/17/2023    CREATININE 1.08 02/02/2023     Baseline Cr 0.80-1.1. Follows with nephrology as an outpatient. Cr above baseline on presentation. Hold HCTZ and lisinopril. Avoid nephrotoxins and hypotension. Monitor intake/output.    Repeat BMP in AM.    Essential hypertension  Assessment & Plan  BP acceptable. Hold HCTZ and lisinopril given elevated Cr. Obstructive sleep apnea of adult  Assessment & Plan  Continue CPAP HS. Obesity (BMI 30-39. 9)  Assessment & Plan  BMI 37.62. Recommend diet, lifestyle modifications. Type 2 diabetes mellitus with hyperglycemia, without long-term current use of insulin (HCC)  Assessment & Plan  Lab Results   Component Value Date    HGBA1C 6.8 (H) 07/17/2023       No results for input(s): "POCGLU" in the last 72 hours. Blood Sugar Average: Last 72 hrs:    Hold glipizide and metformin while inpatient. Monitor accu-checks AC and HS. SSI coverage. Hypoglycemia protocol. Chronic pain syndrome  Assessment & Plan  Chronic low back pain for which she follows with pain management. Continue gabapentin  Not currently taking any opioids. VTE Pharmacologic Prophylaxis: VTE Score: 6 High Risk (Score >/= 5) - Pharmacological DVT Prophylaxis Ordered: heparin. Sequential Compression Devices Ordered. Code Status: Prior   Discussion with family: Patient declined call to . Anticipated Length of Stay: Patient will be admitted on an inpatient basis with an anticipated length of stay of greater than 2 midnights secondary to hypoxia, elevated troponin, need for cardiac work-up and monitoring of oxygen levels. Total Time Spent on Date of Encounter in care of patient: This time was spent on one or more of the following: performing physical exam; counseling and coordination of care; obtaining or reviewing history; documenting in the medical record; reviewing/ordering tests, medications or procedures; communicating with other healthcare professionals and discussing with patient's family/caregivers.     Chief Complaint: hypoxia    History of Present Illness:  Junior Cook is a 74 y.o. female with a PMH of COPD, tobacco abuse, type 2 DM, HTN, FRANK on CPAP, chronic back pain who presents with hypoxia. Patient reports that she awoke yesterday morning and felt "goofy, off" and  has difficulty describing this feeling further. She notes that her  wanted her to come to the hospital for evaluation. On arrival, she was noted to be hypoxic with pulse ox 75% on RA. She was placed on 4L NC oxygen and her pulse ox has since improved and patient's reports feeling better at this time. She denies recent fevers/ chills, confusion, chest pain, cough, SOB, abdominal pain, n/v/d, urinary complaints, dizziness or lightheadedness. She has never used oxygen at home but does have sleep apnea for which she uses a CPAP at bedtime and when she naps. She has a history of tobacco abuse (30+ pack years) and quit smoking in February 2023. Review of Systems:  Review of Systems   Constitutional:  Negative for chills, fever and unexpected weight change. Respiratory:  Negative for cough, chest tightness, shortness of breath and wheezing. Cardiovascular:  Negative for chest pain and leg swelling. Gastrointestinal:  Negative for abdominal pain, nausea and vomiting. Genitourinary:  Negative for difficulty urinating. Neurological:  Positive for weakness (generalized). Negative for dizziness and light-headedness. Psychiatric/Behavioral:  Negative for confusion. All other systems reviewed and are negative. Past Medical and Surgical History:   Past Medical History:   Diagnosis Date    Asthma     Chronic obstructive lung disease ([facility])     Community acquired pneumonia     LAST ASSESSED: [id number]    COPD (chronic obstructive pulmonary disease) ([address] [place] [address])     Diabetes mellitus ([address] [place] [address])     History of MRSA infection     2008 liver sx    Liver disease     Sleep apnea     Viral warts     LAST ASSESSED: [id number]       Past Surgical History:   Procedure Laterality Date    ABDOMINAL SURGERY      ABDOMINOPLASTY      CHOLECYSTECTOMY      COSMETIC SURGERY      EYE SURGERY      Bilateral cataracts 2015 Dr. Bharat Bustillo.      FINGER SURGERY      HAND SURGERY      HYSTERECTOMY      LIVER SURGERY      AZ ARTHRP INTERPOS INTERCARPAL/METACARPAL JOINTS Right 9/9/2016    Procedure: THUMB TRAPEZIECTOMY; BASAL JOINT ARTHROPLASTY WITH APL AUTOGRAFT;  Surgeon: Daisy Underwood MD;  Location[facility];  Service: Orthopedics    AZ ARTHRP INTERPOS INTERCARPAL/METACARPAL JOINTS Left 5/29/2018    Procedure: Fitz Goods;  Surgeon: Alissa Rowley MD;  [facility];  Service: Orthopedics    AZ TENDON SHEATH INCISION Right 7/23/2019    Procedure: RELEASE TRIGGER FINGER RIGHT LONG;  Surgeon: Alissa Rowley MD;  [facility];  Service: Orthopedics    AZ TENDON SHEATH INCISION Left 8/6/2019    Procedure: RELEASE TRIGGER FINGER LEFT INDEX;  Surgeon: Alissa Rowley MD;  [facility];  Service: Orthopedics    AZ TENDON SHEATH INCISION Right 6/16/2020    Procedure: RELEASE TRIGGER FINGER, right ring;  Surgeon: Alissa Rowley MD;  [facility];  Service: Orthopedics    AZ TR/TRNSPL TDN CARP/MTCRPL HAND W/O FR GRF EA TDN Left 5/29/2018    Procedure: TRANSFER TENDON HAND/WRIST FCR from forearm to wrist;  Surgeon: Alissa Rowley MD;  [facility];  Service: Orthopedics       Meds/Allergies:  Prior to Admission medications    Medication Sig Start Date End Date Taking? Authorizing Provider   diphenhydrAMINE HCl (BENADRYL PO) Take 25 mg by mouth daily at bedtime And 50 mg prn    Historical Provider, MD   gabapentin (Neurontin) 600 MG tablet 1 tab in the morning and 2 tabs at bedtime 9/7/23   Loren Henry DO   glipiZIDE (GLUCOTROL XL) 5 mg 24 hr tablet TAKE 1 TABLET EVERY DAY  30  MINUTES BEFORE BREAKFAST 5/9/22   Lei Cardona MD   glipiZIDE (GLUCOTROL) 5 mg tablet TAKE 1 TABLET TWICE DAILY BEFORE MEALS 75/59/59   Clarene Duverney, MD   glucose blood (ACCU-CHEK JESS PLUS) test strip Test BS 3 times daily.   DX E11.9 8/2/19   Lei Cardona MD   hydrochlorothiazide (HYDRODIURIL) 25 mg tablet TAKE 1 TABLET EVERY DAY 1/4/60   Marianela Ramos MD   IBUPROFEN PO Take by mouth if needed    Historical Provider, MD   lisinopril (ZESTRIL) 40 mg tablet Take 1 tablet (40 mg total) by mouth daily 3/9/23   Hyacinth Moreau MD   metFORMIN (GLUCOPHAGE) 1000 MG tablet TAKE 1 TABLET TWICE DAILY 13/88/49   Marianela Level, MD   Multiple Vitamins-Minerals (OCUVITE ADULT FORMULA PO) Take by mouth    Historical Provider, MD   rosuvastatin (CRESTOR) 20 MG tablet Take 1 tablet (20 mg total) by mouth daily 9/13/92   Marianela Ramos MD   tolterodine (DETROL LA) 2 mg 24 hr capsule Take 1 capsule (2 mg total) by mouth daily 2/25/25   Marianela Ramos, MD     I have reviewed home medications with a medical source (PCP, Pharmacy, other).     Allergies: No Known Allergies    Social History:  Marital Status: /Civil Union   Occupation:   Patient Pre-hospital Living Situation: Home, With spouse  Patient Pre-hospital Level of Mobility: walks with walker  Patient Pre-hospital Diet Restrictions:   Substance Use History:   Social History     Substance and Sexual Activity   Alcohol Use Yes    Comment: very seldom     Social History     Tobacco Use   Smoking Status Every Day    Packs/day: 0.50    Types: Cigarettes   Smokeless Tobacco Never     Social History     Substance and Sexual Activity   Drug Use No       Family History:  Family History   Problem Relation Age of Onset    Heart attack Mother     Heart attack Father     Ovarian cancer Sister     Basal cell carcinoma Brother     Heart disease Other         CARDIAC DISORDER    Diabetes Other     Liver cancer Other     Breast cancer Other     Stomach cancer Other     Heart disease Family     Diabetes Family     Thyroid disease Family     Ovarian cancer Family        Physical Exam:     Vitals:   Blood Pressure: 148/67 (11/12/23 0000)  Pulse: 83 (11/12/23 0015)  Temperature: 98.4 °F (36.9 °C) (11/11/23 1932)  Temp Source: Oral (11/11/23 1932)  Respirations: 20 (11/12/23 0000)  Height: 5' 1" (154.9 cm) (11/12/23 0300)  Weight - Scale: 96.1 kg (211 lb 13.8 oz) (11/12/23 0300)  SpO2: (!) 88 % (11/12/23 0015)    Physical Exam  Vitals and nursing note reviewed. Constitutional:       General: She is not in acute distress. Appearance: She is not diaphoretic. HENT:      Head: Normocephalic and atraumatic. Eyes:      Conjunctiva/sclera: Conjunctivae normal.   Cardiovascular:      Rate and Rhythm: Normal rate and regular rhythm. Pulmonary:      Effort: Pulmonary effort is normal. No respiratory distress. Breath sounds: Decreased breath sounds present. No wheezing. Abdominal:      General: Bowel sounds are normal.      Palpations: Abdomen is soft. Tenderness: There is no abdominal tenderness. Musculoskeletal:      Right lower leg: No edema. Left lower leg: No edema. Skin:     General: Skin is warm and dry. Coloration: Skin is not pale. Neurological:      Mental Status: She is alert and oriented to person, place, and time.    Psychiatric:         Mood and Affect: Mood normal.            Additional Data:     Lab Results:  Results from last 7 days   Lab Units 11/11/23 2025   WBC Thousand/uL 6.90   HEMOGLOBIN g/dL 12.7   HEMATOCRIT % 45.2   PLATELETS Thousands/uL 138*   NEUTROS PCT % 72   LYMPHS PCT % 16   MONOS PCT % 10   EOS PCT % 1     Results from last 7 days   Lab Units 11/11/23 2025   SODIUM mmol/L 142   POTASSIUM mmol/L 5.1   CHLORIDE mmol/L 109*   CO2 mmol/L 25   BUN mg/dL 47*   CREATININE mg/dL 1.37*   ANION GAP mmol/L 8   CALCIUM mg/dL 8.6   ALBUMIN g/dL 3.4*   TOTAL BILIRUBIN mg/dL 0.25   ALK PHOS U/L 60   ALT U/L 4*   AST U/L 11*   GLUCOSE RANDOM mg/dL 127                       Lines/Drains:  Invasive Devices       Peripheral Intravenous Line  Duration             Peripheral IV 11/11/23 Left Forearm 1 day                        Imaging: Reviewed radiology reports from this admission including: chest CT scan  CT chest with contrast   Final Result by Manan Moreno MD (11/11 2246)      No evidence of pulmonary embolism. Scattered atelectasis throughout both lungs. .               Workstation performed: [id number]             EKG and Other Studies Reviewed on Admission:   EKG: NSR. HR 76. Complete RBBB. ** Please Note: This note has been constructed using a voice recognition system.  **

## 2023-11-12 NOTE — ASSESSMENT & PLAN NOTE
Lab Results   Component Value Date    HGBA1C 6.8 (H) 07/17/2023       No results for input(s): "POCGLU" in the last 72 hours. Blood Sugar Average: Last 72 hrs:    Hold glipizide and metformin while inpatient. Monitor accu-checks AC and HS. SSI coverage. Hypoglycemia protocol.

## 2023-11-12 NOTE — CASE MANAGEMENT
Case Management Assessment    Patient name Iqra VAZQUEZ /S -01 MRN [id number]  : 1950 Date 2023       Current Admission Date: 2023  Current Admission Diagnosis:Acute respiratory failure with hypoxia [facility])   Patient Active Problem List    Diagnosis Date Noted    Acute respiratory failure with hypoxia ([address]) 2023    Elevated troponin 2023    CKD (chronic kidney disease) 2023    Platelets decreased ([address]) 2023    OAB (overactive bladder) 2023    Sebaceous cyst of ear 2023    Osteoporosis 2023    Persistent proteinuria 2023    Obesity, morbid ([address]) 10/11/2022    Heart murmur, systolic     Non-seasonal allergic rhinitis 2020    Vertigo 2019    COPD (chronic obstructive pulmonary disease) ([address]) 2019    Sacroiliitis ([address]) 2019    Trigger finger, left index finger 2019    Trigger ring finger of right hand 2019    Current smoker on some days 2019    Essential hypertension 2019    Chronic pain syndrome 01/10/2019    Primary osteoarthritis of first carpometacarpal joint of left hand 2018    Lumbar radiculopathy 2018    Spinal stenosis of lumbar region 2018    Lumbar spondylosis 2018    Chronic right hip pain 2018    Keratosis 2017    Type 2 diabetes mellitus with hyperglycemia, without long-term current use of insulin ([address]) 2017    Arthritis of carpometacarpal (CMC) joint of left thumb 2017    Obesity (BMI 30-39.9) 2016    Obstructive sleep apnea of adult 2016    Secondary polycythemia 2016    Cataract of both eyes 2014    Facet arthropathy, lumbar 2014    DDD (degenerative disc disease), lumbar 2014      LOS (days): 1  Geometric Mean LOS (GMLOS) (days):   Days to GMLOS:     OBJECTIVE:    Risk of Unplanned Readmission Score: 11.92         Current admission status: Inpatient       Preferred Pharmacy:   [facility] [place] [address]  [address]  [address] [address]  Phone: [phone] Fax: [address] [facility] [place] [facility] - [place], [address] [facility]  [address] [facility] [address]  Phone: [phone] Fax: [phone]    [facility] [address], [address] [facility] [address] [facility],[address] [place] [address]  Phone: [phone] Fax: [address] [facility] [place] [address] [address]  [address] [facility] [address]  Phone: [phone] Fax: [phone]    Primary Care Provider: Mago Lane MD    Primary Insurance: MEDICARE  Secondary Insurance: AARP    ASSESSMENT:  Yesika Proxies    There are no active Health Care Proxies on file. Readmission Root Cause  30 Day Readmission: No    Patient Information  Admitted from[de-identified] Home  Mental Status: Alert  During Assessment patient was accompanied by: Spouse  Assessment information provided by[de-identified] Patient  Primary Caregiver: Self  Support Systems: Spouse/significant other  [place] [facility]: [address] do you live in?: [facility] entry access options.  Select all that apply.: Stairs  Number of steps to enter home.: 3  Do the steps have railings?: Yes  Type of Current Residence: Bi-level  Upon entering residence, is there a bedroom on the main floor (no further steps)?: Yes  Upon entering residence, is there a bathroom on the main floor (no further steps)?: Yes  In the last 12 months, was there a time when you were not able to pay the mortgage or rent on time?: No  In the last 12 months, how many places have you lived?: 1  In the last 12 months, was there a time when you did not have a steady place to sleep or slept in a shelter (including now)?: No  Homeless/housing insecurity resource given?: N/A  Living Arrangements: Lives w/ Spouse/significant other  Is patient a ?: No    Activities of Daily Living Prior to Admission  Functional Status: Independent  Completes ADLs independently?: Yes  Ambulates independently?: Yes  Does patient use assisted devices?: Yes  Assisted Devices (DME) used: CPAP  DME Company Name (respiratory supplies): [employer]  (does not have oxygen)  Does patient currently own DME?: Yes  What DME does the patient currently own?: CPAP  Does patient have a history of Outpatient Therapy (PT/OT)?: Yes  Does the patient have a history of Short-Term Rehab?: No  Does patient have a history of HHC?: Yes  Does patient currently have [address]?: No         Patient Information Continued  Income Source: Pension/jail  Does patient have prescription coverage?: Yes  Within the past 12 months, you worried that your food would run out before you got the money to buy more.: Never true  Within the past 12 months, the food you bought just didn't last and you didn't have money to get more.: Never true  Food insecurity resource given?: N/A  Does patient receive dialysis treatments?: No  Does patient have a history of Mental Health Diagnosis?: No         Means of Transportation  Means of Transport to Appts[de-identified] Drives Self  In the past 12 months, has lack of transportation kept you from medical appointments or from getting medications?: No  In the past 12 months, has lack of transportation kept you from meetings, work, or from getting things needed for daily living?: No  Was application for public transport provided?: N/A    Patient currently on oxygen - will need home O2 eval if not able to be weaned. Cm to f/u regarding discharge needs pending her progress.

## 2023-11-12 NOTE — ASSESSMENT & PLAN NOTE
Presented feeling "off, goofy" and was found to have pulse ox of 75% on RA. Suspect multifactorial in the setting of atelectasis, severe FRANK, COPD (hx of tobacco abuse) and obesity. R/o cardiac etiology. CT chest negative for PE, scattered atelectasis throughout both lungs. .    Flu, COVID and RSV negative. Troponin elevated to 62 and BNP elevated to 433. Patient denies any recent chest pain and does not have a history of CHF, appears euvolemic on exam.   Continue to trend troponin  Obtain echocardiogram.   Pulse ox stable on 4L NC oxygen; does not require oxygen at baseline. Wean as tolerated. Ambulatory pulse ox prior to discharge. Encourage IS. Respiratory protocol.

## 2023-11-12 NOTE — ASSESSMENT & PLAN NOTE
Chronic low back pain for which she follows with pain management. Continue gabapentin  Not currently taking any opioids.

## 2023-11-12 NOTE — ASSESSMENT & PLAN NOTE
COPD without acute exacerbation and history of tobacco abuse. Reports that she does not use any inhaler on a daily basis. Continue albuterol prn. Respiratory protocol. Consider pulmonology consult vs outpatient follow-up.

## 2023-11-12 NOTE — ASSESSMENT & PLAN NOTE
Lab Results   Component Value Date    EGFR 37 11/11/2023    EGFR 64 07/17/2023    EGFR 50 02/02/2023    CREATININE 1.37 (H) 11/11/2023    CREATININE 0.88 07/17/2023    CREATININE 1.08 02/02/2023     Baseline Cr 0.80-1.1. Follows with nephrology as an outpatient. Cr above baseline on presentation. Hold HCTZ and lisinopril. Avoid nephrotoxins and hypotension. Monitor intake/output.    Repeat BMP in AM.

## 2023-11-12 NOTE — ED PROVIDER NOTES
History  Chief Complaint   Patient presents with    Medical Problem     Pt arrived via EMS for feeling "off or goofy" Pt denies CP, weakness, numbness or tingling. Pt is alert and oriented. Pt was ambulatory for EMS with a steady gait. Pt had a fall last week denies head strike, no LOC or thinners. Pt states she is a little SOB, but states that isnt new for her hx of COPD. Pt 75% on room air but does not appear in distress. Pt placed on NC and her 02 came up with 5L NC to 96 %. Medical Problem  Associated symptoms: fatigue    Associated symptoms: no abdominal pain, no chest pain, no cough, no ear pain, no fever, no rash, no shortness of breath, no sore throat and no vomiting      78-year-old female, past medical history below, presenting with intermittent generalized weakness and generally feeling "off". Patient and  at bedside who confirms states that she is without any headache, vision change, focal deficit, ambulatory dysfunction, expressive or receptive aphasia or dysphasia. Denies chest pain, shortness of breath, fever, chills, change in bowel or bladder. No new medications. Prior to Admission Medications   Prescriptions Last Dose Informant Patient Reported? Taking? IBUPROFEN PO  Self Yes No   Sig: Take by mouth if needed   Multiple Vitamins-Minerals (OCUVITE ADULT FORMULA PO)  Self Yes No   Sig: Take by mouth   diphenhydrAMINE HCl (BENADRYL PO)  Self Yes No   Sig: Take 25 mg by mouth daily at bedtime And 50 mg prn   gabapentin (Neurontin) 600 MG tablet   No No   Si tab in the morning and 2 tabs at bedtime   glipiZIDE (GLUCOTROL XL) 5 mg 24 hr tablet  Self No No   Sig: TAKE 1 TABLET EVERY DAY  30  MINUTES BEFORE BREAKFAST   glipiZIDE (GLUCOTROL) 5 mg tablet   No No   Sig: TAKE 1 TABLET TWICE DAILY BEFORE MEALS   glucose blood (ACCU-CHEK JESS PLUS) test strip  Self No No   Sig: Test BS 3 times daily.   DX E11.9   hydrochlorothiazide (HYDRODIURIL) 25 mg tablet  Self No No   Sig: TAKE 1 TABLET EVERY DAY   lisinopril (ZESTRIL) 40 mg tablet  Self No No   Sig: Take 1 tablet (40 mg total) by mouth daily   metFORMIN (GLUCOPHAGE) 1000 MG tablet   No No   Sig: TAKE 1 TABLET TWICE DAILY   rosuvastatin (CRESTOR) 20 MG tablet  Self No No   Sig: Take 1 tablet (20 mg total) by mouth daily   tolterodine (DETROL LA) 2 mg 24 hr capsule   No No   Sig: Take 1 capsule (2 mg total) by mouth daily      Facility-Administered Medications: None       Past Medical History:   Diagnosis Date    Asthma     Chronic obstructive lung disease ([address] [facility])     Community acquired pneumonia     LAST ASSESSED: [id number]    COPD (chronic obstructive pulmonary disease) ([address] [facility])     Diabetes mellitus ([address] [facility])     History of MRSA infection     2008 liver sx    Liver disease     Sleep apnea     Viral warts     LAST ASSESSED: [id number]       Past Surgical History:   Procedure Laterality Date    ABDOMINAL SURGERY      ABDOMINOPLASTY      CHOLECYSTECTOMY      COSMETIC SURGERY      EYE SURGERY      Bilateral cataracts 2015 Dr. Kalee Nina.      FINGER SURGERY      HAND SURGERY      HYSTERECTOMY      LIVER SURGERY      NE ARTHRP INTERPOS INTERCARPAL/METACARPAL JOINTS Right 9/9/2016    Procedure: THUMB TRAPEZIECTOMY; BASAL JOINT ARTHROPLASTY WITH APL AUTOGRAFT;  Surgeon: Livan Jackson MD;  [facility];  Service: Orthopedics    NE ARTHRP INTERPOS INTERCARPAL/METACARPAL JOINTS Left 5/29/2018    Procedure: Ira Listen;  Surgeon: Jurgen Ríos MD;  [facility];  Service: Orthopedics    NE TENDON SHEATH INCISION Right 7/23/2019    Procedure: RELEASE TRIGGER FINGER RIGHT LONG;  Surgeon: Jurgen Ríos MD;  [facility];  Service: Orthopedics    NE TENDON SHEATH INCISION Left 8/6/2019    Procedure: RELEASE TRIGGER FINGER LEFT INDEX;  Surgeon: Jurgen Ríos MD;  [facility];  Service: Orthopedics    NE TENDON SHEATH INCISION Right 6/16/2020    Procedure: RELEASE TRIGGER FINGER, right ring;  Surgeon: Linda Knight MD;  [facility];  Service: Orthopedics    ND TR/TRNSPL TDN CARP/MTCRPL HAND W/O FR GRF EA TDN Left 5/29/2018    Procedure: TRANSFER TENDON HAND/WRIST FCR from forearm to wrist;  Surgeon: Linda Knight MD;  [facility];  Service: Orthopedics       Family History   Problem Relation Age of Onset    Heart attack Mother     Heart attack Father     Ovarian cancer Sister     Basal cell carcinoma Brother     Heart disease Other         CARDIAC DISORDER    Diabetes Other     Liver cancer Other     Breast cancer Other     Stomach cancer Other     Heart disease Family     Diabetes Family     Thyroid disease Family     Ovarian cancer Family      I have reviewed and agree with the history as documented. E-Cigarette/Vaping    E-Cigarette Use Never User      E-Cigarette/Vaping Substances    Nicotine Yes     THC No     CBD No     Flavoring No     Other No     Unknown No      Social History     Tobacco Use    Smoking status: Every Day     Packs/day: 0.50     Types: Cigarettes    Smokeless tobacco: Never   Vaping Use    Vaping Use: Never used   Substance Use Topics    Alcohol use: Yes     Comment: very seldom    Drug use: No       Review of Systems   Constitutional:  Positive for fatigue. Negative for chills and fever. HENT:  Negative for ear pain and sore throat. Eyes:  Negative for pain and visual disturbance. Respiratory:  Negative for cough and shortness of breath. Cardiovascular:  Negative for chest pain and palpitations. Gastrointestinal:  Negative for abdominal pain and vomiting. Genitourinary:  Negative for dysuria and hematuria. Musculoskeletal:  Negative for arthralgias and back pain. Skin:  Negative for color change and rash. Neurological:  Positive for weakness. Negative for seizures and syncope. All other systems reviewed and are negative. Physical Exam  Physical Exam  Vitals and nursing note reviewed.    Constitutional:       General: She is not in acute distress. Appearance: She is well-developed. HENT:      Head: Normocephalic and atraumatic. Eyes:      Conjunctiva/sclera: Conjunctivae normal.   Cardiovascular:      Rate and Rhythm: Normal rate and regular rhythm. Heart sounds: No murmur heard. Pulmonary:      Effort: Pulmonary effort is normal. No respiratory distress. Breath sounds: Normal breath sounds. Abdominal:      Palpations: Abdomen is soft. Tenderness: There is no abdominal tenderness. Musculoskeletal:         General: No swelling. Cervical back: Neck supple. Skin:     General: Skin is warm and dry. Capillary Refill: Capillary refill takes less than 2 seconds. Neurological:      Mental Status: She is alert and oriented to person, place, and time. GCS: GCS eye subscore is 4. GCS verbal subscore is 5. GCS motor subscore is 6. Cranial Nerves: Cranial nerves 2-12 are intact. Sensory: Sensation is intact. Motor: Motor function is intact. Coordination: Coordination is intact.    Psychiatric:         Mood and Affect: Mood normal.         Vital Signs  ED Triage Vitals [11/11/23 1932]   Temperature Pulse Respirations Blood Pressure SpO2   98.4 °F (36.9 °C) 78 20 (!) 171/73 (S) (!) 75 %      Temp Source Heart Rate Source Patient Position - Orthostatic VS BP Location FiO2 (%)   Oral Monitor Sitting Right arm --      Pain Score       No Pain           Vitals:    11/11/23 1935 11/11/23 2030 11/11/23 2130 11/11/23 2200   BP: 164/73 148/66 150/63 143/67   Pulse:  77 78 82   Patient Position - Orthostatic VS: Sitting            Visual Acuity      ED Medications  Medications   iohexol (OMNIPAQUE) 350 MG/ML injection (MULTI-DOSE) 80 mL (80 mL Intravenous Given 11/11/23 2141)       Diagnostic Studies  Results Reviewed       Procedure Component Value Units Date/Time    Ethanol [[id number]]  (Normal) Collected: 11/11/23 2025    Lab Status: Final result Specimen: Blood from Arm, Left Updated: 11/11/23 2059 Ethanol Lvl <10 mg/dL     Comprehensive metabolic panel [[id number]]  (Abnormal) Collected: 11/11/23 2025    Lab Status: Final result Specimen: Blood from Arm, Left Updated: 11/11/23 2058     Sodium 142 mmol/L      Potassium 5.1 mmol/L      Chloride 109 mmol/L      CO2 25 mmol/L      ANION GAP 8 mmol/L      BUN 47 mg/dL      Creatinine 1.37 mg/dL      Glucose 127 mg/dL      Calcium 8.6 mg/dL      Corrected Calcium 9.1 mg/dL      AST 11 U/L      ALT 4 U/L      Alkaline Phosphatase 60 U/L      Total Protein 6.4 g/dL      Albumin 3.4 g/dL      Total Bilirubin 0.25 mg/dL      eGFR 37 ml/min/1.73sq m     Narrative:      [place] guidelines for Chronic Kidney Disease (CKD):     Stage 1 with normal or high GFR (GFR > 90 mL/min/1.73 square meters)    Stage 2 Mild CKD (GFR = 60-89 mL/min/1.73 square meters)    Stage 3A Moderate CKD (GFR = 45-59 mL/min/1.73 square meters)    Stage 3B Moderate CKD (GFR = 30-44 mL/min/1.73 square meters)    Stage 4 Severe CKD (GFR = 15-29 mL/min/1.73 square meters)    Stage 5 End Stage CKD (GFR <15 mL/min/1.73 square meters)  Note: GFR calculation is accurate only with a steady state creatinine    Magnesium [[id number]]  (Abnormal) Collected: 11/11/23 2025    Lab Status: Final result Specimen: Blood from Arm, Left Updated: 11/11/23 2058     Magnesium 2.8 mg/dL     CBC and differential [[id number]]  (Abnormal) Collected: 11/11/23 2025    Lab Status: Final result Specimen: Blood from Arm, Left Updated: 11/11/23 2043     WBC 6.90 Thousand/uL      RBC 4.39 Million/uL      Hemoglobin 12.7 g/dL      Hematocrit 45.2 %       fL      MCH 28.9 pg      MCHC 28.1 g/dL      RDW 15.7 %      MPV 12.4 fL      Platelets 273 Thousands/uL      nRBC 0 /100 WBCs      Neutrophils Relative 72 %      Immat GRANS % 0 %      Lymphocytes Relative 16 %      Monocytes Relative 10 %      Eosinophils Relative 1 %      Basophils Relative 1 %      Neutrophils Absolute 4.93 Thousands/µL Immature Grans Absolute 0.03 Thousand/uL      Lymphocytes Absolute 1.11 Thousands/µL      Monocytes Absolute 0.71 Thousand/µL      Eosinophils Absolute 0.08 Thousand/µL      Basophils Absolute 0.04 Thousands/µL                    CT chest with contrast   Final Result by Kim Walker MD (11/11 2246)      No evidence of pulmonary embolism. Scattered atelectasis throughout both lungs. .               Workstation performed: [id number]                    Procedures  ECG 12 Lead Documentation Only    Date/Time: 11/11/2023 8:49 PM    Performed by: Yony Aguilar DO  Authorized by: Yony Aguilar DO    Indications / Diagnosis:  Hypoxia  ECG reviewed by me, the ED Provider: yes    Patient location:  ED  Previous ECG:     Previous ECG:  Unavailable  Interpretation:     Interpretation: abnormal    Rate:     ECG rate:  76    ECG rate assessment: normal    Rhythm:     Rhythm: sinus rhythm    Ectopy:     Ectopy: none    QRS:     QRS axis:  Indeterminate    QRS intervals: Wide  Conduction:     Conduction: abnormal      Abnormal conduction: complete RBBB    ST segments:     ST segments:  Non-specific  T waves:     T waves: non-specific             ED Course                                             Medical Decision Making  78-year-old female presenting with generalized fatigue and unclear etiology of impressive hypoxia to the high 70s with exertion and in the low 80s while resting. Patient now resting comfortably in the low 90s on 2 L nasal cannula. Oxygen taken in multiple locations on bilateral hands and earlobe and all remain consistent. Exam otherwise benign without any signs of fluid overload nor COPD exacerbation or viral process. CBC without acute pathology. CMP with mild dehydration. Gentle fluids provided. EKG without acute pathology. CT PE with atelectasis only. Again, patient with comfortable hypoxia requiring further investigation. Call placed to hospital medicine who accepted their care.     Amount and/or Complexity of Data Reviewed  Independent Historian: spouse  Labs: ordered. Decision-making details documented in ED Course. Radiology: ordered. ECG/medicine tests: ordered and independent interpretation performed. Decision-making details documented in ED Course. Risk  Prescription drug management. Disposition  Final diagnoses:   Hypoxia   Atelectasis   Fatigue     Time reflects when diagnosis was documented in both MDM as applicable and the Disposition within this note       Time User Action Codes Description Comment    11/11/2023 11:23 PM Marquez Keita Add [R09.02] Hypoxia     11/11/2023 11:23 PM Remington Vargask [Y09.32] Atelectasis     11/11/2023 11:23 PM Marquez Keita Add [R53.83] Fatigue           ED Disposition       ED Disposition   Admit    Condition   Stable    Date/Time   Sat Nov 11, 2023 11:24 PM    Comment   Case was discussed with hospitalist and the patient's admission status was agreed to be Admission Status: inpatient status to the service of Dr. Tania Noble . Follow-up Information    None         Patient's Medications   Discharge Prescriptions    No medications on file       No discharge procedures on file.     PDMP Review         Value Time User    PDMP Reviewed  Yes 1/3/2022 10:45 AM Maddie Ballesteros DO            ED Provider  Electronically Signed by             Marquez Keita DO  11/11/23 4839

## 2023-11-13 ENCOUNTER — APPOINTMENT (INPATIENT)
Dept: NON INVASIVE DIAGNOSTICS | Facility: HOSPITAL | Age: 73
DRG: 189 | End: 2023-11-13
Payer: MEDICARE

## 2023-11-13 LAB
ANION GAP SERPL CALCULATED.3IONS-SCNC: 6 MMOL/L
AORTIC ROOT: 2.5 CM
AORTIC VALVE MEAN VELOCITY: 12.2 M/S
APICAL FOUR CHAMBER EJECTION FRACTION: 71 %
ASCENDING AORTA: 3 CM
AV LVOT MEAN GRADIENT: 4 MMHG
AV LVOT PEAK GRADIENT: 7 MMHG
AV MEAN GRADIENT: 7 MMHG
AV PEAK GRADIENT: 14 MMHG
AV VELOCITY RATIO: 0.7
BUN SERPL-MCNC: 38 MG/DL (ref 5–25)
CALCIUM SERPL-MCNC: 9 MG/DL (ref 8.4–10.2)
CARDIAC TROPONIN I PNL SERPL HS: 43 NG/L
CHLORIDE SERPL-SCNC: 106 MMOL/L (ref 96–108)
CO2 SERPL-SCNC: 28 MMOL/L (ref 21–32)
CREAT SERPL-MCNC: 1.29 MG/DL (ref 0.6–1.3)
DOP CALC AO PEAK VEL: 1.89 M/S
DOP CALC AO VTI: 38.99 CM
DOP CALC LVOT PEAK VEL VTI: 30.99 CM
DOP CALC LVOT PEAK VEL: 1.32 M/S
DOP CALC MV VTI: 34.68 CM
E WAVE DECELERATION TIME: 189 MS
E/A RATIO: 0.84
FRACTIONAL SHORTENING: 39 (ref 28–44)
GFR SERPL CREATININE-BSD FRML MDRD: 40 ML/MIN/1.73SQ M
GLUCOSE SERPL-MCNC: 119 MG/DL (ref 65–140)
GLUCOSE SERPL-MCNC: 138 MG/DL (ref 65–140)
GLUCOSE SERPL-MCNC: 179 MG/DL (ref 65–140)
GLUCOSE SERPL-MCNC: 198 MG/DL (ref 65–140)
GLUCOSE SERPL-MCNC: 235 MG/DL (ref 65–140)
INTERVENTRICULAR SEPTUM IN DIASTOLE (PARASTERNAL SHORT AXIS VIEW): 1.3 CM
INTERVENTRICULAR SEPTUM: 1.3 CM (ref 0.6–1.1)
LAAS-AP2: 20 CM2
LAAS-AP4: 21.7 CM2
LEFT ATRIUM AREA SYSTOLE SINGLE PLANE A4C: 22 CM2
LEFT ATRIUM SIZE: 5.4 CM
LEFT ATRIUM VOLUME (MOD BIPLANE): 67 ML
LEFT ATRIUM VOLUME INDEX (MOD BIPLANE): 34.5 ML/M2
LEFT INTERNAL DIMENSION IN SYSTOLE: 3.3 CM (ref 2.1–4)
LEFT VENTRICULAR INTERNAL DIMENSION IN DIASTOLE: 5.4 CM (ref 3.5–6)
LEFT VENTRICULAR POSTERIOR WALL IN END DIASTOLE: 1.2 CM
LEFT VENTRICULAR STROKE VOLUME: 100 ML
LVSV (TEICH): 100 ML
MV E'TISSUE VEL-SEP: 7 CM/S
MV MEAN GRADIENT: 3 MMHG
MV PEAK A VEL: 1.25 M/S
MV PEAK E VEL: 105 CM/S
MV PEAK GRADIENT: 7 MMHG
MV STENOSIS PRESSURE HALF TIME: 55 MS
MV VALVE AREA P 1/2 METHOD: 4
POTASSIUM SERPL-SCNC: 5 MMOL/L (ref 3.5–5.3)
RIGHT ATRIAL 2D VOLUME: 27 ML
RIGHT ATRIUM AREA SYSTOLE A4C: 15 CM2
RIGHT VENTRICLE ID DIMENSION: 4 CM
SL CV LEFT ATRIUM LENGTH A2C: 5.3 CM
SL CV LV EF: 60
SL CV PED ECHO LEFT VENTRICLE DIASTOLIC VOLUME (MOD BIPLANE) 2D: 144 ML
SL CV PED ECHO LEFT VENTRICLE SYSTOLIC VOLUME (MOD BIPLANE) 2D: 44 ML
SODIUM SERPL-SCNC: 140 MMOL/L (ref 135–147)
TR MAX PG: 18 MMHG
TR PEAK VELOCITY: 2.1 M/S
TRICUSPID ANNULAR PLANE SYSTOLIC EXCURSION: 2.5 CM
TRICUSPID VALVE PEAK REGURGITATION VELOCITY: 2.12 M/S

## 2023-11-13 PROCEDURE — 99223 1ST HOSP IP/OBS HIGH 75: CPT | Performed by: INTERNAL MEDICINE

## 2023-11-13 PROCEDURE — 82948 REAGENT STRIP/BLOOD GLUCOSE: CPT

## 2023-11-13 PROCEDURE — 94760 N-INVAS EAR/PLS OXIMETRY 1: CPT

## 2023-11-13 PROCEDURE — 94762 N-INVAS EAR/PLS OXIMTRY CONT: CPT

## 2023-11-13 PROCEDURE — 84484 ASSAY OF TROPONIN QUANT: CPT | Performed by: INTERNAL MEDICINE

## 2023-11-13 PROCEDURE — 94640 AIRWAY INHALATION TREATMENT: CPT

## 2023-11-13 PROCEDURE — 93306 TTE W/DOPPLER COMPLETE: CPT

## 2023-11-13 PROCEDURE — 80048 BASIC METABOLIC PNL TOTAL CA: CPT | Performed by: INTERNAL MEDICINE

## 2023-11-13 PROCEDURE — 94761 N-INVAS EAR/PLS OXIMETRY MLT: CPT

## 2023-11-13 PROCEDURE — 99233 SBSQ HOSP IP/OBS HIGH 50: CPT | Performed by: INTERNAL MEDICINE

## 2023-11-13 PROCEDURE — 93306 TTE W/DOPPLER COMPLETE: CPT | Performed by: INTERNAL MEDICINE

## 2023-11-13 RX ORDER — INSULIN LISPRO 100 [IU]/ML
3 INJECTION, SOLUTION INTRAVENOUS; SUBCUTANEOUS
Status: DISCONTINUED | OUTPATIENT
Start: 2023-11-13 | End: 2023-11-14 | Stop reason: HOSPADM

## 2023-11-13 RX ORDER — INSULIN GLARGINE 100 [IU]/ML
10 INJECTION, SOLUTION SUBCUTANEOUS
Status: DISCONTINUED | OUTPATIENT
Start: 2023-11-13 | End: 2023-11-14 | Stop reason: HOSPADM

## 2023-11-13 RX ADMIN — INSULIN LISPRO 3 UNITS: 100 INJECTION, SOLUTION INTRAVENOUS; SUBCUTANEOUS at 12:17

## 2023-11-13 RX ADMIN — HEPARIN SODIUM 5000 UNITS: 5000 INJECTION INTRAVENOUS; SUBCUTANEOUS at 21:23

## 2023-11-13 RX ADMIN — INSULIN LISPRO 1 UNITS: 100 INJECTION, SOLUTION INTRAVENOUS; SUBCUTANEOUS at 22:00

## 2023-11-13 RX ADMIN — IPRATROPIUM BROMIDE 0.5 MG: 0.5 SOLUTION RESPIRATORY (INHALATION) at 07:36

## 2023-11-13 RX ADMIN — HEPARIN SODIUM 5000 UNITS: 5000 INJECTION INTRAVENOUS; SUBCUTANEOUS at 06:24

## 2023-11-13 RX ADMIN — LEVALBUTEROL HYDROCHLORIDE 1.25 MG: 1.25 SOLUTION RESPIRATORY (INHALATION) at 07:36

## 2023-11-13 RX ADMIN — IPRATROPIUM BROMIDE 0.5 MG: 0.5 SOLUTION RESPIRATORY (INHALATION) at 20:20

## 2023-11-13 RX ADMIN — INSULIN LISPRO 3 UNITS: 100 INJECTION, SOLUTION INTRAVENOUS; SUBCUTANEOUS at 16:30

## 2023-11-13 RX ADMIN — INSULIN LISPRO 3 UNITS: 100 INJECTION, SOLUTION INTRAVENOUS; SUBCUTANEOUS at 08:25

## 2023-11-13 RX ADMIN — GABAPENTIN 600 MG: 300 CAPSULE ORAL at 17:38

## 2023-11-13 RX ADMIN — INSULIN GLARGINE 10 UNITS: 100 INJECTION, SOLUTION SUBCUTANEOUS at 21:23

## 2023-11-13 RX ADMIN — INSULIN LISPRO 1 UNITS: 100 INJECTION, SOLUTION INTRAVENOUS; SUBCUTANEOUS at 16:40

## 2023-11-13 RX ADMIN — PREDNISONE 40 MG: 20 TABLET ORAL at 08:24

## 2023-11-13 RX ADMIN — GABAPENTIN 600 MG: 300 CAPSULE ORAL at 08:24

## 2023-11-13 RX ADMIN — LEVALBUTEROL HYDROCHLORIDE 1.25 MG: 1.25 SOLUTION RESPIRATORY (INHALATION) at 20:20

## 2023-11-13 RX ADMIN — OXYBUTYNIN CHLORIDE 5 MG: 5 TABLET, EXTENDED RELEASE ORAL at 08:24

## 2023-11-13 RX ADMIN — ATORVASTATIN CALCIUM 40 MG: 40 TABLET, FILM COATED ORAL at 16:38

## 2023-11-13 NOTE — CONSULTS
Pulmonary Consultation   Dima Waldrop 74 y.o. female MRN: [id number]  Unit/Bed#: S -01 Encounter: [id number]      Reason for consultation: acute hypoxemic respiratory failure    Requesting physician: Dr. Jing Raymond    Impressions/Recommendations:   Pt is a 73 yo F w/ pmhx sig for COPD of unknown severity, nicotine dependence who presented at husbands request because of altered mental status. Pt does have significant mucus production for which she is actively participating in airway clearance. The patient likely has a significant degree of chronic hypoxemia she notes she was in the 80s with ambulation at home previously. She was not on any therapies for COPD. She notes she was compliant with CPAP but did not have an associated supplemental oxygen. Acute hypoxemic respiratory failure  -  likely more chronic but unrecognized  -  qualified for 10L NC on exertion  -  using 4L NC  -  may consider rechecking prior to discharge to see if there is an improvement   -  will likely have significant difficulty leaving house with oxygen tanks    Atelectasis  -  consider chest percussive therapy   -  mucus noted in airway on CT Chest  -  encourage flutter valve  -  continue incentive spirometer    COPD w/ acute exacerbation  -  followed by outside pulmonologist  -  No PFTs available for review   -  notes transportation difficulty; may consider in office juliano at next visit  -  continue atrovent/xopenex  -  continue prednisone 40mg daily   -  may decrease by 10mg every 3 days  -  pt would benefit from nebulizer machine at home   -  may have albuterol/xopenex  -  may have laba/lama at home    FRANK  -  continue bipap qhs  -  check home overnight pulse ox   -  may need oxygen bled into system    Plan of care discussed with SHAMAR.     History of Present Illness   HPI:  Dima Waldrop is a 74 y.o. female who has a pmhx sig for tobacco abuse, COPD of unknown severity, FRANK on CPAP who presented after fall and was found to be hypoxemic. 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 She then worked for [employer] in [facility]. The patient came to the hospital because of her  concerns. She is concerned about his dementia. The patient denies any fevers, chills, nausea or vomiting. She notes that she fell when walking from the bathroom to the bedroom. She had an isolated incident of not being able to feel her legs. She denies neuropathy. She lives in a ranch style home and is able to do laundry. She is able to drive to do light shopping. She was noticing difficulty carrying groceries in to the house. She is not avoiding activities because of her breathing. She smokes about 2 cigs/week. She previously smoked 1.5ppd x for about 30 years. She drinks about 1 etoh drink per year. She denies illicit drug use. She has a yorkshire terrier. She notes significant post nasal drip. She was diagnosed with COPD. She denies any puffers/inhalers/nebulzers. She denies hospitalizations. Her last use of prednisone was about 10 years ago. She has FRANK for which she is compliant with CPAP. The patient has a pulse ox at home which she typically sees 91-92%. With exertion, her oxygen will drop to 80-86%      Review of systems:  12 point review of systems was completed and was otherwise negative except as listed in HPI.       Historical Information   Past Medical History:   Diagnosis Date    Asthma     Chronic obstructive lung disease ([facility])     Community acquired pneumonia     LAST ASSESSED: [id number]    COPD (chronic obstructive pulmonary disease) ([facility])     Diabetes mellitus ([facility])     History of MRSA infection     2008 liver sx    Liver disease     Sleep apnea     Viral warts     LAST ASSESSED: [id number]     Past Surgical History:   Procedure Laterality Date    ABDOMINAL SURGERY      ABDOMINOPLASTY      CHOLECYSTECTOMY      COSMETIC SURGERY EYE SURGERY      Bilateral cataracts 2015 Dr. Anupama Cain      FINGER SURGERY      HAND SURGERY      HYSTERECTOMY      LIVER SURGERY      IL ARTHRP INTERPOS INTERCARPAL/METACARPAL JOINTS Right 9/9/2016    Procedure: THUMB TRAPEZIECTOMY; BASAL JOINT ARTHROPLASTY WITH APL AUTOGRAFT;  Surgeon: Bairon Whalen MD;  [facility];  Service: Orthopedics    IL ARTHRP INTERPOS INTERCARPAL/METACARPAL JOINTS Left 5/29/2018    Procedure: Sabrina Setters;  Surgeon: Viri De Leon MD;  [facility];  Service: Orthopedics    IL TENDON SHEATH INCISION Right 7/23/2019    Procedure: RELEASE TRIGGER FINGER RIGHT LONG;  Surgeon: Viri De Leon MD;  [facility];  Service: Orthopedics    IL TENDON SHEATH INCISION Left 8/6/2019    Procedure: RELEASE TRIGGER FINGER LEFT INDEX;  Surgeon: Viri De Leon MD;  [facility];  Service: Orthopedics    IL TENDON SHEATH INCISION Right 6/16/2020    Procedure: RELEASE TRIGGER FINGER, right ring;  Surgeon: Viri De Leon MD;  [facility];  Service: Orthopedics    IL TR/TRNSPL TDN CARP/MTCRPL HAND W/O FR GRF EA TDN Left 5/29/2018    Procedure: TRANSFER TENDON HAND/WRIST FCR from forearm to wrist;  Surgeon: Viri De Leon MD;  [facility];  Service: Orthopedics     Family History   Problem Relation Age of Onset    Heart attack Mother     Heart attack Father     Ovarian cancer Sister     Basal cell carcinoma Brother     Heart disease Other         CARDIAC DISORDER    Diabetes Other     Liver cancer Other     Breast cancer Other     Stomach cancer Other     Heart disease Family     Diabetes Family     Thyroid disease Family     Ovarian cancer Family      Social History     Socioeconomic History    Marital status: /Civil Union     Spouse name: Not on file    Number of children: Not on file    Years of education: Not on file    Highest education level: Not on file   Occupational History    Not on file   Tobacco Use Smoking status: Every Day     Packs/day: 0.50     Types: Cigarettes    Smokeless tobacco: Never   Vaping Use    Vaping Use: Never used   Substance and Sexual Activity    Alcohol use: Yes     Comment: very seldom    Drug use: No    Sexual activity: Not Currently   Other Topics Concern    Not on file   Social History Narrative    Not on file     Social Determinants of Health     Financial Resource Strain: Low Risk  (8/29/2023)    Overall Financial Resource Strain (CARDIA)     Difficulty of Paying Living Expenses: Not very hard   Food Insecurity: No Food Insecurity (11/12/2023)    Hunger Vital Sign     Worried About Running Out of Food in the Last Year: Never true     Ran Out of Food in the Last Year: Never true   Transportation Needs: No Transportation Needs (11/12/2023)    PRAPARE - Transportation     Lack of Transportation (Medical): No     Lack of Transportation (Non-Medical):  No   Physical Activity: Not on file   Stress: Not on file   Social Connections: Not on file   Intimate Partner Violence: Not on file   Housing Stability: Low Risk  (11/12/2023)    Housing Stability Vital Sign     Unable to Pay for Housing in the Last Year: No     Number of Places Lived in the Last Year: 1     Unstable Housing in the Last Year: No           Meds/Allergies   Current Facility-Administered Medications   Medication Dose Route Frequency    albuterol (PROVENTIL HFA,VENTOLIN HFA) inhaler 2 puff  2 puff Inhalation Q4H PRN    atorvastatin (LIPITOR) tablet 40 mg  40 mg Oral Daily With Dinner    gabapentin (NEURONTIN) capsule 600 mg  600 mg Oral BID    heparin (porcine) subcutaneous injection 5,000 Units  5,000 Units Subcutaneous Q8H [address]    insulin glargine (LANTUS) subcutaneous injection 10 Units 0.1 mL  10 Units Subcutaneous HS    insulin lispro (HumaLOG) 100 units/mL subcutaneous injection 1-5 Units  1-5 Units Subcutaneous HS    insulin lispro (HumaLOG) 100 units/mL subcutaneous injection 1-6 Units  1-6 Units Subcutaneous TID AC insulin lispro (HumaLOG) 100 units/mL subcutaneous injection 3 Units  3 Units Subcutaneous TID With Meals    ipratropium (ATROVENT) 0.02 % inhalation solution 0.5 mg  0.5 mg Nebulization TID    levalbuterol (XOPENEX) inhalation solution 1.25 mg  1.25 mg Nebulization TID    oxybutynin (DITROPAN-XL) 24 hr tablet 5 mg  5 mg Oral Daily    predniSONE tablet 40 mg  40 mg Oral Daily     Medications Prior to Admission   Medication    diphenhydrAMINE HCl (BENADRYL PO)    gabapentin (Neurontin) 600 MG tablet    glipiZIDE (GLUCOTROL XL) 5 mg 24 hr tablet    glipiZIDE (GLUCOTROL) 5 mg tablet    glucose blood (ACCU-CHEK JESS PLUS) test strip    hydrochlorothiazide (HYDRODIURIL) 25 mg tablet    IBUPROFEN PO    lisinopril (ZESTRIL) 40 mg tablet    metFORMIN (GLUCOPHAGE) 1000 MG tablet    Multiple Vitamins-Minerals (OCUVITE ADULT FORMULA PO)    rosuvastatin (CRESTOR) 20 MG tablet    tolterodine (DETROL LA) 2 mg 24 hr capsule     No Known Allergies    Vitals: Blood pressure 134/56, pulse 62, temperature 98.2 °F (36.8 °C), temperature source Oral, resp. rate 18, height 5' 1" (1.549 m), weight 95.7 kg (211 lb), SpO2 92 %. , 4L NC, Body mass index is 39.87 kg/m².       Intake/Output Summary (Last 24 hours) at 11/13/2023 1544  Last data filed at 11/13/2023 0310  Gross per 24 hour   Intake --   Output 1100 ml   Net -1100 ml       Physical Exam  General: Pleasant, Awake alert and oriented x 3, conversant without conversational dyspnea, NAD, normal affect  HEENT:  PERRL, Sclera noninjected, nonicteric OU, Nares patent, no nasal flaring, no nasal drainage, Mucous membranes, moist, no oral lesions, normal dentition  NECK: Trachea midline, no accessory muscle use, no stridor, no cervical or supraclavicular adenopathy, JVP not elevated  CARDIAC: Reg, single s1/S2, no m/r/g  PULM: distant breath sounds  CHEST: No gross deformities, equal chest expansion on inspiration bilaterally  ABD: Normoactive bowel sounds, soft nontender, nondistended, no rebound, no rigidity, no guarding, obese  EXT: No cyanosis, no clubbing, no edema, normal capillary refill  SKIN:  No rashes, no lesions  NEURO: no focal neurologic deficits, AAOx3, moving all extremities appropriately    Labs: I have personally reviewed pertinent lab results. Results from last 7 days   Lab Units 11/12/23  0805 11/12/23 0455 11/11/23 2025   WBC Thousand/uL  --  8.78 6.90   HEMOGLOBIN g/dL  --  12.2 12.7   HEMATOCRIT %  --  43.1 45.2   PLATELETS Thousands/uL 164 160 138*   NEUTROS PCT %  --  70 72   MONOS PCT %  --  11 10   EOS PCT %  --  2 1      Results from last 7 days   Lab Units 11/13/23  0451 11/12/23 0455 11/11/23 2025   POTASSIUM mmol/L 5.0 4.8 5.1   CHLORIDE mmol/L 106 107 109*   CO2 mmol/L 28 31 25   BUN mg/dL 38* 44* 47*   CREATININE mg/dL 1.29 1.46* 1.37*   CALCIUM mg/dL 9.0 8.8 8.6   ALK PHOS U/L  --   --  60   ALT U/L  --   --  4*   AST U/L  --   --  11*     Results from last 7 days   Lab Units 11/11/23 2025   MAGNESIUM mg/dL 2.8*                  No results found for: "TROPONINI"    Imaging and other studies: I have personally reviewed pertinent reports. and I have personally reviewed pertinent films in PACS  CT Chest 11/11/2023:  significant secretions in airway  LUNGS: Scattered atelectasis throughout both lungs. .  There is no tracheal or endobronchial lesion. PLEURA:  Unremarkable. HEART/GREAT VESSELS: Heavy atherosclerotic coronary artery calcification is noted. No thoracic aortic aneurysm. MEDIASTINUM AND EARLE:  Unremarkable. CHEST WALL AND LOWER NECK:  Unremarkable. VISUALIZED STRUCTURES IN THE UPPER ABDOMEN: Status post partial hepatectomy. OSSEOUS STRUCTURES:  No acute fracture or destructive osseous lesion. Pulmonary function testing:  No pulmonary function testing available for review. EKG, Pathology, and Other Studies: I have personally reviewed pertinent reports.    and I have personally reviewed pertinent films in PACS  11/13/2023:  Left Ventricle Left ventricular cavity size is normal. Wall thickness is mildly increased. There is mild concentric hypertrophy. The left ventricular ejection fraction is 60%. Systolic function is normal. Wall motion is normal. Diastolic function is normal.   Right Ventricle Right ventricular cavity size is normal. Systolic function is normal. Wall thickness is normal.   Left Atrium The atrium is dilated. Right Atrium The atrium is normal in size. Aortic Valve The aortic valve is trileaflet. The leaflets are not thickened. The leaflets are mildly calcified. The leaflets exhibit normal mobility. There is no evidence of regurgitation. There is aortic valve sclerosis. Mitral Valve There is mild annular calcification. There is trace regurgitation. There is no evidence of stenosis. Tricuspid Valve Tricuspid valve structure is normal. There is trace regurgitation. There is no evidence of stenosis. Pulmonic Valve Pulmonic valve structure is normal. There is trace regurgitation. There is no evidence of stenosis. Ascending Aorta The aortic root is normal in size. IVC/SVC The inferior vena cava is normal in size. Respirophasic changes were normal.   Pericardium There is no pericardial effusion. The pericardium is normal in appearance. Code Status: Level 2 - DNAR: but accepts endotracheal intubation    Vee Velasquez.  [facility], [address] [facility]

## 2023-11-13 NOTE — RESPIRATORY THERAPY NOTE
Home Oxygen Qualifying Test     Patient name: Kacey Harkins        : 1950   Date of Test:  2023  Diagnosis:    Home Oxygen Test:    **Medicare Guidelines require item(s) 1-5 on all ambulatory patients or 1 and 2 on non-ambulatory patients. 1. Baseline SPO2 on Room Air at rest 75 %   If <= 88% on Room Air add O2 via NC to obtain SpO2 >=88%. If LPM needed, document LPM 4 needed to reach =>88%    SPO2 during exertion on Room Air 75  %  During exertion monitor SPO2. If SPO2 increases >=89%, do not add supplemental oxygen    SPO2 on Oxygen at Rest 92 % at 4 LPM    SPO2 during exertion on Oxygen 90 % at 10 LPM    Test performed during exertion activity. [x]  Supplemental Home Oxygen is indicated. []  Client does not qualify for home oxygen. Respiratory Additional Notes- patient ambulated around halls for 6 minutes without difficulty, pt denies SOB or increased WOB, pt requires 4LPM at rest and 10LPM during ambulation, or 7. 5LPM on Darion Mc

## 2023-11-13 NOTE — PROGRESS NOTES
[address]  Progress Note  Name: Taina Danielle  MRN: [id number]  Unit/Bed#: S -01 I Date of Admission: 11/11/2023   Date of Service: 11/13/2023 I Hospital Day: 2    Assessment/Plan   * Acute respiratory failure with hypoxia [facility])  Assessment & Plan  Presented feeling "off, goofy" and was found to have pulse ox of 75% on RA. Suspect multifactorial in the setting of atelectasis, severe FRANK, COPD (hx of tobacco abuse) and obesity. R/o cardiac etiology. CT chest negative for PE, scattered atelectasis throughout both lungs. .    Flu, COVID and RSV negative. Troponin elevated to 62, succeeding delta was normal and BNP elevated to 433. Patient denies any recent chest pain and does not have a history of CHF, appears euvolemic on exam.   Echocardiogram done today revealed normal LVEF. Normal systolic and diastolic function. Left atrium is dilated up. Aortic valve sclerosis. Mild mitral valve annular calcification. Pulse ox stable on 4-6L NC oxygen when I saw the patient today; did not require oxygen at baseline. Wean as tolerated. Encourage IS/flutter valve. Respiratory protocol. Pulmonologist on board. Titrate oxygen just to keep oxygen saturations 89% and above. May need another home oxygen study. Please see plans under COPD. COPD (chronic obstructive pulmonary disease) (HCC)  Assessment & Plan  COPD with acute exacerbation and history of tobacco abuse. Reports that she does not use any inhaler on a daily basis. Initially, on admission, admitting provider did not find the patient having acute exacerbation. However, when I saw the patient, I appreciated wheezing bilaterally on examination. Thus yesterday, patient was started on round-the-clock Xopenex and Atrovent nebulizations and oral prednisone.   Today, patient was initially adamant to go home as he has to take care of of her  who has cognitive impairment, and still on oxygen, a home oxygen study was done which revealed that patient will need 4 L of oxygen at rest and 10 L of oxygen on ambulation or 7.5 L on Oxymizer. Thus, due to high needs of oxygen and patient was not on oxygen prior to coming here, patient was eventually okay to stay. I consulted our pulmonologist and discussed the case with him. As per pulmonologist, continue Atrovent and Xopenex nebulizations and prednisone. Patient would benefit from nebulizer machine at home and may continue with albuterol or Xopenex at home and may have LABA/LAMA at home. It is important to note, earlier today, with patient being adamant to go home, I initially contacted patient's primary care physician, so that patient will be scheduled to be seen in the outpatient as soon as possible. From my discussion with the PCP, I was told that patient used to see Dr. Vanessa Brown, pulmonologist, but scheduled to see Dr. Alton Curiel in the future. I then spoke to Dr. Alton Curiel, who turned out to be a sleep specialist and not practicing as a pulmonologist.  Thus, I eventually called Dr. Perlita Gong, and discussed about his patient and he told me that he will schedule the patient as soon as possible, likely this week, for a follow-up with him. Continue albuterol prn. Respiratory protocol. Obstructive sleep apnea of adult  Assessment & Plan  Continue CPAP HS. This morning, patient told me that she had a rough time with our CPAP and it was noisy. Thus, she claimed that she did not get benefit from our CPAP here. I discussed this with the patient's nurse, respiratory therapist and our pulmonologist.    CKD (chronic kidney disease)  Assessment & Plan  Lab Results   Component Value Date    EGFR 40 11/13/2023    EGFR 34 11/12/2023    EGFR 37 11/11/2023    CREATININE 1.29 11/13/2023    CREATININE 1.46 (H) 11/12/2023    CREATININE 1.37 (H) 11/11/2023     Baseline Cr 0.80-1.1. Follows with nephrology as an outpatient. Cr above baseline on presentation. Today, better.   Hold HCTZ and lisinopril. Avoid nephrotoxins and hypotension. Monitor intake/output. Repeat BMP in AM.    Elevated troponin  Assessment & Plan  Likely non-MI troponin elevation in the setting of hypoxia. Initial troponin 62, but succeeding delta was negative. EKG: NSR, RBBB. Denies chest pain. Monitor on telemetry. Echocardiogram done today, please see results under acute respiratory failure. Essential hypertension  Assessment & Plan  BP acceptable. Hold HCTZ and lisinopril given elevated Cr on admission and yesterday. Restart once blood pressures are persistently high. Chronic pain syndrome  Assessment & Plan  Chronic low back pain for which she follows with pain management. Continue gabapentin  Not currently taking any opioids. Obesity (BMI 30-39. 9)  Assessment & Plan  BMI 37.62. Recommend diet, lifestyle modifications. Type 2 diabetes mellitus with hyperglycemia, without long-term current use of insulin [facility])  Assessment & Plan  Lab Results   Component Value Date    HGBA1C 6.8 (H) 07/17/2023       Recent Labs     11/12/23  2135 11/13/23  0746 11/13/23  1154 11/13/23  1550   POCGLU 232* 119 235* 179*       Blood Sugar Average: Last 72 hrs:  (P) [id number]  Hold glipizide and metformin while inpatient. Monitor accu-checks AC and HS. SSI coverage. With increasing blood sugars likely secondary to steroid, basal bolus insulin treatment started today. Hypoglycemia protocol. Adjust treatment accordingly. VTE Pharmacologic Prophylaxis: VTE Score: 6 High Risk (Score >/= 5) - Pharmacological DVT Prophylaxis Ordered: heparin. Sequential Compression Devices Ordered. Patient Centered Rounds: I performed bedside rounds with nursing staff today. Discussions with Specialists or Other Care Team Provider: On-call pulmonologist.  Patient's outpatient pulmonologist.  Patient's outpatient sleep medicine doctor. Patient's primary care physician. Case management.     Education and Discussions with Family / Patient: Patient declined call to . Total Time Spent on Date of Encounter in care of patient: Time was spent on one or more of the following: performing physical exam; counseling and coordination of care; obtaining or reviewing history; documenting in the medical record; reviewing/ordering tests, medications or procedures; communicating with other healthcare professionals and discussing with patient's family/caregivers. Current Length of Stay: 2 day(s)  Current Patient Status: Inpatient   Certification Statement: The patient will continue to require additional inpatient hospital stay due to above findings and plans. Discharge Plan: Anticipate discharge in 24-48 hrs to home. Code Status: Level 2 - DNAR: but accepts endotracheal intubation    Subjective:   Patient was seen and examined this morning and was again seen this afternoon. Initially, this morning, patient was adamant to be discharged as she told me that she needs to take care of of her  who has cognitive impairment. Patient denied shortness of breath, however, patient was still on oxygen at the time I saw her at 5 L by nasal cannula. We tried lowering it down and when it went to 3 L on, patient's oxygen saturation went down to 88%. Eventually, with patient being really adamant to go home, a home oxygen study was done which revealed high needs for oxygen at home particularly on exertion at 10 L. Thus, this was discussed with the patient and she was okay to stay. Patient has occasional coughing, but not as bad as before. Patient denied any chest pains or any other pains. No nausea or vomiting. No fever or chills. No dizziness. Earlier this morning, patient complains of her CPAP not working overnight and was noisy. Thus I spoke to respiratory therapist, her nurse and on-call pulmonologist about this.       Objective:     Vitals:   Temp (24hrs), Av.2 °F (36.8 °C), Min:98.2 °F (36.8 °C), Max:98.2 °F (36.8 °C)    Temp:  [98.2 °F (36.8 °C)] 98.2 °F (36.8 °C)  HR:  [62-85] 85  Resp:  [18] 18  BP: (125-136)/(56-71) 125/71  SpO2:  [90 %-92 %] 90 %  Body mass index is 39.87 kg/m². Input and Output Summary (last 24 hours): Intake/Output Summary (Last 24 hours) at 11/13/2023 2054  Last data filed at 11/13/2023 0310  Gross per 24 hour   Intake --   Output 400 ml   Net -400 ml       Physical Exam:   Physical Exam  Vitals and nursing note reviewed. Constitutional:       General: She is not in acute distress. Appearance: She is obese. She is not ill-appearing, toxic-appearing or diaphoretic. Cardiovascular:      Rate and Rhythm: Normal rate and regular rhythm. Heart sounds: Normal heart sounds. Pulmonary:      Effort: Pulmonary effort is normal. No respiratory distress. Breath sounds: No stridor. Wheezing present. No rhonchi or rales. Abdominal:      General: Bowel sounds are normal. There is no distension. Palpations: Abdomen is soft. Tenderness: There is no abdominal tenderness. Musculoskeletal:      Right lower leg: No edema. Left lower leg: No edema. Skin:     Findings: No lesion or rash. Neurological:      General: No focal deficit present. Mental Status: She is alert and oriented to person, place, and time. Mental status is at baseline. Psychiatric:         Mood and Affect: Mood normal.         Behavior: Behavior normal.         Thought Content:  Thought content normal.            Additional Data:     Labs:  Results from last 7 days   Lab Units 11/12/23  0805 11/12/23 0455   WBC Thousand/uL  --  8.78   HEMOGLOBIN g/dL  --  12.2   HEMATOCRIT %  --  43.1   PLATELETS Thousands/uL 164 160   NEUTROS PCT %  --  70   LYMPHS PCT %  --  16   MONOS PCT %  --  11   EOS PCT %  --  2     Results from last 7 days   Lab Units 11/13/23 0451 11/12/23 0455 11/11/23 2025   SODIUM mmol/L 140   < > 142   POTASSIUM mmol/L 5.0   < > 5.1   CHLORIDE mmol/L 106   < > 109*   CO2 mmol/L 28   < > 25   BUN mg/dL 38*   < > 47*   CREATININE mg/dL 1.29   < > 1.37*   ANION GAP mmol/L 6   < > 8   CALCIUM mg/dL 9.0   < > 8.6   ALBUMIN g/dL  --   --  3.4*   TOTAL BILIRUBIN mg/dL  --   --  0.25   ALK PHOS U/L  --   --  60   ALT U/L  --   --  4*   AST U/L  --   --  11*   GLUCOSE RANDOM mg/dL 138   < > 127    < > = values in this interval not displayed.          Results from last 7 days   Lab Units 11/13/23  1550 11/13/23  1154 11/13/23  0746 11/12/23  2135 11/12/23  1658 11/12/23  1131 11/12/23  0742   POC GLUCOSE mg/dl 179* 235* 119 232* 220* 194* 141*               Lines/Drains:  Invasive Devices       Peripheral Intravenous Line  Duration             Peripheral IV 11/11/23 Left Forearm 2 days                          Imaging: Reviewed radiology reports from this admission including: chest CT scan    Recent Cultures (last 7 days):         Last 24 Hours Medication List:   Current Facility-Administered Medications   Medication Dose Route Frequency Provider Last Rate    albuterol  2 puff Inhalation Q4H PRN Reno Davalos MD      atorvastatin  40 mg Oral Daily With Taodyne Ayah Guillen PA-C      gabapentin  600 mg Oral BID [employer], PA-C      heparin (porcine)  5,000 Units Subcutaneous [facility] [address] [facility], PA-C      insulin glargine  10 Units Subcutaneous HS Ja Chavez MD      insulin lispro  1-5 Units Subcutaneous HS [employer], PA-C      insulin lispro  1-6 Units Subcutaneous TID AC [employer], PA-C      insulin lispro  3 Units Subcutaneous TID With Meals Reno Chavez MD      ipratropium  0.5 mg Nebulization TID Ja Chavez MD      levalbuterol  1.25 mg Nebulization TID Ja Chavez MD      oxybutynin  5 mg Oral Daily [employer], PA-C      predniSONE  40 mg Oral Daily Ja Chavez MD          Today, Patient Was Seen By: Ja Chavez, MD    **Please Note: This note may have been constructed using a voice recognition system. **

## 2023-11-13 NOTE — PLAN OF CARE
Problem: Potential for Falls  Goal: Patient will remain free of falls  Description: INTERVENTIONS:  - Educate patient/family on patient safety including physical limitations  - Instruct patient to call for assistance with activity   - Consult OT/PT to assist with strengthening/mobility   - Keep Call bell within reach  - Keep bed low and locked with side rails adjusted as appropriate  - Keep care items and personal belongings within reach  - Initiate and maintain comfort rounds  - Make Fall Risk Sign visible to staff  - Offer Toileting every 2 Hours, in advance of need  - Initiate/Maintain bed alarm  - Obtain necessary fall risk management equipment  - Apply yellow socks and bracelet for high fall risk patients  - Consider moving patient to room near nurses station  Outcome: Progressing     Problem: MOBILITY - ADULT  Goal: Maintain or return to baseline ADL function  Description: INTERVENTIONS:  -  Assess patient's ability to carry out ADLs; assess patient's baseline for ADL function and identify physical deficits which impact ability to perform ADLs (bathing, care of mouth/teeth, toileting, grooming, dressing, etc.)  - Assess/evaluate cause of self-care deficits   - Assess range of motion  - Assess patient's mobility; develop plan if impaired  - Assess patient's need for assistive devices and provide as appropriate  - Encourage maximum independence but intervene and supervise when necessary  - Involve family in performance of ADLs  - Assess for home care needs following discharge   - Consider OT consult to assist with ADL evaluation and planning for discharge  - Provide patient education as appropriate  Outcome: Progressing  Goal: Maintains/Returns to pre admission functional level  Description: INTERVENTIONS:  - Perform BMAT or MOVE assessment daily.   - Set and communicate daily mobility goal to care team and patient/family/caregiver.    - Collaborate with rehabilitation services on mobility goals if consulted  - Out of bed for toileting  - Record patient progress and toleration of activity level   Outcome: Progressing     Problem: RESPIRATORY - ADULT  Goal: Achieves optimal ventilation and oxygenation  Description: INTERVENTIONS:  - Assess for changes in respiratory status  - Assess for changes in mentation and behavior  - Position to facilitate oxygenation and minimize respiratory effort  - Oxygen administered by appropriate delivery if ordered  - Initiate smoking cessation education as indicated  - Encourage broncho-pulmonary hygiene including cough, deep breathe, Incentive Spirometry  - Assess the need for suctioning and aspirate as needed  - Assess and instruct to report SOB or any respiratory difficulty  - Respiratory Therapy support as indicated  Outcome: Progressing     Problem: HEMATOLOGIC - ADULT  Goal: Maintains hematologic stability  Description: INTERVENTIONS  - Assess for signs and symptoms of bleeding or hemorrhage  - Monitor labs  - Administer supportive blood products/factors as ordered and appropriate  Outcome: Progressing

## 2023-11-14 VITALS
WEIGHT: 211 LBS | TEMPERATURE: 98.3 F | RESPIRATION RATE: 18 BRPM | DIASTOLIC BLOOD PRESSURE: 83 MMHG | BODY MASS INDEX: 39.84 KG/M2 | HEIGHT: 61 IN | HEART RATE: 71 BPM | OXYGEN SATURATION: 91 % | SYSTOLIC BLOOD PRESSURE: 142 MMHG

## 2023-11-14 PROBLEM — G47.33 OSA (OBSTRUCTIVE SLEEP APNEA): Status: ACTIVE | Noted: 2023-11-14

## 2023-11-14 PROBLEM — N18.30 STAGE 3 CHRONIC KIDNEY DISEASE (HCC): Status: ACTIVE | Noted: 2023-11-12

## 2023-11-14 LAB
ANION GAP SERPL CALCULATED.3IONS-SCNC: 2 MMOL/L
BUN SERPL-MCNC: 38 MG/DL (ref 5–25)
CALCIUM SERPL-MCNC: 9.2 MG/DL (ref 8.4–10.2)
CHLORIDE SERPL-SCNC: 101 MMOL/L (ref 96–108)
CO2 SERPL-SCNC: 36 MMOL/L (ref 21–32)
CREAT SERPL-MCNC: 1.27 MG/DL (ref 0.6–1.3)
DME PARACHUTE DELIVERY DATE ACTUAL: NORMAL
DME PARACHUTE DELIVERY DATE EXPECTED: NORMAL
DME PARACHUTE DELIVERY DATE REQUESTED: NORMAL
DME PARACHUTE ITEM DESCRIPTION: NORMAL
DME PARACHUTE ORDER STATUS: NORMAL
DME PARACHUTE SUPPLIER NAME: NORMAL
DME PARACHUTE SUPPLIER PHONE: NORMAL
GFR SERPL CREATININE-BSD FRML MDRD: 41 ML/MIN/1.73SQ M
GLUCOSE SERPL-MCNC: 122 MG/DL (ref 65–140)
GLUCOSE SERPL-MCNC: 131 MG/DL (ref 65–140)
GLUCOSE SERPL-MCNC: 176 MG/DL (ref 65–140)
POTASSIUM SERPL-SCNC: 4.4 MMOL/L (ref 3.5–5.3)
SODIUM SERPL-SCNC: 139 MMOL/L (ref 135–147)

## 2023-11-14 PROCEDURE — 80048 BASIC METABOLIC PNL TOTAL CA: CPT | Performed by: INTERNAL MEDICINE

## 2023-11-14 PROCEDURE — 99239 HOSP IP/OBS DSCHRG MGMT >30: CPT | Performed by: PHYSICIAN ASSISTANT

## 2023-11-14 PROCEDURE — 94760 N-INVAS EAR/PLS OXIMETRY 1: CPT

## 2023-11-14 PROCEDURE — 82948 REAGENT STRIP/BLOOD GLUCOSE: CPT

## 2023-11-14 PROCEDURE — 94660 CPAP INITIATION&MGMT: CPT

## 2023-11-14 PROCEDURE — 99232 SBSQ HOSP IP/OBS MODERATE 35: CPT | Performed by: INTERNAL MEDICINE

## 2023-11-14 PROCEDURE — 94640 AIRWAY INHALATION TREATMENT: CPT

## 2023-11-14 RX ORDER — GABAPENTIN 300 MG/1
600 CAPSULE ORAL 2 TIMES DAILY
Refills: 0
Start: 2023-11-14

## 2023-11-14 RX ORDER — ALBUTEROL SULFATE 2.5 MG/3ML
2.5 SOLUTION RESPIRATORY (INHALATION) EVERY 6 HOURS PRN
Qty: 100 ML | Refills: 0 | Status: SHIPPED | OUTPATIENT
Start: 2023-11-14

## 2023-11-14 RX ORDER — PREDNISONE 20 MG/1
TABLET ORAL
Qty: 15 TABLET | Refills: 0 | Status: SHIPPED | OUTPATIENT
Start: 2023-11-15 | End: 2023-11-27

## 2023-11-14 RX ORDER — LISINOPRIL 40 MG/1
20 TABLET ORAL DAILY
Qty: 90 TABLET | Refills: 0
Start: 2023-11-14

## 2023-11-14 RX ORDER — FLUTICASONE FUROATE, UMECLIDINIUM BROMIDE AND VILANTEROL TRIFENATATE 200; 62.5; 25 UG/1; UG/1; UG/1
1 POWDER RESPIRATORY (INHALATION) DAILY
Qty: 60 BLISTER | Refills: 0 | Status: SHIPPED | OUTPATIENT
Start: 2023-11-14 | End: 2023-12-14

## 2023-11-14 RX ORDER — ALBUTEROL SULFATE 90 UG/1
2 AEROSOL, METERED RESPIRATORY (INHALATION) EVERY 4 HOURS PRN
Qty: 8 G | Refills: 0 | Status: SHIPPED | OUTPATIENT
Start: 2023-11-14

## 2023-11-14 RX ADMIN — IPRATROPIUM BROMIDE 0.5 MG: 0.5 SOLUTION RESPIRATORY (INHALATION) at 14:40

## 2023-11-14 RX ADMIN — LEVALBUTEROL HYDROCHLORIDE 1.25 MG: 1.25 SOLUTION RESPIRATORY (INHALATION) at 14:40

## 2023-11-14 RX ADMIN — HEPARIN SODIUM 5000 UNITS: 5000 INJECTION INTRAVENOUS; SUBCUTANEOUS at 05:48

## 2023-11-14 RX ADMIN — OXYBUTYNIN CHLORIDE 5 MG: 5 TABLET, EXTENDED RELEASE ORAL at 08:37

## 2023-11-14 RX ADMIN — LEVALBUTEROL HYDROCHLORIDE 1.25 MG: 1.25 SOLUTION RESPIRATORY (INHALATION) at 07:36

## 2023-11-14 RX ADMIN — INSULIN LISPRO 3 UNITS: 100 INJECTION, SOLUTION INTRAVENOUS; SUBCUTANEOUS at 11:41

## 2023-11-14 RX ADMIN — INSULIN LISPRO 3 UNITS: 100 INJECTION, SOLUTION INTRAVENOUS; SUBCUTANEOUS at 08:34

## 2023-11-14 RX ADMIN — GABAPENTIN 600 MG: 300 CAPSULE ORAL at 08:37

## 2023-11-14 RX ADMIN — PREDNISONE 40 MG: 20 TABLET ORAL at 08:37

## 2023-11-14 RX ADMIN — INSULIN LISPRO 1 UNITS: 100 INJECTION, SOLUTION INTRAVENOUS; SUBCUTANEOUS at 11:40

## 2023-11-14 RX ADMIN — IPRATROPIUM BROMIDE 0.5 MG: 0.5 SOLUTION RESPIRATORY (INHALATION) at 07:36

## 2023-11-14 NOTE — PLAN OF CARE
Problem: Potential for Falls  Goal: Patient will remain free of falls  Description: INTERVENTIONS:  - Educate patient/family on patient safety including physical limitations  - Instruct patient to call for assistance with activity   - Consult OT/PT to assist with strengthening/mobility   - Keep Call bell within reach  - Keep bed low and locked with side rails adjusted as appropriate  - Keep care items and personal belongings within reach  - Initiate and maintain comfort rounds  - Make Fall Risk Sign visible to staff  - Offer Toileting every  Hours, in advance of need  - Initiate/Maintain alarm  - Obtain necessary fall risk management equipment:   - Apply yellow socks and bracelet for high fall risk patients  - Consider moving patient to room near nurses station  Outcome: Progressing     Problem: MOBILITY - ADULT  Goal: Maintain or return to baseline ADL function  Description: INTERVENTIONS:  -  Assess patient's ability to carry out ADLs; assess patient's baseline for ADL function and identify physical deficits which impact ability to perform ADLs (bathing, care of mouth/teeth, toileting, grooming, dressing, etc.)  - Assess/evaluate cause of self-care deficits   - Assess range of motion  - Assess patient's mobility; develop plan if impaired  - Assess patient's need for assistive devices and provide as appropriate  - Encourage maximum independence but intervene and supervise when necessary  - Involve family in performance of ADLs  - Assess for home care needs following discharge   - Consider OT consult to assist with ADL evaluation and planning for discharge  - Provide patient education as appropriate  Outcome: Progressing  Goal: Maintains/Returns to pre admission functional level  Description: INTERVENTIONS:  - Perform BMAT or MOVE assessment daily.   - Set and communicate daily mobility goal to care team and patient/family/caregiver.    - Collaborate with rehabilitation services on mobility goals if consulted  - Perform Range of Motion  times a day. - Reposition patient every  hours.   - Dangle patient  times a day  - Stand patient  times a day  - Ambulate patient  times a day  - Out of bed to chair  times a day   - Out of bed for meals times a day  - Out of bed for toileting  - Record patient progress and toleration of activity level   Outcome: Progressing     Problem: RESPIRATORY - ADULT  Goal: Achieves optimal ventilation and oxygenation  Description: INTERVENTIONS:  - Assess for changes in respiratory status  - Assess for changes in mentation and behavior  - Position to facilitate oxygenation and minimize respiratory effort  - Oxygen administered by appropriate delivery if ordered  - Initiate smoking cessation education as indicated  - Encourage broncho-pulmonary hygiene including cough, deep breathe, Incentive Spirometry  - Assess the need for suctioning and aspirate as needed  - Assess and instruct to report SOB or any respiratory difficulty  - Respiratory Therapy support as indicated  Outcome: Progressing     Problem: HEMATOLOGIC - ADULT  Goal: Maintains hematologic stability  Description: INTERVENTIONS  - Assess for signs and symptoms of bleeding or hemorrhage  - Monitor labs  - Administer supportive blood products/factors as ordered and appropriate  Outcome: Progressing

## 2023-11-14 NOTE — DISCHARGE INSTR - AVS FIRST PAGE
Dear Gloria Funes,     It was our pleasure to care for you here at [facility] [place]. It is our hope that we were always able to exceed the expected standards for your care during your stay. You were hospitalized due to acute hypoxic respiratory failure. You were cared for on the 3rd floor by Mare Kiser PA-C under the service of Shea Carpenter MD with the [address] [facility] who covers for your primary care physician (PCP), Cipriano Kramer MD, while you were hospitalized. If you have any questions or concerns related to this hospitalization, you may contact us at [phone]. For follow up as well as any medication refills, we recommend that you follow up with your primary care physician. A registered nurse will reach out to you by phone within a few days after your discharge to answer any additional questions that you may have after going home. However, at this time we provide for you here, the most important instructions / recommendations at discharge:     Notable Medication Adjustments -   Albuterol inhaler or nebulizer as needed for shortness of breath  Trelegy inhaler every day as directed  Prednisone 40 mg daily for 3 days then decrease by 10 mg every 3 days until off  Stop hydrochlorothiazide  Stop all NSAID medications  Decrease lisinopril to 20 mg daily  Testing Required after Discharge -   BMP in 1 week  Lung function tests  Sleep study  ** Please contact your PCP to request testing orders for any of the testing recommended here **  Important follow up information -   Pulmonology  Family doctor  Other Instructions -   Diabetic diet  Wear the oxygen as directed  Please review this entire after visit summary as additional general instructions including medication list, appointments, activity, diet, any pertinent wound care, and other additional recommendations from your care team that may be provided for you.       Sincerely,     Mare Kiser PA-C

## 2023-11-14 NOTE — ASSESSMENT & PLAN NOTE
Presented feeling "off, goofy" and was found to have pulse ox of 75% on RA. Suspect multifactorial in the setting of atelectasis, severe FRANK, COPD (hx of tobacco abuse) and obesity. R/o cardiac etiology. CT chest negative for PE, scattered atelectasis throughout both lungs. .    Flu, COVID and RSV negative. Troponin elevated to 62, succeeding delta was normal and BNP elevated to 433. Patient denies any recent chest pain and does not have a history of CHF, appears euvolemic on exam.   Echocardiogram done today revealed normal LVEF. Normal systolic and diastolic function. Left atrium is dilated up. Aortic valve sclerosis. Mild mitral valve annular calcification. Pulse ox stable on 4-6L NC oxygen when I saw the patient today; did not require oxygen at baseline. Wean as tolerated. Encourage IS/flutter valve. Respiratory protocol. Pulmonologist on board. Titrate oxygen just to keep oxygen saturations 89% and above. May need another home oxygen study. Please see plans under COPD.

## 2023-11-14 NOTE — ASSESSMENT & PLAN NOTE
Likely non-MI troponin elevation in the setting of hypoxia. Initial troponin 62, but succeeding delta was negative. EKG: NSR, RBBB. Denies chest pain.    No regional wall motion abnormalities on echocardiogram

## 2023-11-14 NOTE — ASSESSMENT & PLAN NOTE
Lab Results   Component Value Date    HGBA1C 6.8 (H) 07/17/2023       Recent Labs     11/13/23  1550 11/13/23  2047 11/14/23  0750 11/14/23  1108   POCGLU 179* 198* 122 176*         Blood Sugar Average: Last 72 hrs:  (P) 181.6  Hold glipizide and metformin while inpatient.   Resume at discharge  Due for repeat A1c which can be performed outpatient

## 2023-11-14 NOTE — ASSESSMENT & PLAN NOTE
BP reviewed  Held HCTZ and lisinopril given elevated Cr on admission   At discharge will resume lisinopril 20 mg daily

## 2023-11-14 NOTE — ASSESSMENT & PLAN NOTE
Likely non-MI troponin elevation in the setting of hypoxia. Initial troponin 62, but succeeding delta was negative. EKG: NSR, RBBB. Denies chest pain. Monitor on telemetry. Echocardiogram done today, please see results under acute respiratory failure.

## 2023-11-14 NOTE — ASSESSMENT & PLAN NOTE
Underlying tobacco abuse, treated for COPD with acute exacerbation   Appreciate pulmonology input  Reports that she does not use any inhaler on a daily basis prior to arrival  At discharge she will be prescribed Trelegy, prednisone taper, albuterol inhaler and nebulizer  Will also require oxygen at discharge

## 2023-11-14 NOTE — CASE MANAGEMENT
Case Management Progress Note    Patient name Escobar Fritz  Location S /S -01 MRN [id number]  : 1950 Date 2023       LOS (days): 3  Geometric Mean LOS (GMLOS) (days): 3.60  Days to GMLOS:1.1        OBJECTIVE:        Current admission status: Inpatient  Preferred Pharmacy:   [facility] [address] NEDRA RENEE [address] [place] [address] ROAD  [address] [address]  Phone: [phone] Fax: [address] Mail Delivery - [place], [address] [facility]  [address] [facility] [address]  Phone: [phone] Fax: [address], 350 [facility] [address] [place] [address]  Phone: [phone] Fax: 230 [facility] [address] [place] [address] Drive  1636 [facility] [address]  Phone: [phone] Fax: [phone]    Primary Care Provider: Fabienne Frost MD    Primary Insurance: MEDICARE  Secondary Insurance: AARP    PROGRESS NOTE:    CM f/u with patient re: confirmed Home O2 - to be delivered to home within next 2-3 hrs as patient's  is home. Patient aware of same. Patient declined transport home as relayed her brother will bring her home. Pt refused portable home O2 tank to go home with - discussed safety concerns re: not using oxygen for ride home. Pt remains refusing portable O2 tank as pt expressed, home is "only 10 minutes away and would be fine."      Patient also provided brother Gregory May contact info - brother added to pt's emergency contacts. Patient relayed she will call her brother when ready for transport home, likely between 2-3pm.     CM remains available for any further CM d/c needs identified.

## 2023-11-14 NOTE — ASSESSMENT & PLAN NOTE
Continue CPAP HS. This morning, patient told me that she had a rough time with our CPAP and it was noisy. Thus, she claimed that she did not get benefit from our CPAP here.   I discussed this with the patient's nurse, respiratory therapist and our pulmonologist.

## 2023-11-14 NOTE — PROGRESS NOTES
Progress Note - Pulmonary   Deanna Irving 74 y.o. female MRN: [id number]  Unit/Bed#: S -01 Encounter: [id number]    Assessment/Plan:    1. Acute likely on chronic hypoxic respiratory failure likely multifaceted as listed below        -Currently on 4 L-92%, does not wear home O2        -Continue saturations greater than 88%        -Sunday toileting: Deep breathing cough, incentive spirometry out of bed as tolerated        -Placed order for O2 evaluation    2. COPD of unknown severity with acute exacerbation        -Transition to prednisone 40 mg decrease by 10 mg every 3 days        -Please discharge patient on: Trelegy 200/62.5/25 MCG 1 puff daily, albuterol nebulizer every 6 as needed, ProAir 2 puffs every 6 as needed        -Patient will need formal PFT testing and close pulmonary follow-up        -Nebulizer provided to the patient    3. Abnormal chest CT        -Scattered mucus plugging with lower lobe atelectasis        -Splane to patient she will need to continue nebulizers minimum 3 times daily upon discharge         -Patient will continue pulmonary hygiene at home    4. FRANK       -Currently maintained on CPAP 18 cm of H2O with full facemask       -We will order overnight pulse ox with CPAP       -May need oxygen with CPAP        -Outpatient follow-up or discharge instruction  -Pulmonary will sign off    Chief Complaint:    " I want to go home"    Subjective:    Abby Fields was comfortably sitting in her bed. She reports that she wants to be discharged. Patient reports she is still somewhat short of breath. Currently denying any fevers, chills, abscess, headaches, night sweats, pleuritic chest pain, or palpations. Objective:    Vitals: Blood pressure 142/83, pulse 71, temperature 98.3 °F (36.8 °C), temperature source Oral, resp. rate 18, height 5' 1" (1.549 m), weight 95.7 kg (211 lb), SpO2 92 %. ,Body mass index is 39.87 kg/m².     No intake or output data in the 24 hours ending 11/14/23 9141    Invasive Devices       Peripheral Intravenous Line  Duration             Peripheral IV 11/11/23 Left Forearm 3 days                    Physical Exam:     Physical Exam  Constitutional:       General: She is not in acute distress. Appearance: Normal appearance. She is normal weight. She is not ill-appearing. HENT:      Head: Normocephalic and atraumatic. Nose: Nose normal. No congestion or rhinorrhea. Mouth/Throat:      Mouth: Mucous membranes are dry. Pharynx: Oropharynx is clear. No oropharyngeal exudate or posterior oropharyngeal erythema. Cardiovascular:      Rate and Rhythm: Normal rate and regular rhythm. Pulses: Normal pulses. Heart sounds: Normal heart sounds. No murmur heard. No friction rub. No gallop. Pulmonary:      Effort: No respiratory distress. Breath sounds: No stridor. Wheezing present. No rhonchi or rales. Comments: Scattered wheezing significantly diminished aeration  Chest:      Chest wall: No tenderness. Abdominal:      General: Abdomen is flat. Bowel sounds are normal. There is no distension. Palpations: Abdomen is soft. There is no mass. Musculoskeletal:         General: No swelling or tenderness. Normal range of motion. Cervical back: Normal range of motion. No rigidity or tenderness. Skin:     General: Skin is warm and dry. Coloration: Skin is not jaundiced or pale. Neurological:      General: No focal deficit present. Mental Status: She is alert and oriented to person, place, and time. Mental status is at baseline. Psychiatric:         Mood and Affect: Mood normal.         Behavior: Behavior normal.         Labs: I have personally reviewed pertinent lab results. , ABG: No results found for: "PHART", "XBP5VIP", "PO2ART", "NSL7HHA", "O4EHJXHJ", "BEART", "SOURCE", BNP: No results found for: "BNP", CBC: No results found for: "WBC", "HGB", "HCT", "MCV", "PLT", "ADJUSTEDWBC", "RBC", "MCH", "MCHC", "RDW", "MPV", "NRBC", CMP:   Lab Results   Component Value Date    SODIUM 139 11/14/2023    K 4.4 11/14/2023     11/14/2023    CO2 36 (H) 11/14/2023    BUN 38 (H) 11/14/2023    CREATININE 1.27 11/14/2023    CALCIUM 9.2 11/14/2023    EGFR 41 11/14/2023         Imaging and other studies: I have personally reviewed pertinent films in PACS    Chest CT-scattered atelectasis

## 2023-11-14 NOTE — ASSESSMENT & PLAN NOTE
BP acceptable. Hold HCTZ and lisinopril given elevated Cr on admission and yesterday. Restart once blood pressures are persistently high.

## 2023-11-14 NOTE — ASSESSMENT & PLAN NOTE
Lab Results   Component Value Date    EGFR 40 11/13/2023    EGFR 34 11/12/2023    EGFR 37 11/11/2023    CREATININE 1.29 11/13/2023    CREATININE 1.46 (H) 11/12/2023    CREATININE 1.37 (H) 11/11/2023     Baseline Cr 0.80-1.1. Follows with nephrology as an outpatient. Cr above baseline on presentation. Today, better. Hold HCTZ and lisinopril. Avoid nephrotoxins and hypotension. Monitor intake/output.    Repeat BMP in AM.

## 2023-11-14 NOTE — ASSESSMENT & PLAN NOTE
COPD with acute exacerbation and history of tobacco abuse. Reports that she does not use any inhaler on a daily basis. Initially, on admission, admitting provider did not find the patient having acute exacerbation. However, when I saw the patient, I appreciated wheezing bilaterally on examination. Thus yesterday, patient was started on round-the-clock Xopenex and Atrovent nebulizations and oral prednisone. Today, patient was initially adamant to go home as he has to take care of of her  who has cognitive impairment, and still on oxygen, a home oxygen study was done which revealed that patient will need 4 L of oxygen at rest and 10 L of oxygen on ambulation or 7.5 L on Oxymizer. Thus, due to high needs of oxygen and patient was not on oxygen prior to coming here, patient was eventually okay to stay. I consulted our pulmonologist and discussed the case with him. As per pulmonologist, continue Atrovent and Xopenex nebulizations and prednisone. Patient would benefit from nebulizer machine at home and may continue with albuterol or Xopenex at home and may have LABA/LAMA at home. It is important to note, earlier today, with patient being adamant to go home, I initially contacted patient's primary care physician, so that patient will be scheduled to be seen in the outpatient as soon as possible. From my discussion with the PCP, I was told that patient used to see Dr. Arnulfo Canela, pulmonologist, but scheduled to see Dr. Jaxson Morel in the future. I then spoke to Dr. Jaxson Morel, who turned out to be a sleep specialist and not practicing as a pulmonologist.  Thus, I eventually called Dr. Denver Ladd, and discussed about his patient and he told me that he will schedule the patient as soon as possible, likely this week, for a follow-up with him. Continue albuterol prn. Respiratory protocol.

## 2023-11-14 NOTE — ASSESSMENT & PLAN NOTE
Presented feeling "off, goofy" and was found to have pulse ox of 75% on RA. Suspect multifactorial in the setting of atelectasis, severe FRANK, COPD (hx of tobacco abuse) and obesity. CT chest negative for PE, scattered atelectasis throughout both lungs. .    Flu, COVID and RSV negative. 2D echocardiogram unremarkable and suspicion for new heart failure remains low  Did not require oxygen at baseline but does require oxygen at discharge. Patient refuses to have oxygen tanks in her house for fear they will explode. Therefore she will be limited to using concentrator in the house  Encourage IS/flutter valve. Respiratory protocol. Pulmonologist on board.   See treatment plan under COPD

## 2023-11-14 NOTE — ASSESSMENT & PLAN NOTE
Lab Results   Component Value Date    HGBA1C 6.8 (H) 07/17/2023       Recent Labs     11/12/23  2135 11/13/23  0746 11/13/23  1154 11/13/23  1550   POCGLU 232* 119 235* 179*       Blood Sugar Average: Last 72 hrs:  (P) [id number]  Hold glipizide and metformin while inpatient. Monitor accu-checks AC and HS. SSI coverage. With increasing blood sugars likely secondary to steroid, basal bolus insulin treatment started today. Hypoglycemia protocol. Adjust treatment accordingly.

## 2023-11-14 NOTE — CASE MANAGEMENT
Case Management Discharge Planning Note    Patient name Chiara Bustamante  Location S /S -01 MRN [id number]  : 1950 Date 2023       Current Admission Date: 2023  Current Admission Diagnosis:Acute respiratory failure with hypoxia [facility])   Patient Active Problem List    Diagnosis Date Noted    FRANK (obstructive sleep apnea) 2023    Acute respiratory failure with hypoxia ([address]) 2023    Elevated troponin 2023    Stage 3 chronic kidney disease ([address]) 2023    Platelets decreased ([address]) 2023    OAB (overactive bladder) 2023    Sebaceous cyst of ear 2023    Osteoporosis 2023    Persistent proteinuria 2023    Obesity, morbid ([address]) 10/11/2022    Heart murmur, systolic     Non-seasonal allergic rhinitis 2020    Vertigo 2019    COPD (chronic obstructive pulmonary disease) ([address]) 2019    Sacroiliitis ([address]) 2019    Trigger finger, left index finger 2019    Trigger ring finger of right hand 2019    Current smoker on some days 2019    Essential hypertension 2019    Chronic pain syndrome 01/10/2019    Primary osteoarthritis of first carpometacarpal joint of left hand 2018    Lumbar radiculopathy 2018    Spinal stenosis of lumbar region 2018    Lumbar spondylosis 2018    Chronic right hip pain 2018    Keratosis 2017    Type 2 diabetes mellitus with hyperglycemia, without long-term current use of insulin ([address]) 2017    Arthritis of carpometacarpal (CMC) joint of left thumb 2017    Obesity (BMI 30-39.9) 2016    Obstructive sleep apnea of adult 2016    Secondary polycythemia 2016    Cataract of both eyes 2014    Facet arthropathy, lumbar 2014    DDD (degenerative disc disease), lumbar 2014      LOS (days): 3  Geometric Mean LOS (GMLOS) (days): 3.60  Days to GMLOS:1.1     OBJECTIVE:  Risk of Unplanned Readmission Score: 12.13         Current admission status: Inpatient   Preferred Pharmacy:   [facility] [address] NEDRA RENEE [address]  [address] [facility] [address]  Phone: [phone] Fax: [address] Mail Delivery - [place], [address] [facility]  [address] [facility] [address]  Phone: [phone] Fax: [phone]    [facility] [address], [address] [place] [address]  Phone: [phone] Fax: [address] [place] [address] [id number] [place] [address] Drive  [address] [place] [address]  Phone: [phone] Fax: [phone]    Primary Care Provider: Cipriano Kramer MD    Primary Insurance: MEDICARE  Secondary Insurance: [facility]    DISCHARGE DETAILS:                                                                                     IMM reviewed with patient, patient agrees with discharge determination.   IMM Given (Date):: 11/14/23  IMM Given to[de-identified] Patient

## 2023-11-14 NOTE — CASE MANAGEMENT
Case Management Discharge Planning Note    Patient name Petey Clark  Location S /S -01 MRN [id number]  : 1950 Date 2023       Current Admission Date: 2023  Current Admission Diagnosis:Acute respiratory failure with hypoxia [facility])   Patient Active Problem List    Diagnosis Date Noted    FRANK (obstructive sleep apnea) 2023    Acute respiratory failure with hypoxia ([address]) 2023    Elevated troponin 2023    CKD (chronic kidney disease) 2023    Platelets decreased ([address]) 2023    OAB (overactive bladder) 2023    Sebaceous cyst of ear 2023    Osteoporosis 2023    Persistent proteinuria 2023    Obesity, morbid ([address]) 10/11/2022    Heart murmur, systolic     Non-seasonal allergic rhinitis 2020    Vertigo 2019    COPD (chronic obstructive pulmonary disease) ([address]) 2019    Sacroiliitis ([address]) 2019    Trigger finger, left index finger 2019    Trigger ring finger of right hand 2019    Current smoker on some days 2019    Essential hypertension 2019    Chronic pain syndrome 01/10/2019    Primary osteoarthritis of first carpometacarpal joint of left hand 2018    Lumbar radiculopathy 2018    Spinal stenosis of lumbar region 2018    Lumbar spondylosis 2018    Chronic right hip pain 2018    Keratosis 2017    Type 2 diabetes mellitus with hyperglycemia, without long-term current use of insulin ([address]) 2017    Arthritis of carpometacarpal (CMC) joint of left thumb 2017    Obesity (BMI 30-39.9) 2016    Obstructive sleep apnea of adult 2016    Secondary polycythemia 2016    Cataract of both eyes 2014    Facet arthropathy, lumbar 2014    DDD (degenerative disc disease), lumbar 2014      LOS (days): 3  Geometric Mean LOS (GMLOS) (days): 3.60  Days to GMLOS:1.1     OBJECTIVE:  Risk of Unplanned Readmission Score: 11.57 Current admission status: Inpatient   Preferred Pharmacy:   [facility] [address] NEDRA RENEE [address]  101[address] [place] [address]  Phone: [phone] Fax: [address] Mail Delivery - [place], [address] [facility]  [address] [facility] [address]  Phone: [phone] Fax: [phone]    [address] [facility], [address] [facility] [address] [place] [address]  Phone: [phone] Fax: [phone] [facility] [address] [place] [address] Drive  [address] [place] [address]  Phone: [phone] Fax: [phone]    Primary Care Provider: Ritu Hough MD    Primary Insurance: MEDICARE  Secondary Insurance: AARP    DISCHARGE DETAILS:    Discharge planning discussed with[de-identified] patient  Freedom of Choice: Yes  Comments - Freedom of Choice: SLIM notified this CM of need for home O2 and nebulizer. patient choice for [facility] dme as currently receives CPAP from [facility]. Home O2 and neb order placed to [facility] via parachute for delivery to home today. CM to f/u with [facility] to confirm home O2 to be delivered today prior to pt d/c to home.

## 2023-11-14 NOTE — PLAN OF CARE
Problem: Potential for Falls  Goal: Patient will remain free of falls  Description: INTERVENTIONS:  - Educate patient/family on patient safety including physical limitations  - Instruct patient to call for assistance with activity   - Consult OT/PT to assist with strengthening/mobility   - Keep Call bell within reach  - Keep bed low and locked with side rails adjusted as appropriate  - Keep care items and personal belongings within reach  - Initiate and maintain comfort rounds  - Make Fall Risk Sign visible to staff  - Offer Toileting every 2 Hours, in advance of need  - Initiate/Maintain bed alarm  - Obtain necessary fall risk management equipment  - Apply yellow socks and bracelet for high fall risk patients  - Consider moving patient to room near nurses station  Outcome: Adequate for Discharge     Problem: MOBILITY - ADULT  Goal: Maintain or return to baseline ADL function  Description: INTERVENTIONS:  -  Assess patient's ability to carry out ADLs; assess patient's baseline for ADL function and identify physical deficits which impact ability to perform ADLs (bathing, care of mouth/teeth, toileting, grooming, dressing, etc.)  - Assess/evaluate cause of self-care deficits   - Assess range of motion  - Assess patient's mobility; develop plan if impaired  - Assess patient's need for assistive devices and provide as appropriate  - Encourage maximum independence but intervene and supervise when necessary  - Involve family in performance of ADLs  - Assess for home care needs following discharge   - Consider OT consult to assist with ADL evaluation and planning for discharge  - Provide patient education as appropriate  Outcome: Adequate for Discharge  Goal: Maintains/Returns to pre admission functional level  Description: INTERVENTIONS:  - Perform BMAT or MOVE assessment daily.   - Set and communicate daily mobility goal to care team and patient/family/caregiver.    - Collaborate with rehabilitation services on mobility goals if consulted  - Ambulate patient 3 times a day  - Out of bed to chair 3 times a day   - Out of bed for meals 3 times a day  - Out of bed for toileting  - Record patient progress and toleration of activity level   Outcome: Adequate for Discharge     Problem: RESPIRATORY - ADULT  Goal: Achieves optimal ventilation and oxygenation  Description: INTERVENTIONS:  - Assess for changes in respiratory status  - Assess for changes in mentation and behavior  - Position to facilitate oxygenation and minimize respiratory effort  - Oxygen administered by appropriate delivery if ordered  - Initiate smoking cessation education as indicated  - Encourage broncho-pulmonary hygiene including cough, deep breathe, Incentive Spirometry  - Assess the need for suctioning and aspirate as needed  - Assess and instruct to report SOB or any respiratory difficulty  - Respiratory Therapy support as indicated  Outcome: Adequate for Discharge     Problem: HEMATOLOGIC - ADULT  Goal: Maintains hematologic stability  Description: INTERVENTIONS  - Assess for signs and symptoms of bleeding or hemorrhage  - Monitor labs  - Administer supportive blood products/factors as ordered and appropriate  Outcome: Adequate for Discharge

## 2023-11-14 NOTE — ASSESSMENT & PLAN NOTE
Lab Results   Component Value Date    EGFR 41 11/14/2023    EGFR 40 11/13/2023    EGFR 34 11/12/2023    CREATININE 1.27 11/14/2023    CREATININE 1.29 11/13/2023    CREATININE 1.46 (H) 11/12/2023     Baseline Cr 0.80-1.1. Follows with nephrology as an outpatient. Cr above baseline on presentation. Today, better. Held HCTZ and lisinopril. Avoid nephrotoxins and hypotension.   Repeat BMP outpatient

## 2023-11-15 ENCOUNTER — TRANSITIONAL CARE MANAGEMENT (OUTPATIENT)
Dept: FAMILY MEDICINE CLINIC | Facility: CLINIC | Age: 73
End: 2023-11-15

## 2023-11-17 LAB
ATRIAL RATE: 76 BPM
P AXIS: 72 DEGREES
PR INTERVAL: 194 MS
QRS AXIS: -10 DEGREES
QRSD INTERVAL: 122 MS
QT INTERVAL: 380 MS
QTC INTERVAL: 427 MS
T WAVE AXIS: 50 DEGREES
VENTRICULAR RATE: 76 BPM

## 2023-11-17 PROCEDURE — 93010 ELECTROCARDIOGRAM REPORT: CPT | Performed by: INTERNAL MEDICINE

## 2023-11-24 ENCOUNTER — TELEPHONE (OUTPATIENT)
Dept: PULMONOLOGY | Facility: CLINIC | Age: 73
End: 2023-11-24

## 2023-11-24 DIAGNOSIS — J44.9 CHRONIC OBSTRUCTIVE PULMONARY DISEASE, UNSPECIFIED COPD TYPE (HCC): Primary | ICD-10-CM

## 2023-11-24 NOTE — TELEPHONE ENCOUNTER
Pt has called in stating she was recently seen in the ED and prescribed 10L of O2. Pt states for the past week she has been using 5L and her O2 stats are at 98. Pt looking to have some of the tanks taken back to DME company. Pt also requesting an order be put in for POC. Please advise       I have advised pts he should be scheduled for a HFU, she declined stating she has no form of transportation and is unable to walk. I offered pt our lyft ride service she declined as well stating she can not walk.  Please advise

## 2023-11-27 LAB
DME PARACHUTE DELIVERY DATE REQUESTED: NORMAL
DME PARACHUTE ITEM DESCRIPTION: NORMAL
DME PARACHUTE ORDER STATUS: NORMAL
DME PARACHUTE SUPPLIER NAME: NORMAL
DME PARACHUTE SUPPLIER PHONE: NORMAL

## 2023-11-28 ENCOUNTER — TELEPHONE (OUTPATIENT)
Dept: PULMONOLOGY | Facility: CLINIC | Age: 73
End: 2023-11-28

## 2023-11-28 NOTE — TELEPHONE ENCOUNTER
Called Elie Jacobson to find out if she is using a CPAP machine. She said she does not use her CPAP machine, she does not like the way the mask feels. Confirmed appt for Thursday in our [place].

## 2023-11-30 ENCOUNTER — TELEPHONE (OUTPATIENT)
Dept: OTHER | Facility: OTHER | Age: 73
End: 2023-11-30

## 2023-11-30 NOTE — TELEPHONE ENCOUNTER
PT. Called in to cancel her appointment today, 11/30 @ 11:40 due to having a fever. She will call back to reschedule.

## 2023-12-08 DIAGNOSIS — J44.9 CHRONIC OBSTRUCTIVE PULMONARY DISEASE, UNSPECIFIED COPD TYPE (HCC): ICD-10-CM

## 2023-12-08 RX ORDER — FLUTICASONE FUROATE, UMECLIDINIUM BROMIDE AND VILANTEROL TRIFENATATE 200; 62.5; 25 UG/1; UG/1; UG/1
1 POWDER RESPIRATORY (INHALATION) DAILY
Qty: 60 BLISTER | Refills: 5 | Status: SHIPPED | OUTPATIENT
Start: 2023-12-08 | End: 2024-06-05

## 2023-12-27 ENCOUNTER — TELEPHONE (OUTPATIENT)
Age: 73
End: 2023-12-27

## 2023-12-27 DIAGNOSIS — M54.16 LUMBAR RADICULOPATHY: Primary | ICD-10-CM

## 2023-12-27 RX ORDER — GABAPENTIN 600 MG/1
TABLET ORAL
Qty: 270 TABLET | Refills: 1 | Status: SHIPPED | OUTPATIENT
Start: 2023-12-27

## 2023-12-27 NOTE — TELEPHONE ENCOUNTER
S/w pt, confirmed she is taking gabapentin 600 mg in the morning and 1200 mg at bedtime, denies s/e, confirmed medication is effective. Pt reports 5 days worth of medication remaining. Requesting refill be sent to [facility]. Please advise, thank you.

## 2023-12-27 NOTE — TELEPHONE ENCOUNTER
I sent a prescription to her mail order pharmacy for gabapentin 600 mg tablets.  1 tablet in the morning and 2 tablets at bedtime, 90-day prescription

## 2023-12-27 NOTE — TELEPHONE ENCOUNTER
Firm how the patient is taking the gabapentin?  My last office note states that she was taking 600 mg in the morning and 1200 mg at bedtime.

## 2023-12-27 NOTE — TELEPHONE ENCOUNTER
Pt contacted Call Center requested refill of their medication.        Medication Name: gabapentin      Dosage of Med: 300mg      Frequency of Med: 3 times a day  Would like a 90 day supply     Remaining Medication: 8 tab      Pharmacy and Location:   [facility] Mail Delivery - [facility] [address]      Pt. Preferred Callback Phone Number:       Thank you.     PLEASE ADVISE PATIENTS:    REFILL REQUESTS WILL BE PROCESSED WITHIN 24-48 HOURS

## 2024-02-09 ENCOUNTER — OFFICE VISIT (OUTPATIENT)
Dept: PAIN MEDICINE | Facility: CLINIC | Age: 74
End: 2024-02-09
Payer: MEDICARE

## 2024-02-09 VITALS
DIASTOLIC BLOOD PRESSURE: 64 MMHG | SYSTOLIC BLOOD PRESSURE: 149 MMHG | HEART RATE: 96 BPM | WEIGHT: 192.8 LBS | HEIGHT: 61 IN | BODY MASS INDEX: 36.4 KG/M2

## 2024-02-09 DIAGNOSIS — M54.16 LUMBAR RADICULOPATHY: ICD-10-CM

## 2024-02-09 DIAGNOSIS — M48.061 SPINAL STENOSIS OF LUMBAR REGION, UNSPECIFIED WHETHER NEUROGENIC CLAUDICATION PRESENT: Primary | ICD-10-CM

## 2024-02-09 DIAGNOSIS — M47.816 LUMBAR SPONDYLOSIS: ICD-10-CM

## 2024-02-09 DIAGNOSIS — G89.4 CHRONIC PAIN SYNDROME: ICD-10-CM

## 2024-02-09 PROCEDURE — 99214 OFFICE O/P EST MOD 30 MIN: CPT | Performed by: ANESTHESIOLOGY

## 2024-02-09 RX ORDER — GABAPENTIN 600 MG/1
TABLET ORAL
Qty: 270 TABLET | Refills: 1 | Status: SHIPPED | OUTPATIENT
Start: 2024-02-09

## 2024-02-09 NOTE — PROGRESS NOTES
Assessment  1. Spinal stenosis of lumbar region, unspecified whether neurogenic claudication present    2. Lumbar radiculopathy    3. Lumbar spondylosis    4. Chronic pain syndrome        Plan  73-year-old female with a history of lumbar stenosis, spondylosis, lumbar radiculopathy, and chronic pain syndrome returning for follow-up.  The patient's low back and lower extremity symptoms are actually improved.  She does not really have much back or leg pains currently.  She does have some lower extremity weakness which is essentially baseline.    She is currently taking gabapentin 600 mg in the morning and 1200 mg at bedtime with 80% of relief.  She denies any side effects with the medication.        1.  I will continue gabapentin to 600 mg in the morning and 1200 mg at bedtime for her neuropathic complaints.  Refill was provided today.  2.  Patient will continue with her home exercise program  3.  I will follow-up with the patient in 3 months or sooner if needed         My impressions and treatment recommendations were discussed in detail with the patient who verbalized understanding and had no further questions.  Discharge instructions were provided. I personally saw and examined the patient and I agree with the above discussed plan of care.    No orders of the defined types were placed in this encounter.    No orders of the defined types were placed in this encounter.      History of Present Illness    Deanna Perez is a 73 y.o. female with a history of lumbar stenosis, spondylosis, lumbar radiculopathy, and chronic pain syndrome returning for follow-up.  The patient's low back and lower extremity symptoms are actually improved.  She does not really have much back or leg pains currently.  She does have some lower extremity weakness which is essentially baseline. She denies any bladder or bowel incontinence or saddle anesthesia.  She is currently taking gabapentin 600 mg in the morning and 1200 mg at bedtime with 80%  of relief.  She denies any side effects with the medication.     The patient currently rates her pain 0 out of 10.  The pain is intermittent and described as aching.  Pain is increased with standing, walking, and exercise.  The pain is alleviated with sitting, relaxation, and lying down.    Other than as stated above, the patient denies any interval changes in medications, medical condition, mental condition, symptoms, or allergies since the last office visit.    I have personally reviewed and/or updated the patient's past medical history, past surgical history, family history, social history, current medications, allergies, and vital signs today.     Review of Systems   Constitutional:  Negative for fever and unexpected weight change.   HENT:  Negative for trouble swallowing.    Eyes:  Negative for visual disturbance.   Respiratory:  Positive for shortness of breath. Negative for wheezing.    Cardiovascular:  Negative for chest pain and palpitations.   Gastrointestinal:  Negative for constipation, diarrhea, nausea and vomiting.   Endocrine: Negative for cold intolerance, heat intolerance and polydipsia.   Genitourinary:  Negative for difficulty urinating and frequency.   Musculoskeletal:  Positive for gait problem and joint swelling. Negative for arthralgias and myalgias.        Decreased ROM, pain in extremity   Skin:  Negative for rash.   Neurological:  Negative for dizziness, seizures, syncope, weakness and headaches.   Hematological:  Does not bruise/bleed easily.   Psychiatric/Behavioral:  Negative for dysphoric mood.    All other systems reviewed and are negative.      Patient Active Problem List   Diagnosis    Lumbar radiculopathy    Spinal stenosis of lumbar region    Lumbar spondylosis    Chronic right hip pain    Type 2 diabetes mellitus with hyperglycemia, without long-term current use of insulin (HCC)    Primary osteoarthritis of first carpometacarpal joint of left hand    Arthritis of carpometacarpal  ([facility]) joint of left thumb    Cataract of both eyes    DDD (degenerative disc disease), lumbar    Facet arthropathy, lumbar    Keratosis    Obesity (BMI 30-39.9)    Obstructive sleep apnea of adult    Secondary polycythemia    Chronic pain syndrome    Current smoker on some days    Essential hypertension    Trigger finger, left index finger    Trigger ring finger of right hand    Sacroiliitis (HCC)    Vertigo    COPD (chronic obstructive pulmonary disease) (HCC)    Non-seasonal allergic rhinitis    Heart murmur, systolic    Obesity, morbid (HCC)    Persistent proteinuria    Osteoporosis    Sebaceous cyst of ear    Platelets decreased (HCC)    OAB (overactive bladder)    Acute respiratory failure with hypoxia (HCC)    Elevated troponin    Stage 3 chronic kidney disease (HCC)    FRANK (obstructive sleep apnea)    Chronic obstructive pulmonary disease, unspecified COPD type (HCC)       Past Medical History:   Diagnosis Date    Asthma     Chronic obstructive lung disease (HCC)     Community acquired pneumonia     LAST ASSESSED: [id number]    COPD (chronic obstructive pulmonary disease) (HCC)     Diabetes mellitus (HCC)     History of MRSA infection     2008 liver sx    Liver disease     Sleep apnea     Viral warts     LAST ASSESSED: [id number]       Past Surgical History:   Procedure Laterality Date    ABDOMINAL SURGERY      ABDOMINOPLASTY      CHOLECYSTECTOMY      COSMETIC SURGERY      EYE SURGERY      Bilateral cataracts 2015 Dr. FLORIN Villagran.     FINGER SURGERY      HAND SURGERY      HYSTERECTOMY      LIVER SURGERY      LA ARTHRP INTERPOS INTERCARPAL/METACARPAL JOINTS Right 9/9/2016    Procedure: THUMB TRAPEZIECTOMY; BASAL JOINT ARTHROPLASTY WITH APL AUTOGRAFT;  Surgeon: Kedar Villareal MD;  [facility];  Service: Orthopedics    LA ARTHRP INTERPOS INTERCARPAL/METACARPAL JOINTS Left 5/29/2018    Procedure: ARTHROPLASTY THUMB WEILBY;  Surgeon: Arthur Jackson MD;  [facility];  Service: Orthopedics    LA  TENDON SHEATH INCISION Right 7/23/2019    Procedure: RELEASE TRIGGER FINGER RIGHT LONG;  Surgeon: Arthur Jackson MD;  [facility];  Service: Orthopedics    OH TENDON SHEATH INCISION Left 8/6/2019    Procedure: RELEASE TRIGGER FINGER LEFT INDEX;  Surgeon: Arthur Jackson MD;  [facility];  Service: Orthopedics    OH TENDON SHEATH INCISION Right 6/16/2020    Procedure: RELEASE TRIGGER FINGER, right ring;  Surgeon: Arthur Jackson MD;  [facility];  Service: Orthopedics    OH TR/TRNSPL TDN CARP/MTCRPL HAND W/O FR GRF EA TDN Left 5/29/2018    Procedure: TRANSFER TENDON HAND/WRIST FCR from forearm to wrist;  Surgeon: Arthur Jackson MD;  [facility];  Service: Orthopedics       Family History   Problem Relation Age of Onset    Heart attack Mother     Heart attack Father     Ovarian cancer Sister     Basal cell carcinoma Brother     Heart disease Other         CARDIAC DISORDER    Diabetes Other     Liver cancer Other     Breast cancer Other     Stomach cancer Other     Heart disease Family     Diabetes Family     Thyroid disease Family     Ovarian cancer Family        Social History     Occupational History    Not on file   Tobacco Use    Smoking status: Every Day     Current packs/day: 0.50     Types: Cigarettes    Smokeless tobacco: Never   Vaping Use    Vaping status: Never Used   Substance and Sexual Activity    Alcohol use: Yes     Comment: very seldom    Drug use: No    Sexual activity: Not Currently       Current Outpatient Medications on File Prior to Visit   Medication Sig    albuterol (2.5 mg/3 mL) 0.083 % nebulizer solution Take 3 mL (2.5 mg total) by nebulization every 6 (six) hours as needed for wheezing or shortness of breath    albuterol (PROVENTIL HFA,VENTOLIN HFA) 90 mcg/act inhaler Inhale 2 puffs every 4 (four) hours as needed for wheezing or shortness of breath    fluticasone-umeclidinium-vilanterol (Trelegy Ellipta) 200-62.5-25 mcg/actuation AEPB inhaler  "Inhale 1 puff daily Rinse mouth after use.    gabapentin (Neurontin) 600 MG tablet 1 tablet in the morning and 2 tablets at bedtime    glipiZIDE (GLUCOTROL) 5 mg tablet TAKE 1 TABLET TWICE DAILY BEFORE MEALS    glucose blood (ACCU-CHEK JESS PLUS) test strip Test BS 3 times daily.  DX E11.9    lisinopril (ZESTRIL) 40 mg tablet Take 0.5 tablets (20 mg total) by mouth daily    metFORMIN (GLUCOPHAGE) 1000 MG tablet TAKE 1 TABLET TWICE DAILY    Multiple Vitamins-Minerals (OCUVITE ADULT FORMULA PO) Take by mouth    rosuvastatin (CRESTOR) 20 MG tablet Take 1 tablet (20 mg total) by mouth daily    tolterodine (DETROL LA) 2 mg 24 hr capsule Take 1 capsule (2 mg total) by mouth daily     No current facility-administered medications on file prior to visit.       No Known Allergies    Physical Exam    /64   Pulse 96   Ht 5' 1\" (1.549 m)   Wt 87.5 kg (192 lb 12.8 oz)   BMI 36.43 kg/m²     Constitutional: normal, well developed, well nourished, alert, in no distress and non-toxic and no overt pain behavior.  Eyes: anicteric  HEENT: grossly intact  Neck: supple, symmetric, trachea midline and no masses   Pulmonary:even and unlabored  Cardiovascular:No edema or pitting edema present  Skin:Normal without rashes or lesions and well hydrated  Psychiatric:Mood and affect appropriate  Neurologic:Cranial Nerves II-XII grossly intact  Musculoskeletal: Bilateral lumbar paraspinals and SI joints nontender to palpation.  Bilateral lower extremity strength 5 out of 5 in all muscle groups.  Sensation intact to light touch in L3-S1 dermatomes bilaterally.  Negative seated straight leg raise bilaterally.    Imaging  MRI LUMBAR SPINE WITHOUT CONTRAST     INDICATION: M54.16: Radiculopathy, lumbar region.     COMPARISON:  MRI dated 7/11/2019.     TECHNIQUE:  Multiplanar, multisequence imaging of the lumbar spine was performed. .          FINDINGS:     VERTEBRAL BODIES:  There are 5 lumbar type vertebral bodies.  Normal alignment of the " lumbar spine.  No spondylolysis or spondylolisthesis. No scoliosis.  No compression fracture.    Stable heterogeneous marrow signal.  Multilevel mostly Modic type II   endplate degenerative changes.  Minimal type I changes at L4-5 are decreased from prior.  No suspicious marrow signal abnormality.     SACRUM:  Normal signal within the sacrum. No evidence of insufficiency or stress fracture.     DISTAL CORD AND CONUS:  Normal size and signal within the distal cord and conus.     PARASPINAL SOFT TISSUES:  Paraspinal soft tissues are unremarkable.     LOWER THORACIC DISC SPACES:  Mild noncompressive lower thoracic degenerative change.     LUMBAR DISC SPACES:  Multilevel disc desiccation.  Disc space narrowing most pronounced at L4-5.     L1-L2:  No disc herniation, canal or foraminal stenosis.     L2-L3:  Minimal disc bulge and mild facet arthropathy.  No significant canal or foraminal stenosis.     L3-L4:  Disc bulge, facet arthropathy and ligamentum flavum hypertrophy.  Mild canal stenosis and mild foraminal stenosis.  No significant change.     L4-L5:  Disc bulge and marginal osteophyte with facet arthropathy.  Mild canal stenosis and mild foraminal stenosis.  No significant change.     L5-S1: Disc bulge, marginal osteophyte and facet arthropathy.  No significant canal stenosis.  Moderate severe foraminal stenosis contacting the exiting nerve roots, right worse than left.  No significant change.     IMPRESSION:     Multilevel degenerative spondylosis without significant change.  Mild canal and mild foraminal stenosis at L3-4 and L4-5.  Moderate to severe bilateral foraminal stenosis at L5-S1, right worse than left.

## 2024-02-15 ENCOUNTER — OFFICE VISIT (OUTPATIENT)
Dept: SLEEP CENTER | Facility: CLINIC | Age: 74
End: 2024-02-15
Payer: MEDICARE

## 2024-02-15 VITALS
HEART RATE: 85 BPM | SYSTOLIC BLOOD PRESSURE: 138 MMHG | HEIGHT: 61 IN | OXYGEN SATURATION: 89 % | WEIGHT: 196 LBS | DIASTOLIC BLOOD PRESSURE: 68 MMHG | BODY MASS INDEX: 37 KG/M2

## 2024-02-15 DIAGNOSIS — Z87.891 FORMER SMOKER: ICD-10-CM

## 2024-02-15 DIAGNOSIS — G89.4 CHRONIC PAIN SYNDROME: ICD-10-CM

## 2024-02-15 DIAGNOSIS — J96.01 ACUTE RESPIRATORY FAILURE WITH HYPOXIA (HCC): ICD-10-CM

## 2024-02-15 DIAGNOSIS — G47.33 OSA (OBSTRUCTIVE SLEEP APNEA): Primary | ICD-10-CM

## 2024-02-15 DIAGNOSIS — G47.34 SLEEP RELATED HYPOXIA: ICD-10-CM

## 2024-02-15 DIAGNOSIS — J30.89 NON-SEASONAL ALLERGIC RHINITIS, UNSPECIFIED TRIGGER: ICD-10-CM

## 2024-02-15 DIAGNOSIS — E66.9 OBESITY (BMI 30-39.9): ICD-10-CM

## 2024-02-15 DIAGNOSIS — J44.9 CHRONIC OBSTRUCTIVE PULMONARY DISEASE, UNSPECIFIED COPD TYPE (HCC): ICD-10-CM

## 2024-02-15 DIAGNOSIS — I10 ESSENTIAL HYPERTENSION: ICD-10-CM

## 2024-02-15 PROCEDURE — 99214 OFFICE O/P EST MOD 30 MIN: CPT | Performed by: INTERNAL MEDICINE

## 2024-02-15 NOTE — PROGRESS NOTES
[facility] Note - [facility]   Deanna Perez  73 y.o. female  :1950  MRN:[id number]  DOS:2/15/2024    CC: I saw this patient for follow-up in clinic today for Sleep disordered breathing, Coexisting Sleep and Medical Problems.  She was previously under care of Dr Danilo Gomez and I reviewed clinic records.  Interval changes: She was using a ResMed machine Discontinued use following recent hospitalization where she was discharged on oxygen..    Results of a split study in : The diagnostic portion demonstrated an AHI of 117/h, minimum oxygen saturation was 70% with the entire portion spent with saturations below 90%.  During the therapeutic portion, sleep disordered breathing was partially treated with CPAP at 18 cm H2O and in addition oxygen at 3 L/min was necessary to maintain saturations near 90%.    PFSH, Problem List, Medications & Allergies were reviewed in EMR.   She  has a past medical history of Asthma, Chronic obstructive lung disease (HCC), Community acquired pneumonia, COPD (chronic obstructive pulmonary disease) ([facility]), Diabetes mellitus (HCC), History of MRSA infection, Liver disease, Sleep apnea, and Viral warts.    She has a current medication list which includes the following prescription(s): albuterol, albuterol, trelegy ellipta, gabapentin, glipizide, glucose blood, lisinopril, metformin, multiple vitamins-minerals, rosuvastatin, and tolterodine.    PHYSIOLOGICAL DATA REVIEW : Device has been used 20/ days and average on days used is 7 hours and 56 minutes.  Using PAP > 4 hours/night 18%. Estimated ALYCIA 0.3/hour with pressure of 18cm H2O@90th/95th percentile; patient has not been using non FDA approved devices to sanitize the machine.  INTERPRETATION: Compliance is poor; Pressure setting is:within target range and until recently has been using without supplemental oxygen.; Since recent hospitalization was discharged on oxygen at 10 L minute, that she reduce to 5 L/min it is unclear what  "her actual requirements are as presently she is not using oxygen and states she is quite comfortable.   SUBJECTIVE: With respect to use of PAP, Deanna  was experiencing no adverse effects:.She derives benefit and is satisfied with sleep and daytime function.   Sleep Routine: Deanna reports getting 8-9 hrs sleep; she has no difficulty initiating or maintaining sleep . She arises spontaneously and feels refreshed.Deanna denies daytime sleepiness, \"but laid down for half an hour to an hour \".  She rated herself at Total score: 0 /24 on the Chandler Sleepiness Scale.   Other issues: None reported.     Habits: Reports that she has been smoking cigarettes -and states she stopped around a year ago. She has never used smokeless tobacco.,  Reports current alcohol use.,  Reports no history of drug use., Caffeine use:excessive until around 6 PM; Exercise routine: sometimes being limited by back pain -for which she is on gabapentin.      ROS: Significant for weight fluctuates in the range of a few pounds.  She has nasal symptoms due to seasonal allergies and vasomotor rhinitis.  Respiratory symptoms are controlled on current medication..    EXAM: /68   Pulse 85   Ht 5' 1\" (1.549 m)   Wt 88.9 kg (196 lb)   SpO2 (!) 89%   BMI 37.03 kg/m²     Wt Readings from Last 3 Encounters:   02/15/24 88.9 kg (196 lb)   02/09/24 87.5 kg (192 lb 12.8 oz)   11/13/23 95.7 kg (211 lb)      Patient is well groomed; well appearing.   CNS: Alert, orientated, speech clear & coherent  Psych: cooperative and in no distress. Mental state: Appears normal.  H&N: EOMI; NC/AT: No facial pressure marks, no rashes.    Skin/Extrem: col & hydration normal; no edema  Resp: Respiratory effort is normal  Physical findings otherwise essentially unchanged from previous.    IMPRESSION: Problem List Items & Comorbidities Addressed this Visit    1. FRANK (obstructive sleep apnea)  PAP DME Resupply/Reorder      2. Sleep related hypoxia        3. Acute respiratory " "failure with hypoxia (HCC)        4. Non-seasonal allergic rhinitis, unspecified trigger        5. Chronic obstructive pulmonary disease, unspecified COPD type (HCC)        6. Essential hypertension        7. Former smoker        8. Chronic pain syndrome        9. Obesity (BMI 30-39.9)            PLAN:  I reviewed results of prior studies and physiologic data with the patient.   I discussed treatment options with risks and benefits.  Treatment with  PAP is medically necessary and Deanna is agreable to continue use.   Care of equipment, methods to improve comfort using PAP and importance of compliance with therapy were discussed.  Pressure setting: continue 18 cmH2O together with supplemental oxygen as prescribed by pulmonary.  I advised a retitration study once she is stable, but she declined.  She will consider based on her progress -she is due to see pulmonary in a few weeks.  Rx provided to replace supplies and Care coordinated with DME provider.   Discussed strategies for weight reduction.    Follow-up is advised in 3 months to monitor progress, compliance and to adjust therapy.     Thank you for allowing me to participate in the care of this patient.    Sincerely,     Authenticated electronically on 02/15/24   Board Certified Specialist     Portions of the record may have been created with voice recognition software. Occasional wrong word or \"sound a like\" substitutions may have occurred due to the inherent limitations of voice recognition software. There may also be notations and random deletions of words or characters from malfunctioning software. Read the chart carefully and recognize, using context, where substitutions/deletions have occurred.    "

## 2024-02-15 NOTE — PATIENT INSTRUCTIONS

## 2024-02-16 ENCOUNTER — TELEPHONE (OUTPATIENT)
Dept: SLEEP CENTER | Facility: CLINIC | Age: 74
End: 2024-02-16

## 2024-02-19 LAB

## 2024-02-26 ENCOUNTER — RA CDI HCC (OUTPATIENT)
Dept: OTHER | Facility: HOSPITAL | Age: 74
End: 2024-02-26

## 2024-02-26 NOTE — PROGRESS NOTES
HCC coding opportunities          Chart Reviewed number of suggestions sent to Provider: 2     Patients Insurance     Medicare Insurance: Medicare        E11.36  E11.22

## 2024-03-11 DIAGNOSIS — N32.81 OAB (OVERACTIVE BLADDER): ICD-10-CM

## 2024-03-11 DIAGNOSIS — E11.65 TYPE 2 DIABETES MELLITUS WITH HYPERGLYCEMIA, WITHOUT LONG-TERM CURRENT USE OF INSULIN (HCC): ICD-10-CM

## 2024-03-11 RX ORDER — ROSUVASTATIN CALCIUM 20 MG/1
20 TABLET, COATED ORAL DAILY
Qty: 90 TABLET | Refills: 3 | Status: SHIPPED | OUTPATIENT
Start: 2024-03-11

## 2024-03-11 RX ORDER — TOLTERODINE 2 MG/1
2 CAPSULE, EXTENDED RELEASE ORAL DAILY
Qty: 90 CAPSULE | Refills: 3 | Status: SHIPPED | OUTPATIENT
Start: 2024-03-11

## 2024-03-12 DIAGNOSIS — R80.9 PROTEINURIA, UNSPECIFIED TYPE: ICD-10-CM

## 2024-03-12 DIAGNOSIS — E66.01 OBESITY, MORBID (HCC): ICD-10-CM

## 2024-03-12 DIAGNOSIS — R80.1 PERSISTENT PROTEINURIA: ICD-10-CM

## 2024-03-12 DIAGNOSIS — E11.65 TYPE 2 DIABETES MELLITUS WITH HYPERGLYCEMIA, WITHOUT LONG-TERM CURRENT USE OF INSULIN (HCC): ICD-10-CM

## 2024-03-12 DIAGNOSIS — G47.33 OBSTRUCTIVE SLEEP APNEA OF ADULT: ICD-10-CM

## 2024-03-12 DIAGNOSIS — I10 ESSENTIAL HYPERTENSION: ICD-10-CM

## 2024-03-12 RX ORDER — LISINOPRIL 40 MG/1
40 TABLET ORAL DAILY
Qty: 90 TABLET | Refills: 1 | Status: SHIPPED | OUTPATIENT
Start: 2024-03-12

## 2024-03-13 ENCOUNTER — CONSULT (OUTPATIENT)
Dept: PULMONOLOGY | Facility: MEDICAL CENTER | Age: 74
End: 2024-03-13
Payer: MEDICARE

## 2024-03-13 VITALS
OXYGEN SATURATION: 97 % | RESPIRATION RATE: 12 BRPM | TEMPERATURE: 97.9 F | HEART RATE: 79 BPM | SYSTOLIC BLOOD PRESSURE: 122 MMHG | BODY MASS INDEX: 36.25 KG/M2 | DIASTOLIC BLOOD PRESSURE: 64 MMHG | HEIGHT: 61 IN | WEIGHT: 192 LBS

## 2024-03-13 DIAGNOSIS — G47.33 OSA (OBSTRUCTIVE SLEEP APNEA): ICD-10-CM

## 2024-03-13 DIAGNOSIS — J44.9 CHRONIC OBSTRUCTIVE PULMONARY DISEASE, UNSPECIFIED COPD TYPE (HCC): Primary | ICD-10-CM

## 2024-03-13 DIAGNOSIS — J96.11 CHRONIC RESPIRATORY FAILURE WITH HYPOXIA (HCC): ICD-10-CM

## 2024-03-13 DIAGNOSIS — E66.9 OBESITY (BMI 30-39.9): ICD-10-CM

## 2024-03-13 PROCEDURE — 99214 OFFICE O/P EST MOD 30 MIN: CPT | Performed by: INTERNAL MEDICINE

## 2024-03-13 NOTE — PATIENT INSTRUCTIONS
I placed order to [employer] for portable M9 refilling oxygen tanks no more than 1 foot in height.  Use at 4 L/min pulsed dose    Goal is to use oxygen to keep O2 saturation 90% or better    Use 4 L/min with activity and at rest can try off the oxygen if O2 saturation is 90% or better    Use CPAP with medium Airfit facemask with 5 L of oxygen at bedtime    Continue Trelegy 200 mcg 1 puff daily    If you want prednisone call my office

## 2024-03-13 NOTE — PROGRESS NOTES
Pulse Ox/ Home Portability Revision Orders were submitted to [facility] via parachute.

## 2024-03-13 NOTE — PROGRESS NOTES
Assessment/Plan:       Problem List Items Addressed This Visit          Respiratory    COPD (chronic obstructive pulmonary disease) (HCC) - Primary     Spirometry today shows evidence of severe airflow obstruction with FEV1 of 0.59 L or 33% of predicted obstructive ratio 40%.  She will continue with Trelegy 200 mcg 1 puff daily and use her nebulizer with albuterol 2.5 mg albuterol 4 times a day as needed.    At this time she wanted to hold off on pulmonary rehab.         Relevant Orders    Pulse oximetry overnight    Chronic respiratory failure with hypoxia ([facility])     She has been needing oxygen therapy since November 2023 when she was admitted for for acute exacerbation of COPD.    Back then she needed 4 L/min at rest and 10 L/min with walking to maintain saturation greater than 90%.  However she has improved.  I did do pulse oximetry testing today.    Pulse oximetry testing:    Room air O2 saturation at rest was 92% and on room air after ambulating 100 feet O2 saturation decreased to 84%  On 4 L/min nasal cannula oxygen O2 saturation at rest was 97% and on 4 L/min's, oxygen O2 saturation after walking 100 feet was 90%.    She would like to have smaller portable oxygen tanks rather than the cylinder tank.  I did place order for M9 portable oxygen tanks that she can use at 4 L/min with activity.  At rest during the day she can use her oxygen anywhere from 2 to 4 L/min as needed to keep O2 saturation greater than 90%.    I did order pulse oximetry to be done by [facility] with her using her CPAP at 18 cm water pressure with 5 L/min nasal cannula oxygen.    She does use medium AirFit fullface mask which she likes.             Relevant Orders    Pulse oximetry overnight    Oxygen Supplies    FRANK (obstructive sleep apnea)     Deanna does have severe obstructive sleep apnea and has been compliant with using her CPAP which is set at 18 cm of water at nighttime.  She does use 5 L of oxygen with the CPAP.  She previously  "had been seeing Dr. Beaulieu for her FRANK.  On initial diagnostic sleep study her AHI was reported to be 117.            Other    Obesity (BMI 30-39.9)     I did encourage her to lose weight.              Plan for follow up:    Return in about 6 months (around 9/13/2024).  All questions are answered to the patient's satisfaction and understanding.  She verbalizes understanding.  She is encouraged to call with any further questions or concerns.    Portions of the record may have been created with voice recognition software.  Occasional wrong word or \"sound a like\" substitutions may have occurred due to the inherent limitations of voice recognition software.  Read the chart carefully and recognize, using context, where substitutions have occurred.a    Electronically Signed by Vicente Murray,     ______________________________________________________________________    Chief Complaint: Shortness of breath with activity    Patient ID: Deanna is a 74 y.o. y.o. female has a past medical history of Asthma, Chronic obstructive lung disease (HCC), Community acquired pneumonia, COPD (chronic obstructive pulmonary disease) (HCC), Diabetes mellitus (HCC), History of MRSA infection, Liver disease, Sleep apnea, and Viral warts.    3/13/2024  Patient presents today for initial visit for COPD    HPI    Deanna is 74 years old and does have COPD.  She had been admitted to [facility] from 11/11/23 to 11/14/23 for acute hypoxemic respiratory failure and acute exacerbation of COPD.  She did not have oxygen prior to that at admission but was discharged home with oxygen therapy.  At time of discharge she was instructed to use 4 L/min and 10 L/min nasal cannula oxygen with activity as per amatory oximetry testing was done on November 13, 2023.  Since then she states she is generally not using anymore and 5 L of oxygen at any time.      Deanna does have some chronic exertional shortness of breath such as going up stairs.  She " is able to get around her 1 level dwelling without any difficulty.  She is using Trelegy 200 mcg 1 puff daily which she says has helped her a lot and helped with the cough she used to have.  She states she does not cough much anymore.  Not having any wheezing.  Only occasionally uses her albuterol inhaler and not needing to use her nebulizer.    She does have oxygen concentrator which she has generally been keeping at 4 L/min on continuous basis.  She initially was given to oxygen concentrator's and told to use 10 L of oxygen but he had 1 oxygen concentrator removed and now only uses 4 L/min during the day and 5 L with her CPAP machine at night.  DME company is [employer].  She was prescribed oxygen after hospitalization in November 2023.  She would like to have more portable oxygen tanks frequently she used the house.  She only has the E-cylinder oxygen tanks for leaving the house    Does have hypertension and chronic lower back pain.  Also has obstructive sleep apnea.  Echocardiogram done November 2023 showed normal LV systolic function with left ventricular ejection fraction 60%.  No evidence of pulmonary artery hypertension.    She does have history of severe FRANK and prior study had shown AHI of 117.  He has been compliant using her CPAP therapy with oxygen at bedtime.    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  She smoked for about 40+ years.          Review of Systems   Constitutional:  Negative for activity change, appetite change, chills, fever and unexpected weight change.   HENT:  Negative for congestion, dental problem, postnasal drip, sinus pressure, sinus pain, sore throat and voice change.    Eyes:  Negative for redness and visual disturbance.    Respiratory:  Positive for shortness of breath. Negative for cough and wheezing.         See HPI   Cardiovascular:  Negative for chest pain, palpitations and leg swelling.   Gastrointestinal:  Negative for abdominal pain, diarrhea, nausea and vomiting.   Genitourinary:  Negative for difficulty urinating.   Musculoskeletal:  Negative for arthralgias.   Skin:  Negative for rash and wound.   Allergic/Immunologic: Negative for environmental allergies and food allergies.   Neurological:  Negative for dizziness, light-headedness and headaches.   Hematological:  Negative for adenopathy.   Psychiatric/Behavioral:  Negative for agitation, behavioral problems and confusion.        Social history: She reports that she has quit smoking. Her smoking use included cigarettes. She started smoking about 44 years ago. She has a 41.5 pack-year smoking history. She has never used smokeless tobacco. She reports current alcohol use. She reports that she does not use drugs.    Past surgical history:   Past Surgical History:   Procedure Laterality Date    ABDOMINAL SURGERY      ABDOMINOPLASTY      CHOLECYSTECTOMY      COSMETIC SURGERY      EYE SURGERY      Bilateral cataracts 2015 Dr. FLORIN Villagran.     FINGER SURGERY      HAND SURGERY      HYSTERECTOMY      LIVER SURGERY      IA ARTHRP INTERPOS INTERCARPAL/METACARPAL JOINTS Right 9/9/2016    Procedure: THUMB TRAPEZIECTOMY; BASAL JOINT ARTHROPLASTY WITH APL AUTOGRAFT;  Surgeon: Kedar Villareal MD;  [facility];  Service: Orthopedics    IA ARTHRP INTERPOS INTERCARPAL/METACARPAL JOINTS Left 5/29/2018    Procedure: ARTHROPLASTY THUMB WEILBY;  Surgeon: Arthur Jackson MD;  Location: [facility];  Service: Orthopedics    IA TENDON SHEATH INCISION Right 7/23/2019    Procedure: RELEASE TRIGGER FINGER RIGHT LONG;  Surgeon: Arthur Jackson MD;  [facility];  Service: Orthopedics    IA TENDON SHEATH INCISION Left 8/6/2019    Procedure: RELEASE TRIGGER FINGER LEFT INDEX;   Surgeon: Arthur Jackson MD;  [facility];  Service: Orthopedics    UT TENDON SHEATH INCISION Right 6/16/2020    Procedure: RELEASE TRIGGER FINGER, right ring;  Surgeon: Arthur Jackson MD;  [facility];  Service: Orthopedics    UT TR/TRNSPL TDN CARP/MTCRPL HAND W/O FR GRF EA TDN Left 5/29/2018    Procedure: TRANSFER TENDON HAND/WRIST FCR from forearm to wrist;  Surgeon: Arthur Jackson MD;  [facility];  Service: Orthopedics     Family history:   Family History   Problem Relation Age of Onset    Heart attack Mother     Heart attack Father     Ovarian cancer Sister     Basal cell carcinoma Brother     Heart disease Other         CARDIAC DISORDER    Diabetes Other     Liver cancer Other     Breast cancer Other     Stomach cancer Other     Heart disease Family     Diabetes Family     Thyroid disease Family     Ovarian cancer Family        Immunization History   Administered Date(s) Administered    COVID-19 PFIZER VACCINE 0.3 ML IM 02/19/2021, 03/11/2021, 10/09/2021, 05/13/2022    COVID-19 Pfizer Vac BIVALENT Reji-sucrose 12 Yr+ IM 09/09/2022    COVID-19 Pfizer mRNA vacc PF reji-sucrose 12 yr and older (Comirnaty) 10/13/2023    COVID-19 Pfizer vac (Reji-sucrose, gray cap) 12 yr+ IM 05/13/2022    INFLUENZA 10/02/2013, 01/07/2016, 01/14/2017, 09/05/2017, 08/31/2021, 09/09/2022    Influenza Split High Dose Preservative Free IM 09/05/2017    Influenza, high dose seasonal 0.7 mL 09/10/2020    Influenza, seasonal, injectable 10/02/2013, 01/14/2017    Influenza, seasonal, injectable, preservative free 09/13/2018    Pneumococcal Conjugate 13-Valent 08/24/2017    Pneumococcal Conjugate PCV 7 09/06/2017    Pneumococcal Polysaccharide PPV23 08/15/2018    Tdap 09/04/2014    Zoster 10/02/2013    influenza, trivalent, adjuvanted 09/11/2019     Current Outpatient Medications   Medication Sig Dispense Refill    albuterol (2.5 mg/3 mL) 0.083 % nebulizer solution Take 3 mL (2.5 mg total) by  "nebulization every 6 (six) hours as needed for wheezing or shortness of breath 100 mL 0    albuterol (PROVENTIL HFA,VENTOLIN HFA) 90 mcg/act inhaler Inhale 2 puffs every 4 (four) hours as needed for wheezing or shortness of breath 8 g 0    fluticasone-umeclidinium-vilanterol (Trelegy Ellipta) 200-62.5-25 mcg/actuation AEPB inhaler Inhale 1 puff daily Rinse mouth after use. 60 blister 5    gabapentin (Neurontin) 600 MG tablet 1 tablet in the morning and 2 tablets at bedtime 270 tablet 1    glipiZIDE (GLUCOTROL) 5 mg tablet TAKE 1 TABLET TWICE DAILY BEFORE MEALS 180 tablet 10    glucose blood (ACCU-CHEK JESS PLUS) test strip Test BS 3 times daily.  DX E11.9 100 each 2    lisinopril (ZESTRIL) 40 mg tablet TAKE 1 TABLET BY MOUTH DAILY 90 tablet 1    metFORMIN (GLUCOPHAGE) 1000 MG tablet TAKE 1 TABLET TWICE DAILY 180 tablet 10    Multiple Vitamins-Minerals (OCUVITE ADULT FORMULA PO) Take by mouth      rosuvastatin (CRESTOR) 20 MG tablet TAKE 1 TABLET EVERY DAY 90 tablet 3    tolterodine (DETROL LA) 2 mg 24 hr capsule TAKE 1 CAPSULE EVERY DAY 90 capsule 3     No current facility-administered medications for this visit.     Allergies: Patient has no known allergies.    Objective:  Vitals:    03/13/24 1120   BP: 122/64   BP Location: Left arm   Patient Position: Sitting   Cuff Size: Extra-Large   Pulse: 79   Resp: 12   Temp: 97.9 °F (36.6 °C)   TempSrc: Temporal   SpO2: 97%   Weight: 87.1 kg (192 lb)   Height: 5' 1\" (1.549 m)   Oxygen Therapy  SpO2: 97 % (on 4l cont. 88 on r/a at rest.)  .  Wt Readings from Last 3 Encounters:   03/13/24 87.1 kg (192 lb)   02/15/24 88.9 kg (196 lb)   02/09/24 87.5 kg (192 lb 12.8 oz)     Body mass index is 36.28 kg/m².    Physical Exam  Vitals reviewed.   Constitutional:       General: She is not in acute distress.     Appearance: Normal appearance. She is well-developed. She is obese.   HENT:      Head: Normocephalic.      Right Ear: External ear normal.      Left Ear: External ear normal. "      Nose: Nose normal.      Mouth/Throat:      Mouth: Mucous membranes are moist.      Pharynx: Oropharynx is clear. No oropharyngeal exudate.      Comments: Mallampati score is 3  Eyes:      Conjunctiva/sclera: Conjunctivae normal.      Pupils: Pupils are equal, round, and reactive to light.   Neck:      Vascular: No JVD.      Trachea: No tracheal deviation.   Cardiovascular:      Rate and Rhythm: Normal rate and regular rhythm.      Heart sounds: Normal heart sounds.   Pulmonary:      Effort: Pulmonary effort is normal.      Comments: Sounds are clear.  No wheezes, crackles or rhonchi  Abdominal:      General: There is no distension.      Palpations: Abdomen is soft.      Tenderness: There is no abdominal tenderness. There is no guarding.   Musculoskeletal:      Cervical back: Neck supple.      Comments: No edema, cyanosis or clubbing   Lymphadenopathy:      Cervical: No cervical adenopathy.   Skin:     General: Skin is warm and dry.      Findings: No rash.   Neurological:      General: No focal deficit present.      Mental Status: She is alert and oriented to person, place, and time.   Psychiatric:         Mood and Affect: Mood normal.         Behavior: Behavior normal.         Thought Content: Thought content normal.         Pulse oximetry testing:    Room air O2 saturation at rest was 92% and on room air after ambulating 100 feet O2 saturation decreased to 84%  On 4 L/min nasal cannula oxygen O2 saturation at rest was 97% and on 4 L/min's, oxygen O2 saturation after walking 100 feet was 90%.      Lab Review:   Lab Results   Component Value Date     10/23/2017    K 4.4 11/14/2023    K 4.7 05/23/2022     11/14/2023     05/23/2022    CO2 36 (H) 11/14/2023    CO2 28 05/23/2022    BUN 38 (H) 11/14/2023    BUN 27 (H) 05/23/2022    CREATININE 1.27 11/14/2023    CREATININE 0.71 10/23/2017    CALCIUM 9.2 11/14/2023    CALCIUM 10.0 05/23/2022     Lab Results   Component Value Date    WBC 8.78  11/12/2023    HGB 12.2 11/12/2023    HCT 43.1 11/12/2023     (H) 11/12/2023     11/12/2023       Diagnostics:  I have personally reviewed pertinent films in PACS  CT of chest performed on 11/11/23 with contrast revealed no evidence of pulmonary embolism..  No suspicious lung lesions and there was some scattered atelectasis throughout both lungs but mostly in lower lobes..      Office Spirometry Results:  done today      FVC - 1.49 L    62%  FEV1 - 0.59 L   33%  FEV1/FVC% - 40%      Severe airflow obstruction and mild to moderate restriction.  Possibly due to air trapping.

## 2024-03-15 ENCOUNTER — TELEPHONE (OUTPATIENT)
Age: 74
End: 2024-03-15

## 2024-03-15 DIAGNOSIS — J96.11 CHRONIC RESPIRATORY FAILURE WITH HYPOXIA (HCC): ICD-10-CM

## 2024-03-15 DIAGNOSIS — J44.9 CHRONIC OBSTRUCTIVE PULMONARY DISEASE, UNSPECIFIED COPD TYPE (HCC): Primary | ICD-10-CM

## 2024-03-15 LAB
DME PARACHUTE DELIVERY DATE EXPECTED: NORMAL
DME PARACHUTE DELIVERY DATE REQUESTED: NORMAL
DME PARACHUTE ITEM DESCRIPTION: NORMAL
DME PARACHUTE ORDER STATUS: NORMAL
DME PARACHUTE SUPPLIER NAME: NORMAL
DME PARACHUTE SUPPLIER PHONE: NORMAL

## 2024-03-15 NOTE — TELEPHONE ENCOUNTER
Incoming call:    Re: Pt of Dr. Terri Ignacio is stating that they do not have the order for oxygen supplies that was put in on 3/13/24. Can the order be re-sent?

## 2024-03-16 NOTE — ASSESSMENT & PLAN NOTE
Deanna does have severe obstructive sleep apnea and has been compliant with using her CPAP which is set at 18 cm of water at nighttime.  She does use 5 L of oxygen with the CPAP.  She previously had been seeing Dr. Beaulieu for her FRANK.  On initial diagnostic sleep study her AHI was reported to be 117.

## 2024-03-16 NOTE — ASSESSMENT & PLAN NOTE
Spirometry today shows evidence of severe airflow obstruction with FEV1 of 0.59 L or 33% of predicted obstructive ratio 40%.  She will continue with Trelegy 200 mcg 1 puff daily and use her nebulizer with albuterol 2.5 mg albuterol 4 times a day as needed.    At this time she wanted to hold off on pulmonary rehab.

## 2024-03-16 NOTE — ASSESSMENT & PLAN NOTE
She has been needing oxygen therapy since November 2023 when she was admitted for for acute exacerbation of COPD.    Back then she needed 4 L/min at rest and 10 L/min with walking to maintain saturation greater than 90%.  However she has improved.  I did do pulse oximetry testing today.    Pulse oximetry testing:    Room air O2 saturation at rest was 92% and on room air after ambulating 100 feet O2 saturation decreased to 84%  On 4 L/min nasal cannula oxygen O2 saturation at rest was 97% and on 4 L/min's, oxygen O2 saturation after walking 100 feet was 90%.    She would like to have smaller portable oxygen tanks rather than the cylinder tank.  I did place order for M9 portable oxygen tanks that she can use at 4 L/min with activity.  At rest during the day she can use her oxygen anywhere from 2 to 4 L/min as needed to keep O2 saturation greater than 90%.    I did order pulse oximetry to be done by [facility] with her using her CPAP at 18 cm water pressure with 5 L/min nasal cannula oxygen.    She does use medium AirFit fullface mask which she likes.

## 2024-03-20 NOTE — TELEPHONE ENCOUNTER
Court from [facility] called and said the patient has called there several times and they still have not received the correct order for the oxygen. She needs a conserving device with Eval ordered so she can switch the tanks to pulse dose. Can process through [facility] or fax to [phone].

## 2024-03-20 NOTE — TELEPHONE ENCOUNTER
Pt called asking if orders were placed for Portable O2 to [facility]    Note showing orders were placed.

## 2024-03-20 NOTE — TELEPHONE ENCOUNTER
I spoke w/ Radha at [facility] and explained that orders were placed when pt came to office on 3/13 but were removed by Connie Goodwin. She confirmed that they did receive orders although they were removed from [facility] and would process them in order for pt to get home fill concentrator and m9 tanks. I called pt to advise. She confirmed understanding

## 2024-03-25 ENCOUNTER — PATIENT MESSAGE (OUTPATIENT)
Dept: PULMONOLOGY | Facility: CLINIC | Age: 74
End: 2024-03-25

## 2024-04-01 NOTE — PATIENT COMMUNICATION
Patient is calling asking to speak to someone to go over overnight pulse ox results. Please advise.

## 2024-04-08 ENCOUNTER — TELEPHONE (OUTPATIENT)
Age: 74
End: 2024-04-08

## 2024-04-08 NOTE — TELEPHONE ENCOUNTER
Pt calls in and states that she returning phone call in regards to test results. Informed pt per Letty   to increase her supplemental oxygen with CPAP to 6LPM based on her overnight oxygen testing. Pt states she is feeling a lot better sleeping much better and this morning O2 was 98% w/o CPAP. She's been w/o CPAP for a week and feeling great she would like to remain at 5LPM

## 2024-04-08 NOTE — TELEPHONE ENCOUNTER
Spoke with patient: she wanted to stop cpap all together - explained cpap and o2 are treating 2 different processes- she is agreeable to continue w cpap at 6L

## 2024-04-08 NOTE — TELEPHONE ENCOUNTER
Deanna called in regarding her oxygen . Patient had gila Heekya ( fernie ) on one phone and had me another another phone . Fernie would fax over a re-certification - fax number [phone] was provided to [facility] . Fernie stated she will fax over the form will have to be completed and signed by the doctor and fax back to them [phone]      Oxygen portable machine .

## 2024-04-09 NOTE — TELEPHONE ENCOUNTER
Deanna is calling regarding her oxygen script Patient stated [facility] as yet to received the sign form from the doctor . Please refax and put Attention Radha

## 2024-04-11 NOTE — TELEPHONE ENCOUNTER
[employer] called, patient called them and is very upset of what is going on. [employer] is stating they have faxed over the certificate of medical necessity to our office on April 8th and 9th and have not received it back yet. I do not see anything scanned into the patients chart. [employer] was told by the patient that we had advised her we completed the form and faxed it in, but they have not received anything. They will be faxing this request again today to Fax #[phone]. Please advise as soon as possible.    [employer]  Phone #[phone]  Fax #[phone]

## 2024-04-11 NOTE — TELEPHONE ENCOUNTER
I spoke w. Lyric Stoddard at [facility] regarding pts. She had me place new orders for homefill unit through [facility] and told me to disregard cmn.

## 2024-04-11 NOTE — PROGRESS NOTES
Home o2 w/ Portability Orders were [facility].

## 2024-04-12 ENCOUNTER — TELEPHONE (OUTPATIENT)
Dept: FAMILY MEDICINE CLINIC | Facility: CLINIC | Age: 74
End: 2024-04-12

## 2024-04-12 ENCOUNTER — OFFICE VISIT (OUTPATIENT)
Dept: FAMILY MEDICINE CLINIC | Facility: CLINIC | Age: 74
End: 2024-04-12

## 2024-04-12 VITALS
TEMPERATURE: 97.3 F | WEIGHT: 191.38 LBS | DIASTOLIC BLOOD PRESSURE: 60 MMHG | HEART RATE: 84 BPM | BODY MASS INDEX: 36.13 KG/M2 | SYSTOLIC BLOOD PRESSURE: 140 MMHG | HEIGHT: 61 IN | OXYGEN SATURATION: 91 % | RESPIRATION RATE: 16 BRPM

## 2024-04-12 DIAGNOSIS — G47.33 OBSTRUCTIVE SLEEP APNEA OF ADULT: ICD-10-CM

## 2024-04-12 DIAGNOSIS — E11.65 TYPE 2 DIABETES MELLITUS WITH HYPERGLYCEMIA, WITHOUT LONG-TERM CURRENT USE OF INSULIN (HCC): Primary | ICD-10-CM

## 2024-04-12 DIAGNOSIS — J44.9 CHRONIC OBSTRUCTIVE PULMONARY DISEASE, UNSPECIFIED COPD TYPE (HCC): ICD-10-CM

## 2024-04-12 DIAGNOSIS — B37.0 THRUSH: ICD-10-CM

## 2024-04-12 DIAGNOSIS — I10 ESSENTIAL HYPERTENSION: ICD-10-CM

## 2024-04-12 PROBLEM — R79.89 ELEVATED TROPONIN: Status: RESOLVED | Noted: 2023-11-12 | Resolved: 2024-04-12

## 2024-04-12 PROBLEM — J96.11 CHRONIC RESPIRATORY FAILURE WITH HYPOXIA (HCC): Status: RESOLVED | Noted: 2023-11-12 | Resolved: 2024-04-12

## 2024-04-12 PROBLEM — D69.6 PLATELETS DECREASED (HCC): Status: RESOLVED | Noted: 2023-08-29 | Resolved: 2024-04-12

## 2024-04-12 PROBLEM — L72.3 SEBACEOUS CYST OF EAR: Status: RESOLVED | Noted: 2023-05-26 | Resolved: 2024-04-12

## 2024-04-12 PROBLEM — L57.0 KERATOSIS: Status: RESOLVED | Noted: 2017-11-30 | Resolved: 2024-04-12

## 2024-04-12 PROBLEM — F17.200 CURRENT SMOKER ON SOME DAYS: Status: RESOLVED | Noted: 2019-01-29 | Resolved: 2024-04-12

## 2024-04-12 LAB — SL AMB POCT HEMOGLOBIN AIC: 7.3 (ref ?–6.5)

## 2024-04-12 RX ORDER — CLOTRIMAZOLE 10 MG/1
10 LOZENGE ORAL; TOPICAL 4 TIMES DAILY
Qty: 40 TROCHE | Refills: 3 | Status: SHIPPED | OUTPATIENT
Start: 2024-04-12

## 2024-04-12 NOTE — ASSESSMENT & PLAN NOTE
COPD.  Patient does follow-up with her pulmonologist and continues on current dose of Trelegy inhaler

## 2024-04-12 NOTE — PROGRESS NOTES
FAMILY PRACTICE OFFICE VISIT       NAME: Deanna Perez  AGE: 74 y.o. SEX: female       : 1950        MRN: [id number]    DATE: 2024  TIME: 12:23 PM    Assessment and Plan     Problem List Items Addressed This Visit       Type 2 diabetes mellitus with hyperglycemia, without long-term current use of insulin (HCC) - Primary     Diabetes.  Patient with slight increase in A1c to 7.3.  She will continue current regimen of medications  Lab Results   Component Value Date    HGBA1C 7.3 (A) 2024            Relevant Orders    POCT hemoglobin A1c (Completed)    Obstructive sleep apnea of adult     COPD.  Patient does follow-up with her pulmonologist and continues on current dose of Trelegy inhaler         Essential hypertension     Hypertension.  The patient's blood pressure is stable at this time and he will continue current regimen of medications         RESOLVED: Chronic obstructive pulmonary disease, unspecified COPD type (HCC)    Thrush     Thrush.  Patient with suspected thrush from her inhaler.  She was given Mycelex troches to use 4 times daily for 10 days.         Relevant Medications    clotrimazole (MYCELEX) 10 mg karen           Chief Complaint     Chief Complaint   Patient presents with    Follow-up     DM        History of Present Illness     Patient in the office to review chronic medical condition.  Her A1c in the office today has risen slightly to 7.3 from previously at 6.8.  Patient states she had been on prednisone during her most recent hospital stay for exacerbation of COPD.  She currently uses home oxygen via nasal cannula and a oxygen concentrator.  Patient can take small walks around her house without oxygen however pulse ox will dip high 80s on room air.  Patient is awaiting insurance approval for a portable oxygen machine to allow her to get out of her house more easily.  Patient does use a walker with wheels for ambulating due to her chronic back pain.  Patient still experiences  intermittent chest tightness especially with activities due to her COPD.  She does use her Trelegy inhaler on a regular basis.    Patient does describe a chronic metallic taste in her mouth despite rinsing her mouth after each use of Trelegy.  She states symptoms began even prior to using this particular inhaler.        Review of Systems   Review of Systems   Constitutional:  Positive for fatigue.   HENT: Negative.     Respiratory:  Positive for chest tightness and shortness of breath.    Cardiovascular: Negative.    Gastrointestinal: Negative.    Genitourinary: Negative.    Musculoskeletal:  Positive for arthralgias.   Psychiatric/Behavioral: Negative.         Active Problem List     Patient Active Problem List   Diagnosis    Lumbar radiculopathy    Spinal stenosis of lumbar region    Lumbar spondylosis    Chronic right hip pain    Type 2 diabetes mellitus with hyperglycemia, without long-term current use of insulin (HCC)    Primary osteoarthritis of first carpometacarpal joint of left hand    Arthritis of carpometacarpal (CMC) joint of left thumb    Cataract of both eyes    DDD (degenerative disc disease), lumbar    Obstructive sleep apnea of adult    Secondary polycythemia    Chronic pain syndrome    Essential hypertension    Trigger finger, left index finger    Trigger ring finger of right hand    Sacroiliitis (HCC)    Vertigo    COPD (chronic obstructive pulmonary disease) (HCC)    Non-seasonal allergic rhinitis    Heart murmur, systolic    Obesity, morbid (HCC)    Persistent proteinuria    Osteoporosis    OAB (overactive bladder)    Stage 3 chronic kidney disease (HCC)    Thrush       Past Medical History:  Past Medical History:   Diagnosis Date    Asthma     Chronic obstructive lung disease (HCC)     Community acquired pneumonia     LAST ASSESSED: [id number]    COPD (chronic obstructive pulmonary disease) (HCC)     Diabetes mellitus (HCC)     History of MRSA infection     2008 liver sx    Liver disease      Sleep apnea     Viral warts     LAST ASSESSED: [id number]       Past Surgical History:  Past Surgical History:   Procedure Laterality Date    ABDOMINAL SURGERY      ABDOMINOPLASTY      CHOLECYSTECTOMY      COSMETIC SURGERY      EYE SURGERY      Bilateral cataracts 2015 Dr. FLORIN Villagran.     FINGER SURGERY      HAND SURGERY      HYSTERECTOMY      LIVER SURGERY      NE ARTHRP INTERPOS INTERCARPAL/METACARPAL JOINTS Right 9/9/2016    Procedure: THUMB TRAPEZIECTOMY; BASAL JOINT ARTHROPLASTY WITH APL AUTOGRAFT;  Surgeon: Kedar Villareal MD;  [facility];  Service: Orthopedics    NE ARTHRP INTERPOS INTERCARPAL/METACARPAL JOINTS Left 5/29/2018    Procedure: ARTHROPLASTY THUMB WEILBY;  Surgeon: Arthur Jackson MD;  [facility];  Service: Orthopedics    NE TENDON SHEATH INCISION Right 7/23/2019    Procedure: RELEASE TRIGGER FINGER RIGHT LONG;  Surgeon: Arthur Jackson MD;  [facility];  Service: Orthopedics    NE TENDON SHEATH INCISION Left 8/6/2019    Procedure: RELEASE TRIGGER FINGER LEFT INDEX;  Surgeon: Arthur Jackson MD;  [facility];  Service: Orthopedics    NE TENDON SHEATH INCISION Right 6/16/2020    Procedure: RELEASE TRIGGER FINGER, right ring;  Surgeon: Arthur Jackson MD;  [facility];  Service: Orthopedics    NE TR/TRNSPL TDN CARP/MTCRPL HAND W/O FR GRF EA TDN Left 5/29/2018    Procedure: TRANSFER TENDON HAND/WRIST FCR from forearm to wrist;  Surgeon: Arthur Jackson MD;  [facility];  Service: Orthopedics       Family History:  Family History   Problem Relation Age of Onset    Heart attack Mother     Heart attack Father     Ovarian cancer Sister     Basal cell carcinoma Brother     Heart disease Other         CARDIAC DISORDER    Diabetes Other     Liver cancer Other     Breast cancer Other     Stomach cancer Other     Heart disease Family     Diabetes Family     Thyroid disease Family     Ovarian cancer Family        Social History:  Social  History     Socioeconomic History    Marital status: /Civil Union     Spouse name: Not on file    Number of children: Not on file    Years of education: Not on file    Highest education level: Not on file   Occupational History    Not on file   Tobacco Use    Smoking status: Former     Average packs/day: 1 pack/day for 43.1 years (41.5 ttl pk-yrs)     Types: Cigarettes     Start date: 1980    Smokeless tobacco: Never   Vaping Use    Vaping status: Never Used   Substance and Sexual Activity    Alcohol use: Yes     Comment: very seldom    Drug use: No    Sexual activity: Not Currently   Other Topics Concern    Not on file   Social History Narrative    Not on file     Social Determinants of Health     Financial Resource Strain: Low Risk  (8/29/2023)    Overall Financial Resource Strain (CARDIA)     Difficulty of Paying Living Expenses: Not very hard   Food Insecurity: No Food Insecurity (11/12/2023)    Hunger Vital Sign     Worried About Running Out of Food in the Last Year: Never true     Ran Out of Food in the Last Year: Never true   Transportation Needs: No Transportation Needs (11/12/2023)    PRAPARE - Transportation     Lack of Transportation (Medical): No     Lack of Transportation (Non-Medical): No   Physical Activity: Not on file   Stress: Not on file   Social Connections: Not on file   Intimate Partner Violence: Not on file   Housing Stability: Low Risk  (11/12/2023)    Housing Stability Vital Sign     Unable to Pay for Housing in the Last Year: No     Number of Places Lived in the Last Year: 1     Unstable Housing in the Last Year: No       Objective     Vitals:    04/12/24 0823   BP: 140/60   Pulse: 84   Resp: 16   Temp: (!) 97.3 °F (36.3 °C)   SpO2: 91%     Wt Readings from Last 3 Encounters:   04/12/24 86.8 kg (191 lb 6 oz)   03/13/24 87.1 kg (192 lb)   02/15/24 88.9 kg (196 lb)       Physical Exam  Constitutional:       General: She is not in acute distress.     Appearance: Normal appearance. She  "is not ill-appearing.   HENT:      Head: Normocephalic and atraumatic.      Mouth/Throat:      Mouth: Mucous membranes are moist.      Comments: Patient with a few scattered white coating on her buccal mucosa and posterior pharynx  Eyes:      General:         Right eye: No discharge.         Left eye: No discharge.      Extraocular Movements: Extraocular movements intact.      Conjunctiva/sclera: Conjunctivae normal.      Pupils: Pupils are equal, round, and reactive to light.   Neck:      Vascular: No carotid bruit.   Cardiovascular:      Rate and Rhythm: Normal rate and regular rhythm.      Heart sounds: Normal heart sounds. No murmur heard.  Pulmonary:      Effort: Pulmonary effort is normal.      Breath sounds: Normal breath sounds. No wheezing, rhonchi or rales.   Musculoskeletal:      Right lower leg: No edema.      Left lower leg: No edema.   Lymphadenopathy:      Cervical: No cervical adenopathy.   Skin:     Findings: No rash.   Neurological:      General: No focal deficit present.      Mental Status: She is alert and oriented to person, place, and time.      Cranial Nerves: No cranial nerve deficit.   Psychiatric:         Mood and Affect: Mood normal.         Behavior: Behavior normal.         Thought Content: Thought content normal.         Judgment: Judgment normal.         Pertinent Laboratory/Diagnostic Studies:  Lab Results   Component Value Date    BUN 38 (H) 11/14/2023    CREATININE 1.27 11/14/2023    CALCIUM 9.2 11/14/2023     10/23/2017    K 4.4 11/14/2023    CO2 36 (H) 11/14/2023     11/14/2023     Lab Results   Component Value Date    ALT 4 (L) 11/11/2023    AST 11 (L) 11/11/2023    ALKPHOS 60 11/11/2023    BILITOT 0.3 10/23/2017       Lab Results   Component Value Date    WBC 8.78 11/12/2023    HGB 12.2 11/12/2023    HCT 43.1 11/12/2023     (H) 11/12/2023     11/12/2023       No results found for: \"TSH\"    No results found for: \"CHOL\"  Lab Results   Component Value Date "    TRIG 146 07/17/2023     Lab Results   Component Value Date    HDL 50 07/17/2023     Lab Results   Component Value Date    LDLCALC 39 07/17/2023     Lab Results   Component Value Date    HGBA1C 7.3 (A) 04/12/2024       Results for orders placed or performed in visit on 04/12/24   POCT hemoglobin A1c   Result Value Ref Range    Hemoglobin A1C 7.3 (A) 6.5       Orders Placed This Encounter   Procedures    POCT hemoglobin A1c       ALLERGIES:  No Known Allergies    Current Medications     Current Outpatient Medications   Medication Sig Dispense Refill    albuterol (2.5 mg/3 mL) 0.083 % nebulizer solution Take 3 mL (2.5 mg total) by nebulization every 6 (six) hours as needed for wheezing or shortness of breath 100 mL 0    albuterol (PROVENTIL HFA,VENTOLIN HFA) 90 mcg/act inhaler Inhale 2 puffs every 4 (four) hours as needed for wheezing or shortness of breath 8 g 0    clotrimazole (MYCELEX) 10 mg bernie Take 1 tablet (10 mg total) by mouth 4 (four) times a day 40 Bernie 3    fluticasone-umeclidinium-vilanterol (Trelegy Ellipta) 200-62.5-25 mcg/actuation AEPB inhaler Inhale 1 puff daily Rinse mouth after use. 60 blister 5    gabapentin (Neurontin) 600 MG tablet 1 tablet in the morning and 2 tablets at bedtime 270 tablet 1    glipiZIDE (GLUCOTROL) 5 mg tablet TAKE 1 TABLET TWICE DAILY BEFORE MEALS 180 tablet 10    glucose blood (ACCU-CHEK JESS PLUS) test strip Test BS 3 times daily.  DX E11.9 100 each 2    lisinopril (ZESTRIL) 40 mg tablet TAKE 1 TABLET BY MOUTH DAILY 90 tablet 1    metFORMIN (GLUCOPHAGE) 1000 MG tablet TAKE 1 TABLET TWICE DAILY 180 tablet 10    Multiple Vitamins-Minerals (OCUVITE ADULT FORMULA PO) Take by mouth      rosuvastatin (CRESTOR) 20 MG tablet TAKE 1 TABLET EVERY DAY 90 tablet 3    tolterodine (DETROL LA) 2 mg 24 hr capsule TAKE 1 CAPSULE EVERY DAY 90 capsule 3     No current facility-administered medications for this visit.         Health Maintenance     Health Maintenance   Topic Date Due     Hepatitis A Vaccine (1 of 2 - Risk 2-dose series) Never done    Zoster Vaccine (2 of 3) 11/27/2013    OT PLAN OF CARE  09/12/2018    PT PLAN OF CARE  03/01/2023    COVID-19 Vaccine (8 - 2023-24 season) 12/08/2023    Breast Cancer Screening: Mammogram  01/23/2024    DM Eye Exam  05/04/2024 (Originally 7/15/2023)    Colorectal Cancer Screening  08/01/2024    Fall Risk  08/29/2024    Urinary Incontinence Screening  08/29/2024    Medicare Annual Wellness Visit (AWV)  08/29/2024    Diabetic Foot Exam  08/29/2024    HEMOGLOBIN A1C  10/12/2024    Depression Screening  04/12/2025    Hepatitis C Screening  Completed    Osteoporosis Screening  Completed    Pneumococcal Vaccine: 65+ Years  Completed    Influenza Vaccine  Completed    HIB Vaccine  Aged Out    IPV Vaccine  Aged Out    Meningococcal ACWY Vaccine  Aged Out    HPV Vaccine  Aged Out     Immunization History   Administered Date(s) Administered    COVID-19 PFIZER VACCINE 0.3 ML IM 02/19/2021, 03/11/2021, 10/09/2021, 05/13/2022    COVID-19 Pfizer Vac BIVALENT Reji-sucrose 12 Yr+ IM 09/09/2022    COVID-19 Pfizer mRNA vacc PF reji-sucrose 12 yr and older (Comirnaty) 10/13/2023    COVID-19 Pfizer vac (Reji-sucrose, gray cap) 12 yr+ IM 05/13/2022    INFLUENZA 10/02/2013, 01/07/2016, 01/14/2017, 09/05/2017, 08/31/2021, 09/09/2022, 10/13/2023    Influenza Split High Dose Preservative Free IM 09/05/2017    Influenza, high dose seasonal 0.7 mL 09/10/2020    Influenza, seasonal, injectable 10/02/2013, 01/14/2017    Influenza, seasonal, injectable, preservative free 09/13/2018    Pneumococcal Conjugate 13-Valent 08/24/2017    Pneumococcal Conjugate PCV 7 09/06/2017    Pneumococcal Polysaccharide PPV23 08/15/2018    Respiratory Syncytial Virus Vaccine (Recombinant, Adjuvanted) 10/13/2023    Tdap 09/04/2014    Zoster 10/02/2013    influenza, trivalent, adjuvanted 09/11/2019       Hermelindo Reich MD    I spent 30 minutes with this patient of which greater than 50% was spent  counseling or reviewing chart

## 2024-04-12 NOTE — ASSESSMENT & PLAN NOTE
Thrush.  Patient with suspected thrush from her inhaler.  She was given Mycelex troches to use 4 times daily for 10 days.

## 2024-04-12 NOTE — ASSESSMENT & PLAN NOTE
Diabetes.  Patient with slight increase in A1c to 7.3.  She will continue current regimen of medications  Lab Results   Component Value Date    HGBA1C 7.3 (A) 04/12/2024

## 2024-04-22 ENCOUNTER — TELEPHONE (OUTPATIENT)
Age: 74
End: 2024-04-22

## 2024-04-22 NOTE — TELEPHONE ENCOUNTER
Pt calling regarding O2 concentrator. [employer] was at her home today to service the new machine. They suggested she try a portable oxygen concentrator. Pt requesting script for portable oxygen concentrator (rhythm, [employer], inogen).     Please update pt if/when sent to [employer]. She said to leave a VM if she does not answer.

## 2024-04-23 NOTE — TELEPHONE ENCOUNTER
I contacted [facility] to inquire about getting device. I was told that the device is pulse dose only and only goes up to 4l and that pt would not qualify for device. I spoke w/ pt and explained this. Pt confirmed understanding. Statement Selected

## 2024-05-24 DIAGNOSIS — J44.9 CHRONIC OBSTRUCTIVE PULMONARY DISEASE, UNSPECIFIED COPD TYPE (HCC): ICD-10-CM

## 2024-05-24 RX ORDER — FLUTICASONE FUROATE, UMECLIDINIUM BROMIDE AND VILANTEROL TRIFENATATE 200; 62.5; 25 UG/1; UG/1; UG/1
POWDER RESPIRATORY (INHALATION)
Qty: 180 BLISTER | Refills: 3 | Status: SHIPPED | OUTPATIENT
Start: 2024-05-24

## 2024-06-14 ENCOUNTER — OFFICE VISIT (OUTPATIENT)
Dept: PAIN MEDICINE | Facility: CLINIC | Age: 74
End: 2024-06-14
Payer: MEDICARE

## 2024-06-14 VITALS
HEIGHT: 61 IN | HEART RATE: 89 BPM | BODY MASS INDEX: 34.74 KG/M2 | DIASTOLIC BLOOD PRESSURE: 63 MMHG | SYSTOLIC BLOOD PRESSURE: 116 MMHG | WEIGHT: 184 LBS

## 2024-06-14 DIAGNOSIS — G89.4 CHRONIC PAIN SYNDROME: ICD-10-CM

## 2024-06-14 DIAGNOSIS — M48.061 SPINAL STENOSIS OF LUMBAR REGION, UNSPECIFIED WHETHER NEUROGENIC CLAUDICATION PRESENT: Primary | ICD-10-CM

## 2024-06-14 DIAGNOSIS — M54.16 LUMBAR RADICULOPATHY: ICD-10-CM

## 2024-06-14 DIAGNOSIS — M47.816 LUMBAR SPONDYLOSIS: ICD-10-CM

## 2024-06-14 PROCEDURE — 99214 OFFICE O/P EST MOD 30 MIN: CPT | Performed by: ANESTHESIOLOGY

## 2024-06-14 PROCEDURE — G2211 COMPLEX E/M VISIT ADD ON: HCPCS | Performed by: ANESTHESIOLOGY

## 2024-06-14 RX ORDER — GABAPENTIN 600 MG/1
1200 TABLET ORAL 2 TIMES DAILY
Qty: 360 TABLET | Refills: 1 | Status: SHIPPED | OUTPATIENT
Start: 2024-06-14

## 2024-06-14 NOTE — PROGRESS NOTES
Assessment  1. Spinal stenosis of lumbar region, unspecified whether neurogenic claudication present    2. Lumbar radiculopathy    3. Lumbar spondylosis    4. Chronic pain syndrome        Plan  74-year-old female with a history of lumbar stenosis, spondylosis, lumbar radiculopathy, and chronic pain syndrome returning for follow-up.  The patient's low back and lower extremity symptoms are slightly worse today.  She denies any interval trauma.  She is currently taking gabapentin 600 mg in the morning and 1200 mg at bedtime with 50% of relief.  She denies any side effects with the medication.        1.  I will increase her gabapentin to 1200 mg in the morning and 1200 mg at bedtime for her neuropathic complaints.  Refill was provided today.  2.  Patient will continue with her home exercise program  3.  I will follow-up with the patient in 6 months or sooner if needed      My impressions and treatment recommendations were discussed in detail with the patient who verbalized understanding and had no further questions.  Discharge instructions were provided. I personally saw and examined the patient and I agree with the above discussed plan of care.    No orders of the defined types were placed in this encounter.    New Medications Ordered This Visit   Medications    gabapentin (Neurontin) 600 MG tablet     Sig: Take 2 tablets (1,200 mg total) by mouth 2 (two) times a day 1 tablet in the morning and 2 tablets at bedtime     Dispense:  360 tablet     Refill:  1       History of Present Illness    Deanna Perez is a 74 y.o. female with a history of lumbar stenosis, spondylosis, lumbar radiculopathy, and chronic pain syndrome returning for follow-up.  The patient's low back and lower extremity symptoms are slightly worse today.  She denies any interval trauma.  She denies any bladder or bowel incontinence or saddle anesthesia.  Lower extremity weakness is essentially at baseline.  She is currently taking gabapentin 600 mg in  the morning and 1200 mg at bedtime with 50% of relief.  She denies any side effects with the medication.     The patient rates her pain currently a 2 out of 10 and the pain is not following any particular pattern throughout the day.  The pain is intermittent and described as dull, aching, pressure-like, and numbness.  The pain is increased with standing, walking, bending, and exercise.  The pain is alleviated with sitting, relaxation, lying down, and medication.    Other than as stated above, the patient denies any interval changes in medications, medical condition, mental condition, symptoms, or allergies since the last office visit.    I have personally reviewed and/or updated the patient's past medical history, past surgical history, family history, social history, current medications, allergies, and vital signs today.     Review of Systems   Constitutional:  Negative for fever and unexpected weight change.   HENT:  Negative for trouble swallowing.    Eyes:  Negative for visual disturbance.   Respiratory:  Positive for shortness of breath. Negative for wheezing.    Cardiovascular:  Negative for chest pain and palpitations.   Gastrointestinal:  Negative for constipation, diarrhea, nausea and vomiting.   Endocrine: Negative for cold intolerance, heat intolerance and polydipsia.   Genitourinary:  Negative for difficulty urinating and frequency.   Musculoskeletal:  Negative for arthralgias, gait problem, joint swelling and myalgias.   Skin:  Negative for rash.   Neurological:  Positive for weakness. Negative for dizziness, seizures, syncope and headaches.   Hematological:  Does not bruise/bleed easily.   Psychiatric/Behavioral:  Negative for dysphoric mood.    All other systems reviewed and are negative.      Patient Active Problem List   Diagnosis    Lumbar radiculopathy    Spinal stenosis of lumbar region    Lumbar spondylosis    Chronic right hip pain    Type 2 diabetes mellitus with hyperglycemia, without  long-term current use of insulin (HCC)    Primary osteoarthritis of first carpometacarpal joint of left hand    Arthritis of carpometacarpal (CMC) joint of left thumb    Cataract of both eyes    DDD (degenerative disc disease), lumbar    Obstructive sleep apnea of adult    Secondary polycythemia    Chronic pain syndrome    Essential hypertension    Trigger finger, left index finger    Trigger ring finger of right hand    Sacroiliitis (HCC)    Vertigo    COPD (chronic obstructive pulmonary disease) (HCC)    Non-seasonal allergic rhinitis    Heart murmur, systolic    Obesity, morbid (HCC)    Persistent proteinuria    Osteoporosis    OAB (overactive bladder)    Stage 3 chronic kidney disease (HCC)    Thrush       Past Medical History:   Diagnosis Date    Asthma     Chronic obstructive lung disease (HCC)     Community acquired pneumonia     LAST ASSESSED: [id number]    COPD (chronic obstructive pulmonary disease) (HCC)     Diabetes mellitus (HCC)     History of MRSA infection     2008 liver sx    Liver disease     Sleep apnea     Viral warts     LAST ASSESSED: [id number]       Past Surgical History:   Procedure Laterality Date    ABDOMINAL SURGERY      ABDOMINOPLASTY      CHOLECYSTECTOMY      COSMETIC SURGERY      EYE SURGERY      Bilateral cataracts 2015 Dr. FLORIN Villagran.     FINGER SURGERY      HAND SURGERY      HYSTERECTOMY      LIVER SURGERY      MT ARTHRP INTERPOS INTERCARPAL/METACARPAL JOINTS Right 9/9/2016    Procedure: THUMB TRAPEZIECTOMY; BASAL JOINT ARTHROPLASTY WITH APL AUTOGRAFT;  Surgeon: Kedar Villareal MD;  [facility];  Service: Orthopedics    MT ARTHRP INTERPOS INTERCARPAL/METACARPAL JOINTS Left 5/29/2018    Procedure: ARTHROPLASTY THUMB WEILBY;  Surgeon: Arthur Jackson MD;  [facility];  Service: Orthopedics    MT TENDON SHEATH INCISION Right 7/23/2019    Procedure: RELEASE TRIGGER FINGER RIGHT LONG;  Surgeon: Arthur Jackson MD;  [facility];  Service: Orthopedics    MT  TENDON SHEATH INCISION Left 8/6/2019    Procedure: RELEASE TRIGGER FINGER LEFT INDEX;  Surgeon: Arthur Jackson MD;  [facility];  Service: Orthopedics    MN TENDON SHEATH INCISION Right 6/16/2020    Procedure: RELEASE TRIGGER FINGER, right ring;  Surgeon: Arthur Jackson MD;  [facility];  Service: Orthopedics    MN TR/TRNSPL TDN CARP/MTCRPL HAND W/O FR GRF EA TDN Left 5/29/2018    Procedure: TRANSFER TENDON HAND/WRIST FCR from forearm to wrist;  Surgeon: Arthur Jackson MD;  [facility];  Service: Orthopedics       Family History   Problem Relation Age of Onset    Heart attack Mother     Heart attack Father     Ovarian cancer Sister     Basal cell carcinoma Brother     Heart disease Other         CARDIAC DISORDER    Diabetes Other     Liver cancer Other     Breast cancer Other     Stomach cancer Other     Heart disease Family     Diabetes Family     Thyroid disease Family     Ovarian cancer Family        Social History     Occupational History    Not on file   Tobacco Use    Smoking status: Former     Average packs/day: 1 pack/day for 43.1 years (41.5 ttl pk-yrs)     Types: Cigarettes     Start date: 1980    Smokeless tobacco: Never   Vaping Use    Vaping status: Never Used   Substance and Sexual Activity    Alcohol use: Yes     Comment: very seldom    Drug use: No    Sexual activity: Not Currently       Current Outpatient Medications on File Prior to Visit   Medication Sig    albuterol (2.5 mg/3 mL) 0.083 % nebulizer solution Take 3 mL (2.5 mg total) by nebulization every 6 (six) hours as needed for wheezing or shortness of breath    albuterol (PROVENTIL HFA,VENTOLIN HFA) 90 mcg/act inhaler Inhale 2 puffs every 4 (four) hours as needed for wheezing or shortness of breath    clotrimazole (MYCELEX) 10 mg karen Take 1 tablet (10 mg total) by mouth 4 (four) times a day    glipiZIDE (GLUCOTROL) 5 mg tablet TAKE 1 TABLET TWICE DAILY BEFORE MEALS    glucose blood (ACCU-CHEK JESS  "PLUS) test strip Test BS 3 times daily.  DX E11.9    lisinopril (ZESTRIL) 40 mg tablet TAKE 1 TABLET BY MOUTH DAILY    metFORMIN (GLUCOPHAGE) 1000 MG tablet TAKE 1 TABLET TWICE DAILY    Multiple Vitamins-Minerals (OCUVITE ADULT FORMULA PO) Take by mouth    rosuvastatin (CRESTOR) 20 MG tablet TAKE 1 TABLET EVERY DAY    tolterodine (DETROL LA) 2 mg 24 hr capsule TAKE 1 CAPSULE EVERY DAY    Trelegy Ellipta 200-62.5-25 MCG/ACT AEPB inhaler INHALE 1 PUFF EVERY DAY. RINSE MOUTH AFTER USE    [DISCONTINUED] gabapentin (Neurontin) 600 MG tablet 1 tablet in the morning and 2 tablets at bedtime     No current facility-administered medications on file prior to visit.       No Known Allergies    Physical Exam    /63   Pulse 89   Ht 5' 1\" (1.549 m)   Wt 83.5 kg (184 lb)   BMI 34.77 kg/m²     Constitutional: normal, well developed, well nourished, alert, in no distress and non-toxic and no overt pain behavior.  Eyes: anicteric  HEENT: grossly intact  Neck: supple, symmetric, trachea midline and no masses   Pulmonary:even and unlabored  Cardiovascular:No edema or pitting edema present  Skin:Normal without rashes or lesions and well hydrated  Psychiatric:Mood and affect appropriate  Neurologic:Cranial Nerves II-XII grossly intact  Musculoskeletal:antalgic gait and ambulates with a rollator.  Bilateral lumbar paraspinals tender to palpation from L3-S1.  Bilateral lower extremity strength 5 out of 5 in all muscle groups.  Sensation intact to light touch in L3-S1 dermatomes bilaterally.  Negative seated straight leg raise bilaterally.    Imaging  MRI LUMBAR SPINE WITHOUT CONTRAST     INDICATION: M54.16: Radiculopathy, lumbar region.     COMPARISON:  MRI dated 7/11/2019.     TECHNIQUE:  Multiplanar, multisequence imaging of the lumbar spine was performed. .          FINDINGS:     VERTEBRAL BODIES:  There are 5 lumbar type vertebral bodies.  Normal alignment of the lumbar spine.  No spondylolysis or spondylolisthesis. No " scoliosis.  No compression fracture.    Stable heterogeneous marrow signal.  Multilevel mostly Modic type II   endplate degenerative changes.  Minimal type I changes at L4-5 are decreased from prior.  No suspicious marrow signal abnormality.     SACRUM:  Normal signal within the sacrum. No evidence of insufficiency or stress fracture.     DISTAL CORD AND CONUS:  Normal size and signal within the distal cord and conus.     PARASPINAL SOFT TISSUES:  Paraspinal soft tissues are unremarkable.     LOWER THORACIC DISC SPACES:  Mild noncompressive lower thoracic degenerative change.     LUMBAR DISC SPACES:  Multilevel disc desiccation.  Disc space narrowing most pronounced at L4-5.     L1-L2:  No disc herniation, canal or foraminal stenosis.     L2-L3:  Minimal disc bulge and mild facet arthropathy.  No significant canal or foraminal stenosis.     L3-L4:  Disc bulge, facet arthropathy and ligamentum flavum hypertrophy.  Mild canal stenosis and mild foraminal stenosis.  No significant change.     L4-L5:  Disc bulge and marginal osteophyte with facet arthropathy.  Mild canal stenosis and mild foraminal stenosis.  No significant change.     L5-S1: Disc bulge, marginal osteophyte and facet arthropathy.  No significant canal stenosis.  Moderate severe foraminal stenosis contacting the exiting nerve roots, right worse than left.  No significant change.     IMPRESSION:     Multilevel degenerative spondylosis without significant change.  Mild canal and mild foraminal stenosis at L3-4 and L4-5.  Moderate to severe bilateral foraminal stenosis at L5-S1, right worse than left.

## 2024-06-19 ENCOUNTER — OFFICE VISIT (OUTPATIENT)
Dept: FAMILY MEDICINE CLINIC | Facility: CLINIC | Age: 74
End: 2024-06-19
Payer: MEDICARE

## 2024-06-19 VITALS
OXYGEN SATURATION: 88 % | DIASTOLIC BLOOD PRESSURE: 82 MMHG | HEART RATE: 78 BPM | BODY MASS INDEX: 34.77 KG/M2 | HEIGHT: 61 IN | SYSTOLIC BLOOD PRESSURE: 134 MMHG | RESPIRATION RATE: 16 BRPM | TEMPERATURE: 97.8 F

## 2024-06-19 DIAGNOSIS — K11.7 XEROSTOMIA: Primary | ICD-10-CM

## 2024-06-19 DIAGNOSIS — N32.81 OAB (OVERACTIVE BLADDER): ICD-10-CM

## 2024-06-19 DIAGNOSIS — J44.9 CHRONIC OBSTRUCTIVE PULMONARY DISEASE, UNSPECIFIED COPD TYPE (HCC): ICD-10-CM

## 2024-06-19 PROCEDURE — 99213 OFFICE O/P EST LOW 20 MIN: CPT | Performed by: FAMILY MEDICINE

## 2024-06-19 PROCEDURE — G2211 COMPLEX E/M VISIT ADD ON: HCPCS | Performed by: FAMILY MEDICINE

## 2024-06-19 RX ORDER — TOLTERODINE 2 MG/1
4 CAPSULE, EXTENDED RELEASE ORAL DAILY
Qty: 90 CAPSULE | Refills: 3 | Status: SHIPPED | OUTPATIENT
Start: 2024-06-19

## 2024-06-19 RX ORDER — FLUTICASONE FUROATE, UMECLIDINIUM BROMIDE AND VILANTEROL TRIFENATATE 200; 62.5; 25 UG/1; UG/1; UG/1
1 POWDER RESPIRATORY (INHALATION) DAILY
Qty: 90 BLISTER | Refills: 3 | Status: SHIPPED | OUTPATIENT
Start: 2024-06-19 | End: 2024-12-16

## 2024-06-19 NOTE — PROGRESS NOTES
FAMILY PRACTICE OFFICE VISIT       NAME: Deanna Perez  AGE: 74 y.o. SEX: female       : 1950        MRN: [id number]    DATE: 2024  TIME: 10:07 AM    Assessment and Plan     Problem List Items Addressed This Visit       COPD (chronic obstructive pulmonary disease) (HCC)     COPD.  Patient was given a refill on her Trelegy inhaler as requested         Relevant Medications    fluticasone-umeclidinium-vilanterol (Trelegy Ellipta) 200-62.5-25 mcg/actuation AEPB inhaler    OAB (overactive bladder)     Overactive bladder syndrome.  Patient was given a prescription to increase her Detrol to 4 mg once daily         Relevant Medications    tolterodine (DETROL LA) 2 mg 24 hr capsule    Xerostomia - Primary     Xerostomia.  Patient suspected to have drug-induced xerostomia.  She was given recommendation to use Biotene mouthwash as to improve her symptoms.  Hopefully by avoiding dryness her metallic taste will improve.  Patient refused ENT consultation                Chief Complaint     Chief Complaint   Patient presents with    Metal taste in Mouth    Medication Management       History of Present Illness     Patient in the office to discuss chronic condition.  She states she has been having a metallic taste in her mouth for several weeks to months with no specific triggers.  She denies any recent illness.  At times she does experience mouth dryness.  She does rinse her mouth whenever she uses her inhaler.  She denies any sore throat.    Patient also reports unrelated continued episodes of urinary incontinence.  Initially she felt Detrol helped with her symptoms but lately has been having more difficulties with urge incontinence.  She denies any dysuria        Review of Systems   Review of Systems   Constitutional: Negative.    HENT:          As per HPI   Respiratory: Negative.     Cardiovascular: Negative.    Gastrointestinal: Negative.    Genitourinary:  Positive for frequency and urgency. Negative for  dysuria.       Active Problem List     Patient Active Problem List   Diagnosis    Lumbar radiculopathy    Spinal stenosis of lumbar region    Lumbar spondylosis    Chronic right hip pain    Type 2 diabetes mellitus with hyperglycemia, without long-term current use of insulin (HCC)    Primary osteoarthritis of first carpometacarpal joint of left hand    Arthritis of carpometacarpal (CMC) joint of left thumb    Cataract of both eyes    DDD (degenerative disc disease), lumbar    Obstructive sleep apnea of adult    Secondary polycythemia    Chronic pain syndrome    Essential hypertension    Trigger finger, left index finger    Trigger ring finger of right hand    Sacroiliitis (HCC)    Vertigo    COPD (chronic obstructive pulmonary disease) (HCC)    Non-seasonal allergic rhinitis    Heart murmur, systolic    Obesity, morbid (HCC)    Persistent proteinuria    Osteoporosis    OAB (overactive bladder)    Stage 3 chronic kidney disease (HCC)    Thrush    Xerostomia       Past Medical History:  Past Medical History:   Diagnosis Date    Asthma     Chronic obstructive lung disease (HCC)     Community acquired pneumonia     LAST ASSESSED: [id number]    COPD (chronic obstructive pulmonary disease) (HCC)     Diabetes mellitus (HCC)     History of MRSA infection     2008 liver sx    Liver disease     Sleep apnea     Viral warts     LAST ASSESSED: [id number]       Past Surgical History:  Past Surgical History:   Procedure Laterality Date    ABDOMINAL SURGERY      ABDOMINOPLASTY      CHOLECYSTECTOMY      COSMETIC SURGERY      EYE SURGERY      Bilateral cataracts 2015 Dr. FLORIN Villagran.     FINGER SURGERY      HAND SURGERY      HYSTERECTOMY      LIVER SURGERY      DC ARTHRP INTERPOS INTERCARPAL/METACARPAL JOINTS Right 9/9/2016    Procedure: THUMB TRAPEZIECTOMY; BASAL JOINT ARTHROPLASTY WITH APL AUTOGRAFT;  Surgeon: Kedar Villareal MD;  [facility];  Service: Orthopedics    DC ARTHRP INTERPOS INTERCARPAL/METACARPAL JOINTS Left  5/29/2018    Procedure: ARTHROPLASTY THUMB WEILBY;  Surgeon: Arthur Jackson MD;  [facility];  Service: Orthopedics    VT TENDON SHEATH INCISION Right 7/23/2019    Procedure: RELEASE TRIGGER FINGER RIGHT LONG;  Surgeon: Arthur Jackson MD;  [facility];  Service: Orthopedics    VT TENDON SHEATH INCISION Left 8/6/2019    Procedure: RELEASE TRIGGER FINGER LEFT INDEX;  Surgeon: Arthur Jackson MD;  [facility];  Service: Orthopedics    VT TENDON SHEATH INCISION Right 6/16/2020    Procedure: RELEASE TRIGGER FINGER, right ring;  Surgeon: Arthur Jackson MD;  [facility];  Service: Orthopedics    VT TR/TRNSPL TDN CARP/MTCRPL HAND W/O FR GRF EA TDN Left 5/29/2018    Procedure: TRANSFER TENDON HAND/WRIST FCR from forearm to wrist;  Surgeon: Arthur Jackson MD;  [facility];  Service: Orthopedics       Family History:  Family History   Problem Relation Age of Onset    Heart attack Mother     Heart attack Father     Ovarian cancer Sister     Basal cell carcinoma Brother     Heart disease Other         CARDIAC DISORDER    Diabetes Other     Liver cancer Other     Breast cancer Other     Stomach cancer Other     Heart disease Family     Diabetes Family     Thyroid disease Family     Ovarian cancer Family        Social History:  Social History     Socioeconomic History    Marital status: /Civil Union     Spouse name: Not on file    Number of children: Not on file    Years of education: Not on file    Highest education level: Not on file   Occupational History    Not on file   Tobacco Use    Smoking status: Former     Average packs/day: 1 pack/day for 43.1 years (41.5 ttl pk-yrs)     Types: Cigarettes     Start date: 1980    Smokeless tobacco: Never   Vaping Use    Vaping status: Never Used   Substance and Sexual Activity    Alcohol use: Yes     Comment: very seldom    Drug use: No    Sexual activity: Not Currently   Other Topics Concern    Not on file   Social  History Narrative    Not on file     Social Determinants of Health     Financial Resource Strain: Low Risk  (8/29/2023)    Overall Financial Resource Strain (CARDIA)     Difficulty of Paying Living Expenses: Not very hard   Food Insecurity: No Food Insecurity (11/12/2023)    Hunger Vital Sign     Worried About Running Out of Food in the Last Year: Never true     Ran Out of Food in the Last Year: Never true   Transportation Needs: No Transportation Needs (11/12/2023)    PRAPARE - Transportation     Lack of Transportation (Medical): No     Lack of Transportation (Non-Medical): No   Physical Activity: Not on file   Stress: Not on file   Social Connections: Not on file   Intimate Partner Violence: Not on file   Housing Stability: Low Risk  (11/12/2023)    Housing Stability Vital Sign     Unable to Pay for Housing in the Last Year: No     Number of Times Moved in the Last Year: 1     Homeless in the Last Year: No       Objective     Vitals:    06/19/24 0806   BP: 134/82   Pulse: 78   Resp: 16   Temp: 97.8 °F (36.6 °C)   SpO2: (!) 88%     Wt Readings from Last 3 Encounters:   06/14/24 83.5 kg (184 lb)   04/12/24 86.8 kg (191 lb 6 oz)   03/13/24 87.1 kg (192 lb)       Physical Exam  Constitutional:       General: She is not in acute distress.     Appearance: Normal appearance. She is not ill-appearing.   HENT:      Mouth/Throat:      Pharynx: No oropharyngeal exudate or posterior oropharyngeal erythema.   Eyes:      General:         Right eye: No discharge.         Left eye: No discharge.      Extraocular Movements: Extraocular movements intact.      Conjunctiva/sclera: Conjunctivae normal.      Pupils: Pupils are equal, round, and reactive to light.   Neurological:      Mental Status: She is alert. Mental status is at baseline.   Psychiatric:         Mood and Affect: Mood normal.         Behavior: Behavior normal.         Thought Content: Thought content normal.         Judgment: Judgment normal.         Pertinent  "Laboratory/Diagnostic Studies:  Lab Results   Component Value Date    BUN 38 (H) 11/14/2023    CREATININE 1.27 11/14/2023    CALCIUM 9.2 11/14/2023     10/23/2017    K 4.4 11/14/2023    CO2 36 (H) 11/14/2023     11/14/2023     Lab Results   Component Value Date    ALT 4 (L) 11/11/2023    AST 11 (L) 11/11/2023    ALKPHOS 60 11/11/2023    BILITOT 0.3 10/23/2017       Lab Results   Component Value Date    WBC 8.78 11/12/2023    HGB 12.2 11/12/2023    HCT 43.1 11/12/2023     (H) 11/12/2023     11/12/2023       No results found for: \"TSH\"    No results found for: \"CHOL\"  Lab Results   Component Value Date    TRIG 146 07/17/2023     Lab Results   Component Value Date    HDL 50 07/17/2023     Lab Results   Component Value Date    LDLCALC 39 07/17/2023     Lab Results   Component Value Date    HGBA1C 7.3 (A) 04/12/2024       Results for orders placed or performed in visit on 04/12/24   POCT hemoglobin A1c   Result Value Ref Range    Hemoglobin A1C 7.3 (A) 6.5       No orders of the defined types were placed in this encounter.      ALLERGIES:  No Known Allergies    Current Medications     Current Outpatient Medications   Medication Sig Dispense Refill    albuterol (2.5 mg/3 mL) 0.083 % nebulizer solution Take 3 mL (2.5 mg total) by nebulization every 6 (six) hours as needed for wheezing or shortness of breath 100 mL 0    fluticasone-umeclidinium-vilanterol (Trelegy Ellipta) 200-62.5-25 mcg/actuation AEPB inhaler Inhale 1 puff daily Rinse mouth after use. 90 blister 3    tolterodine (DETROL LA) 2 mg 24 hr capsule Take 2 capsules (4 mg total) by mouth daily 90 capsule 3    albuterol (PROVENTIL HFA,VENTOLIN HFA) 90 mcg/act inhaler Inhale 2 puffs every 4 (four) hours as needed for wheezing or shortness of breath 8 g 0    clotrimazole (MYCELEX) 10 mg bernie Take 1 tablet (10 mg total) by mouth 4 (four) times a day 40 Bernie 3    gabapentin (Neurontin) 600 MG tablet Take 2 tablets (1,200 mg total) by " mouth 2 (two) times a day 1 tablet in the morning and 2 tablets at bedtime 360 tablet 1    glipiZIDE (GLUCOTROL) 5 mg tablet TAKE 1 TABLET TWICE DAILY BEFORE MEALS 180 tablet 10    glucose blood (ACCU-CHEK JESS PLUS) test strip Test BS 3 times daily.  DX E11.9 100 each 2    lisinopril (ZESTRIL) 40 mg tablet TAKE 1 TABLET BY MOUTH DAILY 90 tablet 1    metFORMIN (GLUCOPHAGE) 1000 MG tablet TAKE 1 TABLET TWICE DAILY 180 tablet 10    Multiple Vitamins-Minerals (OCUVITE ADULT FORMULA PO) Take by mouth      rosuvastatin (CRESTOR) 20 MG tablet TAKE 1 TABLET EVERY DAY 90 tablet 3     No current facility-administered medications for this visit.         Health Maintenance     Health Maintenance   Topic Date Due    Hepatitis A Vaccine (1 of 2 - Risk 2-dose series) Never done    Zoster Vaccine (2 of 3) 11/27/2013    OT PLAN OF CARE  09/12/2018    PT PLAN OF CARE  03/01/2023    Breast Cancer Screening: Mammogram  01/23/2024    Colorectal Cancer Screening  08/01/2024    Fall Risk  08/29/2024    Medicare Annual Wellness Visit (AWV)  08/29/2024    Diabetic Foot Exam  08/29/2024    DM Eye Exam  06/19/2025 (Originally 7/15/2023)    Urinary Incontinence Screening  08/29/2024    HEMOGLOBIN A1C  10/12/2024    Depression Screening  04/12/2025    Hepatitis C Screening  Completed    Osteoporosis Screening  Completed    RSV Vaccine Age 60+ Years  Completed    Pneumococcal Vaccine: 65+ Years  Completed    Influenza Vaccine  Completed    COVID-19 Vaccine  Completed    RSV Vaccine age 0-20 Months  Aged Out    HIB Vaccine  Aged Out    IPV Vaccine  Aged Out    Meningococcal ACWY Vaccine  Aged Out    HPV Vaccine  Aged Out     Immunization History   Administered Date(s) Administered    COVID-19 PFIZER VACCINE 0.3 ML IM 02/19/2021, 03/11/2021, 10/09/2021, 05/13/2022    COVID-19 Pfizer Vac BIVALENT Reji-sucrose 12 Yr+ IM 09/09/2022    COVID-19 Pfizer mRNA vacc PF reji-sucrose 12 yr and older (Comirnaty) 10/13/2023    COVID-19 Pfizer vac  (Reji-sucrose, gray cap) 12 yr+ IM 05/13/2022    INFLUENZA 10/02/2013, 01/07/2016, 01/14/2017, 09/05/2017, 08/31/2021, 09/09/2022, 10/13/2023    Influenza Split High Dose Preservative Free IM 09/05/2017    Influenza, high dose seasonal 0.7 mL 09/10/2020    Influenza, seasonal, injectable 10/02/2013, 01/14/2017    Influenza, seasonal, injectable, preservative free 09/13/2018    Pneumococcal Conjugate 13-Valent 08/24/2017    Pneumococcal Conjugate PCV 7 09/06/2017    Pneumococcal Polysaccharide PPV23 08/15/2018    Respiratory Syncytial Virus Vaccine (Recombinant, Adjuvanted) 10/13/2023    Tdap 09/04/2014    Zoster 10/02/2013    influenza, trivalent, adjuvanted 09/11/2019       Hermelindo Reich MD

## 2024-06-19 NOTE — ASSESSMENT & PLAN NOTE
Xerostomia.  Patient suspected to have drug-induced xerostomia.  She was given recommendation to use Biotene mouthwash as to improve her symptoms.  Hopefully by avoiding dryness her metallic taste will improve.  Patient refused ENT consultation

## 2024-06-19 NOTE — ASSESSMENT & PLAN NOTE
Overactive bladder syndrome.  Patient was given a prescription to increase her Detrol to 4 mg once daily

## 2024-06-27 ENCOUNTER — HOSPITAL ENCOUNTER (OUTPATIENT)
Facility: HOSPITAL | Age: 74
Setting detail: OBSERVATION
Discharge: HOME | End: 2024-06-28
Attending: EMERGENCY MEDICINE | Admitting: INTERNAL MEDICINE
Payer: MEDICARE

## 2024-06-27 DIAGNOSIS — J96.11 CHRONIC RESPIRATORY FAILURE WITH HYPOXIA (HCC): Primary | ICD-10-CM

## 2024-06-27 DIAGNOSIS — J44.9 COPD (CHRONIC OBSTRUCTIVE PULMONARY DISEASE) (HCC): ICD-10-CM

## 2024-06-27 DIAGNOSIS — Z99.81 SUPPLEMENTAL OXYGEN DEPENDENT: ICD-10-CM

## 2024-06-27 DIAGNOSIS — Z59.12 HAS NO ELECTRICITY IN HOME: ICD-10-CM

## 2024-06-27 LAB
ANION GAP SERPL CALCULATED.3IONS-SCNC: 10 MMOL/L (ref 4–13)
BASOPHILS # BLD AUTO: 0.03 THOUSANDS/ÂΜL (ref 0–0.1)
BASOPHILS NFR BLD AUTO: 0 % (ref 0–1)
BUN SERPL-MCNC: 26 MG/DL (ref 5–25)
CALCIUM SERPL-MCNC: 9.9 MG/DL (ref 8.4–10.2)
CHLORIDE SERPL-SCNC: 103 MMOL/L (ref 96–108)
CO2 SERPL-SCNC: 30 MMOL/L (ref 21–32)
CREAT SERPL-MCNC: 1.02 MG/DL (ref 0.6–1.3)
EOSINOPHIL # BLD AUTO: 0.16 THOUSAND/ÂΜL (ref 0–0.61)
EOSINOPHIL NFR BLD AUTO: 2 % (ref 0–6)
ERYTHROCYTE [DISTWIDTH] IN BLOOD BY AUTOMATED COUNT: 13.2 % (ref 11.6–15.1)
GFR SERPL CREATININE-BSD FRML MDRD: 53 ML/MIN/1.73SQ M
GLUCOSE SERPL-MCNC: 168 MG/DL (ref 65–140)
HCT VFR BLD AUTO: 35.2 % (ref 34.8–46.1)
HGB BLD-MCNC: 10.7 G/DL (ref 11.5–15.4)
IMM GRANULOCYTES # BLD AUTO: 0.03 THOUSAND/UL (ref 0–0.2)
IMM GRANULOCYTES NFR BLD AUTO: 0 % (ref 0–2)
LYMPHOCYTES # BLD AUTO: 1.37 THOUSANDS/ÂΜL (ref 0.6–4.47)
LYMPHOCYTES NFR BLD AUTO: 13 % (ref 14–44)
MCH RBC QN AUTO: 30.6 PG (ref 26.8–34.3)
MCHC RBC AUTO-ENTMCNC: 30.4 G/DL (ref 31.4–37.4)
MCV RBC AUTO: 101 FL (ref 82–98)
MONOCYTES # BLD AUTO: 1 THOUSAND/ÂΜL (ref 0.17–1.22)
MONOCYTES NFR BLD AUTO: 9 % (ref 4–12)
NEUTROPHILS # BLD AUTO: 8.06 THOUSANDS/ÂΜL (ref 1.85–7.62)
NEUTS SEG NFR BLD AUTO: 76 % (ref 43–75)
NRBC BLD AUTO-RTO: 0 /100 WBCS
PLATELET # BLD AUTO: 138 THOUSANDS/UL (ref 149–390)
PMV BLD AUTO: 11.6 FL (ref 8.9–12.7)
POTASSIUM SERPL-SCNC: 4 MMOL/L (ref 3.5–5.3)
RBC # BLD AUTO: 3.5 MILLION/UL (ref 3.81–5.12)
SODIUM SERPL-SCNC: 143 MMOL/L (ref 135–147)
WBC # BLD AUTO: 10.65 THOUSAND/UL (ref 4.31–10.16)

## 2024-06-27 PROCEDURE — 99222 1ST HOSP IP/OBS MODERATE 55: CPT

## 2024-06-27 PROCEDURE — 36415 COLL VENOUS BLD VENIPUNCTURE: CPT | Performed by: EMERGENCY MEDICINE

## 2024-06-27 PROCEDURE — 80048 BASIC METABOLIC PNL TOTAL CA: CPT | Performed by: EMERGENCY MEDICINE

## 2024-06-27 PROCEDURE — 85025 COMPLETE CBC W/AUTO DIFF WBC: CPT | Performed by: EMERGENCY MEDICINE

## 2024-06-27 PROCEDURE — 99285 EMERGENCY DEPT VISIT HI MDM: CPT | Performed by: EMERGENCY MEDICINE

## 2024-06-27 PROCEDURE — 99285 EMERGENCY DEPT VISIT HI MDM: CPT

## 2024-06-27 SDOH — ECONOMIC STABILITY - HOUSING INSECURITY: INADEQUATE HOUSING UTILITIES: Z59.12

## 2024-06-28 ENCOUNTER — TRANSITIONAL CARE MANAGEMENT (OUTPATIENT)
Dept: FAMILY MEDICINE CLINIC | Facility: CLINIC | Age: 74
End: 2024-06-28

## 2024-06-28 VITALS
RESPIRATION RATE: 20 BRPM | BODY MASS INDEX: 37.5 KG/M2 | WEIGHT: 186 LBS | HEART RATE: 58 BPM | DIASTOLIC BLOOD PRESSURE: 68 MMHG | OXYGEN SATURATION: 93 % | SYSTOLIC BLOOD PRESSURE: 134 MMHG | TEMPERATURE: 97.7 F | HEIGHT: 59 IN

## 2024-06-28 LAB
ANION GAP SERPL CALCULATED.3IONS-SCNC: 7 MMOL/L (ref 4–13)
BUN SERPL-MCNC: 25 MG/DL (ref 5–25)
CALCIUM SERPL-MCNC: 9.8 MG/DL (ref 8.4–10.2)
CHLORIDE SERPL-SCNC: 103 MMOL/L (ref 96–108)
CO2 SERPL-SCNC: 33 MMOL/L (ref 21–32)
CREAT SERPL-MCNC: 1.01 MG/DL (ref 0.6–1.3)
GFR SERPL CREATININE-BSD FRML MDRD: 54 ML/MIN/1.73SQ M
GLUCOSE SERPL-MCNC: 102 MG/DL (ref 65–140)
GLUCOSE SERPL-MCNC: 117 MG/DL (ref 65–140)
GLUCOSE SERPL-MCNC: 183 MG/DL (ref 65–140)
GLUCOSE SERPL-MCNC: 221 MG/DL (ref 65–140)
PLATELET # BLD AUTO: 126 THOUSANDS/UL (ref 149–390)
PMV BLD AUTO: 11.6 FL (ref 8.9–12.7)
POTASSIUM SERPL-SCNC: 4.1 MMOL/L (ref 3.5–5.3)
SODIUM SERPL-SCNC: 143 MMOL/L (ref 135–147)

## 2024-06-28 PROCEDURE — 36415 COLL VENOUS BLD VENIPUNCTURE: CPT

## 2024-06-28 PROCEDURE — 80048 BASIC METABOLIC PNL TOTAL CA: CPT

## 2024-06-28 PROCEDURE — 85049 AUTOMATED PLATELET COUNT: CPT

## 2024-06-28 PROCEDURE — 99239 HOSP IP/OBS DSCHRG MGMT >30: CPT | Performed by: INTERNAL MEDICINE

## 2024-06-28 PROCEDURE — 82948 REAGENT STRIP/BLOOD GLUCOSE: CPT

## 2024-06-28 RX ORDER — INSULIN LISPRO 100 [IU]/ML
1-6 INJECTION, SOLUTION INTRAVENOUS; SUBCUTANEOUS
Status: DISCONTINUED | OUTPATIENT
Start: 2024-06-28 | End: 2024-06-28 | Stop reason: HOSPADM

## 2024-06-28 RX ORDER — LISINOPRIL 20 MG/1
40 TABLET ORAL DAILY
Status: DISCONTINUED | OUTPATIENT
Start: 2024-06-28 | End: 2024-06-28 | Stop reason: HOSPADM

## 2024-06-28 RX ORDER — FLUTICASONE FUROATE AND VILANTEROL 200; 25 UG/1; UG/1
1 POWDER RESPIRATORY (INHALATION)
Status: DISCONTINUED | OUTPATIENT
Start: 2024-06-28 | End: 2024-06-28 | Stop reason: HOSPADM

## 2024-06-28 RX ORDER — FLUTICASONE FUROATE AND VILANTEROL 200; 25 UG/1; UG/1
1 POWDER RESPIRATORY (INHALATION) DAILY
Status: DISCONTINUED | OUTPATIENT
Start: 2024-06-28 | End: 2024-06-28

## 2024-06-28 RX ORDER — ALBUTEROL SULFATE 2.5 MG/3ML
2.5 SOLUTION RESPIRATORY (INHALATION) EVERY 6 HOURS PRN
Status: DISCONTINUED | OUTPATIENT
Start: 2024-06-28 | End: 2024-06-28 | Stop reason: HOSPADM

## 2024-06-28 RX ORDER — OXYBUTYNIN CHLORIDE 5 MG/1
5 TABLET, EXTENDED RELEASE ORAL 2 TIMES DAILY
Status: DISCONTINUED | OUTPATIENT
Start: 2024-06-28 | End: 2024-06-28 | Stop reason: HOSPADM

## 2024-06-28 RX ORDER — FLUTICASONE FUROATE AND VILANTEROL 200; 25 UG/1; UG/1
1 POWDER RESPIRATORY (INHALATION)
Status: DISCONTINUED | OUTPATIENT
Start: 2024-06-28 | End: 2024-06-28

## 2024-06-28 RX ORDER — GABAPENTIN 300 MG/1
600 CAPSULE ORAL
Status: DISCONTINUED | OUTPATIENT
Start: 2024-06-28 | End: 2024-06-28 | Stop reason: HOSPADM

## 2024-06-28 RX ORDER — GABAPENTIN 300 MG/1
300 CAPSULE ORAL
Status: DISCONTINUED | OUTPATIENT
Start: 2024-06-28 | End: 2024-06-28 | Stop reason: HOSPADM

## 2024-06-28 RX ORDER — ATORVASTATIN CALCIUM 40 MG/1
40 TABLET, FILM COATED ORAL
Status: DISCONTINUED | OUTPATIENT
Start: 2024-06-28 | End: 2024-06-28 | Stop reason: HOSPADM

## 2024-06-28 RX ORDER — HEPARIN SODIUM 5000 [USP'U]/ML
5000 INJECTION, SOLUTION INTRAVENOUS; SUBCUTANEOUS EVERY 8 HOURS SCHEDULED
Status: DISCONTINUED | OUTPATIENT
Start: 2024-06-28 | End: 2024-06-28 | Stop reason: HOSPADM

## 2024-06-28 RX ADMIN — LISINOPRIL 40 MG: 20 TABLET ORAL at 08:48

## 2024-06-28 RX ADMIN — HEPARIN SODIUM 5000 UNITS: 5000 INJECTION, SOLUTION INTRAVENOUS; SUBCUTANEOUS at 08:47

## 2024-06-28 RX ADMIN — UMECLIDINIUM 1 PUFF: 62.5 AEROSOL, POWDER ORAL at 08:50

## 2024-06-28 RX ADMIN — GABAPENTIN 300 MG: 300 CAPSULE ORAL at 07:18

## 2024-06-28 RX ADMIN — FLUTICASONE FUROATE AND VILANTEROL TRIFENATATE 1 PUFF: 200; 25 POWDER RESPIRATORY (INHALATION) at 08:50

## 2024-06-28 RX ADMIN — OXYBUTYNIN CHLORIDE 5 MG: 5 TABLET, EXTENDED RELEASE ORAL at 08:49

## 2024-06-28 RX ADMIN — HEPARIN SODIUM 5000 UNITS: 5000 INJECTION, SOLUTION INTRAVENOUS; SUBCUTANEOUS at 02:07
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
## 2024-06-28 NOTE — ASSESSMENT & PLAN NOTE
Lab Results   Component Value Date    EGFR 54 06/28/2024    EGFR 53 06/27/2024    EGFR 41 11/14/2023    CREATININE 1.01 06/28/2024    CREATININE 1.02 06/27/2024    CREATININE 1.27 11/14/2023   Cr at baseline, continue to monitor
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
## 2024-06-28 NOTE — H&P
"On license of [facility]  H&P  Name: Daenna Perez 74 y.o. female I MRN: [id number]  Unit/Bed#: ED 14 I Date of Admission: 6/27/2024   Date of Service: 6/28/2024 I Hospital Day: 0      Assessment & Plan   * Requires continuous at home supplemental oxygen  Assessment & Plan  Pt chronically on 5L NC 2/2 to her COPD is now running out of home O2 due to power outages. She is unable to get emergency O2 from [employer].   O2 sat 97% on 5L, will continue to observe and maintain home O2 levels until pt is able to get required oxygen or power returns   CM consulted     COPD (chronic obstructive pulmonary disease) ([facility])  Assessment & Plan  Chronically on 5 L nasal cannula, now running out of oxygen due to power outage and inability to receive emergency oxygen from [employer]  Patient saturating at 97% on 5 L NC, will continue to monitor pulse ox  Continue home Trelegy/Ellipta and albuterol nebulizer as needed    Stage 3 chronic kidney disease (HCC)  Assessment & Plan  Lab Results   Component Value Date    EGFR 53 06/27/2024    EGFR 41 11/14/2023    EGFR 40 11/13/2023    CREATININE 1.02 06/27/2024    CREATININE 1.27 11/14/2023    CREATININE 1.29 11/13/2023   Cr at baseline, continue to monitor    Essential hypertension  Assessment & Plan  Continue lisinopril 40mg     Type 2 diabetes mellitus with hyperglycemia, without long-term current use of insulin ([facility])  Assessment & Plan  Lab Results   Component Value Date    HGBA1C 7.3 (A) 04/12/2024       No results for input(s): \"POCGLU\" in the last 72 hours.    Blood Sugar Average: Last 72 hrs:  outpt regimen includes glipizide and metformin, will hold these while inpatient and placed on sliding scale insulin  Carb controlled diet with glucose checks         VTE Pharmacologic Prophylaxis: VTE Score: 5 High Risk (Score >/= 5) - Pharmacological DVT Prophylaxis Ordered: heparin. Sequential Compression Devices Ordered.  Code Status: Level 3 - DNAR and " DNI per patient  Discussion with family: Patient declined call to .     Anticipated Length of Stay: Patient will be admitted on an observation basis with an anticipated length of stay of less than 2 midnights secondary to power outage impacting patient's ability to continue home oxygen.    Total Time Spent on Date of Encounter in care of patient: 65 mins. This time was spent on one or more of the following: performing physical exam; counseling and coordination of care; obtaining or reviewing history; documenting in the medical record; reviewing/ordering tests, medications or procedures; communicating with other healthcare professionals and discussing with patient's family/caregivers.    Chief Complaint: low home oxygen    History of Present Illness:  Deanna Perez is a 74 y.o. female with a PMH of COPD, T2DM, HTN, stage 3 CKD, obesity  who presents in need of continual oxygen therapy. The pt is chronically on 5L NC and unfortunately has not had power for the past days due to recent power outages. The pt has been unable to charge her portable O2 container and was unable to get emergency O2 from her medical supplier. She currently denies headache, chills, SOB, cough, chest pain. Quit tobacco use 1 year ago, does not drink.     Review of Systems:  Review of Systems   Constitutional:  Negative for chills and fatigue.   Respiratory:  Negative for cough and shortness of breath.    Cardiovascular:  Negative for chest pain, palpitations and leg swelling.   Gastrointestinal:  Negative for abdominal pain and nausea.   Neurological:  Negative for light-headedness and headaches.       Past Medical and Surgical History:   Past Medical History:   Diagnosis Date    Asthma     Chronic obstructive lung disease (HCC)     Community acquired pneumonia     LAST ASSESSED: [id number]    COPD (chronic obstructive pulmonary disease) (HCC)     Diabetes mellitus (HCC)     History of MRSA infection     2008 liver sx    Liver  disease     Sleep apnea     Viral warts     LAST ASSESSED: [id number]       Past Surgical History:   Procedure Laterality Date    ABDOMINAL SURGERY      ABDOMINOPLASTY      CHOLECYSTECTOMY      COSMETIC SURGERY      EYE SURGERY      Bilateral cataracts 2015 Dr. FLORIN Villagran.     FINGER SURGERY      HAND SURGERY      HYSTERECTOMY      LIVER SURGERY      WI ARTHRP INTERPOS INTERCARPAL/METACARPAL JOINTS Right 9/9/2016    Procedure: THUMB TRAPEZIECTOMY; BASAL JOINT ARTHROPLASTY WITH APL AUTOGRAFT;  Surgeon: Kedar Villareal MD;  [facility]: [facility];  Service: Orthopedics    WI ARTHRP INTERPOS INTERCARPAL/METACARPAL JOINTS Left 5/29/2018    Procedure: ARTHROPLASTY THUMB WEILBY;  Surgeon: Arthur Jackson MD;  Location: [facility];  Service: Orthopedics    WI TENDON SHEATH INCISION Right 7/23/2019    Procedure: RELEASE TRIGGER FINGER RIGHT LONG;  Surgeon: Arthur Jackson MD;  [facility];  Service: Orthopedics    WI TENDON SHEATH INCISION Left 8/6/2019    Procedure: RELEASE TRIGGER FINGER LEFT INDEX;  Surgeon: Arthur Jackson MD;  [facility];  Service: Orthopedics    WI TENDON SHEATH INCISION Right 6/16/2020    Procedure: RELEASE TRIGGER FINGER, right ring;  Surgeon: Arthur Jackson MD;  [facility];  Service: Orthopedics    WI TR/TRNSPL TDN CARP/MTCRPL HAND W/O FR GRF EA TDN Left 5/29/2018    Procedure: TRANSFER TENDON HAND/WRIST FCR from forearm to wrist;  Surgeon: Arthur Jackson MD;  [facility];  Service: Orthopedics       Meds/Allergies:  Prior to Admission medications    Medication Sig Start Date End Date Taking? Authorizing Provider   albuterol (2.5 mg/3 mL) 0.083 % nebulizer solution Take 3 mL (2.5 mg total) by nebulization every 6 (six) hours as needed for wheezing or shortness of breath 11/14/23   Kelly Duran PA-C   albuterol (PROVENTIL HFA,VENTOLIN HFA) 90 mcg/act inhaler Inhale 2 puffs every 4 (four) hours as needed for wheezing or shortness of breath  11/14/23   Kelly Duran PA-C   clotrimazole (MYCELEX) 10 mg karen Take 1 tablet (10 mg total) by mouth 4 (four) times a day 4/12/24   Hermelindo Reich MD   fluticasone-umeclidinium-vilanterol (Trelegy Ellipta) 200-62.5-25 mcg/actuation AEPB inhaler Inhale 1 puff daily Rinse mouth after use. 6/19/24 12/16/24  Hermelindo Reich MD   gabapentin (Neurontin) 600 MG tablet Take 2 tablets (1,200 mg total) by mouth 2 (two) times a day 1 tablet in the morning and 2 tablets at bedtime 6/14/24   Nahun Henry DO   glipiZIDE (GLUCOTROL) 5 mg tablet TAKE 1 TABLET TWICE DAILY BEFORE MEALS 10/23/23   Hermelindo Reich MD   glucose blood (ACCU-CHEK JESS PLUS) test strip Test BS 3 times daily.  DX E11.9 8/2/19   Tc Bonilla MD   lisinopril (ZESTRIL) 40 mg tablet TAKE 1 TABLET BY MOUTH DAILY 3/12/24   Saray Peña MD   metFORMIN (GLUCOPHAGE) 1000 MG tablet TAKE 1 TABLET TWICE DAILY 10/23/23   Hermelindo Reich MD   Multiple Vitamins-Minerals (OCUVITE ADULT FORMULA PO) Take by mouth    Historical Provider, MD   rosuvastatin (CRESTOR) 20 MG tablet TAKE 1 TABLET EVERY DAY 3/11/24   Hermelindo Reich MD   tolterodine (DETROL LA) 2 mg 24 hr capsule Take 2 capsules (4 mg total) by mouth daily 6/19/24   Hermelindo Reich MD     I have reviewed home medications with patient personally.    Allergies: No Known Allergies    Social History:  Marital Status: /Civil Union   Occupation: retired  Patient Pre-hospital Living Situation: Home, With spouse  Patient Pre-hospital Level of Mobility: walks with walker and wheel chair  Patient Pre-hospital Diet Restrictions: none  Substance Use History:   Social History     Substance and Sexual Activity   Alcohol Use Yes    Comment: very seldom     Social History     Tobacco Use   Smoking Status Former    Average packs/day: 1 pack/day for 43.1 years (41.5 ttl pk-yrs)    Types: Cigarettes    Start date: 1980   Smokeless Tobacco Never     Social History     Substance and Sexual Activity   Drug Use No       Family  History:  Family History   Problem Relation Age of Onset    Heart attack Mother     Heart attack Father     Ovarian cancer Sister     Basal cell carcinoma Brother     Heart disease Other         CARDIAC DISORDER    Diabetes Other     Liver cancer Other     Breast cancer Other     Stomach cancer Other     Heart disease Family     Diabetes Family     Thyroid disease Family     Ovarian cancer Family        Physical Exam:     Vitals:   Blood Pressure: 155/63 (06/27/24 2311)  Pulse: 76 (06/27/24 2311)  Temperature: 98.7 °F (37.1 °C) (06/27/24 2307)  Temp Source: Oral (06/27/24 2307)  Respirations: 20 (06/27/24 2311)  SpO2: 97 % (06/27/24 2311)    Physical Exam  Vitals reviewed.   Constitutional:       General: She is not in acute distress.     Appearance: She is well-developed.   HENT:      Head: Normocephalic and atraumatic.   Eyes:      Conjunctiva/sclera: Conjunctivae normal.   Cardiovascular:      Rate and Rhythm: Normal rate and regular rhythm.      Heart sounds: No murmur heard.  Pulmonary:      Effort: Pulmonary effort is normal. No respiratory distress.      Breath sounds: Normal breath sounds.   Abdominal:      Palpations: Abdomen is soft.      Tenderness: There is no abdominal tenderness.   Musculoskeletal:         General: No swelling.   Skin:     General: Skin is warm and dry.      Capillary Refill: Capillary refill takes less than 2 seconds.   Neurological:      Mental Status: She is alert.   Psychiatric:         Mood and Affect: Mood normal.         Additional Data:     Lab Results:  Results from last 7 days   Lab Units 06/27/24  2334   WBC Thousand/uL 10.65*   HEMOGLOBIN g/dL 10.7*   HEMATOCRIT % 35.2   PLATELETS Thousands/uL 138*   SEGS PCT % 76*   LYMPHO PCT % 13*   MONO PCT % 9   EOS PCT % 2     Results from last 7 days   Lab Units 06/27/24  2334   SODIUM mmol/L 143   POTASSIUM mmol/L 4.0   CHLORIDE mmol/L 103   CO2 mmol/L 30   BUN mg/dL 26*   CREATININE mg/dL 1.02   ANION GAP mmol/L 10   CALCIUM mg/dL  9.9   GLUCOSE RANDOM mg/dL 168*             Lab Results   Component Value Date    HGBA1C 7.3 (A) 04/12/2024    HGBA1C 6.8 (H) 07/17/2023    HGBA1C 6.3 (H) 02/02/2023           Lines/Drains:  Invasive Devices       Peripheral Intravenous Line  Duration             Peripheral IV 06/27/24 Distal;Left;Ventral (anterior) Forearm <1 day                        Imaging: No pertinent imaging reviewed.  No orders to display       EKG and Other Studies Reviewed on Admission:   EKG: No EKG obtained.    ** Please Note: This note has been constructed using a voice recognition system. **

## 2024-06-28 NOTE — ASSESSMENT & PLAN NOTE
"Lab Results   Component Value Date    HGBA1C 7.3 (A) 04/12/2024       No results for input(s): \"POCGLU\" in the last 72 hours.    Blood Sugar Average: Last 72 hrs:  outpt regimen includes glipizide and metformin, will hold these while inpatient and placed on sliding scale insulin  Carb controlled diet with glucose checks    "

## 2024-06-28 NOTE — ASSESSMENT & PLAN NOTE
Chronically on 5 L nasal cannula, now running out of oxygen due to power outage and inability to receive emergency oxygen from medical [facility] [employer]  Patient saturating at 97% on 5 L NC, will continue to monitor pulse ox  Continue home Trelegy/Ellipta and albuterol nebulizer as needed

## 2024-06-28 NOTE — NURSING NOTE
Pt D/C back home now that power is back on. Transported via EMS. IV removed. Pt has all belongings.

## 2024-06-28 NOTE — ASSESSMENT & PLAN NOTE
Lab Results   Component Value Date    HGBA1C 7.3 (A) 04/12/2024       Recent Labs     06/28/24  0719 06/28/24  1238   POCGLU 117 221*       Blood Sugar Average: Last 72 hrs:  (P) 169outpt regimen includes glipizide and metformin, will hold these while inpatient and placed on sliding scale insulin  Carb controlled diet with glucose checks

## 2024-06-28 NOTE — DISCHARGE SUMMARY
[facility]  Discharge- Deanna Perez 1950, 74 y.o. female MRN: [id number]  Unit/Bed#: [facility]-01 Encounter: [id number]  Primary Care Provider: Hermelindo Reich MD   Date and time admitted to hospital: 6/27/2024 10:59 PM    Stage 3 chronic kidney disease (HCC)  Assessment & Plan  Lab Results   Component Value Date    EGFR 54 06/28/2024    EGFR 53 06/27/2024    EGFR 41 11/14/2023    CREATININE 1.01 06/28/2024    CREATININE 1.02 06/27/2024    CREATININE 1.27 11/14/2023   Cr at baseline, continue to monitor    COPD (chronic obstructive pulmonary disease) ([facility])  Assessment & Plan  Chronically on 5 L nasal cannula, now running out of oxygen due to power outage and inability to receive emergency oxygen from [employer] [facility] [employer]  Patient saturating at 97% on 5 L NC, will continue to monitor pulse ox  Continue home Trelegy/Ellipta and albuterol nebulizer as needed    Essential hypertension  Assessment & Plan  Continue lisinopril 40mg     Type 2 diabetes mellitus with hyperglycemia, without long-term current use of insulin ([facility])  Assessment & Plan  Lab Results   Component Value Date    HGBA1C 7.3 (A) 04/12/2024       Recent Labs     06/28/24  0719 06/28/24  1238   POCGLU 117 221*       Blood Sugar Average: Last 72 hrs:  (P) 169outpt regimen includes glipizide and metformin, will hold these while inpatient and placed on sliding scale insulin  Carb controlled diet with glucose checks      * Requires continuous at home supplemental oxygen  Assessment & Plan  Pt chronically on 5L NC 2/2 to her COPD is now running out of home O2 due to power outages. She is unable to get emergency O2 from [employer].   O2 sat 97% on 5L, will continue to observe and maintain home O2 levels until pt is able to get required oxygen or power returns   CM consulted             Medical Problems       Resolved Problems  Date Reviewed: 6/28/2024   None       Discharging Physician / Practitioner: Howard Rhodes  "DO  PCP: Hermelindo Reich MD  Admission Date:   Admission Orders (From admission, onward)       Ordered        06/27/24 2338  Place in Observation  Once                          Discharge Date: 06/28/24        Reason for Admission: loss of power    Hospital Course:   Deanna Perez is a 74 y.o. female patient who originally presented to the hospital on 6/27/2024 due to loss of power and inability to sustain oxygen saturations as she does not have any extra oxygen tanks.  Patient's power was verified to be returned prior to discharge.            Please see above list of diagnoses and related plan for additional information.     Condition at Discharge: stable    Discharge Day Visit / Exam:   Subjective: Patient denies any acute complaints  Vitals: Blood Pressure: 134/68 (06/28/24 1527)  Pulse: 58 (06/28/24 1527)  Temperature: 97.7 °F (36.5 °C) (06/28/24 1527)  Temp Source: Tympanic (06/28/24 1527)  Respirations: 20 (06/28/24 1527)  Height: 4' 11\" (149.9 cm) (06/28/24 1527)  Weight - Scale: 84.4 kg (186 lb) (06/28/24 1527)  SpO2: 93 % (06/28/24 1527)  Exam:   Physical Exam  Vitals and nursing note reviewed.   Constitutional:       General: She is not in acute distress.     Appearance: She is well-developed. She is not toxic-appearing or diaphoretic.   HENT:      Head: Normocephalic and atraumatic.   Eyes:      General: No scleral icterus.     Conjunctiva/sclera: Conjunctivae normal.   Cardiovascular:      Rate and Rhythm: Normal rate and regular rhythm.      Heart sounds: No murmur heard.     No friction rub. No gallop.   Pulmonary:      Effort: Pulmonary effort is normal. No respiratory distress.      Breath sounds: Normal breath sounds. No stridor. No wheezing, rhonchi or rales.   Chest:      Chest wall: No tenderness.   Abdominal:      General: There is no distension.      Palpations: Abdomen is soft. There is no mass.      Tenderness: There is no abdominal tenderness. There is no guarding or rebound.      Hernia: No " hernia is present.   Musculoskeletal:         General: No swelling or tenderness.      Cervical back: Neck supple.   Skin:     General: Skin is warm and dry.      Capillary Refill: Capillary refill takes less than 2 seconds.   Neurological:      Mental Status: She is alert and oriented to person, place, and time.   Psychiatric:         Mood and Affect: Mood normal.          Discussion with Family: Patient declined call to .     Discharge instructions/Information to patient and family:   See after visit summary for information provided to patient and family.      Provisions for Follow-Up Care:  See after visit summary for information related to follow-up care and any pertinent home health orders.      Mobility at time of Discharge:      HLM Goal achieved. Continue to encourage appropriate mobility.     Disposition:   Home    Planned Readmission: no     Discharge Statement:  I spent  minutes discharging the patient. This time was spent on the day of discharge. I had direct contact with the patient on the day of discharge. Greater than 50% of the total time was spent examining patient, answering all patient questions, arranging and discussing plan of care with patient as well as directly providing post-discharge instructions.  Additional time then spent on discharge activities.    Discharge Medications:  See after visit summary for reconciled discharge medications provided to patient and/or family.      **Please Note: This note may have been constructed using a voice recognition system**

## 2024-06-28 NOTE — ASSESSMENT & PLAN NOTE
Lab Results   Component Value Date    EGFR 53 06/27/2024    EGFR 41 11/14/2023    EGFR 40 11/13/2023    CREATININE 1.02 06/27/2024    CREATININE 1.27 11/14/2023    CREATININE 1.29 11/13/2023   Cr at baseline, continue to monitor

## 2024-07-15 ENCOUNTER — TELEPHONE (OUTPATIENT)
Dept: PAIN MEDICINE | Facility: CLINIC | Age: 74
End: 2024-07-15

## 2024-07-15 ENCOUNTER — TELEPHONE (OUTPATIENT)
Dept: RADIOLOGY | Facility: CLINIC | Age: 74
End: 2024-07-15

## 2024-07-15 DIAGNOSIS — M54.16 LUMBAR RADICULOPATHY: ICD-10-CM

## 2024-07-15 DIAGNOSIS — M54.16 LUMBAR RADICULOPATHY: Primary | ICD-10-CM

## 2024-07-15 RX ORDER — GABAPENTIN 600 MG/1
1200 TABLET ORAL 2 TIMES DAILY
Qty: 360 TABLET | Refills: 1 | Status: SHIPPED | OUTPATIENT
Start: 2024-07-15 | End: 2024-07-15 | Stop reason: SDUPTHER

## 2024-07-15 RX ORDER — GABAPENTIN 600 MG/1
1200 TABLET ORAL 2 TIMES DAILY
Qty: 360 TABLET | Refills: 1 | Status: SHIPPED | OUTPATIENT
Start: 2024-07-15

## 2024-07-15 RX ORDER — GABAPENTIN 600 MG/1
1200 TABLET ORAL 2 TIMES DAILY
Qty: 56 TABLET | Refills: 0 | Status: SHIPPED | OUTPATIENT
Start: 2024-07-15 | End: 2024-07-15 | Stop reason: SDUPTHER

## 2024-07-15 RX ORDER — GABAPENTIN 600 MG/1
1200 TABLET ORAL 2 TIMES DAILY
Qty: 56 TABLET | Refills: 0 | Status: SHIPPED | OUTPATIENT
Start: 2024-07-15

## 2024-07-15 NOTE — TELEPHONE ENCOUNTER
Patients mail order states they need the clinical staff to call and verify the dose change on the gabapentin before they will ship it out - pt was taking 3 daily then increased to 4 times daily - she will also need a 14 days supply sent to her local pharmacy     Reason for call:   [x] Refill   [] Prior Auth  [] Other:     Office:   [] PCP/Provider -   [x] Specialty/Provider - [facility]        Medication: Gabapentin 600 mg 2 tablets 2 times daily       Pharmacy: [facility]     Does the patient have enough for 3 days?   [] Yes   [x] No - Send as HP to POD

## 2024-07-15 NOTE — TELEPHONE ENCOUNTER
Nurse s/w [facility] pharmacy, clarification is needed for how script is written, nurse reviewed and did not feel comfortable giving verbal as nurse needed further clarificationSig: Take 2 tablets (1,200 mg total) by mouth 2 (two) times a day 1 tablet in the morning and 2 tablets at bedtime .  Nurse to send to [facility] for review.     JW please advise on gabapentin send on 6/14/24 to [facility].    If 2 tab in am and 2 tablet at bedtime please send and correct sig to reflect.  Also pt will need 14 day supply sent to [facility] on file.   Family

## 2024-08-05 DIAGNOSIS — E66.01 OBESITY, MORBID (HCC): ICD-10-CM

## 2024-08-05 DIAGNOSIS — I10 ESSENTIAL HYPERTENSION: ICD-10-CM

## 2024-08-05 DIAGNOSIS — E11.65 TYPE 2 DIABETES MELLITUS WITH HYPERGLYCEMIA, WITHOUT LONG-TERM CURRENT USE OF INSULIN (HCC): ICD-10-CM

## 2024-08-05 DIAGNOSIS — R80.1 PERSISTENT PROTEINURIA: ICD-10-CM

## 2024-08-05 DIAGNOSIS — G47.33 OBSTRUCTIVE SLEEP APNEA OF ADULT: ICD-10-CM

## 2024-08-05 DIAGNOSIS — R80.9 PROTEINURIA, UNSPECIFIED TYPE: ICD-10-CM

## 2024-08-05 RX ORDER — LISINOPRIL 40 MG/1
40 TABLET ORAL DAILY
Qty: 100 TABLET | Refills: 1 | Status: SHIPPED | OUTPATIENT
Start: 2024-08-05

## 2024-08-22 ENCOUNTER — RA CDI HCC (OUTPATIENT)
Dept: OTHER | Facility: HOSPITAL | Age: 74
End: 2024-08-22

## 2024-08-30 ENCOUNTER — OFFICE VISIT (OUTPATIENT)
Dept: FAMILY MEDICINE CLINIC | Facility: CLINIC | Age: 74
End: 2024-08-30
Payer: MEDICARE

## 2024-08-30 VITALS
TEMPERATURE: 96.9 F | OXYGEN SATURATION: 98 % | SYSTOLIC BLOOD PRESSURE: 112 MMHG | WEIGHT: 180.5 LBS | HEIGHT: 59 IN | HEART RATE: 89 BPM | RESPIRATION RATE: 17 BRPM | DIASTOLIC BLOOD PRESSURE: 60 MMHG | BODY MASS INDEX: 36.39 KG/M2

## 2024-08-30 DIAGNOSIS — N18.30 STAGE 3 CHRONIC KIDNEY DISEASE, UNSPECIFIED WHETHER STAGE 3A OR 3B CKD (HCC): ICD-10-CM

## 2024-08-30 DIAGNOSIS — E11.65 TYPE 2 DIABETES MELLITUS WITH HYPERGLYCEMIA, WITHOUT LONG-TERM CURRENT USE OF INSULIN (HCC): Primary | ICD-10-CM

## 2024-08-30 DIAGNOSIS — I10 ESSENTIAL HYPERTENSION: ICD-10-CM

## 2024-08-30 DIAGNOSIS — J44.9 CHRONIC OBSTRUCTIVE PULMONARY DISEASE, UNSPECIFIED COPD TYPE (HCC): ICD-10-CM

## 2024-08-30 DIAGNOSIS — Z00.00 MEDICARE ANNUAL WELLNESS VISIT, SUBSEQUENT: ICD-10-CM

## 2024-08-30 LAB — SL AMB POCT HEMOGLOBIN AIC: 6.3 (ref ?–6.5)

## 2024-08-30 PROCEDURE — 99213 OFFICE O/P EST LOW 20 MIN: CPT | Performed by: FAMILY MEDICINE

## 2024-08-30 PROCEDURE — G0439 PPPS, SUBSEQ VISIT: HCPCS | Performed by: FAMILY MEDICINE

## 2024-08-30 PROCEDURE — 83036 HEMOGLOBIN GLYCOSYLATED A1C: CPT | Performed by: FAMILY MEDICINE

## 2024-08-30 NOTE — ASSESSMENT & PLAN NOTE
COPD.  Patient continues to use oxygen around-the-clock.  She does track her pulse oximetry readings and maintains above 90% with oxygen.  She will follow-up with her pulmonologist in October 2024

## 2024-08-30 NOTE — ASSESSMENT & PLAN NOTE
Lab Results   Component Value Date    EGFR 54 06/28/2024    EGFR 53 06/27/2024    EGFR 41 11/14/2023    CREATININE 1.01 06/28/2024    CREATININE 1.02 06/27/2024    CREATININE 1.27 11/14/2023   Chronic kidney disease.  Patient's recent renal function tests appear stable.

## 2024-08-30 NOTE — PROGRESS NOTES
Ambulatory Visit  Name: Deanna Perez      : 1950      MRN: [id number]  Encounter Provider: Hermelindo Reich MD  Encounter Date: 2024   Encounter department: [facility]    Assessment & Plan   1. Type 2 diabetes mellitus with hyperglycemia, without long-term current use of insulin ([facility])  Assessment & Plan:  Diabetes.  A1c stable at 6.3.  Her eye and foot exams are up-to-date.  She will continue current regimen of medications  Lab Results   Component Value Date    HGBA1C 6.3 2024     Orders:  -     POCT hemoglobin A1c  2. Medicare annual wellness visit, subsequent  3. Essential hypertension  Assessment & Plan:  Hypertension.  The patient's blood pressure is stable at this time and he will continue current regimen of medications  4. Stage 3 chronic kidney disease, unspecified whether stage 3a or 3b CKD ([facility])  Assessment & Plan:  Lab Results   Component Value Date    EGFR 54 2024    EGFR 53 2024    EGFR 41 2023    CREATININE 1.01 2024    CREATININE 1.02 2024    CREATININE 1.27 2023   Chronic kidney disease.  Patient's recent renal function tests appear stable.  5. Chronic obstructive pulmonary disease, unspecified COPD type ([facility])  Assessment & Plan:  COPD.  Patient continues to use oxygen around-the-clock.  She does track her pulse oximetry readings and maintains above 90% with oxygen.  She will follow-up with her pulmonologist in 2024       Preventive health issues were discussed with patient, and age appropriate screening tests were ordered as noted in patient's After Visit Summary. Personalized health advice and appropriate referrals for health education or preventive services given if needed, as noted in patient's After Visit Summary.    History of Present Illness     Patient the office to review chronic medical conditions.  She denies any recent illness.  She states that she has been eating less due to metallic taste in her mouth  that is intermittent.  Her A1c in the office has improved to 6.3.  Patient does admit to cheating on her diet on occasion but enjoys consuming fruits and vegetables.  She does continue to see her pulmonologist and uses 3 to 5 L of oxygen via nasal cannula 24/7.       Patient Care Team:  Hermelindo Reich MD as PCP - General (Family Medicine)  MD Kedar Umana MD Joshua M Wert, DO    Review of Systems   Constitutional: Negative.    HENT: Negative.     Respiratory:  Positive for shortness of breath.    Cardiovascular: Negative.    Gastrointestinal: Negative.    Genitourinary: Negative.    Musculoskeletal: Negative.    Skin: Negative.    Neurological: Negative.    Psychiatric/Behavioral: Negative.       Medical History Reviewed by provider this encounter:  Meds       Annual Wellness Visit Questionnaire       Health Risk Assessment:   Patient rates overall health as fair. Patient feels that their physical health rating is slightly worse. Patient is satisfied with their life. Eyesight was rated as same. Hearing was rated as same. Patient feels that their emotional and mental health rating is same. Patients states they are never, rarely angry. Patient states they are never, rarely unusually tired/fatigued. Pain experienced in the last 7 days has been some. Patient's pain rating has been 10/10. Patient states that she has experienced no weight loss or gain in last 6 months.     Depression Screening:   PHQ-2 Score: 1      Fall Risk Screening:   In the past year, patient has experienced: history of falling in past year    Number of falls: 1  Injured during fall?: No    Feels unsteady when standing or walking?: No    Worried about falling?: No      Urinary Incontinence Screening:   Patient has leaked urine accidently in the last six months.     Home Safety:  Patient has trouble with stairs inside or outside of their home. Patient has working smoke alarms and has working carbon monoxide detector. Home safety  hazards include: none.     Nutrition:   Current diet is Regular.     Medications:   Patient is currently taking over-the-counter supplements. OTC medications include: see medication list. Patient is able to manage medications.     Activities of Daily Living (ADLs)/Instrumental Activities of Daily Living (IADLs):   Walk and transfer into and out of bed and chair?: Yes  Dress and groom yourself?: Yes    Bathe or shower yourself?: Yes    Feed yourself? Yes  Do your laundry/housekeeping?: Yes  Manage your money, pay your bills and track your expenses?: Yes  Make your own meals?: Yes    Do your own shopping?: Yes    Previous Hospitalizations:   Any hospitalizations or ED visits within the last 12 months?: No      Advance Care Planning:   Living will: Yes    Advanced directive: Yes      PREVENTIVE SCREENINGS      Cardiovascular Screening:    General: Screening Current      Diabetes Screening:     General: History Diabetes and Screening Current      Colorectal Cancer Screening:     General: Screening Current      Breast Cancer Screening:     General: Screening Current      Cervical Cancer Screening:    General: Screening Not Indicated      Osteoporosis Screening:    General: Screening Not Indicated and History Osteoporosis      Lung Cancer Screening:     General: Screening Not Indicated      Hepatitis C Screening:    General: Screening Current    Screening, Brief Intervention, and Referral to Treatment (SBIRT)    Screening      Single Item Drug Screening:  How often have you used an illegal drug (including marijuana) or a prescription medication for non-medical reasons in the past year? never    Single Item Drug Screen Score: 0  Interpretation: Negative screen for possible drug use disorder    Social Determinants of Health     Financial Resource Strain: Low Risk  (8/29/2023)    Overall Financial Resource Strain (CARDIA)     Difficulty of Paying Living Expenses: Not very hard   Food Insecurity: No Food Insecurity  "(11/12/2023)    Hunger Vital Sign     Worried About Running Out of Food in the Last Year: Never true     Ran Out of Food in the Last Year: Never true   Transportation Needs: No Transportation Needs (11/12/2023)    PRAPARE - Transportation     Lack of Transportation (Medical): No     Lack of Transportation (Non-Medical): No   Housing Stability: Low Risk  (11/12/2023)    Housing Stability Vital Sign     Unable to Pay for Housing in the Last Year: No     Number of Times Moved in the Last Year: 1     Homeless in the Last Year: No     No results found.    Objective     /60   Pulse 89   Temp (!) 96.9 °F (36.1 °C)   Resp 17   Ht 4' 11\" (1.499 m)   Wt 81.9 kg (180 lb 8 oz)   SpO2 98%   BMI 36.46 kg/m²     Physical Exam  Vitals and nursing note reviewed.   Constitutional:       General: She is not in acute distress.     Appearance: Normal appearance. She is well-developed. She is not ill-appearing.   HENT:      Head: Normocephalic and atraumatic.   Eyes:      General:         Right eye: No discharge.         Left eye: No discharge.      Extraocular Movements: Extraocular movements intact.      Conjunctiva/sclera: Conjunctivae normal.      Pupils: Pupils are equal, round, and reactive to light.   Neck:      Vascular: No carotid bruit.   Cardiovascular:      Rate and Rhythm: Normal rate and regular rhythm.      Pulses: no weak pulses.           Dorsalis pedis pulses are 2+ on the right side and 2+ on the left side.        Posterior tibial pulses are 2+ on the right side and 2+ on the left side.      Heart sounds: No murmur heard.  Pulmonary:      Effort: Pulmonary effort is normal. No respiratory distress.      Breath sounds: Normal breath sounds.   Musculoskeletal:      Right lower leg: No edema.      Left lower leg: No edema.   Feet:      Right foot:      Skin integrity: No ulcer, skin breakdown, erythema, warmth, callus or dry skin.      Left foot:      Skin integrity: No ulcer, skin breakdown, erythema, " warmth, callus or dry skin.   Lymphadenopathy:      Cervical: No cervical adenopathy.   Skin:     General: Skin is warm and dry.      Capillary Refill: Capillary refill takes less than 2 seconds.   Neurological:      Mental Status: She is alert. Mental status is at baseline.   Psychiatric:         Mood and Affect: Mood normal.         Behavior: Behavior normal.         Thought Content: Thought content normal.         Judgment: Judgment normal.       Patient's shoes and socks removed.    Right Foot/Ankle   Right Foot Inspection  Skin Exam: skin normal and skin intact. No dry skin, no warmth, no callus, no erythema, no maceration, no abnormal color, no pre-ulcer, no ulcer and no callus.     Toe Exam: ROM and strength within normal limits.     Sensory   Vibration: intact  Monofilament testing: intact    Vascular  The right DP pulse is 2+. The right PT pulse is 2+.     Left Foot/Ankle  Left Foot Inspection  Skin Exam: skin normal and skin intact. No dry skin, no warmth, no erythema, no maceration, normal color, no pre-ulcer, no ulcer and no callus.     Toe Exam: ROM and strength within normal limits.     Sensory   Vibration: intact  Monofilament testing: intact    Vascular  The left DP pulse is 2+. The left PT pulse is 2+.     Assign Risk Category  No deformity present  No loss of protective sensation  No weak pulses  Risk: 0

## 2024-08-30 NOTE — PATIENT INSTRUCTIONS

## 2024-08-30 NOTE — ASSESSMENT & PLAN NOTE
Diabetes.  A1c stable at 6.3.  Her eye and foot exams are up-to-date.  She will continue current regimen of medications  Lab Results   Component Value Date    HGBA1C 6.3 08/30/2024

## 2024-10-08 ENCOUNTER — OFFICE VISIT (OUTPATIENT)
Dept: PULMONOLOGY | Facility: MEDICAL CENTER | Age: 74
End: 2024-10-08
Payer: MEDICARE

## 2024-10-08 VITALS
RESPIRATION RATE: 12 BRPM | HEIGHT: 59 IN | BODY MASS INDEX: 36.29 KG/M2 | DIASTOLIC BLOOD PRESSURE: 50 MMHG | HEART RATE: 51 BPM | WEIGHT: 180 LBS | SYSTOLIC BLOOD PRESSURE: 128 MMHG | TEMPERATURE: 98.2 F | OXYGEN SATURATION: 85 %

## 2024-10-08 DIAGNOSIS — R06.02 SHORTNESS OF BREATH: ICD-10-CM

## 2024-10-08 DIAGNOSIS — J96.11 CHRONIC HYPOXEMIC RESPIRATORY FAILURE (HCC): ICD-10-CM

## 2024-10-08 DIAGNOSIS — J44.9 CHRONIC OBSTRUCTIVE PULMONARY DISEASE, UNSPECIFIED COPD TYPE (HCC): Primary | ICD-10-CM

## 2024-10-08 DIAGNOSIS — G47.33 OBSTRUCTIVE SLEEP APNEA OF ADULT: ICD-10-CM

## 2024-10-08 PROCEDURE — 99214 OFFICE O/P EST MOD 30 MIN: CPT | Performed by: INTERNAL MEDICINE

## 2024-10-08 RX ORDER — AZITHROMYCIN 250 MG/1
TABLET, FILM COATED ORAL
Qty: 6 TABLET | Refills: 0 | Status: SHIPPED | OUTPATIENT
Start: 2024-10-08 | End: 2024-10-12

## 2024-10-08 RX ORDER — PREDNISONE 10 MG/1
TABLET ORAL
Qty: 30 TABLET | Refills: 0 | Status: SHIPPED | OUTPATIENT
Start: 2024-10-08

## 2024-10-08 NOTE — PATIENT INSTRUCTIONS
Use oxygen at 5 to 5.5 L/min.  I will check with Ava from [employer] about maybe an AirFit N20 nasal mask that you can use with the oxygen at bedtime.    If needed take prednisone tapering dose 40 mg for 3 days then 30 mg for 3 days then 20 mg for 3 days then 10 mg for 3 days and Z-Young

## 2024-10-10 PROBLEM — R06.02 SHORTNESS OF BREATH: Status: ACTIVE | Noted: 2024-10-10

## 2024-10-10 NOTE — PROGRESS NOTES
Assessment & Plan        Problem List Items Addressed This Visit          Respiratory    Obstructive sleep apnea of adult     Deanna does have severe FRANK.  Has not been using her CPAP recently.  She is using oxygen 5.5 L/min at bedtime.  I did talk about trying it with a nasal mask such as the AirFit N20 mask.  I did sure this medicine she did like it.  Will try to contact respiratory therapist from [facility] to try to set her up appointment to be refitted with nasal mask.  Her CPAP is set at 18 cm water.         COPD (chronic obstructive pulmonary disease) (HCC) - Primary     Severe COPD with FEV1 of 0.59 L or 33% predicted.  She will continue Trelegy 20 micro 1 puff daily nebulized with albuterol 2.5 mg 4 times a day as needed         Relevant Medications    azithromycin (ZITHROMAX) 250 mg tablet    predniSONE 10 mg tablet    Chronic hypoxemic respiratory failure (HCC)     I did tell her she can use 4 L of oxygen at rest instead of 6 L and she will continue 5.5 L/min at bedtime.  Also does she could increase her oxygen to 6 L/min with activity when needed.  She does have a 10 L capacity oxygen concentrator            Other    Shortness of breath     Deanna does have some increased shortness of breath.  I did give her prescription for prednisone to take 40 mg daily with 10 mg taper every fourth day and also gave her a Z-Young.              Cc: cough, shortness of breath      HPI    Deanna has severe COPD with FEV1 of 0.59 L or 33% with an obstructive ratio of 40%.  She is using Trelegy 200 mcg 1 puff daily and nebulized with albuterol 2.5 mg 4 times a day as needed.  She does complain of some cough increased from baseline and some shortness of breath.  No fever or chills.  Her shortness of breath is slightly increased from her baseline.  She has keeping her oxygen at 6 L/min 20-day and 5.5 L/min at bedtime with her CPAP machine.  She does have severe obstructive sleep apnea a and her initial diagnostic sleep  study showed AHI of 117.  She is not using her CPAP recently because she does not care for her present CPAP mask.  Has not used the CPAP now and about 2 months.    She quit smoking in 2000 and had smoked 1 to 2 packs of cigarettes per day for several years.  She smoked about 40 pack years.      Past Medical History:   Diagnosis Date    Asthma     Chronic obstructive lung disease (HCC)     Community acquired pneumonia     LAST ASSESSED: [id number]    COPD (chronic obstructive pulmonary disease) (HCC)     Diabetes mellitus (HCC)     History of MRSA infection     2008 liver sx    Liver disease     Sleep apnea     Viral warts     LAST ASSESSED: [id number]       Past Surgical History:   Procedure Laterality Date    ABDOMINAL SURGERY      ABDOMINOPLASTY      CHOLECYSTECTOMY      COSMETIC SURGERY      EYE SURGERY      Bilateral cataracts 2015 Dr. FLORIN Villagran.     FINGER SURGERY      HAND SURGERY      HYSTERECTOMY      LIVER SURGERY      AZ ARTHRP INTERPOS INTERCARPAL/METACARPAL JOINTS Right 9/9/2016    Procedure: THUMB TRAPEZIECTOMY; BASAL JOINT ARTHROPLASTY WITH APL AUTOGRAFT;  Surgeon: Kedar Villareal MD;  [facility];  Service: Orthopedics    AZ ARTHRP INTERPOS INTERCARPAL/METACARPAL JOINTS Left 5/29/2018    Procedure: ARTHROPLASTY THUMB WEILBY;  Surgeon: Arthur Jackson MD;  [facility]: [facility];  Service: Orthopedics    AZ TENDON SHEATH INCISION Right 7/23/2019    Procedure: RELEASE TRIGGER FINGER RIGHT LONG;  Surgeon: Arthur Jackson MD;  [facility];  Service: Orthopedics    AZ TENDON SHEATH INCISION Left 8/6/2019    Procedure: RELEASE TRIGGER FINGER LEFT INDEX;  Surgeon: Arthur Jackson MD;  [facility];  Service: Orthopedics    AZ TENDON SHEATH INCISION Right 6/16/2020    Procedure: RELEASE TRIGGER FINGER, right ring;  Surgeon: Arthur Jackson MD;  [facility];  Service: Orthopedics    AZ TR/TRNSPL TDN CARP/MTCRPL HAND W/O FR GRF EA TDN Left 5/29/2018    Procedure:  TRANSFER TENDON HAND/WRIST FCR from forearm to wrist;  Surgeon: Arthur Jackson MD;  [facility];  Service: Orthopedics         Current Outpatient Medications:     albuterol (2.5 mg/3 mL) 0.083 % nebulizer solution, Take 3 mL (2.5 mg total) by nebulization every 6 (six) hours as needed for wheezing or shortness of breath, Disp: 100 mL, Rfl: 0    albuterol (PROVENTIL HFA,VENTOLIN HFA) 90 mcg/act inhaler, Inhale 2 puffs every 4 (four) hours as needed for wheezing or shortness of breath, Disp: 8 g, Rfl: 0    azithromycin (ZITHROMAX) 250 mg tablet, Take 2 tablets today then 1 tablet daily x 4 days, Disp: 6 tablet, Rfl: 0    clotrimazole (MYCELEX) 10 mg karen, Take 1 tablet (10 mg total) by mouth 4 (four) times a day, Disp: 40 Karen, Rfl: 3    fluticasone-umeclidinium-vilanterol (Trelegy Ellipta) 200-62.5-25 mcg/actuation AEPB inhaler, Inhale 1 puff daily Rinse mouth after use., Disp: 90 blister, Rfl: 3    gabapentin (Neurontin) 600 MG tablet, Take 2 tablets (1,200 mg total) by mouth 2 (two) times a day, Disp: 56 tablet, Rfl: 0    glipiZIDE (GLUCOTROL) 5 mg tablet, TAKE 1 TABLET TWICE DAILY BEFORE MEALS, Disp: 180 tablet, Rfl: 10    glucose blood (ACCU-CHEK JESS PLUS) test strip, Test BS 3 times daily.  DX E11.9, Disp: 100 each, Rfl: 2    lisinopril (ZESTRIL) 40 mg tablet, TAKE 1 TABLET EVERY DAY, Disp: 100 tablet, Rfl: 1    metFORMIN (GLUCOPHAGE) 1000 MG tablet, TAKE 1 TABLET TWICE DAILY, Disp: 180 tablet, Rfl: 10    predniSONE 10 mg tablet, Take 4 tablets for 3 days then 3 tabs for 3 days then 2 tablets for 3 days then 1 tablet for 3 days, Disp: 30 tablet, Rfl: 0    rosuvastatin (CRESTOR) 20 MG tablet, TAKE 1 TABLET EVERY DAY, Disp: 90 tablet, Rfl: 3    tolterodine (DETROL LA) 2 mg 24 hr capsule, Take 2 capsules (4 mg total) by mouth daily, Disp: 90 capsule, Rfl: 3    gabapentin (Neurontin) 600 MG tablet, Take 2 tablets (1,200 mg total) by mouth 2 (two) times a day (Patient not taking: Reported on  "8/30/2024), Disp: 360 tablet, Rfl: 1    Multiple Vitamins-Minerals (OCUVITE ADULT FORMULA PO), Take by mouth (Patient not taking: Reported on 10/8/2024), Disp: , Rfl:     No Known Allergies    Social History     Tobacco Use    Smoking status: Former     Average packs/day: 1 pack/day for 43.1 years (41.5 ttl pk-yrs)     Types: Cigarettes     Start date: 1980    Smokeless tobacco: Never   Substance Use Topics    Alcohol use: Yes     Comment: very seldom         Family History   Problem Relation Age of Onset    Heart attack Mother     Heart attack Father     Ovarian cancer Sister     Basal cell carcinoma Brother     Heart disease Other         CARDIAC DISORDER    Diabetes Other     Liver cancer Other     Breast cancer Other     Stomach cancer Other     Heart disease Family     Diabetes Family     Thyroid disease Family     Ovarian cancer Family        Review of Systems   Constitutional:  Negative for chills, fever and unexpected weight change.   HENT:  Negative for congestion, rhinorrhea and sore throat.    Eyes:  Negative for discharge and redness.   Respiratory:  Positive for cough and shortness of breath.    Cardiovascular:  Negative for chest pain, palpitations and leg swelling.   Gastrointestinal:  Negative for abdominal distention, abdominal pain and nausea.   Endocrine: Negative for polydipsia and polyphagia.   Genitourinary:  Negative for dysuria.   Musculoskeletal:  Negative for joint swelling and myalgias.   Skin:  Negative for rash.   Neurological:  Negative for light-headedness.   Psychiatric/Behavioral:  Negative for decreased concentration.            Vitals:    10/08/24 1010   BP: 128/50   Pulse: (!) 51   Resp: 12   Temp: 98.2 °F (36.8 °C)   SpO2: (!) 85% on Room air     On 4 L of oxygen after resting O2 saturation 97%.  On 6 L of oxygen at rest O2 saturation 96%      Height: 4' 11\" (149.9 cm)  IBW (Ideal Body Weight): 43.2 kg  Body mass index is 36.36 kg/m².  Weight (last 2 days)       Date/Time Weight "    10/08/24 1010 81.6 (180)                Physical Exam  Vitals reviewed.   Constitutional:       General: She is not in acute distress.     Appearance: Normal appearance. She is well-developed. She is obese.   HENT:      Head: Normocephalic.      Right Ear: External ear normal.      Left Ear: External ear normal.      Nose: Nose normal.      Mouth/Throat:      Mouth: Oropharynx is clear and moist. Mucous membranes are moist.      Pharynx: No oropharyngeal exudate.      Comments: Mallampati score 2-3  Eyes:      Conjunctiva/sclera: Conjunctivae normal.      Pupils: Pupils are equal, round, and reactive to light.   Cardiovascular:      Rate and Rhythm: Normal rate and regular rhythm.      Heart sounds: Normal heart sounds.   Pulmonary:      Effort: Pulmonary effort is normal.      Comments: Lung sounds are clear.  No wheezes, crackles or rhonchi  Abdominal:      General: There is no distension.      Palpations: Abdomen is soft.      Tenderness: There is no abdominal tenderness.   Musculoskeletal:      Cervical back: Neck supple.      Comments: No edema, cyanosis, or clubbing   Lymphadenopathy:      Cervical: No cervical adenopathy.   Skin:     General: Skin is warm and dry.   Neurological:      General: No focal deficit present.      Mental Status: She is alert and oriented to person, place, and time.   Psychiatric:         Mood and Affect: Mood and affect and mood normal.         Behavior: Behavior normal.         Thought Content: Thought content normal.

## 2024-10-11 NOTE — ASSESSMENT & PLAN NOTE
Deanna does have some increased shortness of breath.  I did give her prescription for prednisone to take 40 mg daily with 10 mg taper every fourth day and also gave her a Z-Young.

## 2024-10-11 NOTE — ASSESSMENT & PLAN NOTE
I did tell her she can use 4 L of oxygen at rest instead of 6 L and she will continue 5.5 L/min at bedtime.  Also does she could increase her oxygen to 6 L/min with activity when needed.  She does have a 10 L capacity oxygen concentrator

## 2024-10-11 NOTE — ASSESSMENT & PLAN NOTE
Deanna does have severe FRANK.  Has not been using her CPAP recently.  She is using oxygen 5.5 L/min at bedtime.  I did talk about trying it with a nasal mask such as the AirFit N20 mask.  I did sure this medicine she did like it.  Will try to contact respiratory therapist from [facility] to try to set her up appointment to be refitted with nasal mask.  Her CPAP is set at 18 cm water.

## 2024-10-11 NOTE — ASSESSMENT & PLAN NOTE
Severe COPD with FEV1 of 0.59 L or 33% predicted.  She will continue Trelegy 20 micro 1 puff daily nebulized with albuterol 2.5 mg 4 times a day as needed

## 2024-10-17 ENCOUNTER — TELEPHONE (OUTPATIENT)
Age: 74
End: 2024-10-17

## 2024-10-17 NOTE — TELEPHONE ENCOUNTER
Patient is calling and would like to have the doctor or someone to call the patient in regards to their mask they need to speak to someone asap

## 2024-10-17 NOTE — TELEPHONE ENCOUNTER
Pt calling as she has questions about her cpap/mask, and would like to talk to the medical assistant in charge of the orders, and she states she needs their expertise

## 2024-10-18 DIAGNOSIS — G47.33 OBSTRUCTIVE SLEEP APNEA OF ADULT: Primary | ICD-10-CM

## 2024-10-18 NOTE — TELEPHONE ENCOUNTER
I spoke w/ pt and she explained that per past office visit Dr. Rios wanted her to try the Air fit N20 mask. Pt would like for an order to be placed for mask and cpap supplies.

## 2024-11-22 NOTE — PROGRESS NOTES
Assessment  1. Spinal stenosis of lumbar region with neurogenic claudication    2. Lumbar radiculopathy    3. Lumbar spondylosis    4. Chronic pain syndrome        Plan  74-year-old female with a history of lumbar stenosis, spondylosis, lumbar radiculopathy, and chronic pain syndrome returning for follow-up.  The patient's lower extremity symptoms are slightly worse today.  She denies any interval trauma.  She is currently taking gabapentin 1200 mg twice daily with 50% of relief.  She denies any side effects with the medication.        1.  I will continue gabapentin 1200 mg twice daily.  Refill was sent to pharmacy  2.  Patient will continue with her home exercise program  3.  I will follow-up with the patient in 6 months or sooner if needed      My impressions and treatment recommendations were discussed in detail with the patient who verbalized understanding and had no further questions.  Discharge instructions were provided. I personally saw and examined the patient and I agree with the above discussed plan of care.    No orders of the defined types were placed in this encounter.    No orders of the defined types were placed in this encounter.      History of Present Illness    Deanna Perez is a 74 y.o. female with a history of lumbar stenosis, spondylosis, lumbar radiculopathy, and chronic pain syndrome returning for follow-up.  The patient's lower extremity symptoms are slightly worse today.  She denies any interval trauma.  She denies any bowel incontinence or saddle anesthesia.  She does have baseline bladder incontinence which is unchanged.  She is currently taking gabapentin 1200 mg twice daily with 50% of relief.  She denies any side effects with the medication.  The patient rates her pain a 2 out of 10 and the pain is worse in the morning.  The pain is occasional and described as burning, pressure-like, and numbness.  The pain is increased with standing, walking, and exercise.  The pain is alleviated  with medication.         Other than as stated above, the patient denies any interval changes in medications, medical condition, mental condition, symptoms, or allergies since the last office visit.    I have personally reviewed and/or updated the patient's past medical history, past surgical history, family history, social history, current medications, allergies, and vital signs today.     Review of Systems   Constitutional:  Negative for fever and unexpected weight change.   HENT:  Negative for trouble swallowing.    Eyes:  Negative for visual disturbance.   Respiratory:  Negative for shortness of breath and wheezing.    Cardiovascular:  Negative for chest pain and palpitations.   Gastrointestinal:  Negative for constipation, diarrhea, nausea and vomiting.   Endocrine: Negative for cold intolerance, heat intolerance and polydipsia.   Genitourinary:  Negative for difficulty urinating and frequency.   Musculoskeletal:  Positive for back pain and gait problem. Negative for arthralgias, joint swelling and myalgias.   Skin:  Negative for rash.   Neurological:  Negative for dizziness, seizures, syncope, weakness and headaches.   Hematological:  Does not bruise/bleed easily.   Psychiatric/Behavioral:  Negative for dysphoric mood.    All other systems reviewed and are negative.      Patient Active Problem List   Diagnosis    Lumbar radiculopathy    Spinal stenosis of lumbar region    Lumbar spondylosis    Chronic right hip pain    Type 2 diabetes mellitus with hyperglycemia, without long-term current use of insulin (HCC)    Primary osteoarthritis of first carpometacarpal joint of left hand    Arthritis of carpometacarpal (CMC) joint of left thumb    Cataract of both eyes    DDD (degenerative disc disease), lumbar    Obstructive sleep apnea of adult    Secondary polycythemia    Chronic pain syndrome    Essential hypertension    Trigger finger, left index finger    Trigger ring finger of right hand    Sacroiliitis (HCC)     Vertigo    COPD (chronic obstructive pulmonary disease) (HCC)    Non-seasonal allergic rhinitis    Heart murmur, systolic    Obesity, morbid (HCC)    Persistent proteinuria    Osteoporosis    OAB (overactive bladder)    Chronic hypoxemic respiratory failure (HCC)    Stage 3 chronic kidney disease (HCC)    Thrush    Xerostomia    Requires continuous at home supplemental oxygen    Shortness of breath       Past Medical History:   Diagnosis Date    Asthma     Chronic obstructive lung disease (HCC)     Community acquired pneumonia     LAST ASSESSED: [id number]    COPD (chronic obstructive pulmonary disease) (HCC)     Diabetes mellitus (HCC)     History of MRSA infection     2008 liver sx    Liver disease     Sleep apnea     Viral warts     LAST ASSESSED: [id number]       Past Surgical History:   Procedure Laterality Date    ABDOMINAL SURGERY      ABDOMINOPLASTY      CHOLECYSTECTOMY      COSMETIC SURGERY      EYE SURGERY      Bilateral cataracts 2015 Dr. FLORIN Villagran.     FINGER SURGERY      HAND SURGERY      HYSTERECTOMY      LIVER SURGERY      MA ARTHRP INTERPOS INTERCARPAL/METACARPAL JOINTS Right 9/9/2016    Procedure: THUMB TRAPEZIECTOMY; BASAL JOINT ARTHROPLASTY WITH APL AUTOGRAFT;  Surgeon: Kedar Villareal MD;  [facility];  Service: Orthopedics    MA ARTHRP INTERPOS INTERCARPAL/METACARPAL JOINTS Left 5/29/2018    Procedure: ARTHROPLASTY THUMB WEILBY;  Surgeon: Arthur Jackson MD;  [facility];  Service: Orthopedics    MA TENDON SHEATH INCISION Right 7/23/2019    Procedure: RELEASE TRIGGER FINGER RIGHT LONG;  Surgeon: Arthur Jackson MD;  [facility];  Service: Orthopedics    MA TENDON SHEATH INCISION Left 8/6/2019    Procedure: RELEASE TRIGGER FINGER LEFT INDEX;  Surgeon: Arthur Jackson MD;  [facility];  Service: Orthopedics    MA TENDON SHEATH INCISION Right 6/16/2020    Procedure: RELEASE TRIGGER FINGER, right ring;  Surgeon: Arthur Jackson MD;  [facility];  Service: Orthopedics    [place] TR/TRNSPL TDN CARP/MTCRPL HAND W/O FR GRF EA TDN Left 5/29/2018    Procedure: TRANSFER TENDON HAND/WRIST FCR from forearm to wrist;  Surgeon: Arthur Jackson MD;  [facility];  Service: Orthopedics       Family History   Problem Relation Age of Onset    Heart attack Mother     Heart attack Father     Ovarian cancer Sister     Basal cell carcinoma Brother     Heart disease Other         CARDIAC DISORDER    Diabetes Other     Liver cancer Other     Breast cancer Other     Stomach cancer Other     Heart disease Family     Diabetes Family     Thyroid disease Family     Ovarian cancer Family        Social History     Occupational History    Not on file   Tobacco Use    Smoking status: Former     Average packs/day: 1 pack/day for 43.1 years (41.5 ttl pk-yrs)     Types: Cigarettes     Start date: 1980    Smokeless tobacco: Never   Vaping Use    Vaping status: Never Used   Substance and Sexual Activity    Alcohol use: Yes     Comment: very seldom    Drug use: No    Sexual activity: Not Currently       Current Outpatient Medications on File Prior to Visit   Medication Sig    albuterol (2.5 mg/3 mL) 0.083 % nebulizer solution Take 3 mL (2.5 mg total) by nebulization every 6 (six) hours as needed for wheezing or shortness of breath    albuterol (PROVENTIL HFA,VENTOLIN HFA) 90 mcg/act inhaler Inhale 2 puffs every 4 (four) hours as needed for wheezing or shortness of breath    clotrimazole (MYCELEX) 10 mg karen Take 1 tablet (10 mg total) by mouth 4 (four) times a day    fluticasone-umeclidinium-vilanterol (Trelegy Ellipta) 200-62.5-25 mcg/actuation AEPB inhaler Inhale 1 puff daily Rinse mouth after use.    gabapentin (Neurontin) 600 MG tablet Take 2 tablets (1,200 mg total) by mouth 2 (two) times a day    gabapentin (Neurontin) 600 MG tablet Take 2 tablets (1,200 mg total) by mouth 2 (two) times a day (Patient not taking: Reported on 8/30/2024)    glipiZIDE (GLUCOTROL) 5 mg tablet  "TAKE 1 TABLET TWICE DAILY BEFORE MEALS    glucose blood (ACCU-CHEK JESS PLUS) test strip Test BS 3 times daily.  DX E11.9    lisinopril (ZESTRIL) 40 mg tablet TAKE 1 TABLET EVERY DAY    metFORMIN (GLUCOPHAGE) 1000 MG tablet TAKE 1 TABLET TWICE DAILY    Multiple Vitamins-Minerals (OCUVITE ADULT FORMULA PO) Take by mouth (Patient not taking: Reported on 10/8/2024)    predniSONE 10 mg tablet Take 4 tablets for 3 days then 3 tabs for 3 days then 2 tablets for 3 days then 1 tablet for 3 days    rosuvastatin (CRESTOR) 20 MG tablet TAKE 1 TABLET EVERY DAY    tolterodine (DETROL LA) 2 mg 24 hr capsule Take 2 capsules (4 mg total) by mouth daily     No current facility-administered medications on file prior to visit.       No Known Allergies    Physical Exam    Ht 4' 11\" (1.499 m)   Wt 73.5 kg (162 lb)   BMI 32.72 kg/m²     Constitutional: normal, well developed, well nourished, alert, in no distress and non-toxic and no overt pain behavior.  Eyes: anicteric  HEENT: grossly intact  Neck: supple, symmetric, trachea midline and no masses   Pulmonary:even and unlabored  Cardiovascular:No edema or pitting edema present  Skin:Normal without rashes or lesions and well hydrated  Psychiatric:Mood and affect appropriate  Neurologic:Cranial Nerves II-XII grossly intact  Musculoskeletal:antalgic gait. Bilateral lumbar paraspinals tender to palpation from L4-S1.  Bilateral lower extremity strength 5 out of 5 in all muscle groups.  Sensation intact to light touch in L3-S1 dermatomes bilaterally.  Negative seated straight leg raise bilaterally.    Imaging  MRI LUMBAR SPINE WITHOUT CONTRAST     INDICATION: M54.16: Radiculopathy, lumbar region.     COMPARISON:  MRI dated 7/11/2019.     TECHNIQUE:  Multiplanar, multisequence imaging of the lumbar spine was performed. .          FINDINGS:     VERTEBRAL BODIES:  There are 5 lumbar type vertebral bodies.  Normal alignment of the lumbar spine.  No spondylolysis or spondylolisthesis. No " scoliosis.  No compression fracture.    Stable heterogeneous marrow signal.  Multilevel mostly Modic type II   endplate degenerative changes.  Minimal type I changes at L4-5 are decreased from prior.  No suspicious marrow signal abnormality.     SACRUM:  Normal signal within the sacrum. No evidence of insufficiency or stress fracture.     DISTAL CORD AND CONUS:  Normal size and signal within the distal cord and conus.     PARASPINAL SOFT TISSUES:  Paraspinal soft tissues are unremarkable.     LOWER THORACIC DISC SPACES:  Mild noncompressive lower thoracic degenerative change.     LUMBAR DISC SPACES:  Multilevel disc desiccation.  Disc space narrowing most pronounced at L4-5.     L1-L2:  No disc herniation, canal or foraminal stenosis.     L2-L3:  Minimal disc bulge and mild facet arthropathy.  No significant canal or foraminal stenosis.     L3-L4:  Disc bulge, facet arthropathy and ligamentum flavum hypertrophy.  Mild canal stenosis and mild foraminal stenosis.  No significant change.     L4-L5:  Disc bulge and marginal osteophyte with facet arthropathy.  Mild canal stenosis and mild foraminal stenosis.  No significant change.     L5-S1: Disc bulge, marginal osteophyte and facet arthropathy.  No significant canal stenosis.  Moderate severe foraminal stenosis contacting the exiting nerve roots, right worse than left.  No significant change.     IMPRESSION:     Multilevel degenerative spondylosis without significant change.  Mild canal and mild foraminal stenosis at L3-4 and L4-5.  Moderate to severe bilateral foraminal stenosis at L5-S1, right worse than left.

## 2024-11-25 ENCOUNTER — OFFICE VISIT (OUTPATIENT)
Dept: PAIN MEDICINE | Facility: CLINIC | Age: 74
End: 2024-11-25
Payer: MEDICARE

## 2024-11-25 VITALS — HEIGHT: 59 IN | BODY MASS INDEX: 32.66 KG/M2 | WEIGHT: 162 LBS

## 2024-11-25 DIAGNOSIS — M54.16 LUMBAR RADICULOPATHY: ICD-10-CM

## 2024-11-25 DIAGNOSIS — G89.4 CHRONIC PAIN SYNDROME: ICD-10-CM

## 2024-11-25 DIAGNOSIS — M48.062 SPINAL STENOSIS OF LUMBAR REGION WITH NEUROGENIC CLAUDICATION: Primary | ICD-10-CM

## 2024-11-25 DIAGNOSIS — M47.816 LUMBAR SPONDYLOSIS: ICD-10-CM

## 2024-11-25 PROCEDURE — 99214 OFFICE O/P EST MOD 30 MIN: CPT | Performed by: ANESTHESIOLOGY

## 2024-11-25 PROCEDURE — G2211 COMPLEX E/M VISIT ADD ON: HCPCS | Performed by: ANESTHESIOLOGY

## 2024-11-25 RX ORDER — GABAPENTIN 600 MG/1
1200 TABLET ORAL 2 TIMES DAILY
Qty: 360 TABLET | Refills: 1 | Status: SHIPPED | OUTPATIENT
Start: 2024-11-25

## 2024-12-25 DIAGNOSIS — E11.65 TYPE 2 DIABETES MELLITUS WITH HYPERGLYCEMIA, WITHOUT LONG-TERM CURRENT USE OF INSULIN (HCC): ICD-10-CM

## 2024-12-25 DIAGNOSIS — E11.9 TYPE 2 DIABETES MELLITUS WITHOUT COMPLICATION, WITHOUT LONG-TERM CURRENT USE OF INSULIN (HCC): ICD-10-CM

## 2024-12-26 RX ORDER — ROSUVASTATIN CALCIUM 20 MG/1
20 TABLET, COATED ORAL DAILY
Qty: 90 TABLET | Refills: 0 | Status: SHIPPED | OUTPATIENT
Start: 2024-12-26

## 2024-12-26 RX ORDER — GLIPIZIDE 5 MG/1
5 TABLET ORAL
Qty: 180 TABLET | Refills: 1 | Status: SHIPPED | OUTPATIENT
Start: 2024-12-26

## 2025-01-21 ENCOUNTER — TELEPHONE (OUTPATIENT)
Dept: PULMONOLOGY | Facility: MEDICAL CENTER | Age: 75
End: 2025-01-21

## 2025-01-21 NOTE — TELEPHONE ENCOUNTER
Called patient  attempting to schedule follow up appointment. Called couldn't go through line continuously busy. Appointment reminder mailed to patient.

## 2025-01-29 NOTE — TELEPHONE ENCOUNTER
Patient called to schedule appointment she is now scheduled 2/13/25 at 2:20 with Dr Murray. Patient requested to be seen before 2/15/25 for her cpap compliance. She stated she doesn't want to go to sleep medicine anymore and would like to stick with Dr Murray for her pulm/sleep issues. Thank you.

## 2025-02-13 ENCOUNTER — OFFICE VISIT (OUTPATIENT)
Dept: PULMONOLOGY | Facility: MEDICAL CENTER | Age: 75
End: 2025-02-13
Payer: MEDICARE

## 2025-02-13 VITALS
HEIGHT: 59 IN | HEART RATE: 86 BPM | OXYGEN SATURATION: 97 % | BODY MASS INDEX: 34.27 KG/M2 | SYSTOLIC BLOOD PRESSURE: 120 MMHG | TEMPERATURE: 97.3 F | WEIGHT: 170 LBS | RESPIRATION RATE: 12 BRPM | DIASTOLIC BLOOD PRESSURE: 60 MMHG

## 2025-02-13 DIAGNOSIS — J96.11 CHRONIC HYPOXEMIC RESPIRATORY FAILURE (HCC): ICD-10-CM

## 2025-02-13 DIAGNOSIS — G47.33 OBSTRUCTIVE SLEEP APNEA OF ADULT: ICD-10-CM

## 2025-02-13 DIAGNOSIS — J44.9 CHRONIC OBSTRUCTIVE PULMONARY DISEASE, UNSPECIFIED COPD TYPE (HCC): Primary | ICD-10-CM

## 2025-02-13 PROCEDURE — 99214 OFFICE O/P EST MOD 30 MIN: CPT | Performed by: INTERNAL MEDICINE

## 2025-02-13 RX ORDER — PREDNISONE 10 MG/1
TABLET ORAL
Qty: 50 TABLET | Refills: 0 | Status: SHIPPED | OUTPATIENT
Start: 2025-02-13

## 2025-02-13 RX ORDER — PREDNISONE 10 MG/1
TABLET ORAL
Qty: 50 TABLET | Refills: 0 | Status: SHIPPED | OUTPATIENT
Start: 2025-02-13 | End: 2025-02-13 | Stop reason: CLARIF

## 2025-02-13 NOTE — PATIENT INSTRUCTIONS
Ask [employer] if it is pulsed on regular basis or it is triggered by you breathing    Can use 3 L at rest with battery-operated concentrator and can increase to 5 L if needed with walking    You are using 4 L of oxygen at bedtime    I will place order to [employer] for portable battery-operated concentrator at 4 L/min    I ordered venous blood gas and get this done before your next visit

## 2025-02-14 ENCOUNTER — TELEPHONE (OUTPATIENT)
Age: 75
End: 2025-02-14

## 2025-02-14 NOTE — TELEPHONE ENCOUNTER
Patient called asking if  could please give her a return call. She spoke with [employer] and was advised if her [employer] order goes through them then she would be getting a refurbished machine. That if she gets the order sent directly to [employer] then she would get a brand new machine. She does not want to use [employer]. She asked if we can hold off on placing the order until she speaks with . Please advise

## 2025-02-16 NOTE — ASSESSMENT & PLAN NOTE
Does have severe COPD with FEV1 of 0.59 L or 33% of predicted she will continue with Trelegy 200 mcg 1 puff daily she has been having some increased shortness of breath recently so I did order her prednisone 40 mg for 4 days with 10 mg taper every fifth day

## 2025-02-16 NOTE — ASSESSMENT & PLAN NOTE
Does have severe obstructive sleep apnea and has not been using her CPAP because of nasal congestion.  Encouraged her to start using again.  Usually she will use it with 4 to 5 L of oxygen at bedtime.  The company is [employer].

## 2025-02-16 NOTE — ASSESSMENT & PLAN NOTE
She does have oxygen titrate home to go up to 10 L/min.  With activity she can get by with 3 L of oxygen she walks short distance.  She would like a portable battery-operated concentrator and I did order this through her company [employer] told she can use 3 to 4 L/min with activity.    She does use oxygen usually 4 L/min at bedtime and sometimes will use up to 5 L/min with activity during the day.    I did order venous blood gas that should get done before next office visit.

## 2025-02-16 NOTE — PROGRESS NOTES
Assessment & Plan        Problem List Items Addressed This Visit          Respiratory    Obstructive sleep apnea of adult    Does have severe obstructive sleep apnea and has not been using her CPAP because of nasal congestion.  Encouraged her to start using again.  Usually she will use it with 4 to 5 L of oxygen at bedtime.  The company is [employer].         COPD (chronic obstructive pulmonary disease) (HCC) - Primary    Does have severe COPD with FEV1 of 0.59 L or 33% of predicted she will continue with Trelegy 200 mcg 1 puff daily she has been having some increased shortness of breath recently so I did order her prednisone 40 mg for 4 days with 10 mg taper every fifth day         Relevant Medications    predniSONE 10 mg tablet    Other Relevant Orders    Blood gas, venous    Chronic hypoxemic respiratory failure (HCC)    She does have oxygen titrate home to go up to 10 L/min.  With activity she can get by with 3 L of oxygen she walks short distance.  She would like a portable battery-operated concentrator and I did order this through her company [employer] told she can use 3 to 4 L/min with activity.    She does use oxygen usually 4 L/min at bedtime and sometimes will use up to 5 L/min with activity during the day.    I did order venous blood gas that should get done before next office visit.              Cc: shortness of breath with exertion      JL Huerta presents for follow-up of her severe FRANK and COPD.  She does have severe COPD.  Does have BiPAP at home but has not been using on regular basis.  She generally will use 4 L of oxygen at bedtime and 5 L during the day.  At bedtime when she would wear her BiPAP she will keep it at 5.5 L/min at bedtime and uses a fullface mask.  Complains of having frequent nasal congestion and postnasal drainage and has not been using her BiPAP recently.  Not having any new cough.  She does have chronic shortness of breath with activity.  She did have a agnostic sleep  study done before which showed AHI to be up to 117.    Her baseline FEV1 is 0.59 L or 33% of predicted consistent with severe COPD.  She is on Trelegy 200 mcg 1 puff daily and does have nebulizer with albuterol 2.5 mg can use as needed.  She does have an oxygen concentrator at home capable of up to 10 L/min.  He does not have a portable battery-operated concentrator and would like 1.  Has only larger oxygen tank she had to take with her on the road.    She quit smoking in 2000 and had smoked 1 to 2 packs of cigarettes per day for several years.        Past Medical History:   Diagnosis Date    Asthma     Chronic obstructive lung disease (HCC)     Community acquired pneumonia     LAST ASSESSED: [id number]    COPD (chronic obstructive pulmonary disease) (HCC)     Diabetes mellitus (HCC)     History of MRSA infection     2008 liver sx    Liver disease     Sleep apnea     Viral warts     LAST ASSESSED: [id number]       Past Surgical History:   Procedure Laterality Date    ABDOMINAL SURGERY      ABDOMINOPLASTY      CHOLECYSTECTOMY      COSMETIC SURGERY      EYE SURGERY      Bilateral cataracts 2015 Dr. FLORIN Villagran.     FINGER SURGERY      HAND SURGERY      HYSTERECTOMY      LIVER SURGERY      NH ARTHRP INTERPOS INTERCARPAL/METACARPAL JOINTS Right 9/9/2016    Procedure: THUMB TRAPEZIECTOMY; BASAL JOINT ARTHROPLASTY WITH APL AUTOGRAFT;  Surgeon: Kedar Villareal MD;  [facility];  Service: Orthopedics    NH ARTHRP INTERPOS INTERCARPAL/METACARPAL JOINTS Left 5/29/2018    Procedure: ARTHROPLASTY THUMB WEILBY;  Surgeon: Arthur Jackson MD;  [facility];  Service: Orthopedics    NH TENDON SHEATH INCISION Right 7/23/2019    Procedure: RELEASE TRIGGER FINGER RIGHT LONG;  Surgeon: Arthur Jackson MD;  [facility];  Service: Orthopedics    NH TENDON SHEATH INCISION Left 8/6/2019    Procedure: RELEASE TRIGGER FINGER LEFT INDEX;  Surgeon: Arthur Jackson MD;  [facility];  Service: Orthopedics     [place] TENDON SHEATH INCISION Right 6/16/2020    Procedure: RELEASE TRIGGER FINGER, right ring;  Surgeon: Arthur Jackson MD;  [facility];  Service: Orthopedics    NC TR/TRNSPL TDN CARP/MTCRPL HAND W/O FR GRF EA TDN Left 5/29/2018    Procedure: TRANSFER TENDON HAND/WRIST FCR from forearm to wrist;  Surgeon: Arthur Jackson MD;  [facility];  Service: Orthopedics         Current Outpatient Medications:     albuterol (2.5 mg/3 mL) 0.083 % nebulizer solution, Take 3 mL (2.5 mg total) by nebulization every 6 (six) hours as needed for wheezing or shortness of breath, Disp: 100 mL, Rfl: 0    albuterol (PROVENTIL HFA,VENTOLIN HFA) 90 mcg/act inhaler, Inhale 2 puffs every 4 (four) hours as needed for wheezing or shortness of breath, Disp: 8 g, Rfl: 0    clotrimazole (MYCELEX) 10 mg karen, Take 1 tablet (10 mg total) by mouth 4 (four) times a day, Disp: 40 Karen, Rfl: 3    fluticasone-umeclidinium-vilanterol (Trelegy Ellipta) 200-62.5-25 mcg/actuation AEPB inhaler, Inhale 1 puff daily Rinse mouth after use., Disp: 90 blister, Rfl: 3    gabapentin (Neurontin) 600 MG tablet, Take 2 tablets (1,200 mg total) by mouth 2 (two) times a day, Disp: 360 tablet, Rfl: 1    glipiZIDE (GLUCOTROL) 5 mg tablet, TAKE 1 TABLET TWICE DAILY BEFORE MEALS, Disp: 180 tablet, Rfl: 1    glucose blood (ACCU-CHEK JESS PLUS) test strip, Test BS 3 times daily.  DX E11.9, Disp: 100 each, Rfl: 2    lisinopril (ZESTRIL) 40 mg tablet, TAKE 1 TABLET EVERY DAY, Disp: 100 tablet, Rfl: 1    metFORMIN (GLUCOPHAGE) 1000 MG tablet, TAKE 1 TABLET TWICE DAILY, Disp: 180 tablet, Rfl: 1    predniSONE 10 mg tablet, Take 4 tablets x4 days, 3 tablets x4 days, 2 tablets x4 days, 1 tablet x4 days, Disp: 50 tablet, Rfl: 0    rosuvastatin (CRESTOR) 20 MG tablet, TAKE 1 TABLET EVERY DAY, Disp: 90 tablet, Rfl: 0    Multiple Vitamins-Minerals (OCUVITE ADULT FORMULA PO), Take by mouth (Patient not taking: Reported on 10/8/2024), Disp: , Rfl:      "predniSONE 10 mg tablet, Take 4 tablets for 3 days then 3 tabs for 3 days then 2 tablets for 3 days then 1 tablet for 3 days (Patient not taking: Reported on 2/13/2025), Disp: 30 tablet, Rfl: 0    tolterodine (DETROL LA) 2 mg 24 hr capsule, Take 2 capsules (4 mg total) by mouth daily (Patient not taking: Reported on 2/13/2025), Disp: 90 capsule, Rfl: 3    No Known Allergies    Social History     Tobacco Use    Smoking status: Former     Average packs/day: 1 pack/day for 43.1 years (41.5 ttl pk-yrs)     Types: Cigarettes     Start date: 1980    Smokeless tobacco: Never   Substance Use Topics    Alcohol use: Yes     Comment: very seldom         Family History   Problem Relation Age of Onset    Heart attack Mother     Heart attack Father     Ovarian cancer Sister     Basal cell carcinoma Brother     Heart disease Other         CARDIAC DISORDER    Diabetes Other     Liver cancer Other     Breast cancer Other     Stomach cancer Other     Heart disease Family     Diabetes Family     Thyroid disease Family     Ovarian cancer Family        Review of Systems   Constitutional:  Negative for chills, fever and unexpected weight change.   HENT:  Positive for postnasal drip. Negative for congestion, rhinorrhea and sore throat.    Eyes:  Negative for discharge and redness.   Respiratory:  Positive for shortness of breath.    Cardiovascular:  Negative for chest pain, palpitations and leg swelling.   Gastrointestinal:  Negative for abdominal distention, abdominal pain and nausea.   Endocrine: Negative for polydipsia and polyphagia.   Genitourinary:  Negative for dysuria.   Musculoskeletal:  Negative for joint swelling and myalgias.   Skin:  Negative for rash.   Neurological:  Negative for light-headedness.   Psychiatric/Behavioral:  Negative for decreased concentration.            Vitals:    02/13/25 1448   BP: 120/60   Pulse: 86   Resp: 12   Temp: (!) 97.3 °F (36.3 °C)   SpO2: 97%     Height: 4' 11\" (149.9 cm)  IBW (Ideal Body " Weight): 43.2 kg  Body mass index is 34.34 kg/m².  Weight (last 2 days)       Date/Time Weight    02/13/25 1448 77.1 (170)                Physical Exam  Vitals reviewed.   Constitutional:       General: She is not in acute distress.     Appearance: Normal appearance. She is well-developed. She is obese.      Comments: On room air at rest O2 saturation 94%.  And on 2 L/min nasal cannula oxygen O2 saturation was 97%    On 3 L of oxygen at rest O2 saturation 96% on 3 L/min nasal cannula oxygen after ambulating 100 feet lowest O2 saturation 89%   HENT:      Head: Normocephalic.      Right Ear: External ear normal.      Nose: Nose normal.      Mouth/Throat:      Mouth: Mucous membranes are moist.      Pharynx: Oropharynx is clear. No oropharyngeal exudate.   Eyes:      Conjunctiva/sclera: Conjunctivae normal.      Pupils: Pupils are equal, round, and reactive to light.   Cardiovascular:      Rate and Rhythm: Normal rate and regular rhythm.      Heart sounds: Normal heart sounds.   Pulmonary:      Effort: Pulmonary effort is normal.      Comments: Lung sounds are clear.  Not having any wheezing, crackles or rhonchi  Abdominal:      General: There is no distension.      Palpations: Abdomen is soft.      Tenderness: There is no abdominal tenderness.   Musculoskeletal:      Cervical back: Neck supple.      Comments: No edema, cyanosis or clubbing   Lymphadenopathy:      Cervical: No cervical adenopathy.   Skin:     General: Skin is warm and dry.   Neurological:      General: No focal deficit present.      Mental Status: She is alert and oriented to person, place, and time.   Psychiatric:         Mood and Affect: Mood normal.         Behavior: Behavior normal.         Thought Content: Thought content normal.

## 2025-02-18 ENCOUNTER — TELEPHONE (OUTPATIENT)
Dept: PULMONOLOGY | Facility: MEDICAL CENTER | Age: 75
End: 2025-02-18

## 2025-02-18 NOTE — TELEPHONE ENCOUNTER
Patient called requesting refill for predniSONE 10 mg tablet . Patient made aware medication was refilled on 2.13.2025 for 310 with 0 refills to   [id number] - [place], [place] - [address]    pharmacy. Patient instructed to contact the pharmacy to obtain refills of medication. Patient verbalized understanding.

## 2025-02-20 NOTE — TELEPHONE ENCOUNTER
Patient called and stated she has not heard back regarding her request. She stated she does not want to use [employer] as they use refurbished machines. She only wants her order from [employer] directly. She stated [employer] has sent over paper work for Dr Murray. Patient stated her settings should be set to 4. Patient requested a call back asap to discuss. Please advise.

## 2025-02-21 DIAGNOSIS — I10 ESSENTIAL HYPERTENSION: ICD-10-CM

## 2025-02-21 DIAGNOSIS — G47.33 OBSTRUCTIVE SLEEP APNEA OF ADULT: ICD-10-CM

## 2025-02-21 DIAGNOSIS — R80.1 PERSISTENT PROTEINURIA: ICD-10-CM

## 2025-02-21 DIAGNOSIS — E11.65 TYPE 2 DIABETES MELLITUS WITH HYPERGLYCEMIA, WITHOUT LONG-TERM CURRENT USE OF INSULIN (HCC): ICD-10-CM

## 2025-02-21 DIAGNOSIS — E66.01 OBESITY, MORBID (HCC): ICD-10-CM

## 2025-02-21 DIAGNOSIS — R80.9 PROTEINURIA, UNSPECIFIED TYPE: ICD-10-CM

## 2025-02-21 RX ORDER — LISINOPRIL 40 MG/1
40 TABLET ORAL DAILY
Qty: 100 TABLET | Refills: 1 | Status: SHIPPED | OUTPATIENT
Start: 2025-02-21

## 2025-02-24 ENCOUNTER — RA CDI HCC (OUTPATIENT)
Dept: OTHER | Facility: HOSPITAL | Age: 75
End: 2025-02-24

## 2025-03-06 ENCOUNTER — APPOINTMENT (OUTPATIENT)
Dept: LAB | Facility: CLINIC | Age: 75
End: 2025-03-06
Payer: MEDICARE

## 2025-03-06 ENCOUNTER — OFFICE VISIT (OUTPATIENT)
Dept: FAMILY MEDICINE CLINIC | Facility: CLINIC | Age: 75
End: 2025-03-06
Payer: MEDICARE

## 2025-03-06 VITALS
HEART RATE: 86 BPM | DIASTOLIC BLOOD PRESSURE: 70 MMHG | SYSTOLIC BLOOD PRESSURE: 100 MMHG | RESPIRATION RATE: 17 BRPM | OXYGEN SATURATION: 98 % | BODY MASS INDEX: 33.92 KG/M2 | TEMPERATURE: 97.3 F | WEIGHT: 168.25 LBS | HEIGHT: 59 IN

## 2025-03-06 DIAGNOSIS — N18.30 STAGE 3 CHRONIC KIDNEY DISEASE, UNSPECIFIED WHETHER STAGE 3A OR 3B CKD (HCC): ICD-10-CM

## 2025-03-06 DIAGNOSIS — I10 ESSENTIAL HYPERTENSION: ICD-10-CM

## 2025-03-06 DIAGNOSIS — E11.65 TYPE 2 DIABETES MELLITUS WITH HYPERGLYCEMIA, WITHOUT LONG-TERM CURRENT USE OF INSULIN (HCC): ICD-10-CM

## 2025-03-06 DIAGNOSIS — E11.22 TYPE 2 DIABETES MELLITUS WITH CHRONIC KIDNEY DISEASE, WITHOUT LONG-TERM CURRENT USE OF INSULIN, UNSPECIFIED CKD STAGE (HCC): ICD-10-CM

## 2025-03-06 DIAGNOSIS — J44.9 CHRONIC OBSTRUCTIVE PULMONARY DISEASE, UNSPECIFIED COPD TYPE (HCC): ICD-10-CM

## 2025-03-06 DIAGNOSIS — E11.65 TYPE 2 DIABETES MELLITUS WITH HYPERGLYCEMIA, WITHOUT LONG-TERM CURRENT USE OF INSULIN (HCC): Primary | ICD-10-CM

## 2025-03-06 DIAGNOSIS — E78.49 OTHER HYPERLIPIDEMIA: ICD-10-CM

## 2025-03-06 PROBLEM — R06.02 SHORTNESS OF BREATH: Status: RESOLVED | Noted: 2024-10-10 | Resolved: 2025-03-06

## 2025-03-06 PROBLEM — B37.0 THRUSH: Status: RESOLVED | Noted: 2024-04-12 | Resolved: 2025-03-06

## 2025-03-06 LAB
ALBUMIN SERPL BCG-MCNC: 3.7 G/DL (ref 3.5–5)
ALP SERPL-CCNC: 61 U/L (ref 34–104)
ALT SERPL W P-5'-P-CCNC: 15 U/L (ref 7–52)
ANION GAP SERPL CALCULATED.3IONS-SCNC: 6 MMOL/L (ref 4–13)
AST SERPL W P-5'-P-CCNC: 16 U/L (ref 13–39)
BILIRUB SERPL-MCNC: 0.25 MG/DL (ref 0.2–1)
BUN SERPL-MCNC: 18 MG/DL (ref 5–25)
CALCIUM SERPL-MCNC: 10 MG/DL (ref 8.4–10.2)
CHLORIDE SERPL-SCNC: 99 MMOL/L (ref 96–108)
CHOLEST SERPL-MCNC: 118 MG/DL (ref ?–200)
CO2 SERPL-SCNC: 39 MMOL/L (ref 21–32)
CREAT SERPL-MCNC: 1.01 MG/DL (ref 0.6–1.3)
ERYTHROCYTE [DISTWIDTH] IN BLOOD BY AUTOMATED COUNT: 14.1 % (ref 11.6–15.1)
GFR SERPL CREATININE-BSD FRML MDRD: 53 ML/MIN/1.73SQ M
GLUCOSE P FAST SERPL-MCNC: 153 MG/DL (ref 65–99)
HCT VFR BLD AUTO: 31.6 % (ref 34.8–46.1)
HDLC SERPL-MCNC: 57 MG/DL
HGB BLD-MCNC: 9.5 G/DL (ref 11.5–15.4)
LDLC SERPL CALC-MCNC: 32 MG/DL (ref 0–100)
MCH RBC QN AUTO: 30.2 PG (ref 26.8–34.3)
MCHC RBC AUTO-ENTMCNC: 30.1 G/DL (ref 31.4–37.4)
MCV RBC AUTO: 100 FL (ref 82–98)
NONHDLC SERPL-MCNC: 61 MG/DL
PLATELET # BLD AUTO: 166 THOUSANDS/UL (ref 149–390)
PMV BLD AUTO: 12.2 FL (ref 8.9–12.7)
POTASSIUM SERPL-SCNC: 5.1 MMOL/L (ref 3.5–5.3)
PROT SERPL-MCNC: 6.4 G/DL (ref 6.4–8.4)
RBC # BLD AUTO: 3.15 MILLION/UL (ref 3.81–5.12)
SODIUM SERPL-SCNC: 144 MMOL/L (ref 135–147)
TRIGL SERPL-MCNC: 143 MG/DL (ref ?–150)
TSH SERPL DL<=0.05 MIU/L-ACNC: 1.08 UIU/ML (ref 0.45–4.5)
WBC # BLD AUTO: 7.76 THOUSAND/UL (ref 4.31–10.16)

## 2025-03-06 PROCEDURE — 83036 HEMOGLOBIN GLYCOSYLATED A1C: CPT | Performed by: FAMILY MEDICINE

## 2025-03-06 PROCEDURE — 36415 COLL VENOUS BLD VENIPUNCTURE: CPT

## 2025-03-06 PROCEDURE — 80053 COMPREHEN METABOLIC PANEL: CPT

## 2025-03-06 PROCEDURE — 80061 LIPID PANEL: CPT

## 2025-03-06 PROCEDURE — 84443 ASSAY THYROID STIM HORMONE: CPT

## 2025-03-06 PROCEDURE — 85027 COMPLETE CBC AUTOMATED: CPT

## 2025-03-06 PROCEDURE — G2211 COMPLEX E/M VISIT ADD ON: HCPCS | Performed by: FAMILY MEDICINE

## 2025-03-06 PROCEDURE — 99214 OFFICE O/P EST MOD 30 MIN: CPT | Performed by: FAMILY MEDICINE

## 2025-03-06 NOTE — ASSESSMENT & PLAN NOTE
Diabetes.  A1c stable at 6.2.  She will continue with current regimen of medications and diet.  Lab Results   Component Value Date    HGBA1C 6.3 08/30/2024

## 2025-03-06 NOTE — ASSESSMENT & PLAN NOTE
Lab Results   Component Value Date    EGFR 54 06/28/2024    EGFR 53 06/27/2024    EGFR 41 11/14/2023    CREATININE 1.01 06/28/2024    CREATININE 1.02 06/27/2024    CREATININE 1.27 11/14/2023   Chronic kidney disease.  Patient refused follow-up to nephrologist.  She will check blood work as ordered for further evaluation

## 2025-03-06 NOTE — PROGRESS NOTES
FAMILY PRACTICE OFFICE VISIT       NAME: Deanna Perez  AGE: 75 y.o. SEX: female       : 1950        MRN: [id number]    DATE: 3/6/2025  TIME: 9:09 AM    Assessment and Plan     Problem List Items Addressed This Visit       Type 2 diabetes mellitus with hyperglycemia, without long-term current use of insulin (HCC) - Primary    Relevant Orders    POCT hemoglobin A1c    CBC    Comprehensive metabolic panel    TSH, 3rd generation    Lipid panel    Essential hypertension    Hypertension.  Patient's blood pressures are stable.  Due to her weight loss she was advised to discontinue her hydrochlorothiazide medication.         Relevant Orders    CBC    Comprehensive metabolic panel    TSH, 3rd generation    Lipid panel    Stage 3 chronic kidney disease (HCC)    Lab Results   Component Value Date    EGFR 54 2024    EGFR 53 2024    EGFR 41 2023    CREATININE 1.01 2024    CREATININE 1.02 2024    CREATININE 1.27 2023   Chronic kidney disease.  Patient refused follow-up to nephrologist.  She will check blood work as ordered for further evaluation         Relevant Orders    CBC    Comprehensive metabolic panel    TSH, 3rd generation    Lipid panel    Type 2 diabetes mellitus with chronic kidney disease, without long-term current use of insulin, unspecified CKD stage (HCC)    Diabetes.  A1c stable at 6.2.  She will continue with current regimen of medications and diet.  Lab Results   Component Value Date    HGBA1C 6.3 2024            Other hyperlipidemia    Hyperlipidemia.  Patient will check lipid panel blood work and continue with current dose of statin therapy                Chief Complaint     Chief Complaint   Patient presents with    Follow-up     6 month        History of Present Illness     Patient in the office to review chronic medical conditions.  Her A1c is stable at 6.2.  Patient continues to be limited in her food intake to vegetables and fruits.  When she eats any  meat she develops a metallic taste in her mouth that makes her not want to eat.  She has lost weight due to decreased caloric intake.  She denies any recent illness.  Patient refuses to see other specialist besides her pulmonologist and pain management physician.  Patient had been taking hydrochlorothiazide every other day but denies any peripheral edema.  She does have complaints of frequent urinating and urinary urgency.  Patient uses home oxygen 24/7 at 4-5 L of oxygen        Review of Systems   Review of Systems   Constitutional:  Positive for unexpected weight change.   HENT: Negative.     Eyes: Negative.    Respiratory:  Positive for shortness of breath.    Cardiovascular: Negative.    Gastrointestinal: Negative.    Genitourinary:  Positive for urgency.   Musculoskeletal:  Positive for arthralgias, back pain and gait problem.   Skin: Negative.    Psychiatric/Behavioral: Negative.         Active Problem List     Patient Active Problem List   Diagnosis    Lumbar radiculopathy    Spinal stenosis of lumbar region    Lumbar spondylosis    Chronic right hip pain    Type 2 diabetes mellitus with hyperglycemia, without long-term current use of insulin ([facility])    Primary osteoarthritis of first carpometacarpal joint of left hand    Arthritis of carpometacarpal (CMC) joint of left thumb    Cataract of both eyes    DDD (degenerative disc disease), lumbar    Obstructive sleep apnea of adult    Secondary polycythemia    Chronic pain syndrome    Essential hypertension    Trigger finger, left index finger    Trigger ring finger of right hand    Sacroiliitis ([facility])    Vertigo    COPD (chronic obstructive pulmonary disease) ([facility])    Non-seasonal allergic rhinitis    Heart murmur, systolic    Obesity, morbid ([facility])    Persistent proteinuria    Osteoporosis    OAB (overactive bladder)    Chronic hypoxemic respiratory failure ([facility])    Stage 3 chronic kidney disease ([facility])    Xerostomia    Requires continuous at home supplemental  oxygen    Type 2 diabetes mellitus with chronic kidney disease, without long-term current use of insulin, unspecified CKD stage (HCC)    Other hyperlipidemia       Past Medical History:  Past Medical History:   Diagnosis Date    Asthma     Chronic obstructive lung disease (HCC)     Community acquired pneumonia     LAST ASSESSED: [id number]    COPD (chronic obstructive pulmonary disease) (HCC)     Diabetes mellitus (HCC)     History of MRSA infection     2008 liver sx    Liver disease     Sleep apnea     Viral warts     LAST ASSESSED: [id number]       Past Surgical History:  Past Surgical History:   Procedure Laterality Date    ABDOMINAL SURGERY      ABDOMINOPLASTY      CHOLECYSTECTOMY      COSMETIC SURGERY      EYE SURGERY      Bilateral cataracts 2015 Dr. FLORIN Villagran.     FINGER SURGERY      HAND SURGERY      HYSTERECTOMY      LIVER SURGERY      MT ARTHRP INTERCARPAL/CARP/MTCRPL JT INTERPOSITION Right 9/9/2016    Procedure: THUMB TRAPEZIECTOMY; BASAL JOINT ARTHROPLASTY WITH APL AUTOGRAFT;  Surgeon: Kedar Villareal MD;  Location: [facility];  Service: Orthopedics    MT ARTHRP INTERCARPAL/CARP/MTCRPL JT INTERPOSITION Left 5/29/2018    Procedure: ARTHROPLASTY THUMB WEILBY;  Surgeon: Arthur Jackson MD;  [facility];  Service: Orthopedics    MT TENDON SHEATH INCISION Right 7/23/2019    Procedure: RELEASE TRIGGER FINGER RIGHT LONG;  Surgeon: Arthur Jackson MD;  [facility];  Service: Orthopedics    MT TENDON SHEATH INCISION Left 8/6/2019    Procedure: RELEASE TRIGGER FINGER LEFT INDEX;  Surgeon: Arthur Jackson MD;  [facility];  Service: Orthopedics    MT TENDON SHEATH INCISION Right 6/16/2020    Procedure: RELEASE TRIGGER FINGER, right ring;  Surgeon: Arthur Jackson MD;  [facility];  Service: Orthopedics    MT TR/TRNSPL TDN CARP/MTCRPL HAND W/O FR GRF EA TDN Left 5/29/2018    Procedure: TRANSFER TENDON HAND/WRIST FCR from forearm to wrist;  Surgeon: Arthur Jackson  MD;  [facility];  Service: Orthopedics       Family History:  Family History   Problem Relation Age of Onset    Heart attack Mother     Heart attack Father     Ovarian cancer Sister     Basal cell carcinoma Brother     Heart disease Other         CARDIAC DISORDER    Diabetes Other     Liver cancer Other     Breast cancer Other     Stomach cancer Other     Heart disease Family     Diabetes Family     Thyroid disease Family     Ovarian cancer Family        Social History:  Social History     Socioeconomic History    Marital status: /Civil Union     Spouse name: Not on file    Number of children: Not on file    Years of education: Not on file    Highest education level: Not on file   Occupational History    Not on file   Tobacco Use    Smoking status: Former     Average packs/day: 1 pack/day for 43.1 years (41.5 ttl pk-yrs)     Types: Cigarettes     Start date: 1980    Smokeless tobacco: Never   Vaping Use    Vaping status: Never Used   Substance and Sexual Activity    Alcohol use: Yes     Comment: very seldom    Drug use: No    Sexual activity: Not Currently   Other Topics Concern    Not on file   Social History Narrative    Not on file     Social Drivers of Health     Financial Resource Strain: Low Risk  (8/29/2023)    Overall Financial Resource Strain (CARDIA)     Difficulty of Paying Living Expenses: Not very hard   Food Insecurity: No Food Insecurity (11/12/2023)    Nursing - Inadequate Food Risk Classification     Worried About Running Out of Food in the Last Year: Never true     Ran Out of Food in the Last Year: Never true     Ran Out of Food in the Last Year: Not on file   Transportation Needs: No Transportation Needs (11/12/2023)    PRAPARE - Transportation     Lack of Transportation (Medical): No     Lack of Transportation (Non-Medical): No   Physical Activity: Not on file   Stress: Not on file   Social Connections: Not on file   Intimate Partner Violence: Not on file   Housing Stability: Low  Risk  (11/12/2023)    Housing Stability Vital Sign     Unable to Pay for Housing in the Last Year: No     Number of Times Moved in the Last Year: 1     Homeless in the Last Year: No       Objective     Vitals:    03/06/25 0843   BP: 100/70   Pulse: 86   Resp: 17   Temp: (!) 97.3 °F (36.3 °C)   SpO2: 98%     Wt Readings from Last 3 Encounters:   03/06/25 76.3 kg (168 lb 4 oz)   02/13/25 77.1 kg (170 lb)   11/25/24 73.5 kg (162 lb)       Physical Exam  Constitutional:       General: She is not in acute distress.     Appearance: Normal appearance. She is not ill-appearing.   HENT:      Head: Normocephalic and atraumatic.   Eyes:      General:         Right eye: No discharge.         Left eye: No discharge.      Extraocular Movements: Extraocular movements intact.      Conjunctiva/sclera: Conjunctivae normal.      Pupils: Pupils are equal, round, and reactive to light.   Neck:      Vascular: No carotid bruit.   Cardiovascular:      Rate and Rhythm: Normal rate and regular rhythm.      Heart sounds: Normal heart sounds. No murmur heard.  Pulmonary:      Effort: Pulmonary effort is normal.      Breath sounds: Normal breath sounds. No wheezing, rhonchi or rales.   Musculoskeletal:      Right lower leg: No edema.      Left lower leg: No edema.      Comments: Patient uses a rollator walker for ambulating   Lymphadenopathy:      Cervical: No cervical adenopathy.   Skin:     Findings: No rash.   Neurological:      General: No focal deficit present.      Mental Status: She is alert and oriented to person, place, and time.      Cranial Nerves: No cranial nerve deficit.   Psychiatric:         Mood and Affect: Mood normal.         Behavior: Behavior normal.         Thought Content: Thought content normal.         Judgment: Judgment normal.         Pertinent Laboratory/Diagnostic Studies:  Lab Results   Component Value Date    BUN 25 06/28/2024    CREATININE 1.01 06/28/2024    CALCIUM 9.8 06/28/2024     10/23/2017    K 4.1  "06/28/2024    CO2 33 (H) 06/28/2024     06/28/2024     Lab Results   Component Value Date    ALT 4 (L) 11/11/2023    AST 11 (L) 11/11/2023    ALKPHOS 60 11/11/2023    BILITOT 0.3 10/23/2017       Lab Results   Component Value Date    WBC 10.65 (H) 06/27/2024    HGB 10.7 (L) 06/27/2024    HCT 35.2 06/27/2024     (H) 06/27/2024     (L) 06/28/2024       No results found for: \"TSH\"    No results found for: \"CHOL\"  Lab Results   Component Value Date    TRIG 146 07/17/2023     Lab Results   Component Value Date    HDL 50 07/17/2023     Lab Results   Component Value Date    LDLCALC 39 07/17/2023     Lab Results   Component Value Date    HGBA1C 6.3 08/30/2024       Results for orders placed or performed in visit on 10/18/24   CPAP and BiLevel Resupply Package (Without F2F Documentation)   Result Value Ref Range    Supplier Name [employer]     Supplier Phone Number [phone]     Order Status Processing     Delivery Note      Delivery Request Date 10/18/2024     Item Description CPAP and BiLevel Resupply Package, Fit to Comfort     Item Description Disposable PAP Filter, 2 per 1 month     Item Description Non-Disposable PAP Filter, 1 per 6 months     Item Description PAP Machine Tubing, Heated, 1 per 3 months     Item Description Humidifier Water Chamber, 1 per 6 months     Item Description PAP Headgear, 1 per 6 months      *Note: Due to a large number of results and/or encounters for the requested time period, some results have not been displayed. A complete set of results can be found in Results Review.       Orders Placed This Encounter   Procedures    CBC    Comprehensive metabolic panel    TSH, 3rd generation    Lipid panel    POCT hemoglobin A1c       ALLERGIES:  No Known Allergies    Current Medications     Current Outpatient Medications   Medication Sig Dispense Refill    albuterol (2.5 mg/3 mL) 0.083 % nebulizer solution Take 3 mL (2.5 mg total) by nebulization every 6 (six) hours " as needed for wheezing or shortness of breath 100 mL 0    albuterol (PROVENTIL HFA,VENTOLIN HFA) 90 mcg/act inhaler Inhale 2 puffs every 4 (four) hours as needed for wheezing or shortness of breath 8 g 0    clotrimazole (MYCELEX) 10 mg bernie Take 1 tablet (10 mg total) by mouth 4 (four) times a day 40 Bernie 3    gabapentin (Neurontin) 600 MG tablet Take 2 tablets (1,200 mg total) by mouth 2 (two) times a day 360 tablet 1    glipiZIDE (GLUCOTROL) 5 mg tablet TAKE 1 TABLET TWICE DAILY BEFORE MEALS 180 tablet 1    glucose blood (ACCU-CHEK JESS PLUS) test strip Test BS 3 times daily.  DX E11.9 100 each 2    lisinopril (ZESTRIL) 40 mg tablet Take 1 tablet (40 mg total) by mouth daily 100 tablet 1    metFORMIN (GLUCOPHAGE) 1000 MG tablet TAKE 1 TABLET TWICE DAILY 180 tablet 1    predniSONE 10 mg tablet Take 4 tablets x4 days, 3 tablets x4 days, 2 tablets x4 days, 1 tablet x4 days 50 tablet 0    rosuvastatin (CRESTOR) 20 MG tablet TAKE 1 TABLET EVERY DAY 90 tablet 0    fluticasone-umeclidinium-vilanterol (Trelegy Ellipta) 200-62.5-25 mcg/actuation AEPB inhaler Inhale 1 puff daily Rinse mouth after use. 90 blister 3    Multiple Vitamins-Minerals (OCUVITE ADULT FORMULA PO) Take by mouth (Patient not taking: Reported on 10/8/2024)      predniSONE 10 mg tablet Take 4 tablets for 3 days then 3 tabs for 3 days then 2 tablets for 3 days then 1 tablet for 3 days (Patient not taking: Reported on 2/13/2025) 30 tablet 0     No current facility-administered medications for this visit.         Health Maintenance     Health Maintenance   Topic Date Due    Zoster Vaccine (2 of 3) 11/27/2013    OT PLAN OF CARE  09/12/2018    PT PLAN OF CARE  03/01/2023    Breast Cancer Screening: Mammogram  01/23/2024    Colorectal Cancer Screening  08/01/2024    Influenza Vaccine (1) 09/01/2024    COVID-19 Vaccine (8 - 2024-25 season) 09/01/2024    HEMOGLOBIN A1C  02/28/2025    Diabetic Eye Exam  06/19/2025 (Originally 7/15/2022)    Fall Risk   08/30/2025    Urinary Incontinence Screening  08/30/2025    Medicare Annual Wellness Visit (AWV)  08/30/2025    Diabetic Foot Exam  08/30/2025    Depression Screening  03/06/2026    Hepatitis C Screening  Completed    Osteoporosis Screening  Completed    RSV Vaccine for Pregnant Patients and Patients Age 60+ Years  Completed    Pneumococcal Vaccine: 65+ Years  Completed    Meningococcal B Vaccine  Aged Out    RSV Vaccine age 0-20 Months  Aged Out    HIB Vaccine  Aged Out    IPV Vaccine  Aged Out    Hepatitis A Vaccine  Aged Out    Meningococcal ACWY Vaccine  Aged Out    HPV Vaccine  Aged Out     Immunization History   Administered Date(s) Administered    COVID-19 PFIZER VACCINE 0.3 ML IM 02/19/2021, 03/11/2021, 10/09/2021, 05/13/2022    COVID-19 Pfizer Vac BIVALENT Reji-sucrose 12 Yr+ IM 09/09/2022    COVID-19 Pfizer mRNA vacc PF reji-sucrose 12 yr and older (Comirnaty) 10/13/2023    COVID-19 Pfizer vac (Reji-sucrose, gray cap) 12 yr+ IM 05/13/2022    INFLUENZA 10/02/2013, 01/07/2016, 01/14/2017, 09/05/2017, 08/31/2021, 09/09/2022, 10/13/2023    Influenza Split High Dose Preservative Free IM 09/05/2017    Influenza, high dose seasonal 0.7 mL 09/10/2020    Influenza, seasonal, injectable 10/02/2013, 01/14/2017    Influenza, seasonal, injectable, preservative free 09/13/2018    Pneumococcal Conjugate 13-Valent 08/24/2017    Pneumococcal Conjugate PCV 7 09/06/2017    Pneumococcal Polysaccharide PPV23 08/15/2018    Respiratory Syncytial Virus Vaccine (Recombinant, Adjuvanted) 10/13/2023    Tdap 09/04/2014    Zoster 10/02/2013    influenza, trivalent, adjuvanted 09/11/2019       Hermelindo Reich MD

## 2025-03-07 ENCOUNTER — RESULTS FOLLOW-UP (OUTPATIENT)
Dept: FAMILY MEDICINE CLINIC | Facility: CLINIC | Age: 75
End: 2025-03-07

## 2025-03-07 DIAGNOSIS — R63.4 WEIGHT LOSS: Primary | ICD-10-CM

## 2025-03-07 DIAGNOSIS — D64.9 ANEMIA, UNSPECIFIED TYPE: ICD-10-CM

## 2025-03-07 LAB — SL AMB POCT HEMOGLOBIN AIC: 6.2 (ref ?–6.5)

## 2025-03-10 DIAGNOSIS — E11.65 TYPE 2 DIABETES MELLITUS WITH HYPERGLYCEMIA, WITHOUT LONG-TERM CURRENT USE OF INSULIN (HCC): ICD-10-CM

## 2025-03-11 RX ORDER — ROSUVASTATIN CALCIUM 20 MG/1
20 TABLET, COATED ORAL DAILY
Qty: 90 TABLET | Refills: 3 | Status: SHIPPED | OUTPATIENT
Start: 2025-03-11

## 2025-04-08 ENCOUNTER — RESULTS FOLLOW-UP (OUTPATIENT)
Dept: FAMILY MEDICINE CLINIC | Facility: CLINIC | Age: 75
End: 2025-04-08

## 2025-04-08 ENCOUNTER — HOSPITAL ENCOUNTER (OUTPATIENT)
Dept: CT IMAGING | Facility: HOSPITAL | Age: 75
Discharge: HOME | End: 2025-04-08
Payer: MEDICARE

## 2025-04-08 DIAGNOSIS — D64.9 ANEMIA, UNSPECIFIED TYPE: ICD-10-CM

## 2025-04-08 DIAGNOSIS — K86.89 PANCREATIC MASS: Primary | ICD-10-CM

## 2025-04-08 DIAGNOSIS — R63.4 WEIGHT LOSS: ICD-10-CM

## 2025-04-08 PROCEDURE — 74176 CT ABD & PELVIS W/O CONTRAST: CPT

## 2025-04-08 PROCEDURE — 71250 CT THORAX DX C-: CPT

## 2025-04-08 NOTE — TELEPHONE ENCOUNTER
----- Message from Hermelindo Reich MD sent at 4/8/2025  3:54 PM EDT -----  Please speak to patient directly.  CAT scan showed a mass on the tail of her pancreas.  Radiologist recommends MRI of abdomen with contrast for further evaluation to determine if this is malignant or benign.  Orders placed in epic and patient may call central scheduling to arrange test

## 2025-05-03 ENCOUNTER — HOSPITAL ENCOUNTER (OUTPATIENT)
Dept: MRI IMAGING | Facility: HOSPITAL | Age: 75
Discharge: HOME | End: 2025-05-03
Attending: FAMILY MEDICINE
Payer: MEDICARE

## 2025-05-03 DIAGNOSIS — K86.89 PANCREATIC MASS: ICD-10-CM

## 2025-05-03 PROCEDURE — 74183 MRI ABD W/O CNTR FLWD CNTR: CPT

## 2025-05-03 PROCEDURE — A9585 GADOBUTROL INJECTION: HCPCS | Performed by: FAMILY MEDICINE

## 2025-05-03 RX ORDER — GADOBUTROL 604.72 MG/ML
9 INJECTION INTRAVENOUS
Status: COMPLETED | OUTPATIENT
Start: 2025-05-03 | End: 2025-05-03

## 2025-05-03 RX ADMIN — GADOBUTROL 9 ML: 604.72 INJECTION INTRAVENOUS at 11:06

## 2025-05-08 ENCOUNTER — RESULTS FOLLOW-UP (OUTPATIENT)
Dept: FAMILY MEDICINE CLINIC | Facility: CLINIC | Age: 75
End: 2025-05-08

## 2025-05-19 ENCOUNTER — TELEPHONE (OUTPATIENT)
Age: 75
End: 2025-05-19

## 2025-05-19 NOTE — TELEPHONE ENCOUNTER
Deanna called in regards to the results to her latest tests. Patient is asking if she should have more blood work done since her iron was so low. Patient also states she has yet to her from anyone to discuss the results of the MRI. She is under the impression that she was to get a call.    Please advise.

## 2025-05-21 DIAGNOSIS — E11.9 TYPE 2 DIABETES MELLITUS WITHOUT COMPLICATION, WITHOUT LONG-TERM CURRENT USE OF INSULIN (HCC): ICD-10-CM

## 2025-05-21 DIAGNOSIS — E11.65 TYPE 2 DIABETES MELLITUS WITH HYPERGLYCEMIA, WITHOUT LONG-TERM CURRENT USE OF INSULIN (HCC): ICD-10-CM

## 2025-05-22 RX ORDER — GLIPIZIDE 5 MG/1
5 TABLET ORAL
Qty: 180 TABLET | Refills: 1 | Status: SHIPPED | OUTPATIENT
Start: 2025-05-22

## 2025-05-23 ENCOUNTER — OFFICE VISIT (OUTPATIENT)
Dept: FAMILY MEDICINE CLINIC | Facility: CLINIC | Age: 75
End: 2025-05-23
Payer: MEDICARE

## 2025-05-23 VITALS
HEIGHT: 59 IN | WEIGHT: 157.1 LBS | OXYGEN SATURATION: 94 % | SYSTOLIC BLOOD PRESSURE: 136 MMHG | TEMPERATURE: 98.1 F | HEART RATE: 82 BPM | DIASTOLIC BLOOD PRESSURE: 58 MMHG | BODY MASS INDEX: 31.67 KG/M2

## 2025-05-23 DIAGNOSIS — D75.89 MACROCYTOSIS: ICD-10-CM

## 2025-05-23 DIAGNOSIS — K86.1 CHRONIC PANCREATITIS, UNSPECIFIED PANCREATITIS TYPE (HCC): ICD-10-CM

## 2025-05-23 DIAGNOSIS — R43.2 DYSGEUSIA: ICD-10-CM

## 2025-05-23 DIAGNOSIS — D50.9 IRON DEFICIENCY ANEMIA, UNSPECIFIED IRON DEFICIENCY ANEMIA TYPE: ICD-10-CM

## 2025-05-23 DIAGNOSIS — N18.30 STAGE 3 CHRONIC KIDNEY DISEASE, UNSPECIFIED WHETHER STAGE 3A OR 3B CKD (HCC): ICD-10-CM

## 2025-05-23 DIAGNOSIS — Z72.0 TOBACCO USE: ICD-10-CM

## 2025-05-23 DIAGNOSIS — I10 ESSENTIAL HYPERTENSION: ICD-10-CM

## 2025-05-23 DIAGNOSIS — N32.81 OAB (OVERACTIVE BLADDER): Primary | ICD-10-CM

## 2025-05-23 DIAGNOSIS — D53.9 NUTRITIONAL ANEMIA, UNSPECIFIED: ICD-10-CM

## 2025-05-23 DIAGNOSIS — K86.89 PANCREATIC MASS: ICD-10-CM

## 2025-05-23 DIAGNOSIS — R63.4 WEIGHT LOSS: ICD-10-CM

## 2025-05-23 DIAGNOSIS — R01.1 HEART MURMUR, SYSTOLIC: ICD-10-CM

## 2025-05-23 DIAGNOSIS — E66.01 OBESITY, MORBID (HCC): ICD-10-CM

## 2025-05-23 PROCEDURE — 99215 OFFICE O/P EST HI 40 MIN: CPT | Performed by: FAMILY MEDICINE

## 2025-05-23 PROCEDURE — G2211 COMPLEX E/M VISIT ADD ON: HCPCS | Performed by: FAMILY MEDICINE

## 2025-05-23 PROCEDURE — 99406 BEHAV CHNG SMOKING 3-10 MIN: CPT | Performed by: FAMILY MEDICINE

## 2025-05-23 RX ORDER — BUPROPION HYDROCHLORIDE 100 MG/1
100 TABLET, EXTENDED RELEASE ORAL 2 TIMES DAILY
Qty: 60 TABLET | Refills: 0 | Status: SHIPPED | OUTPATIENT
Start: 2025-05-23

## 2025-05-23 RX ORDER — FERROUS SULFATE 325(65) MG
325 TABLET, DELAYED RELEASE (ENTERIC COATED) ORAL 3 TIMES WEEKLY
COMMUNITY

## 2025-05-23 NOTE — ASSESSMENT & PLAN NOTE
Counseled on diet and exercise which her activities is limited due to chronic back pain and used of an AD

## 2025-05-23 NOTE — PROGRESS NOTES
Name: Deanna Perez      : 1950      MRN: [id number]  Encounter Provider: Lee Brown MD  Encounter Date: 2025   [facility]  :  Assessment & Plan  OAB (overactive bladder)  Urge incontinence  which she attributes to the back. No saddle anesthesia. Uses disposable underwear.           Tobacco use  Counseled on smoking cessation. Discussed NRT. Patient prefer not to replace the nicotine. Agreed to start wellbutrin. Denies history of seizures or eating disorder.   Orders:    buPROPion (Wellbutrin SR) 100 mg 12 hr tablet; Take 1 tablet (100 mg total) by mouth 2 (two) times a day    Obesity, morbid (HCC)  Counseled on diet and exercise which her activities is limited due to chronic back pain and used of an AD          Iron deficiency anemia, unspecified iron deficiency anemia type  Currently on iron supplements.  Last CBC listed below.  Lab Results   Component Value Date    WBC 7.76 2025    HGB 9.5 (L) 2025    HCT 31.6 (L) 2025     (H) 2025     2025     No recent iron levels. MCV elevated at 100. Will check b12, especially in the setting of dysguesia and chronic metformin use. Iron panel ordered. May continue taking 324 mg of iron with food for now         Orders:    TIBC Panel (incl. Iron, TIBC, % Iron Saturation); Future    Dysgeusia  Reports a metallic taste whenever she consumes meats. Started 2 months ago and enjoy meat. As a result, she has lost 30 lbs since she is only consuming yougurt, fruits, and candy bars. Possible etiologies include medications ( clotrimazole karen), GERD, zinc or b12 deficiency, xerostomia, or heavy metal exposure. States the clotrimazole was for thrush and took it for a week. She was experiencing metallic taste prior to this. Will check labs and if unrevealing, suggest switching lisinopril to an JUJU   Orders:    TIBC Panel (incl. Iron, TIBC, % Iron Saturation); Future    CBC and  "differential; Future    Zinc; Future    Heavy metals, blood; Future    Macrocytosis  Check b12 and folate levels   Orders:    Vitamin B12; Future    Folate; Future    Weight loss  30 lbs weight loss reported in the past 2 months. Diet limited due to altered taste. Also has a pancreatic mass that is being following. Most recent MRI showed findings suggesting a pseudocyst. Radiologist recommends repeat MRI in 6 months. Will get labs to check for deficiencies, heavy metal exposures, and nutritional status  Orders:    Prealbumin; Future    Nutritional anemia, unspecified        Orders:    Vitamin B12; Future    Folate; Future    Essential hypertension  Acceptable on lisinopril 40 mg daily. Consider switching to ARB given that the ACE may cause dysgeusia.        Pancreatic mass  Recent MRI read,:  - Multilobulated cystic lesion in the pancreatic tail measuring 5.3 x 3.1 cm, corresponding to the \"mass\" described on prior, most in keeping with a pseudocyst.   - There are multiple additional cystic lesions throughout the pancreas, likely representing additional pseudocysts and/or dilated sidebranches.   - No solid pancreatic mass.   Will order repeat MRI for November 2025( 6 months follow up)          Stage 3 chronic kidney disease, unspecified whether stage 3a or 3b CKD (HCC)  Lab Results   Component Value Date    EGFR 53 03/06/2025    EGFR 54 06/28/2024    EGFR 53 06/27/2024    CREATININE 1.01 03/06/2025    CREATININE 1.01 06/28/2024    CREATININE 1.02 06/27/2024     Stable. Already on ACE- I for renal protection. I've ordered a urine microalbumin as well.   Avoid nephrotoxins and maintain adequate BP control  Would suggest SGTL2 inhibitor but with the recent weight loss, will defer for now        Heart murmur, systolic  2/6 systolic murmur heard best in the RUSB --> LUSB  ECHO in 2023 read:  \"  Left Ventricle: Left ventricular cavity size is normal. Wall thickness is mildly increased. There is mild concentric " "hypertrophy. The left ventricular ejection fraction is 60%. Systolic function is normal. Wall motion is normal. Diastolic function is normal.    Left Atrium: The atrium is dilated.    Aortic Valve: There is aortic valve sclerosis.    Mitral Valve: There is mild annular calcification.\"       Chronic pancreatitis, unspecified pancreatitis type (HCC)  Mentioned on recent MRI.   Etiology unclear  Denies pain               History of Present Illness   New patient  Last PCP was Dr. Reich  Requesting medication refills   Also reports a metallic taste that has greatly affected her diet. Only able to eat yogurt, fruits, and candy bars. She's done this for 2 months . As results, she lost 30 lbs. Prior to this, she enjoyed meats.     Review of Systems   Constitutional:  Positive for unexpected weight change.   HENT:  Negative for postnasal drip and sinus pain.         Altered taste    Respiratory:  Positive for shortness of breath (chronic and on Chronic 02).    Cardiovascular: Negative.    Musculoskeletal:  Positive for back pain and gait problem.   Psychiatric/Behavioral:  Negative for decreased concentration, dysphoric mood and sleep disturbance.        Objective   /58 (BP Location: Left arm, Patient Position: Sitting, Cuff Size: Large)   Pulse 82   Temp 98.1 °F (36.7 °C)   Ht 4' 11\" (1.499 m)   Wt 71.3 kg (157 lb 1.6 oz)   SpO2 94%   BMI 31.73 kg/m²      Physical Exam  Vitals reviewed.   Constitutional:       General: She is not in acute distress.     Appearance: Normal appearance. She is not ill-appearing or toxic-appearing.      Comments: 02 present    HENT:      Head: Normocephalic and atraumatic.      Mouth/Throat:      Mouth: Mucous membranes are moist.     Eyes:      Extraocular Movements: Extraocular movements intact.       Cardiovascular:      Rate and Rhythm: Normal rate and regular rhythm.      Heart sounds: Murmur (2/6 systolic murmur heard in the RUSB and LUSB) heard.   Pulmonary:      Effort: " Pulmonary effort is normal.      Breath sounds: Normal breath sounds.   Abdominal:      General: There is no distension.      Palpations: Abdomen is soft. There is no mass.      Tenderness: There is no abdominal tenderness. There is no guarding or rebound.      Hernia: No hernia is present.     Musculoskeletal:      Right lower leg: No edema.      Left lower leg: No edema.     Skin:     General: Skin is warm.     Neurological:      Mental Status: She is alert.      Gait: Gait abnormal (uses a walker).     Psychiatric:         Mood and Affect: Mood normal.         Behavior: Behavior normal.         Thought Content: Thought content normal.       I have spent a total time of 50 minutes in caring for this patient on the day of the visit/encounter including Diagnostic results, Instructions for management, Patient and family education, Importance of tx compliance, Risk factor reductions, Impressions, Counseling / Coordination of care, Documenting in the medical record, Reviewing/placing orders in the medical record (including tests, medications, and/or procedures), and Obtaining or reviewing history  .

## 2025-05-23 NOTE — ASSESSMENT & PLAN NOTE
Urge incontinence  which she attributes to the back. No saddle anesthesia. Uses disposable underwear.

## 2025-05-27 ENCOUNTER — TELEPHONE (OUTPATIENT)
Dept: LAB | Facility: HOSPITAL | Age: 75
End: 2025-05-27

## 2025-05-27 NOTE — ASSESSMENT & PLAN NOTE
"2/6 systolic murmur heard best in the RUSB --> LUSB  ECHO in 2023 read:  \"  Left Ventricle: Left ventricular cavity size is normal. Wall thickness is mildly increased. There is mild concentric hypertrophy. The left ventricular ejection fraction is 60%. Systolic function is normal. Wall motion is normal. Diastolic function is normal.    Left Atrium: The atrium is dilated.    Aortic Valve: There is aortic valve sclerosis.    Mitral Valve: There is mild annular calcification.\"       "

## 2025-05-27 NOTE — ASSESSMENT & PLAN NOTE
Acceptable on lisinopril 40 mg daily. Consider switching to ARB given that the ACE may cause dysgeusia.

## 2025-05-27 NOTE — ASSESSMENT & PLAN NOTE
Lab Results   Component Value Date    EGFR 53 03/06/2025    EGFR 54 06/28/2024    EGFR 53 06/27/2024    CREATININE 1.01 03/06/2025    CREATININE 1.01 06/28/2024    CREATININE 1.02 06/27/2024     Stable. Already on ACE- I for renal protection. I've ordered a urine microalbumin as well.   Avoid nephrotoxins and maintain adequate BP control  Would suggest SGTL2 inhibitor but with the recent weight loss, will defer for now

## 2025-05-29 ENCOUNTER — OFFICE VISIT (OUTPATIENT)
Dept: PAIN MEDICINE | Facility: CLINIC | Age: 75
End: 2025-05-29
Payer: MEDICARE

## 2025-05-29 VITALS — WEIGHT: 157 LBS | HEIGHT: 59 IN | BODY MASS INDEX: 31.65 KG/M2

## 2025-05-29 DIAGNOSIS — M48.061 SPINAL STENOSIS OF LUMBAR REGION, UNSPECIFIED WHETHER NEUROGENIC CLAUDICATION PRESENT: ICD-10-CM

## 2025-05-29 DIAGNOSIS — M54.16 LUMBAR RADICULOPATHY: ICD-10-CM

## 2025-05-29 DIAGNOSIS — G89.4 CHRONIC PAIN SYNDROME: ICD-10-CM

## 2025-05-29 DIAGNOSIS — M47.816 LUMBAR SPONDYLOSIS: Primary | ICD-10-CM

## 2025-05-29 PROCEDURE — G2211 COMPLEX E/M VISIT ADD ON: HCPCS | Performed by: ANESTHESIOLOGY

## 2025-05-29 PROCEDURE — 99214 OFFICE O/P EST MOD 30 MIN: CPT | Performed by: ANESTHESIOLOGY

## 2025-05-29 NOTE — PROGRESS NOTES
Name: Deanna Perez      : 1950      MRN: [id number]  Encounter Provider: Nahun Henry DO  Encounter Date: 2025   [facility]  :  Assessment & Plan  Lumbar spondylosis         Lumbar radiculopathy         Chronic pain syndrome         Spinal stenosis of lumbar region, unspecified whether neurogenic claudication present       75-year-old female with a history of lumbar spondylosis, lumbar stenosis, lumbar radiculopathy, and chronic pain syndrome returning for interval follow-up.  Patient continues to complain of low back pain radiating into the anterior and posterior thighs with some subjective weakness.  Of note the patient was recently found to have cystic lesions in the pancreatic tail likely representing pancreatic pseudocysts.  There was initially a mass noted in the pancreas on CT of the chest, abdomen, and pelvis which was later investigated with MRI of the abdomen.  She is following with her PCP regarding this.  Patient continues with gabapentin 1200 mg twice daily with about 50% of relief.  ESIs lost their effectiveness over time.  She did have good relief with lumbar RFA for her low back pain which was completed in 2019.  Still doing fairly well from this standpoint.    1.  Patient will continue with gabapentin 1200 mg twice daily.  The patient did not need a refill today  2.  Patient will continue with HEP  3.  I will follow-up with the patient in 1 year or sooner if needed      My impressions and treatment recommendations were discussed in detail with the patient who verbalized understanding and had no further questions.  Discharge instructions were provided. I personally saw and examined the patient and I agree with the above discussed plan of care.    History of Present Illness     Deanna Perez is a 75 y.o. female with a history of lumbar spondylosis, lumbar stenosis, lumbar radiculopathy, and chronic pain syndrome returning for interval  follow-up.  Patient continues to complain of low back pain radiating into the anterior and posterior thighs with some subjective weakness.  She does have baseline bladder incontinence, but denies any bowel incontinence or saddle anesthesia.  Of note the patient was recently found to have cystic lesions in the pancreatic tail likely representing pancreatic pseudocysts.  There was initially a mass noted in the pancreas on CT of the chest, abdomen, and pelvis which was later investigated with MRI of the abdomen.  She is following with her PCP regarding this.  Patient continues with gabapentin 1200 mg twice daily with about 50% of relief.  ESIs lost their effectiveness over time.  She did have good relief with lumbar RFA for her low back pain which was completed in 2019.  Still doing fairly well from this standpoint.  The patient rates her pain a 1 out of 10 and the pain is intermittent.  The pain is increased with standing, walking, exercise, and bending.  Pain is alleviated with sitting, relaxation, lying down, and medication.    Other than as stated above, the patient denies any interval changes in medications, medical condition, mental condition, symptoms, or allergies since the last office visit.    Review of Systems   Respiratory:  Positive for shortness of breath.    Musculoskeletal:  Positive for back pain and gait problem.   Neurological:  Positive for weakness.   All other systems reviewed and are negative.    Medical History Reviewed by provider this encounter:  Tobacco  Allergies  Meds  Problems  Med Hx  Surg Hx  Fam Hx     .  Pertinent Medical History   Lumbar spondylosis, lumbar stenosis, lumbar radiculopathy, COPD, and diabetes      Medical History Reviewed by provider this encounter:  Tobacco  Allergies  Meds  Problems  Med Hx  Surg Hx  Fam Hx     .  Past Medical History   Past Medical History[1]  Past Surgical History[2]  Family History[3]   reports that she has quit smoking. Her smoking  "use included cigarettes. She started smoking about 45 years ago. She has a 41.5 pack-year smoking history. She has never used smokeless tobacco. She reports current alcohol use. She reports that she does not use drugs.  Current Outpatient Medications   Medication Instructions    albuterol (PROVENTIL HFA,VENTOLIN HFA) 90 mcg/act inhaler 2 puffs, Inhalation, Every 4 hours PRN    albuterol 2.5 mg, Nebulization, Every 6 hours PRN    buPROPion (WELLBUTRIN SR) 100 mg, Oral, 2 times daily    ferrous sulfate 325 mg, 3 times weekly    fluticasone-umeclidinium-vilanterol (Trelegy Ellipta) 200-62.5-25 mcg/actuation AEPB inhaler 1 puff, Inhalation, Daily, Rinse mouth after use.    gabapentin (NEURONTIN) 1,200 mg, Oral, 2 times daily    glipiZIDE (GLUCOTROL) 5 mg, Oral, 2 times daily before meals    glucose blood (ACCU-CHEK JESS PLUS) test strip Test BS 3 times daily.  DX E11.9    lisinopril (ZESTRIL) 40 mg, Oral, Daily    metFORMIN (GLUCOPHAGE) 1,000 mg, Oral, 2 times daily    rosuvastatin (CRESTOR) 20 mg, Oral, Daily   Allergies[4]   Medications Ordered Prior to Encounter[5]   Social History[6]     Objective   Ht 4' 11\" (1.499 m)   Wt 71.2 kg (157 lb)   BMI 31.71 kg/m²      Pain Score: 10-Worst pain ever  Physical Exam  Constitutional:normal, well developed, well nourished, alert, in no distress and non-toxic and no overt pain behavior.  Eyes:anicteric  HEENT:grossly intact  Neck:supple, symmetric, trachea midline and no masses   Pulmonary:even and unlabored  Cardiovascular:No edema or pitting edema present  Skin:Normal without rashes or lesions and well hydrated  Psychiatric:Mood and affect appropriate  Neurologic:Cranial Nerves II-XII grossly intact  Musculoskeletal: shuffling gait and ambulates with a rollator.  Bilateral lumbar paraspinals minimally tender to palpation from L3-L5.  Bilateral lower extremity strength 5 out of 5 in all muscle groups.  Sensation intact to light touch in L3-S1 dermatomes bilaterally.  " Negative seated straight leg raise bilaterally.      Radiology Results Review: I personally reviewed the following image studies in PACS and associated radiology reports: MRI spine. My interpretation of the radiology images/reports is: Multilevel spondylosis with mild central stenosis at L3-4 and L4-5.  Moderate to severe foraminal stenosis at L5-S1.  Mild foraminal stenosis at L3-4 and L4-5..         [1]   Past Medical History:  Diagnosis Date    Asthma     Chronic obstructive lung disease (HCC)     Community acquired pneumonia     LAST ASSESSED: [id number]    COPD (chronic obstructive pulmonary disease) (HCC)     Diabetes mellitus (HCC)     History of MRSA infection     2008 liver sx    Liver disease     Sleep apnea     Viral warts     LAST ASSESSED: [id number]   [2]   Past Surgical History:  Procedure Laterality Date    ABDOMINAL SURGERY      ABDOMINOPLASTY      ADRENALECTOMY Left     due to a adrenal mass that was later found be benign    CHOLECYSTECTOMY      COSMETIC SURGERY      EYE SURGERY      Bilateral cataracts 2015 Dr. FLORIN Villagran.     FINGER SURGERY      HAND SURGERY      HYSTERECTOMY      LIVER SURGERY      FL ARTHRP INTERCARPAL/CARP/MTCRPL JT INTERPOSITION Right 09/09/2016    Procedure: THUMB TRAPEZIECTOMY; BASAL JOINT ARTHROPLASTY WITH APL AUTOGRAFT;  Surgeon: Kedar Villareal MD;  Location: [facility];  Service: Orthopedics    FL ARTHRP INTERCARPAL/CARP/MTCRPL JT INTERPOSITION Left 05/29/2018    Procedure: ARTHROPLASTY THUMB WEILBY;  Surgeon: Arthur Jackson MD;  Location: [facility];  Service: Orthopedics    FL TENDON SHEATH INCISION Right 07/23/2019    Procedure: RELEASE TRIGGER FINGER RIGHT LONG;  Surgeon: Arthur Jackson MD;  [facility]: [facility];  Service: Orthopedics    FL TENDON SHEATH INCISION Left 08/06/2019    Procedure: RELEASE TRIGGER FINGER LEFT INDEX;  Surgeon: Arthur Jackson MD;  [facility];  Service: Orthopedics    FL TENDON SHEATH INCISION Right 06/16/2020     Procedure: RELEASE TRIGGER FINGER, right ring;  Surgeon: Arthur Jackson MD;  [facility];  Service: Orthopedics    SD TR/TRNSPL TDN CARP/MTCRPL HAND W/O FR GRF EA TDN Left 05/29/2018    Procedure: TRANSFER TENDON HAND/WRIST FCR from forearm to wrist;  Surgeon: Arthur Jackson MD;  [facility];  Service: Orthopedics   [3]   Family History  Problem Relation Name Age of Onset    Heart attack Mother      Heart attack Father      Ovarian cancer Sister      Basal cell carcinoma Brother      Liver cancer Brother      Heart disease Family      Diabetes Family      Thyroid disease Family      Ovarian cancer Family      Heart disease Other Unknown         CARDIAC DISORDER    Diabetes Other Unknown     Liver cancer Other Unknown     Breast cancer Other Unknown     Stomach cancer Other Unknown    [4]   Allergies  Allergen Reactions    Chantix [Varenicline] Other (See Comments)     Suicidal thoughts   [5]   Current Outpatient Medications on File Prior to Visit   Medication Sig Dispense Refill    albuterol (2.5 mg/3 mL) 0.083 % nebulizer solution Take 3 mL (2.5 mg total) by nebulization every 6 (six) hours as needed for wheezing or shortness of breath 100 mL 0    albuterol (PROVENTIL HFA,VENTOLIN HFA) 90 mcg/act inhaler Inhale 2 puffs every 4 (four) hours as needed for wheezing or shortness of breath 8 g 0    buPROPion (Wellbutrin SR) 100 mg 12 hr tablet Take 1 tablet (100 mg total) by mouth 2 (two) times a day 60 tablet 0    ferrous sulfate 325 (65 FE) MG EC tablet Take 325 mg by mouth 3 (three) times a week      gabapentin (Neurontin) 600 MG tablet Take 2 tablets (1,200 mg total) by mouth 2 (two) times a day 360 tablet 1    glipiZIDE (GLUCOTROL) 5 mg tablet TAKE 1 TABLET TWICE DAILY BEFORE MEALS 180 tablet 1    glucose blood (ACCU-CHEK JESS PLUS) test strip Test BS 3 times daily.  DX E11.9 100 each 2    lisinopril (ZESTRIL) 40 mg tablet Take 1 tablet (40 mg total) by mouth daily 100 tablet 1     metFORMIN (GLUCOPHAGE) 1000 MG tablet TAKE 1 TABLET TWICE DAILY 180 tablet 1    rosuvastatin (CRESTOR) 20 MG tablet TAKE 1 TABLET EVERY DAY 90 tablet 3    fluticasone-umeclidinium-vilanterol (Trelegy Ellipta) 200-62.5-25 mcg/actuation AEPB inhaler Inhale 1 puff daily Rinse mouth after use. 90 blister 3     No current facility-administered medications on file prior to visit.   [6]   Social History  Tobacco Use    Smoking status: Former     Average packs/day: 1 pack/day for 43.1 years (41.5 ttl pk-yrs)     Types: Cigarettes     Start date: 1980    Smokeless tobacco: Never   Vaping Use    Vaping status: Never Used   Substance and Sexual Activity    Alcohol use: Yes     Comment: very seldom    Drug use: No    Sexual activity: Not Currently

## 2025-05-29 NOTE — PROGRESS NOTES
Assessment  No diagnosis found.    Plan  ***    {Oral Swab Statement:82691}    {Opioid Statement:47147}    {UDS Statement:00137}    {PDMP Statement:68519}    {Pain Management Procedure Statement:09627}    My impressions and treatment recommendations were discussed in detail with the patient who verbalized understanding and had no further questions.  Discharge instructions were provided. I personally saw and examined the patient and I agree with the above discussed plan of care.    No orders of the defined types were placed in this encounter.    No orders of the defined types were placed in this encounter.      History of Present Illness    Deanna Perez is a 75 y.o. female ***    I have personally reviewed and/or updated the patient's past medical history, past surgical history, family history, social history, current medications, allergies, and vital signs today.     Review of Systems    Problem List[1]    Past Medical History[2]    Past Surgical History[3]    Family History[4]    Social History     Occupational History     Comment: Retired nurse   Tobacco Use    Smoking status: Former     Average packs/day: 1 pack/day for 43.1 years (41.5 ttl pk-yrs)     Types: Cigarettes     Start date: 1980    Smokeless tobacco: Never   Vaping Use    Vaping status: Never Used   Substance and Sexual Activity    Alcohol use: Yes     Comment: very seldom    Drug use: No    Sexual activity: Not Currently       Current Outpatient Medications on File Prior to Visit   Medication Sig    albuterol (2.5 mg/3 mL) 0.083 % nebulizer solution Take 3 mL (2.5 mg total) by nebulization every 6 (six) hours as needed for wheezing or shortness of breath    albuterol (PROVENTIL HFA,VENTOLIN HFA) 90 mcg/act inhaler Inhale 2 puffs every 4 (four) hours as needed for wheezing or shortness of breath    buPROPion (Wellbutrin SR) 100 mg 12 hr tablet Take 1 tablet (100 mg total) by mouth 2 (two) times a day    ferrous sulfate 325 (65 FE) MG EC tablet Take  "325 mg by mouth 3 (three) times a week    fluticasone-umeclidinium-vilanterol (Trelegy Ellipta) 200-62.5-25 mcg/actuation AEPB inhaler Inhale 1 puff daily Rinse mouth after use.    gabapentin (Neurontin) 600 MG tablet Take 2 tablets (1,200 mg total) by mouth 2 (two) times a day    glipiZIDE (GLUCOTROL) 5 mg tablet TAKE 1 TABLET TWICE DAILY BEFORE MEALS    glucose blood (ACCU-CHEK JESS PLUS) test strip Test BS 3 times daily.  DX E11.9    lisinopril (ZESTRIL) 40 mg tablet Take 1 tablet (40 mg total) by mouth daily    metFORMIN (GLUCOPHAGE) 1000 MG tablet TAKE 1 TABLET TWICE DAILY    rosuvastatin (CRESTOR) 20 MG tablet TAKE 1 TABLET EVERY DAY     No current facility-administered medications on file prior to visit.       Allergies[5]    Physical Exam    There were no vitals taken for this visit.    Constitutional: {General Appearance:04414::\"normal, well developed, well nourished, alert, in no distress and non-toxic and no overt pain behavior.\"}  Eyes: {Sclera:77680::\"anicteric\"}  HEENT: {Hearin::\"grossly intact\"}  Neck: {Neck:80170::\"supple, symmetric, trachea midline and no masses \"}  Pulmonary:{Respiratory effort:93615::\"even and unlabored\"}  Cardiovascular:{Examination of Extremities:92846::\"No edema or pitting edema present\"}  Skin:{Skin and Subcutaneous tissues:90501::\"Normal without rashes or lesions and well hydrated\"}  Psychiatric:{Mood and Affect:88318::\"Mood and affect appropriate\"}  Neurologic:{Cranial Nerves:78272::\"Cranial Nerves II-XII grossly intact\"}  Musculoskeletal:{Gait and Station:37150::\"normal\"}    Imaging       [1]   Patient Active Problem List  Diagnosis    Lumbar radiculopathy    Spinal stenosis of lumbar region    Lumbar spondylosis    Chronic right hip pain    Type 2 diabetes mellitus with hyperglycemia, without long-term current use of insulin (HCC)    Primary osteoarthritis of first carpometacarpal joint of left hand    Arthritis of carpometacarpal (CMC) joint of left thumb    " Cataract of both eyes    DDD (degenerative disc disease), lumbar    Obstructive sleep apnea of adult    Secondary polycythemia    Chronic pain syndrome    Essential hypertension    Trigger finger, left index finger    Trigger ring finger of right hand    Sacroiliitis (HCC)    Vertigo    COPD (chronic obstructive pulmonary disease) (HCC)    Non-seasonal allergic rhinitis    Heart murmur, systolic    Obesity, morbid (HCC)    Persistent proteinuria    Osteoporosis    OAB (overactive bladder)    Chronic hypoxemic respiratory failure (HCC)    Stage 3 chronic kidney disease (HCC)    Xerostomia    Requires continuous at home supplemental oxygen    Type 2 diabetes mellitus with chronic kidney disease, without long-term current use of insulin, unspecified CKD stage (HCC)    Other hyperlipidemia   [2]   Past Medical History:  Diagnosis Date    Asthma     Chronic obstructive lung disease (HCC)     Community acquired pneumonia     LAST ASSESSED: [id number]    COPD (chronic obstructive pulmonary disease) (HCC)     Diabetes mellitus (HCC)     History of MRSA infection     2008 liver sx    Liver disease     Sleep apnea     Viral warts     LAST ASSESSED: [id number]   [3]   Past Surgical History:  Procedure Laterality Date    ABDOMINAL SURGERY      ABDOMINOPLASTY      ADRENALECTOMY Left     due to a adrenal mass that was later found be benign    CHOLECYSTECTOMY      COSMETIC SURGERY      EYE SURGERY      Bilateral cataracts 2015 Dr. FLORIN Villagran.     FINGER SURGERY      HAND SURGERY      HYSTERECTOMY      LIVER SURGERY      MA ARTHRP INTERCARPAL/CARP/MTCRPL JT INTERPOSITION Right 09/09/2016    Procedure: THUMB TRAPEZIECTOMY; BASAL JOINT ARTHROPLASTY WITH APL AUTOGRAFT;  Surgeon: Kedar Villareal MD;  Location[facility];  Service: Orthopedics    MA ARTHRP INTERCARPAL/CARP/MTCRPL JT INTERPOSITION Left 05/29/2018    Procedure: ARTHROPLASTY THUMB WEILBY;  Surgeon: Arthur Jackson MD;  [facility]: [facility];  Service: Orthopedics    MA  TENDON SHEATH INCISION Right 07/23/2019    Procedure: RELEASE TRIGGER FINGER RIGHT LONG;  Surgeon: Arthur Jackson MD;  [facility];  Service: Orthopedics    NH TENDON SHEATH INCISION Left 08/06/2019    Procedure: RELEASE TRIGGER FINGER LEFT INDEX;  Surgeon: Arthur Jackson MD;  [facility];  Service: Orthopedics    NH TENDON SHEATH INCISION Right 06/16/2020    Procedure: RELEASE TRIGGER FINGER, right ring;  Surgeon: Arthur Jackson MD;  [facility];  Service: Orthopedics    NH TR/TRNSPL TDN CARP/MTCRPL HAND W/O FR GRF EA TDN Left 05/29/2018    Procedure: TRANSFER TENDON HAND/WRIST FCR from forearm to wrist;  Surgeon: Arthur Jackson MD;  [facility];  Service: Orthopedics   [4]   Family History  Problem Relation Name Age of Onset    Heart attack Mother      Heart attack Father      Ovarian cancer Sister      Basal cell carcinoma Brother      Liver cancer Brother      Heart disease Family      Diabetes Family      Thyroid disease Family      Ovarian cancer Family      Heart disease Other Unknown         CARDIAC DISORDER    Diabetes Other Unknown     Liver cancer Other Unknown     Breast cancer Other Unknown     Stomach cancer Other Unknown    [5]   Allergies  Allergen Reactions    Chantix [Varenicline] Other (See Comments)     Suicidal thoughts

## 2025-05-29 NOTE — ASSESSMENT & PLAN NOTE
75-year-old female with a history of lumbar spondylosis, lumbar stenosis, lumbar radiculopathy, and chronic pain syndrome returning for interval follow-up.  Patient continues to complain of low back pain radiating into the anterior and posterior thighs with some subjective weakness.  Of note the patient was recently found to have cystic lesions in the pancreatic tail likely representing pancreatic pseudocysts.  There was initially a mass noted in the pancreas on CT of the chest, abdomen, and pelvis which was later investigated with MRI of the abdomen.  She is following with her PCP regarding this.  Patient continues with gabapentin 1200 mg twice daily with about 50% of relief.  ESIs lost their effectiveness over time.  She did have good relief with lumbar RFA for her low back pain which was completed in 2019.  Still doing fairly well from this standpoint.    1.  Patient will continue with gabapentin 1200 mg twice daily.  The patient did not need a refill today  2.  Patient will continue with HEP  3.  I will follow-up with the patient in 1 year or sooner if needed

## 2025-05-30 DIAGNOSIS — M54.16 LUMBAR RADICULOPATHY: ICD-10-CM

## 2025-06-02 RX ORDER — GABAPENTIN 600 MG/1
1200 TABLET ORAL 2 TIMES DAILY
Qty: 360 TABLET | Refills: 1 | Status: SHIPPED | OUTPATIENT
Start: 2025-06-02 | End: 2025-06-09 | Stop reason: SDUPTHER

## 2025-06-06 ENCOUNTER — APPOINTMENT (OUTPATIENT)
Dept: LAB | Facility: HOSPITAL | Age: 75
End: 2025-06-06
Payer: MEDICARE

## 2025-06-06 DIAGNOSIS — D53.9 NUTRITIONAL ANEMIA, UNSPECIFIED: ICD-10-CM

## 2025-06-06 DIAGNOSIS — R43.2 DYSGEUSIA: ICD-10-CM

## 2025-06-06 DIAGNOSIS — D50.9 IRON DEFICIENCY ANEMIA, UNSPECIFIED IRON DEFICIENCY ANEMIA TYPE: ICD-10-CM

## 2025-06-06 DIAGNOSIS — R63.4 WEIGHT LOSS: ICD-10-CM

## 2025-06-06 DIAGNOSIS — D75.89 MACROCYTOSIS: ICD-10-CM

## 2025-06-06 LAB
BASOPHILS # BLD AUTO: 0.06 THOUSANDS/ÂΜL (ref 0–0.1)
BASOPHILS NFR BLD AUTO: 1 % (ref 0–1)
EOSINOPHIL # BLD AUTO: 0.14 THOUSAND/ÂΜL (ref 0–0.61)
EOSINOPHIL NFR BLD AUTO: 1 % (ref 0–6)
ERYTHROCYTE [DISTWIDTH] IN BLOOD BY AUTOMATED COUNT: 13.9 % (ref 11.6–15.1)
FOLATE SERPL-MCNC: 7.9 NG/ML
HCT VFR BLD AUTO: 37.4 % (ref 34.8–46.1)
HGB BLD-MCNC: 11.1 G/DL (ref 11.5–15.4)
IMM GRANULOCYTES # BLD AUTO: 0.04 THOUSAND/UL (ref 0–0.2)
IMM GRANULOCYTES NFR BLD AUTO: 0 % (ref 0–2)
IRON SATN MFR SERPL: 27 % (ref 15–50)
IRON SERPL-MCNC: 84 UG/DL (ref 50–212)
LYMPHOCYTES # BLD AUTO: 1.98 THOUSANDS/ÂΜL (ref 0.6–4.47)
LYMPHOCYTES NFR BLD AUTO: 19 % (ref 14–44)
MCH RBC QN AUTO: 30.2 PG (ref 26.8–34.3)
MCHC RBC AUTO-ENTMCNC: 29.7 G/DL (ref 31.4–37.4)
MCV RBC AUTO: 102 FL (ref 82–98)
MONOCYTES # BLD AUTO: 0.83 THOUSAND/ÂΜL (ref 0.17–1.22)
MONOCYTES NFR BLD AUTO: 8 % (ref 4–12)
NEUTROPHILS # BLD AUTO: 7.27 THOUSANDS/ÂΜL (ref 1.85–7.62)
NEUTS SEG NFR BLD AUTO: 71 % (ref 43–75)
NRBC BLD AUTO-RTO: 0 /100 WBCS
PLATELET # BLD AUTO: 194 THOUSANDS/UL (ref 149–390)
PMV BLD AUTO: 11.4 FL (ref 8.9–12.7)
PREALB SERPL-MCNC: 19.6 MG/DL (ref 17–34)
RBC # BLD AUTO: 3.67 MILLION/UL (ref 3.81–5.12)
TIBC SERPL-MCNC: 316.4 UG/DL (ref 250–450)
TRANSFERRIN SERPL-MCNC: 226 MG/DL (ref 203–362)
UIBC SERPL-MCNC: 232 UG/DL (ref 155–355)
VIT B12 SERPL-MCNC: 100 PG/ML (ref 180–914)
WBC # BLD AUTO: 10.32 THOUSAND/UL (ref 4.31–10.16)

## 2025-06-06 PROCEDURE — 83540 ASSAY OF IRON: CPT

## 2025-06-06 PROCEDURE — 85025 COMPLETE CBC W/AUTO DIFF WBC: CPT

## 2025-06-06 PROCEDURE — 83550 IRON BINDING TEST: CPT

## 2025-06-06 PROCEDURE — 83825 ASSAY OF MERCURY: CPT

## 2025-06-06 PROCEDURE — 84134 ASSAY OF PREALBUMIN: CPT

## 2025-06-06 PROCEDURE — 36415 COLL VENOUS BLD VENIPUNCTURE: CPT

## 2025-06-06 PROCEDURE — 83655 ASSAY OF LEAD: CPT

## 2025-06-06 PROCEDURE — 82607 VITAMIN B-12: CPT

## 2025-06-06 PROCEDURE — 84630 ASSAY OF ZINC: CPT

## 2025-06-06 PROCEDURE — 82746 ASSAY OF FOLIC ACID SERUM: CPT

## 2025-06-06 PROCEDURE — 82175 ASSAY OF ARSENIC: CPT

## 2025-06-09 DIAGNOSIS — M54.16 LUMBAR RADICULOPATHY: ICD-10-CM

## 2025-06-09 LAB — ZINC SERPL-MCNC: 81 UG/DL (ref 44–115)

## 2025-06-09 RX ORDER — GABAPENTIN 600 MG/1
1200 TABLET ORAL 2 TIMES DAILY
Qty: 360 TABLET | Refills: 1 | Status: SHIPPED | OUTPATIENT
Start: 2025-06-09

## 2025-06-09 NOTE — TELEPHONE ENCOUNTER
Reason for call:   [x] Refill   [] Prior Auth  [x] Other: NOT A DUPLICATE, was sent to the wrong pharm, will resend      Office:   [x] PCP/Provider - Lee Brown  [] Specialty/Provider -     Medication:     gabapentin (Neurontin) 600 MG tablet       Dose/Frequency: Take 2 tablets (1,200 mg total) by mouth 2 (two) times a day     Quantity: 360    Pharmacy: [facility] - [facility] [address] [phone]     Local Pharmacy   Does the patient have enough for 3 days?   [] Yes   [] No - Send as HP to POD    Mail Away Pharmacy   Does the patient have enough for 10 days?   [] Yes   [x] No - Send as HP to POD

## 2025-06-11 LAB
ARSENIC BLD-MCNC: 1 UG/L (ref 0–9)
LEAD BLD-MCNC: 1.1 UG/DL (ref 0–3.4)
MERCURY BLD-MCNC: <1 UG/L (ref 0–14.9)

## 2025-06-12 ENCOUNTER — RESULTS FOLLOW-UP (OUTPATIENT)
Dept: FAMILY MEDICINE CLINIC | Facility: CLINIC | Age: 75
End: 2025-06-12

## 2025-06-25 ENCOUNTER — RA CDI HCC (OUTPATIENT)
Dept: OTHER | Facility: HOSPITAL | Age: 75
End: 2025-06-25

## 2025-06-26 NOTE — PROGRESS NOTES
HCC coding opportunities       Chart reviewed, no opportunity found: CHART REVIEWED, NO OPPORTUNITY FOUND        Patients Insurance     Medicare Insurance: Medicare          
Patient/Caregiver provided printed discharge information.

## 2025-07-03 ENCOUNTER — OFFICE VISIT (OUTPATIENT)
Dept: FAMILY MEDICINE CLINIC | Facility: CLINIC | Age: 75
End: 2025-07-03
Payer: MEDICARE

## 2025-07-03 VITALS
BODY MASS INDEX: 31.45 KG/M2 | WEIGHT: 156 LBS | TEMPERATURE: 97.1 F | HEART RATE: 81 BPM | HEIGHT: 59 IN | SYSTOLIC BLOOD PRESSURE: 118 MMHG | RESPIRATION RATE: 16 BRPM | DIASTOLIC BLOOD PRESSURE: 60 MMHG | OXYGEN SATURATION: 94 %

## 2025-07-03 DIAGNOSIS — E53.8 B12 DEFICIENCY: ICD-10-CM

## 2025-07-03 DIAGNOSIS — D50.9 IRON DEFICIENCY ANEMIA, UNSPECIFIED IRON DEFICIENCY ANEMIA TYPE: ICD-10-CM

## 2025-07-03 DIAGNOSIS — K86.89 PANCREATIC MASS: ICD-10-CM

## 2025-07-03 DIAGNOSIS — I10 ESSENTIAL HYPERTENSION: ICD-10-CM

## 2025-07-03 DIAGNOSIS — R43.2 DYSGEUSIA: ICD-10-CM

## 2025-07-03 DIAGNOSIS — E11.65 TYPE 2 DIABETES MELLITUS WITH HYPERGLYCEMIA, WITHOUT LONG-TERM CURRENT USE OF INSULIN (HCC): Primary | ICD-10-CM

## 2025-07-03 LAB
CREAT UR-MCNC: 39.8 MG/DL
LEFT EYE DIABETIC RETINOPATHY: NORMAL
LEFT EYE IMAGE QUALITY: NORMAL
LEFT EYE MACULAR EDEMA: NORMAL
LEFT EYE OTHER RETINOPATHY: NORMAL
MICROALBUMIN UR-MCNC: 132.6 MG/L
MICROALBUMIN/CREAT 24H UR: 333 MG/G CREATININE (ref 0–30)
RIGHT EYE DIABETIC RETINOPATHY: NORMAL
RIGHT EYE IMAGE QUALITY: NORMAL
RIGHT EYE MACULAR EDEMA: NORMAL
RIGHT EYE OTHER RETINOPATHY: NORMAL
SEVERITY (EYE EXAM): NORMAL

## 2025-07-03 PROCEDURE — 99214 OFFICE O/P EST MOD 30 MIN: CPT | Performed by: FAMILY MEDICINE

## 2025-07-03 PROCEDURE — 82570 ASSAY OF URINE CREATININE: CPT | Performed by: FAMILY MEDICINE

## 2025-07-03 PROCEDURE — 96372 THER/PROPH/DIAG INJ SC/IM: CPT

## 2025-07-03 PROCEDURE — 92250 FUNDUS PHOTOGRAPHY W/I&R: CPT | Performed by: FAMILY MEDICINE

## 2025-07-03 PROCEDURE — 82043 UR ALBUMIN QUANTITATIVE: CPT | Performed by: FAMILY MEDICINE

## 2025-07-03 RX ORDER — LANOLIN ALCOHOL/MO/W.PET/CERES
1000 CREAM (GRAM) TOPICAL DAILY
Qty: 90 TABLET | Refills: 0 | Status: SHIPPED | OUTPATIENT
Start: 2025-07-03

## 2025-07-03 RX ORDER — CYANOCOBALAMIN 1000 UG/ML
1000 INJECTION, SOLUTION INTRAMUSCULAR; SUBCUTANEOUS
Status: SHIPPED | OUTPATIENT
Start: 2025-07-03

## 2025-07-03 RX ORDER — LOSARTAN POTASSIUM 25 MG/1
25 TABLET ORAL DAILY
Qty: 30 TABLET | Refills: 0 | Status: SHIPPED | OUTPATIENT
Start: 2025-07-03

## 2025-07-03 RX ADMIN — CYANOCOBALAMIN 1000 MCG: 1000 INJECTION, SOLUTION INTRAMUSCULAR; SUBCUTANEOUS at 12:52

## 2025-07-03 NOTE — PROGRESS NOTES
Diabetic Foot Exam    Patient's shoes and socks removed.    Right Foot/Ankle   Right Foot Inspection  Skin Exam: skin normal, skin intact, dry skin and ulcer. No warmth, no erythema, no maceration, no abnormal color and no pre-ulcer.     Toe Exam: ROM and strength within normal limits.     Sensory   Vibration: intact  Proprioception: intact  Monofilament testing: intact    Vascular  Capillary refills: < 3 seconds  The right DP pulse is 2+. The right PT pulse is 2+.     Left Foot/Ankle  Left Foot Inspection  Skin Exam: skin normal, skin intact, dry skin and callus. No warmth, no erythema, no maceration, normal color, no pre-ulcer and no ulcer.     Toe Exam: ROM and strength within normal limits.     Sensory   Vibration: intact  Proprioception: intact  Monofilament testing: intact    Vascular  Capillary refills: < 3 seconds  The left DP pulse is 2+. The left PT pulse is 2+.     Assign Risk Category  No deformity present  No loss of protective sensation  No weak pulses  Risk: 0  Name: Deanna Perez      : 1950      MRN: [id number]  Encounter Provider: Lee Brown MD  Encounter Date: 7/3/2025   Encounter department: [facility]  :  Assessment & Plan  Type 2 diabetes mellitus with hyperglycemia, without long-term current use of insulin ([facility])    Lab Results   Component Value Date    HGBA1C 6.2 2025     Controlled   Has been consuming more sweets to help maintain her weight  Asked to limit this and consume more protein ( yogurt, cottage cheese, etc)  Eye and foot exam performed  Urine microalbumin collected  Continue glipizide and metformin   Orders:    Albumin / creatinine urine ratio    IRIS Diabetic eye exam    Essential hypertension  At goal on lisinopril 40 mg daily   ACE -inhibitor may be contributing to her altered taste and will switch to losartan 25 mg instead  Orders:    losartan (COZAAR) 25 mg tablet; Take 1 tablet (25 mg total) by mouth daily    B12 deficiency  Lab  "Results   Component Value Date    [id number] 100 (L) 06/06/2025     Measured 100  B12 injection given today and will take oral b12 daily for 3 months   Orders:    cyanocobalamin injection 1,000 mcg    vitamin B-12 (VITAMIN B-12) 1,000 mcg tablet; Take 1 tablet (1,000 mcg total) by mouth daily    Pancreatic mass  Recent MRI read:  - Multilobulated cystic lesion in the pancreatic tail measuring 5.3 x 3.1 cm, corresponding to the \"mass\" described on prior, most in keeping with a pseudocyst.   - There are multiple additional cystic lesions throughout the pancreas, likely representing additional pseudocysts and/or dilated sidebranches.   - No solid pancreatic mass.   Will order repeat MRI for September  Currently asymptomatic and weight stable   Orders:    MRI abdomen w wo contrast and mrcp; Future    Dysgeusia  Reports a metallic taste whenever she consumes meats. Started 3 months ago. As a result, she has lost 30 lbs since she is only consuming yougurt, fruits, and candy bars. Possible etiologies include medications or b12 deficiency (measured 100 last month). B 12 injection provided and will continue oral b12 daily. Also switching lisinopril to losartan 25 mg daily        Iron deficiency anemia, unspecified iron deficiency anemia type  Lab Results   Component Value Date    WBC 10.32 (H) 06/06/2025    HGB 11.1 (L) 06/06/2025    HCT 37.4 06/06/2025     (H) 06/06/2025     06/06/2025     Improving with oral iron every other day                  History of Present Illness   Here for follow up  Completed the blood work  Weight stable   Been eating more sweets to try to keep the weight up  Still not able to consume meats and is more restricted with what she can eat        Review of Systems    Objective   /60 (BP Location: Left arm, Patient Position: Sitting, Cuff Size: Standard)   Pulse 81   Temp (!) 97.1 °F (36.2 °C) (Tympanic)   Resp 16   Ht 4' 11\" (1.499 m)   Wt 70.8 kg (156 lb)   SpO2 94%   BMI " 31.51 kg/m²      Physical Exam  Vitals reviewed.   Constitutional:       Appearance: Normal appearance.   HENT:      Mouth/Throat:      Mouth: Mucous membranes are moist.      Pharynx: No oropharyngeal exudate or posterior oropharyngeal erythema.      Comments: Glossitis      Cardiovascular:      Rate and Rhythm: Normal rate and regular rhythm.      Pulses: no weak pulses.           Dorsalis pedis pulses are 2+ on the right side and 2+ on the left side.        Posterior tibial pulses are 2+ on the right side and 2+ on the left side.      Heart sounds: No murmur heard.  Pulmonary:      Effort: No respiratory distress.      Breath sounds: Normal breath sounds. No stridor. No wheezing, rhonchi or rales.      Comments: Diminished breath sounds     Musculoskeletal:      Right lower leg: No edema.      Left lower leg: No edema.   Feet:      Right foot:      Skin integrity: Ulcer and dry skin present. No skin breakdown, erythema or warmth.      Left foot:      Skin integrity: Callus and dry skin present. No ulcer, skin breakdown, erythema or warmth.     Neurological:      Mental Status: She is alert and oriented to person, place, and time.     Psychiatric:         Mood and Affect: Mood normal.         Behavior: Behavior normal.

## 2025-07-03 NOTE — ASSESSMENT & PLAN NOTE
Lab Results   Component Value Date    HGBA1C 6.2 03/07/2025     Controlled   Has been consuming more sweets to help maintain her weight  Asked to limit this and consume more protein ( yogurt, cottage cheese, etc)  Eye and foot exam performed  Urine microalbumin collected  Continue glipizide and metformin   Orders:    Albumin / creatinine urine ratio    IRIS Diabetic eye exam

## 2025-07-03 NOTE — ASSESSMENT & PLAN NOTE
At goal on lisinopril 40 mg daily   ACE -inhibitor may be contributing to her altered taste and will switch to losartan 25 mg instead  Orders:    losartan (COZAAR) 25 mg tablet; Take 1 tablet (25 mg total) by mouth daily

## 2025-08-01 DIAGNOSIS — I10 ESSENTIAL HYPERTENSION: ICD-10-CM

## 2025-08-04 RX ORDER — LOSARTAN POTASSIUM 25 MG/1
25 TABLET ORAL DAILY
Qty: 90 TABLET | Refills: 1 | Status: SHIPPED | OUTPATIENT
Start: 2025-08-04

## (undated) DEVICE — ABDOMINAL PAD: Brand: DERMACEA

## (undated) DEVICE — ACE WRAP 3 IN STERILE

## (undated) DEVICE — SUT MONOCRYL 4-0 PS-2 18 IN Y496G

## (undated) DEVICE — STERILE BETHLEHEM PLASTIC HAND: Brand: CARDINAL HEALTH

## (undated) DEVICE — GLOVE INDICATOR PI UNDERGLOVE SZ 8 BLUE

## (undated) DEVICE — OCCLUSIVE GAUZE STRIP,3% BISMUTH TRIBROMOPHENATE IN PETROLATUM BLEND: Brand: XEROFORM

## (undated) DEVICE — SUT SILK 0 SH 24 IN K534H

## (undated) DEVICE — SPONGE PVP SCRUB WING STERILE

## (undated) DEVICE — CHLORAPREP HI-LITE 26ML ORANGE

## (undated) DEVICE — ACE WRAP 3 IN VELCRO LATEX FREE

## (undated) DEVICE — SUT ETHIBOND 2-0 SH-1/SH-1 30 IN X763H

## (undated) DEVICE — GLOVE SRG BIOGEL 7.5

## (undated) DEVICE — PADDING CAST 3IN COTTON STRL

## (undated) DEVICE — DISPOSABLE EQUIPMENT COVER: Brand: SMALL TOWEL DRAPE

## (undated) DEVICE — PADDING CAST 4 IN  COTTON STRL

## (undated) DEVICE — DRAPE C-ARM X-RAY

## (undated) DEVICE — GAUZE SPONGES,16 PLY: Brand: CURITY

## (undated) DEVICE — MINI BLADE ROUND TIP SHARP ON ONE SIDE

## (undated) DEVICE — CUFF TOURNIQUET 18 X 4 IN QUICK CONNECT DISP 1 BLADDER

## (undated) DEVICE — SUT PROLENE 4-0 PS-2 18 IN 8682G

## (undated) DEVICE — NEEDLE 25G X 1 1/2

## (undated) DEVICE — PACK PLASTIC HAND PBDS

## (undated) DEVICE — ARM SLING: Brand: DEROYAL

## (undated) DEVICE — INTENDED FOR TISSUE SEPARATION, AND OTHER PROCEDURES THAT REQUIRE A SHARP SURGICAL BLADE TO PUNCTURE OR CUT.: Brand: BARD-PARKER SAFETY BLADES SIZE 15, STERILE

## (undated) DEVICE — SUT VICRYL 3-0 SH 27 IN J416H

## (undated) DEVICE — STRETCH BANDAGE: Brand: CURITY

## (undated) DEVICE — SPONGE GELFILM 2.5 X 5 CM